# Patient Record
Sex: FEMALE | Race: WHITE | NOT HISPANIC OR LATINO | Employment: OTHER | URBAN - METROPOLITAN AREA
[De-identification: names, ages, dates, MRNs, and addresses within clinical notes are randomized per-mention and may not be internally consistent; named-entity substitution may affect disease eponyms.]

---

## 2017-01-04 ENCOUNTER — ALLSCRIPTS OFFICE VISIT (OUTPATIENT)
Dept: OTHER | Facility: OTHER | Age: 72
End: 2017-01-04

## 2017-01-04 DIAGNOSIS — Z13.820 ENCOUNTER FOR SCREENING FOR OSTEOPOROSIS: ICD-10-CM

## 2017-01-04 DIAGNOSIS — Z12.31 ENCOUNTER FOR SCREENING MAMMOGRAM FOR MALIGNANT NEOPLASM OF BREAST: ICD-10-CM

## 2017-01-04 DIAGNOSIS — E04.2 NONTOXIC MULTINODULAR GOITER: ICD-10-CM

## 2017-01-05 ENCOUNTER — GENERIC CONVERSION - ENCOUNTER (OUTPATIENT)
Dept: OTHER | Facility: OTHER | Age: 72
End: 2017-01-05

## 2017-01-05 ENCOUNTER — LAB CONVERSION - ENCOUNTER (OUTPATIENT)
Dept: OTHER | Facility: OTHER | Age: 72
End: 2017-01-05

## 2017-01-05 LAB — HBA1C MFR BLD HPLC: 11.7 % OF TOTAL HGB

## 2017-01-06 LAB — CULTURE RESULT (HISTORICAL): NORMAL

## 2017-01-11 ENCOUNTER — GENERIC CONVERSION - ENCOUNTER (OUTPATIENT)
Dept: OTHER | Facility: OTHER | Age: 72
End: 2017-01-11

## 2017-01-16 ENCOUNTER — HOSPITAL ENCOUNTER (OUTPATIENT)
Dept: RADIOLOGY | Age: 72
Discharge: HOME | End: 2017-01-16
Payer: MEDICARE

## 2017-01-16 DIAGNOSIS — E04.2 NONTOXIC MULTINODULAR GOITER: ICD-10-CM

## 2017-01-16 DIAGNOSIS — Z12.31 ENCOUNTER FOR SCREENING MAMMOGRAM FOR MALIGNANT NEOPLASM OF BREAST: ICD-10-CM

## 2017-01-16 PROCEDURE — G0202 SCR MAMMO BI INCL CAD: HCPCS

## 2017-01-16 PROCEDURE — 76536 US EXAM OF HEAD AND NECK: CPT

## 2017-02-09 ENCOUNTER — GENERIC CONVERSION - ENCOUNTER (OUTPATIENT)
Dept: OTHER | Facility: OTHER | Age: 72
End: 2017-02-09

## 2017-04-24 ENCOUNTER — ALLSCRIPTS OFFICE VISIT (OUTPATIENT)
Dept: OTHER | Facility: OTHER | Age: 72
End: 2017-04-24

## 2017-04-24 DIAGNOSIS — R09.89 OTHER SPECIFIED SYMPTOMS AND SIGNS INVOLVING THE CIRCULATORY AND RESPIRATORY SYSTEMS: ICD-10-CM

## 2017-04-24 LAB — GLUCOSE SERPL-MCNC: 272 MG/DL

## 2017-04-26 ENCOUNTER — TRANSCRIBE ORDERS (OUTPATIENT)
Dept: LAB | Facility: CLINIC | Age: 72
End: 2017-04-26

## 2017-04-26 ENCOUNTER — APPOINTMENT (OUTPATIENT)
Dept: LAB | Facility: CLINIC | Age: 72
End: 2017-04-26
Payer: MEDICARE

## 2017-04-26 DIAGNOSIS — E55.9 UNSPECIFIED VITAMIN D DEFICIENCY: ICD-10-CM

## 2017-04-26 DIAGNOSIS — E11.65 UNCONTROLLED TYPE 2 DIABETES MELLITUS WITH COMPLICATION, UNSPECIFIED LONG TERM INSULIN USE STATUS: ICD-10-CM

## 2017-04-26 DIAGNOSIS — E78.2 MIXED HYPERLIPIDEMIA: ICD-10-CM

## 2017-04-26 DIAGNOSIS — E04.2 NONTOXIC MULTINODULAR GOITER: ICD-10-CM

## 2017-04-26 DIAGNOSIS — I10 UNSPECIFIED ESSENTIAL HYPERTENSION: ICD-10-CM

## 2017-04-26 DIAGNOSIS — I10 UNSPECIFIED ESSENTIAL HYPERTENSION: Primary | ICD-10-CM

## 2017-04-26 DIAGNOSIS — E11.8 UNCONTROLLED TYPE 2 DIABETES MELLITUS WITH COMPLICATION, UNSPECIFIED LONG TERM INSULIN USE STATUS: ICD-10-CM

## 2017-04-26 LAB
25(OH)D3 SERPL-MCNC: 15.5 NG/ML (ref 30–100)
ALBUMIN SERPL BCP-MCNC: 3.6 G/DL (ref 3.5–5)
ALP SERPL-CCNC: 119 U/L (ref 46–116)
ALT SERPL W P-5'-P-CCNC: 26 U/L (ref 12–78)
ANION GAP SERPL CALCULATED.3IONS-SCNC: 9 MMOL/L (ref 4–13)
AST SERPL W P-5'-P-CCNC: 16 U/L (ref 5–45)
BILIRUB DIRECT SERPL-MCNC: 0.1 MG/DL (ref 0–0.2)
BILIRUB SERPL-MCNC: 0.4 MG/DL (ref 0.2–1)
BUN SERPL-MCNC: 13 MG/DL (ref 5–25)
CALCIUM SERPL-MCNC: 9.5 MG/DL (ref 8.3–10.1)
CHLORIDE SERPL-SCNC: 97 MMOL/L (ref 100–108)
CHOLEST SERPL-MCNC: 234 MG/DL (ref 50–200)
CO2 SERPL-SCNC: 31 MMOL/L (ref 21–32)
CREAT SERPL-MCNC: 0.71 MG/DL (ref 0.6–1.3)
CREAT UR-MCNC: 89.1 MG/DL
EST. AVERAGE GLUCOSE BLD GHB EST-MCNC: 255 MG/DL
GFR SERPL CREATININE-BSD FRML MDRD: >60 ML/MIN/1.73SQ M
GLUCOSE SERPL-MCNC: 284 MG/DL (ref 65–140)
HBA1C MFR BLD: 10.5 % (ref 4.2–6.3)
HDLC SERPL-MCNC: 51 MG/DL (ref 40–60)
LDLC SERPL CALC-MCNC: 150 MG/DL (ref 0–100)
MICROALBUMIN UR-MCNC: 9.8 MG/L (ref 0–20)
MICROALBUMIN/CREAT 24H UR: 11 MG/G CREATININE (ref 0–30)
PHOSPHATE SERPL-MCNC: 3.3 MG/DL (ref 2.3–4.1)
POTASSIUM SERPL-SCNC: 4.5 MMOL/L (ref 3.5–5.3)
PROT SERPL-MCNC: 7.6 G/DL (ref 6.4–8.2)
PTH-INTACT SERPL-MCNC: 77.6 PG/ML (ref 14–72)
SODIUM SERPL-SCNC: 137 MMOL/L (ref 136–145)
T4 FREE SERPL-MCNC: 1.06 NG/DL (ref 0.76–1.46)
TRIGL SERPL-MCNC: 163 MG/DL
TSH SERPL DL<=0.05 MIU/L-ACNC: 1.89 UIU/ML (ref 0.36–3.74)

## 2017-04-26 PROCEDURE — 80076 HEPATIC FUNCTION PANEL: CPT

## 2017-04-26 PROCEDURE — 82306 VITAMIN D 25 HYDROXY: CPT

## 2017-04-26 PROCEDURE — 84100 ASSAY OF PHOSPHORUS: CPT

## 2017-04-26 PROCEDURE — 80048 BASIC METABOLIC PNL TOTAL CA: CPT

## 2017-04-26 PROCEDURE — 83036 HEMOGLOBIN GLYCOSYLATED A1C: CPT

## 2017-04-26 PROCEDURE — 84443 ASSAY THYROID STIM HORMONE: CPT

## 2017-04-26 PROCEDURE — 83970 ASSAY OF PARATHORMONE: CPT

## 2017-04-26 PROCEDURE — 36415 COLL VENOUS BLD VENIPUNCTURE: CPT

## 2017-04-26 PROCEDURE — 84439 ASSAY OF FREE THYROXINE: CPT

## 2017-04-26 PROCEDURE — 82043 UR ALBUMIN QUANTITATIVE: CPT | Performed by: INTERNAL MEDICINE

## 2017-04-26 PROCEDURE — 82570 ASSAY OF URINE CREATININE: CPT | Performed by: INTERNAL MEDICINE

## 2017-04-26 PROCEDURE — 84681 ASSAY OF C-PEPTIDE: CPT

## 2017-04-26 PROCEDURE — 80061 LIPID PANEL: CPT

## 2017-04-27 LAB — C PEPTIDE SERPL-MCNC: 3.5 NG/ML (ref 1.1–4.4)

## 2017-04-28 ENCOUNTER — GENERIC CONVERSION - ENCOUNTER (OUTPATIENT)
Dept: OTHER | Facility: OTHER | Age: 72
End: 2017-04-28

## 2017-09-11 ENCOUNTER — ALLSCRIPTS OFFICE VISIT (OUTPATIENT)
Dept: OTHER | Facility: OTHER | Age: 72
End: 2017-09-11

## 2017-09-14 ENCOUNTER — LAB CONVERSION - ENCOUNTER (OUTPATIENT)
Dept: OTHER | Facility: OTHER | Age: 72
End: 2017-09-14

## 2017-09-14 LAB
BUN SERPL-MCNC: 16 MG/DL (ref 7–25)
BUN/CREA RATIO (HISTORICAL): ABNORMAL (CALC) (ref 6–22)
CALCIUM SERPL-MCNC: 9.4 MG/DL (ref 8.6–10.4)
CHLORIDE SERPL-SCNC: 99 MMOL/L (ref 98–110)
CO2 SERPL-SCNC: 25 MMOL/L (ref 20–31)
CREAT SERPL-MCNC: 0.68 MG/DL (ref 0.6–0.93)
EGFR AFRICAN AMERICAN (HISTORICAL): 102 ML/MIN/1.73M2
EGFR-AMERICAN CALC (HISTORICAL): 88 ML/MIN/1.73M2
GLUCOSE (HISTORICAL): 344 MG/DL (ref 65–99)
HBA1C MFR BLD HPLC: 10.8 % OF TOTAL HGB
POTASSIUM SERPL-SCNC: 4.5 MMOL/L (ref 3.5–5.3)
SODIUM SERPL-SCNC: 135 MMOL/L (ref 135–146)

## 2017-09-20 ENCOUNTER — GENERIC CONVERSION - ENCOUNTER (OUTPATIENT)
Dept: OTHER | Facility: OTHER | Age: 72
End: 2017-09-20

## 2018-01-12 NOTE — RESULT NOTES
Message   Madi Graff called her and told her that her throat culture was negative  She may discontinue antibiotics  Verified Results  (1) THROAT CULTURE (CULTURE, UPPER RESPIRATORY) [id number] 12:00AM Booker Esteves     Test Name Result Flag Reference   CULTURE, THROAT      CULTURE, THROAT         MICRO NUMBER:      [id number]    TEST STATUS:       FINAL    SPECIMEN SOURCE:   NOT GIVEN    SPECIMEN QUALITY:  ADEQUATE    RESULT:            No oropharyngeal pathogens recovered

## 2018-01-13 VITALS
DIASTOLIC BLOOD PRESSURE: 90 MMHG | SYSTOLIC BLOOD PRESSURE: 160 MMHG | WEIGHT: 260.38 LBS | HEART RATE: 84 BPM | HEIGHT: 68 IN | BODY MASS INDEX: 39.46 KG/M2

## 2018-01-13 VITALS
TEMPERATURE: 99.7 F | SYSTOLIC BLOOD PRESSURE: 140 MMHG | BODY MASS INDEX: 39.25 KG/M2 | DIASTOLIC BLOOD PRESSURE: 70 MMHG | HEIGHT: 68 IN | WEIGHT: 259 LBS

## 2018-01-13 VITALS
WEIGHT: 255 LBS | BODY MASS INDEX: 38.65 KG/M2 | SYSTOLIC BLOOD PRESSURE: 144 MMHG | DIASTOLIC BLOOD PRESSURE: 82 MMHG | HEIGHT: 68 IN

## 2018-01-13 NOTE — RESULT NOTES
Verified Results  (1) BASIC METABOLIC PROFILE [id number] 12:00AM Manpreet Ho     Test Name Result Flag Reference   GLUCOSE 344 mg/dL H 65-99   Fasting reference interval     For someone without known diabetes, a glucose  value >125 mg/dL indicates that they may have  diabetes and this should be confirmed with a  follow-up test    UREA NITROGEN (BUN) 16 mg/dL  7-25   CREATININE 0 68 mg/dL  0 60-0 93   For patients >52years of age, the reference limit  for Creatinine is approximately 13% higher for people  identified as -American  eGFR NON-AFR  AMERICAN 88 mL/min/1 73m2  > OR = 60   eGFR AFRICAN AMERICAN 102 mL/min/1 73m2  > OR = 60   BUN/CREATININE RATIO   4-97   NOT APPLICABLE (calc)   SODIUM 135 mmol/L  135-146   POTASSIUM 4 5 mmol/L  3 5-5 3   CHLORIDE 99 mmol/L     CARBON DIOXIDE 25 mmol/L  20-31   CALCIUM 9 4 mg/dL  8 6-10 4     (Q) HEMOGLOBIN A1c [id number] 12:00AM Booker Esteves     Test Name Result Flag Reference   HEMOGLOBIN A1c 10 8 % of total Hgb H <5 7   For someone without known diabetes, a hemoglobin A1c  value of 6 5% or greater indicates that they may have   diabetes and this should be confirmed with a follow-up   test      For someone with known diabetes, a value <7% indicates   that their diabetes is well controlled and a value   greater than or equal to 7% indicates suboptimal   control  A1c targets should be individualized based on   duration of diabetes, age, comorbid conditions, and   other considerations  Currently, no consensus exists regarding use of  hemoglobin A1c for diagnosis of diabetes for children         Plan  Diabetes type 2, uncontrolled    · MetFORMIN HCl ER (OSM) 1000 MG Oral Tablet Extended Release 24 Hour;  take one tablet by mouth twice daily

## 2018-01-13 NOTE — RESULT NOTES
Discussion/Summary   diabetes is out of control  can start metformin as she wishes but would need more meds as a1c is 10 5%  send us Logs  high BG would be deterimental to her body organs  vitamin D is low , let us know if she is willing to take 50K weekly x 8 weeks  Verified Results  (1) RENAL FUNCTION PANEL 26Apr2017 06:28AM Shalonda Bettencourt     Test Name Result Flag Reference   PHOSPHORUS 3 3 mg/dL  2 3-4 1     (1) LIPID PANEL, FASTING 26Apr2017 06:28AM Shalonda Bettencourt     Test Name Result Flag Reference   CHOLESTEROL 234 mg/dL H    HDL,DIRECT 51 mg/dL  40-60   Specimen collection should occur prior to Metamizole administration due to the potential for falsely depressed results  LDL CHOLESTEROL CALCULATED 150 mg/dL H 0-100   This is a fasting blood test  Water,black tea or black  coffee only after 9:00pm the night before test  Drink 2 glasses of water the morning of test         Triglyceride:         Normal              <150 mg/dl       Borderline High    150-199 mg/dl       High               200-499 mg/dl       Very High          >499 mg/dl  Cholesterol:         Desirable        <200 mg/dl      Borderline High  200-239 mg/dl      High             >239 mg/dl  HDL Cholesterol:        High    >59 mg/dL      Low     <41 mg/dL  LDL CALCULATED:    This screening LDL is a calculated result  It does not have the accuracy of the Direct Measured LDL in the monitoring of patients with hyperlipidemia and/or statin therapy  Direct Measure LDL ([id number]) must be ordered separately in these patients  TRIGLYCERIDES 163 mg/dL H <=150   Specimen collection should occur prior to N-Acetylcysteine or Metamizole administration due to the potential for falsely depressed results  (1) HEPATIC FUNCTION PANEL 26Apr2017 06:28AM Sahlonda Bettencourt     Test Name Result Flag Reference   ALBUMIN 3 6 g/dL  3 5-5 0   This bloodwork is non-fasting  Please drink two glasses of water morning of  bloodwork     ALK PHOSPHATAS 119 U/L H    ALT (SGPT) 26 U/L  12-78   AST(SGOT) 16 U/L  5-45   BILI, DIRECT 0 10 mg/dL  0 00-0 20   BILI, TOTAL 0 40 mg/dL  0 20-1 00   TOTAL PROTEIN 7 6 g/dL  6 4-8 2     (1) RENAL FUNCTION PANEL 26Apr2017 06:28 Lisa Ku     Test Name Result Flag Reference   ANION GAP (CALC) 9 mmol/L  4-13   BLOOD UREA NITROGEN 13 mg/dL  5-25   CALCIUM 9 5 mg/dL  8 3-10 1   CHLORIDE 97 mmol/L L 100-108   CARBON DIOXIDE 31 mmol/L  21-32   CREATININE 0 71 mg/dL  0 60-1 30   Standardized to IDMS reference method   GLUCOSE,RANDM 284 mg/dL H    If the patient is fasting, the ADA then defines impaired fasting glucose as > 100 mg/dL and diabetes as > or equal to 123 mg/dL  POTASSIUM 4 5 mmol/L  3 5-5 3   eGFR Non-African American      >60 0 ml/min/1 [address] [facility] Disease Education Program recommendations are as follows:  GFR calculation is accurate only with a steady state creatinine  Chronic Kidney disease less than 60 ml/min/1 73 sq  meters  Kidney failure less than 15 ml/min/1 73 sq  meters  SODIUM 137 mmol/L  136-145     (1) TSH 26Apr2017 06:28 Lisa Bondgess     Test Name Result Flag Reference   TSH 1 890 uIU/mL  0 358-3 740   This bloodwork is non-fasting  Please drink two glasses of water morning of  bloodwork  Patients undergoing fluorescein dye angiography may retain small amounts of fluorescein in the body for 48-72 hours post procedure  Samples containing fluorescein can produce falsely depressed TSH values  If the patient had this procedure,a specimen should be resubmitted post fluorescein clearance            The recommended reference ranges for TSH during pregnancy are as follows:  First trimester 0 1 to 2 5 uIU/mL  Second trimester  0 2 to 3 0 uIU/mL  Third trimester 0 3 to 3 0 uIU/m     (1) T4, FREE 26Apr2017 06:28 dooub     Test Name Result Flag Reference   T4,FREE 1 06 ng/dL  0 76-1 46     (1) PTH N-TERMINAL (INTACT) 26Apr2017 06:28 dooub     Test Name Result Flag Reference   PARATHYROID HORMONE INTACT 77 6 pg/mL H 14 0-72 0     (1) VITAMIN D 25-HYDROXY 26Apr2017 06:28AM Jillian Diss     Test Name Result Flag Reference   VIT D 25-HYDROX 15 5 ng/mL L 30 0-100 0   This assay is a certified procedure of the CDC Vitamin D Standardization Certification Program (VDSCP)     Deficiency <20ng/ml   Insufficiency 20-30ng/ml   Sufficient  ng/ml     *Patients undergoing fluorescein dye angiography may retain small amounts of fluorescein in the body for 48-72 hours post procedure  Samples containing fluorescein can produce falsely elevated Vitamin D values  If the patient had this procedure, a specimen should be resubmitted post fluorescein clearance  (1) HEMOGLOBIN A1C 26Apr2017 06:28AM Jillian Diss     Test Name Result Flag Reference   HEMOGLOBIN A1C 10 5 % H 4 2-6 3   EST  AVG  GLUCOSE 255 mg/dl       (1) MICROALBUMIN CREATININE RATIO, RANDOM URINE 26Apr2017 06:28AM Jillian Diss     Test Name Result Flag Reference   MICROALBUMIN/ CREAT R 11 mg/g creatinine  0-30   MICROALBUMIN,URINE 9 8 mg/L  0 0-20 0   CREATININE URINE 89 1 mg/dL       (1) C-PEPTIDE 26Apr2017 06:28AM Jillian Diss     Test Name Result Flag Reference   C PEPTIDE 3 5 ng/mL  1 1 - 4 4   C-Peptide reference interval is for fasting patients    Performed at:  [address] [place] [address]  [id number]  : Roman eDng MD, Phone:  [phone]

## 2018-01-14 NOTE — RESULT NOTES
Message   Please tell her that her A1c was 11 7  Let us know if she is unable to obtain appointment with endocrinology  Verified Results  (Q) HEMOGLOBIN A1c [id number] 12:00AM Thurlow Fall     Test Name Result Flag Reference   HEMOGLOBIN A1c 11 7 % of total Hgb H <5 7   According to ADA guidelines, hemoglobin A1c <7 0%  represents optimal control in non-pregnant diabetic  patients  Different metrics may apply to specific  patient populations  Standards of Medical Care in    Diabetes Care  2013;36:s11-s66     For the purpose of screening for the presence of  diabetes  <5 7%       Consistent with the absence of diabetes  5 7-6 4%    Consistent with increased risk for diabetes              (prediabetes)  >or=6 5%    Consistent with diabetes     This assay result is consistent with diabetes  mellitus  Currently, no consensus exists for use of hemoglobin  A1c for diagnosis of diabetes for children

## 2018-01-15 NOTE — RESULT NOTES
Message   Please tell her that her thyroid reveals no change in nodules from her previous ultrasound according to radiologist  None appear suspicious but she should discuss this with endocrinology when she sees them later this month in regards to this as well as her diabetes  Verified Results  * MAMMO SCREENING BILATERAL W CAD [id number] 09:45AM Cyndie Gandhi Order Number: [id number]    - Patient Instructions: To schedule this appointment, please contact Central Scheduling at [phone]  Do not wear any perfume, powder, lotion or deodorant on breast or underarm area  Please bring your doctors order, referral (if needed) and insurance information with you on the day of the test  Failure to bring this information may result in this test being rescheduled  Arrive 15 minutes prior to your appointment time to register  On the day of your test, please bring any prior mammogram or breast studies with you that were not performed at a [facility]  Failure to bring prior exams may result in your test needing to be rescheduled  Test Name Result Flag Reference   MAMMO SCREENING BILATERAL W CAD (Report)     Patient History:   Patient is postmenopausal and has history of endometrial cancer    at age 62  Family history of breast cancer in maternal half sister  Took hormonal contraceptives for 27 years  Patient has never smoked  Patient's BMI is 38 0      Reason for exam: screening (asymptomatic)  Mammo Screening Bilateral W CAD: January 16, 2017 - Check In #:    [de-identified]   Bilateral MLO and CC view(s) were taken  Technologist: Rupert Duverney, R T (EVAN)(M)   Prior study comparison: December 17, 2014, digital bilateral    screening mammogram performed at [facility]  June 26, 2013, digital bilateral screening mammogram performed at   [facility]  January 23, 2012, digital    bilateral screening mammogram performed at [facility]  December 2, 2010, digital bilateral screening mammogram    performed at [facility]  January 21, 2009,    bilateral SLNBIC DIGTL SCRN MAMMO performed at [facility]  There are scattered fibroglandular densities  No dominant soft tissue mass, architectural distortion or    suspicious calcifications are noted in either breast  The skin    and nipple contours are within normal limits  No evidence of malignancy  No significant changes when compared with prior studies  ASSESSMENT: BiRad:1 - Negative     Recommendation:   Routine screening mammogram of both breasts in 1 year  A    reminder letter will be scheduled  Analyzed by CAD     8-10% of cancers will be missed on mammography  Management of a    palpable abnormality must be based on clinical grounds  Patients   will be notified of their results via letter from our facility  Accredited by [employer] of Radiology and FDA  Transcription Location: EVAN Mcneil 98: [id number]     Risk Value(s):   Tyrer-Cuzick 10 Year: 4 237%, Tyrer-Cuzick Lifetime: 6 682%,    Myriad Table: 1 5%, TONY 5 Year: 1 4%, NCI Lifetime: 4 1%   Signed by:   Marilin Valencia MD   1/16/17     US THYROID [id number] 09:43AM Shalom Gave Order Number: [id number]    - Patient Instructions: To schedule this appointment, please contact Central Scheduling at [phone]  Test Name Result Flag Reference   US THYROID (Report)     THYROID ULTRASOUND     INDICATION: Nontoxic multinodular goiter  COMPARISON: Report from outside institution dated 12/22/2014     TECHNIQUE:  Ultrasound of the thyroid was performed with a high frequency linear transducer in transverse and sagittal planes including volumetric imaging sweeps as well as traditional still imaging technique  FINDINGS:   Diffusely heterogeneous echotexture     Right gland: 4 7 x 3 0 x 1 8 cm      Mid region nearly isoechoic nodule measuring 1 8 x 1 5 x 1 4 cm previously described as 2 1 x 1 9 x 1 6 cm  Lower pole nodule measures 1 6 x 1 1 x 1 3 cm earlier described as 1 7 x 1 3 x 1 5 cm  Left gland: 3 7 x 2 0 x 1 6 cm  No dominant nodules  Isthmus: The isthmus is 0 5 cm in AP dimension  Right isthmus hypoechoic nodule measures 0 8 x 0 5 x 0 7 cm  This was earlier described as 0 7 x 0 5 x 0 4 cm  IMPRESSION:      Right thyroid nodules and right isthmus nodule, without apparent significant change from earlier report descriptions from 2014               Workstation performed: [id number]     Signed by:   Gregorio Fitzgerald MD   1/16/17

## 2018-01-22 ENCOUNTER — HOSPITAL ENCOUNTER (OUTPATIENT)
Dept: RADIOLOGY | Age: 73
Discharge: HOME | End: 2018-01-22
Payer: MEDICARE

## 2018-01-22 DIAGNOSIS — Z12.31 ENCOUNTER FOR SCREENING MAMMOGRAM FOR MALIGNANT NEOPLASM OF BREAST: ICD-10-CM

## 2018-01-22 PROCEDURE — 77067 SCR MAMMO BI INCL CAD: CPT

## 2018-01-24 NOTE — CONSULTS
Assessment    1  Diabetes type 2, uncontrolled (250 02) (E11 65)   2  Multiple thyroid nodules (241 1) (E04 2)   3  Hyperlipidemia, mixed (272 2) (E78 2)   4  Benign essential hypertension (401 1) (I10)   5  Vitamin D deficiency (268 9) (E55 9)   6  Decreased dorsalis pedis pulse (785 9) (R09 89)      # Obese uncontrolled Type 2 Diabetes Mellitus: uncontrolled, a1c,  11 7%  gfr, ?   -No blood sugar logs to review, has self beliefs to control diabetes, get CGM yenny if agrees  -Please start self-monitoring of blood glucose and undergo comprehensive diabetes education  -We had a lengthy discussion about the effect of hyperglycemia on multiple organ damage, largely irreversible  -If renal function is normal, she would only like to try metformin 500 mg twice a day only  -we discussed possibility of other medications which she does not seem to be ready for them yet    2  MNG: asymptomatic  -obtain old US and FNA reports from 2014 [facility]  -per patient all of them were bx negative  -New US in Jan 2017 showed stable nodules  -check TSH and Ft4 now    3  HTN and HLP:  -should be on aceI and Statins if she agrees  -check lipids and monitor BP at home  -discussed CVD prevention    4  R/o PAD: decreased peripheral pulses  -obtain ASHLEY and vascular dopler     Plan  Benign essential hypertension, Diabetes type 2, uncontrolled, Hyperlipidemia, mixed,  Multiple thyroid nodules, Vitamin D deficiency    · (1) C-PEPTIDE; Status:Active; Requested for:24Apr2017;    Perform:[facility]; Due:24Apr2018; Ordered; For:Benign essential hypertension, Diabetes type 2, uncontrolled, Hyperlipidemia, mixed, Multiple thyroid nodules, Vitamin D deficiency; Ordered By:Cong Retana;   · (1) HEMOGLOBIN A1C; Status:Active; Requested for:24Apr2017;    Perform:[facility]; Due:[id number]; Ordered; For:Benign essential hypertension, Diabetes type 2, uncontrolled, Hyperlipidemia, mixed, Multiple thyroid nodules, Vitamin D deficiency;  Ordered By:Cong Retana;   · (1) HEPATIC FUNCTION PANEL; Status:Active; Requested for:24Apr2017;    Perform:[facility]; Due:24Apr2018; Ordered; For:Benign essential hypertension, Diabetes type 2, uncontrolled, Hyperlipidemia, mixed, Multiple thyroid nodules, Vitamin D deficiency; Ordered By:Cong Retana;   · (1) LIPID PANEL, FASTING; Status:Active; Requested for:24Apr2017;    Perform:[facility]; Due:24Apr2018; Ordered; For:Benign essential hypertension, Diabetes type 2, uncontrolled, Hyperlipidemia, mixed, Multiple thyroid nodules, Vitamin D deficiency; Ordered By:Cong Retana;   · (1) MICROALBUMIN CREATININE RATIO, RANDOM URINE; Status:Active; Requested  for:24Apr2017;    Perform:[facility]; Due:24Apr2018; Ordered; For:Benign essential hypertension, Diabetes type 2, uncontrolled, Hyperlipidemia, mixed, Multiple thyroid nodules, Vitamin D deficiency; Ordered By:Cong Retana;   · (1) PTH N-TERMINAL (INTACT); Status:Active; Requested for:24Apr2017;    Perform:[facility]; Due:24Apr2018; Ordered; For:Benign essential hypertension, Diabetes type 2, uncontrolled, Hyperlipidemia, mixed, Multiple thyroid nodules, Vitamin D deficiency; Ordered By:Cong Retana;   · (1) RENAL FUNCTION PANEL; Status:Active; Requested for:24Apr2017;    Perform:[facility]; Due:24Apr2018; Ordered; For:Benign essential hypertension, Diabetes type 2, uncontrolled, Hyperlipidemia, mixed, Multiple thyroid nodules, Vitamin D deficiency; Ordered By:Cong Retana;   · (1) T4, FREE; Status:Active; Requested for:24Apr2017;    Perform:[facility]; Due:24Apr2018; Ordered; For:Benign essential hypertension, Diabetes type 2, uncontrolled, Hyperlipidemia, mixed, Multiple thyroid nodules, Vitamin D deficiency; Ordered By:Cong Retana;   · (1) TSH; Status:Active; Requested for:24Apr2017;    Perform:[facility]; Due:24Apr2018; Ordered; For:Benign essential hypertension, Diabetes type 2, uncontrolled, Hyperlipidemia, mixed, Multiple thyroid nodules, Vitamin D deficiency;  Ordered By:Cong Retana;   · (1) VITAMIN D 25-HYDROXY; Status:Active; Requested for:24Apr2017;    Perform:[facility]; Due:24Apr2018; Ordered; For:Benign essential hypertension, Diabetes type 2, uncontrolled, Hyperlipidemia, mixed, Multiple thyroid nodules, Vitamin D deficiency; Ordered By:Cong Retana;   · Continuous Glucose Monitoring CGM; Status:Hold For - Scheduling; Requested  for:24Apr2017;    Perform:Not Applicable; Sophia Lyman; XQK:[id number]; Ordered; For:Benign essential hypertension, Diabetes type 2, uncontrolled, Hyperlipidemia, mixed, Multiple thyroid nodules, Vitamin D deficiency; Ordered By:Cong Retana;   · Follow-up visit in 6 months Evaluation and Treatment  Follow-up  Status: Hold For -  Scheduling  Requested for: 24Apr2017   Ordered; For: Benign essential hypertension, Diabetes type 2, uncontrolled, Hyperlipidemia, mixed, Multiple thyroid nodules, Vitamin D deficiency; Ordered By: Magnolia Mcginnis Performed:  Due: [id number]   · Follow-Up With Advanced Practitioner Evaluation and Treatment  Follow-up  Status: Hold  For - Scheduling  Requested for: 24Apr2017   Ordered; For: Benign essential hypertension, Diabetes type 2, uncontrolled, Hyperlipidemia, mixed, Multiple thyroid nodules, Vitamin D deficiency; Ordered By: Magnolia Mcginnis Performed:  Due: [id number]   · *1 - [facility] DIABETES SELF MANAGEMENT TRAINING OUTPATIENT Co-Management  *   Status: Hold For - Scheduling  Requested for: 24Apr2017   Ordered; For: Benign essential hypertension, Diabetes type 2, uncontrolled, Hyperlipidemia, mixed, Multiple thyroid nodules, Vitamin D deficiency; Ordered By: Magnolia Mcginnis Performed:  Due: 42GWA2669  Counting : Yes  requires instruction : Yes  Needs requiring Individual DSMT? : No  Self-Management Education/Trainng : Living Well with Diabetes Education      Program  Care Summary provided  : Yes   · *1 - [facility] NUTRITION THERAPY FOR DIABETES Co-Management  *  Status:  Need Information - Financial Authorization  Requested for: 24Apr2017   Ordered;  For: Benign essential hypertension, Diabetes type 2, uncontrolled, Hyperlipidemia, mixed, Multiple thyroid nodules, Vitamin D deficiency; Ordered By: John Ramos Performed:  Due: 29TSG3350  Care Summary provided  : Yes   · 2 - José Manuel Mendez  (Podiatry) Co-Management  *  Status: Hold For - Scheduling   Requested for: 01OFK2843   Ordered; For: Benign essential hypertension, Diabetes type 2, uncontrolled, Hyperlipidemia, mixed, Multiple thyroid nodules, Vitamin D deficiency; Ordered By: John Ramos Performed:  Due: 00RNN9433  Care Summary provided  : Yes   · 2 - Judith Cordon  (Podiatry) Co-Management  *  Status: Hold For - Scheduling   Requested for: 08XUF2407   Ordered; For: Benign essential hypertension, Diabetes type 2, uncontrolled, Hyperlipidemia, mixed, Multiple thyroid nodules, Vitamin D deficiency; Ordered By: John Ramos Performed:  Due: 40CMO6418  Care Summary provided  : Yes  Decreased dorsalis pedis pulse    · VAS ASHLEY & WAVEFORM ANALYSIS, MULTIPLE LEVELS; Status:Hold For - Scheduling;  Requested for:24Apr2017;    Perform:[facility]; Due:24Apr2018; Ordered; For:Decreased dorsalis pedis pulse; Ordered By:Cong Retana;  Diabetes type 2, uncontrolled    · Blood Glucose- POC; Status:Resulted - Requires Verification,Retrospective  Authorization;   Done: 08OFQ6572 09:17AM   Performed: In Office; Due:24Apr2018; Last Updated Adria Jean; 4/24/2017 9:18:21 AM;Ordered; For:Diabetes type 2, uncontrolled; Ordered By:Cong Retana; Fasting (Y/N) : No - N    Discussion/Summary  Discussion Summary:   1  Please check blood sugars 2-4 times a day and send us logs  2  please carry glucose tablets with you all the time  3  Please call us probably the blood sugar is less than 70 or more than 300 more than twice  4  Please followed regularly with diabetes educator, podiatrist and ophthalmologist  5   Please perform lab work as advised and make sure we have discussed it in the office visit or on the phone within 7-14 days  6  If you are on insulin therapy, please make sure you rotate insulin injection sites to avoid any hard areas  Counseling Documentation With Imm: The patient was counseled regarding diagnostic results, instructions for management, risk factor reductions, impressions, risks and benefits of treatment options, importance of compliance with treatment  Medication SE Review and Pt Understands Tx: Possible side effects of new medications were reviewed with the patient/guardian today  The treatment plan was reviewed with the patient/guardian  The patient/guardian understands and agrees with the treatment plan      Chief Complaint  Chief Complaint Free Text Note Form: Consult      History of Present Illness  HPI: The patient is a 78-year-old female with a past medical history of type 2 diabetes mellitus for at least 2 years and maybe longer, uncontrolled due to noncompliance and self beliefs, was seen by an endocrinologist in the past and used metformin for some time, reported self-improvement of blood sugars by losing 30 pounds of weight, last A1c was 11 7% Jan 2017 and apparently in 2015 also, comes for initial visit  She does not like diabetes medications and would like to avoid them  She recently had back pain and is not able to exercise  She thinks that the exercise and her weight loss would improve blood sugars  Denies any known diabetes complications and has seen a ophthalmologist  No previous heart attack or stroke  She denies any neuropathy but has numbness off and on with cold feet  She barely checks her blood sugar once or twice a week  No hypoglycemia is reported  Had previous diabetes education and seeing a nutritionist in [facility]  It appears from the chart that probably she has hypertension but is not on any medications  She thinks that today she is nervous and having hypertension d/t the same   She is also reporting a borderline hyper cholesterolemia but we do not have any previous lab work to review  She is not on any cholesterol medication and would like to avoid if possible  She is not on a vitamin D replacement  She also had thyroid nodules were diagnosed and biopsied about 2 years ago at [facility]  She reported benign biopsy  We do not have her old reports review  No family history of thyroid cancer or Head and neck radiation but was exposed to [place] accident in 13  Diabetes: The patient is being seen for an initial evaluation of Diabetes Mellitus 2  The HbA1c was   See Medication List for current medication(s)  By report, there is poor compliance with treatment, poor tolerance of treatment and poor symptom control  Current pertinent lifestyle factors include obesity, disordered eating and inactivity  Disease Course and Complications:  there have been no previous episodes of diabetic ketoacidosis  Goiter: The patient is being seen for an initial evaluation of a goiter  Past evaluation has included thyroid ultrasound  The patient is currently asymptomatic  The patient is not currently being treated for this problem  Hyperlipidemia (Follow-Up): The patient states her hyperlipidemia has been poorly controlled since the last visit  Comorbid Illnesses: diabetes mellitus and hypertension  She has no significant interval events  Symptoms: The patient is currently asymptomatic  Medications: The patient is not currently on any medications for her hyperlipidemia  The patient is not doing well with her hyperlipidemia goals  Hypertension (Follow-Up): The patient presents for follow-up of essential hypertension  The patient states she has been doing poorly with her blood pressure control since the last visit  She has no significant interval events  Symptoms: The patient is currently asymptomatic        Review of Systems  Endo Adult ROS Female New Patient:   Constitutional/General: no change in ring size, no change in shoe size, no chills, no dizziness, no fainting, no fatigue, no fever, no forgetfulness, no headache, no loss of sleep, no recent weight loss, no nervousness, no numbness, no temperature intolerance, no excessive sweating and no weight gain  Muscle/Joint/Bone: no arm pain, no back pain, no hip pain, no leg pain, no foot pain, no neck pain, no hand pain, no shoulder pain, no arm weakness, no back weakness, no hip weakness, no leg weakness, no foot weakness, no neck weakness, no hand weakness, no shoulder weakness, no arm numbness, no back numbness, no hip numbness, no leg numbness, no foot numbness, no neck numbness, no hand numbness and no shoulder numbness  Gastrointestinal: no constipation, no diarrhea, no excessive hunger, no excessive thirst, no nausea, no poor appetite, no rectal bleeding, no stomach pain, no vomiting and no vomiting blood  Cardiovascular: no chest pain, no hypertension, no irregular heart beat, no hypotension, no poor circulation, no rapid heart beat and no ankle swelling  Eye/Ear/Nose/Throat: no bleeding gums, no blurred vision, no difficulty swallowing, no double vision, no gritty eyes, no hoarseness, no hearing loss, no persistent cough, no sinus problems, not seeing flashes and not seeing halos  Skin: no easy bruising, no hives, no itching, no change in a mole, no rashes, no scar and no slow healing sores  Genitourinary frequent urination and night time urination, but no blood in urine and no painful urination  Genitourinary - Reproductive 3 Children, but normal period, no bleeding between periods, no breast lump, no hot flashes, no nipple discharge, no vaginal discharge and not pregnant  date of LMP is not applicable  LMP intervals not applicable  the interval length of the last menstruation is not applicable   ROS Reviewed:   ROS reviewed  Active Problems    1  Acute pharyngitis, unspecified etiology (462) (J02 9)   2  Carpal tunnel syndrome (354 0) (G56 00)   3   Dermatitis (692 9) (L30 9)   4  Diabetes type 2, uncontrolled (250 02) (E11 65)   5  Multiple thyroid nodules (241 1) (E04 2)   6  Screening for osteoporosis (V82 81) (Z13 820)   7  Synovial Cyst (727 40)    Past Medical History    1  History of acute sinusitis (V12 69) (Z87 09)  Active Problems And Past Medical History Reviewed: The active problems and past medical history were reviewed and updated today  Surgical History    1  History of Hysterectomy   2  History of Tonsillectomy   3  History of Tubal Ligation  Surgical History Reviewed: The surgical history was reviewed and updated today  Family History  Brother    1  Family history of Chronic Obstructive Pulmonary Disease   2  Family history of Coronary Artery Disease (V17 49)  Family History Reviewed: The family history was reviewed and updated today  Social History    · Never A Smoker  Social History Reviewed: The social history was reviewed and updated today  Current Meds   1  Amoxicillin 875 MG Oral Tablet; TAKE 1 TABLET EVERY 12 HOURS DAILY; Therapy: 01MAI5948 to (Evaluate:14Jan2017)  Requested for: 79CPT4889; Last   Rx:04Jan2017 Ordered   2  Multivitamin TABS; TAKE 1 TABLET DAILY; Therapy: (Recorded:24Apr2017) to Recorded   3  Promethazine-DM 6 25-15 MG/5ML Oral Syrup; TAKE 5 - 10 ML BY MOUTH EVERY 6   HOURS AS NEEDED; Therapy: 98WQL0017 to (Evaluate:07Jan2017)  Requested for: 85NHR9988; Last   Rx:04Jan2017 Ordered  Medication List Reviewed: The medication list was reviewed and updated today  Allergies    1  No Known Drug Allergies    Vitals  Vital Signs    Recorded: 24Apr2017 09:14AM   Heart Rate 84   Systolic 901   Diastolic 90   Height 5 ft 8 in   Weight 260 lb 6 00 oz   BMI Calculated 39 59   BSA Calculated 2 29     Physical Exam    Constitutional   General appearance: No acute distress, well appearing and well nourished  Obese but not cushingoid  Eyes   Conjunctiva and lids: No swelling, erythema, or discharge      Pupils: Equal, round and reactive to light  The sclera are anicteric  Extraocular movements are intact  Ears, Nose, Mouth, and Throat   External inspection of ears, nose and lips: Normal     Oropharynx: Normal with no erythema, edema, exudate or lesions  Exam of Head: The head is atraumatic and normocephalic  Neck: Abnormal   Bilateral thyroid nodules  Pulmonary   Auscultation of lungs: Clear to auscultation bilaterally with normal chest expansion  Cardiovascular   Auscultation of heart: Normal rate and rhythm with no murmurs, gallops or rubs  Examination of extremities for edema and/or varicosities: Abnormal     Examination of extremities for edema and/or varicosities: Abnormal     Abdomen   Abdomen: Abdomen is soft, non-tender with normal bowel sounds  Lymphatic   Palpation of lymph nodes: No supraclavicular or suboccipital lymphadenopathy  Musculoskeletal   Inspection/palpation of joints, bones, and muscles: Muscle bulk and tone is normal     Skin   Skin and subcutaneous tissue: Abnormal     Neurologic   Reflexes: 2+ and symmetric  Motor Strength: Strength is 5/5 bilaterally  Psychiatric   Orientation to person, place and time: Normal     Mood and affect: Affect and attention span are normal       Socks and shoes removed, Right Foot Findings: swollen, but no warmth  The right toes were normal  Diminished tactile sensation with monofilament testing throughout the right foot  Socks and shoes removed, Left Foot Findings: swollen, but no warmth  The left toes were normal  Diminished tactile sensation with monofilament testing throughout the left foot  Pulses:   1+ in the posterior tibialis on the right   1+ in the dorsalis pedis on the right  Pulses:   1+ in the posterior tibialis on the left   1+ in the dorsalis pedis on the left         Results/Data  Blood Glucose- POC 24Apr2017 09:17AM Fab Rehman     Test Name Result Flag Reference   Glucose Finger Stick [address] Record Review: I have reviewed the office records as summarized above in the HPI  I have requested any additional records  US THYROID [id number] 09:43AM Sera Canales Order Number: [id number]    - Patient Instructions: To schedule this appointment, please contact Central Scheduling at [phone]  Test Name Result Flag Reference   US THYROID (Report)     THYROID ULTRASOUND     INDICATION: Nontoxic multinodular goiter  COMPARISON: Report from outside institution dated 12/22/2014     TECHNIQUE:  Ultrasound of the thyroid was performed with a high frequency linear transducer in transverse and sagittal planes including volumetric imaging sweeps as well as traditional still imaging technique  FINDINGS:   Diffusely heterogeneous echotexture     Right gland: 4 7 x 3 0 x 1 8 cm  Mid region nearly isoechoic nodule measuring 1 8 x 1 5 x 1 4 cm previously described as 2 1 x 1 9 x 1 6 cm  Lower pole nodule measures 1 6 x 1 1 x 1 3 cm earlier described as 1 7 x 1 3 x 1 5 cm  Left gland: 3 7 x 2 0 x 1 6 cm  No dominant nodules  Isthmus: The isthmus is 0 5 cm in AP dimension  Right isthmus hypoechoic nodule measures 0 8 x 0 5 x 0 7 cm  This was earlier described as 0 7 x 0 5 x 0 4 cm  IMPRESSION:      Right thyroid nodules and right isthmus nodule, without apparent significant change from earlier report descriptions from 2014  Workstation performed: [id number]     Signed by:   Wilson Flores MD   1/16/17     (Q) HEMOGLOBIN A1c [id number] 12:00AM Nancy Gonzalezoba     Test Name Result Flag Reference   HEMOGLOBIN A1c 11 7 % of total Hgb H <5 7   According to ADA guidelines, hemoglobin A1c <7 0%  represents optimal control in non-pregnant diabetic  patients  Different metrics may apply to specific  patient populations  Standards of Medical Care in    Diabetes Care   2013;36:s11-s66     For the purpose of screening for the presence of  diabetes  <5 7% Consistent with the absence of diabetes  5 7-6 4%    Consistent with increased risk for diabetes              (prediabetes)  >or=6 5%    Consistent with diabetes     This assay result is consistent with diabetes  mellitus  Currently, no consensus exists for use of hemoglobin  A1c for diagnosis of diabetes for children       Future Appointments    Date/Time Provider Specialty Site   10/24/2017 07:45 AM Csaey Nguyễn, [address] [facility]     Signatures   Electronically signed by : Romana Prose, M D ; Apr 24 2017  4:25PM EST                       (Author)

## 2018-06-22 LAB
LEFT EYE DIABETIC RETINOPATHY: NORMAL
RIGHT EYE DIABETIC RETINOPATHY: NORMAL

## 2018-10-15 ENCOUNTER — IMMUNIZATION (OUTPATIENT)
Dept: FAMILY MEDICINE CLINIC | Facility: CLINIC | Age: 73
End: 2018-10-15
Payer: MEDICARE

## 2018-10-15 DIAGNOSIS — Z23 ENCOUNTER FOR IMMUNIZATION: ICD-10-CM

## 2018-10-15 PROCEDURE — 90662 IIV NO PRSV INCREASED AG IM: CPT

## 2018-10-15 PROCEDURE — G0008 ADMIN INFLUENZA VIRUS VAC: HCPCS

## 2018-10-15 RX ORDER — METFORMIN HYDROCHLORIDE EXTENDED-RELEASE TABLETS 1000 MG/1
1 TABLET, FILM COATED, EXTENDED RELEASE ORAL 2 TIMES DAILY
COMMUNITY
Start: 2017-05-02 | End: 2019-03-09

## 2018-10-15 RX ORDER — BLOOD-GLUCOSE METER
KIT MISCELLANEOUS
COMMUNITY
Start: 2017-05-11 | End: 2019-03-09

## 2018-10-15 RX ORDER — ELECTROLYTES/DEXTROSE
1 SOLUTION, ORAL ORAL DAILY
COMMUNITY
End: 2019-03-09

## 2018-10-15 RX ORDER — ERGOCALCIFEROL 1.25 MG/1
1 CAPSULE ORAL WEEKLY
COMMUNITY
Start: 2017-05-02 | End: 2019-03-09

## 2019-03-09 ENCOUNTER — OFFICE VISIT (OUTPATIENT)
Dept: FAMILY MEDICINE CLINIC | Facility: CLINIC | Age: 74
End: 2019-03-09
Payer: MEDICARE

## 2019-03-09 VITALS
HEIGHT: 68 IN | BODY MASS INDEX: 37.28 KG/M2 | DIASTOLIC BLOOD PRESSURE: 90 MMHG | WEIGHT: 246 LBS | TEMPERATURE: 98.5 F | SYSTOLIC BLOOD PRESSURE: 158 MMHG

## 2019-03-09 DIAGNOSIS — J11.1 INFLUENZA: Primary | ICD-10-CM

## 2019-03-09 DIAGNOSIS — IMO0002 UNCONTROLLED TYPE 2 DIABETES MELLITUS WITH COMPLICATION: ICD-10-CM

## 2019-03-09 DIAGNOSIS — E11.65 UNCONTROLLED TYPE 2 DIABETES MELLITUS WITH HYPERGLYCEMIA (HCC): ICD-10-CM

## 2019-03-09 PROBLEM — I10 BENIGN ESSENTIAL HYPERTENSION: Status: ACTIVE | Noted: 2017-04-24

## 2019-03-09 PROBLEM — R09.89 DECREASED DORSALIS PEDIS PULSE: Status: ACTIVE | Noted: 2017-04-24

## 2019-03-09 PROBLEM — E66.9 OBESITY (BMI 30-39.9): Status: ACTIVE | Noted: 2017-09-11

## 2019-03-09 PROBLEM — E78.2 HYPERLIPIDEMIA, MIXED: Status: ACTIVE | Noted: 2017-04-24

## 2019-03-09 PROBLEM — E04.2 MULTIPLE THYROID NODULES: Status: ACTIVE | Noted: 2017-01-04

## 2019-03-09 PROBLEM — E55.9 VITAMIN D DEFICIENCY: Status: ACTIVE | Noted: 2017-04-24

## 2019-03-09 PROCEDURE — 99214 OFFICE O/P EST MOD 30 MIN: CPT | Performed by: FAMILY MEDICINE

## 2019-03-09 RX ORDER — OSELTAMIVIR PHOSPHATE 75 MG/1
75 CAPSULE ORAL EVERY 12 HOURS SCHEDULED
Qty: 10 CAPSULE | Refills: 0 | Status: SHIPPED | OUTPATIENT
Start: 2019-03-09 | End: 2019-03-14

## 2019-03-09 RX ORDER — DEXTROMETHORPHAN HYDROBROMIDE AND PROMETHAZINE HYDROCHLORIDE 15; 6.25 MG/5ML; MG/5ML
5 SYRUP ORAL 4 TIMES DAILY PRN
Qty: 118 ML | Refills: 0 | Status: SHIPPED | OUTPATIENT
Start: 2019-03-09 | End: 2019-05-24 | Stop reason: ALTCHOICE

## 2019-03-09 NOTE — ASSESSMENT & PLAN NOTE
Lab Results   Component Value Date    HGBA1C 10 8 (H) 09/11/2017    We briefly discussed her diabetes today  She has not had any follow-up through Endocrinology or other medical provider for this for quite some time  We reviewed that she is going to be at increased risk cardiovascular complication among other if she continues to not treat this issue  She states that she is aware of this  She is going to [place] for 6 weeks in the near future  She states that after this occurs she would like to come in for wellness visit to address her issues  We agreed with her that she needs to begin addressing this as soon as possible  No results for input(s): POCGLU in the last 72 hours      Blood Sugar Average: Last 72 hrs:

## 2019-03-09 NOTE — ASSESSMENT & PLAN NOTE
Patient presents with an acute viral respiratory illness  She is in no distress  I believe this most likely represents a case of influenza or RSV  We are going to have her push fluids and rest   She will start Tamiflu as well as promethazine  We did obtain a nasopharyngeal specimen for flu and RSV PCR  She will use antipyretics  She will call Monday for results of her PCR  She will call sooner or seek more urgent medical attention should her condition deteriorate  She agrees

## 2019-03-09 NOTE — PROGRESS NOTES
Assessment/Plan:  Influenza  Patient presents with an acute viral respiratory illness  She is in no distress  I believe this most likely represents a case of influenza or RSV  We are going to have her push fluids and rest   She will start Tamiflu as well as promethazine  We did obtain a nasopharyngeal specimen for flu and RSV PCR  She will use antipyretics  She will call Monday for results of her PCR  She will call sooner or seek more urgent medical attention should her condition deteriorate  She agrees  Diabetes type 2, uncontrolled ([place] [address] )  Lab Results   Component Value Date    HGBA1C 10 8 (H) 09/11/2017    We briefly discussed her diabetes today  She has not had any follow-up through Endocrinology or other medical provider for this for quite some time  We reviewed that she is going to be at increased risk cardiovascular complication among other if she continues to not treat this issue  She states that she is aware of this  She is going to [place] for 6 weeks in the near future  She states that after this occurs she would like to come in for wellness visit to address her issues  We agreed with her that she needs to begin addressing this as soon as possible  No results for input(s): POCGLU in the last 72 hours  Blood Sugar Average: Last 72 hrs:           Diagnoses and all orders for this visit:    Influenza  -     promethazine-dextromethorphan (PHENERGAN-DM) 6 25-15 mg/5 mL oral syrup; Take 5 mL by mouth 4 (four) times a day as needed for cough  -     oseltamivir (TAMIFLU) 75 mg capsule;  Take 1 capsule (75 mg total) by mouth every 12 (twelve) hours for 5 days  -     Influenza A/B and RSV by PCR    Uncontrolled type 2 diabetes mellitus with complication ([facility] )    Uncontrolled type 2 diabetes mellitus with hyperglycemia ([place] [address] )          Subjective:   Chief Complaint   Patient presents with    Cough     Pain in lungs    Headache    Sore Throat         Patient ID: Deena Shaan is a 69 y o  female  HPI  The patient is a 67-year-old female who presents today stating that she began with a sore throat 2 days ago  She has had a headache and some soreness in her chest but no diffuse myalgias  She did have an influenza vaccine  She has had a fevers feeling but no working thermometer  She has had some nausea but no vomiting or diarrhea  She flew back from [place] 1 week ago  She has no odynophagia or dysphagia  She has no otalgia but feels a fluttering in her ear  She has had copious clear nasal discharge with postnasal drip  No rash no chest pain or shortness of breath  She does have a history of uncontrolled diabetes  She has not been addressing this recently  She does check her fasting blood sugars about once a week and it is in the 250 range or more  She has had polyuria polydipsia  The following portions of the patient's history were reviewed and updated as appropriate: allergies, current medications, past medical history, past social history and problem list     Review of Systems   Constitution: Positive for chills  Negative for malaise/fatigue  HENT: Positive for congestion and sore throat  Negative for ear pain  Cardiovascular: Negative for chest pain, irregular heartbeat, leg swelling, orthopnea and paroxysmal nocturnal dyspnea  Respiratory: Positive for cough and sputum production  Negative for shortness of breath and wheezing  Endocrine: Positive for polydipsia, polyphagia and polyuria  Negative for cold intolerance and heat intolerance  Hematologic/Lymphatic: Negative for adenopathy and bleeding problem  Does not bruise/bleed easily  Skin: Negative for rash  Musculoskeletal: Positive for myalgias  Gastrointestinal: Positive for nausea  Negative for diarrhea and vomiting  Genitourinary: Positive for frequency  Negative for dysuria and urgency  Neurological: Positive for headaches           Objective:    Physical Exam   Constitutional: She is oriented to person, place, and time  She appears well-developed and well-nourished  No distress  Obese in no distress   HENT:   Right Ear: External ear normal    Left Ear: External ear normal    Mouth/Throat: Oropharynx is clear and moist  No oropharyngeal exudate  Minimal oropharyngeal erythema without ulcer exudate or other lesion  No obvious postnasal drip  Tympanic membranes appear normal bilaterally without evidence of otitis media  No paranasal sinus tenderness  Eyes: Right eye exhibits no discharge  Left eye exhibits no discharge  Neck: Neck supple  Cardiovascular: Normal rate, regular rhythm and normal heart sounds  Exam reveals no gallop  No murmur heard  Pulmonary/Chest: Effort normal and breath sounds normal  No respiratory distress  She has no wheezes  She has no rales  No rub   Musculoskeletal: She exhibits no edema  Lymphadenopathy:     She has no cervical adenopathy  Neurological: She is alert and oriented to person, place, and time  Skin: No rash noted  Psychiatric: She has a normal mood and affect  Thought content normal    Nursing note and vitals reviewed

## 2019-03-11 LAB
FLUAV RNA SPEC QL NAA+PROBE: NOT DETECTED
FLUBV RNA SPEC QL NAA+PROBE: NOT DETECTED
RSV RNA SPEC QL NAA+PROBE: NOT DETECTED

## 2019-05-24 ENCOUNTER — OFFICE VISIT (OUTPATIENT)
Dept: FAMILY MEDICINE CLINIC | Facility: CLINIC | Age: 74
End: 2019-05-24
Payer: MEDICARE

## 2019-05-24 VITALS
DIASTOLIC BLOOD PRESSURE: 82 MMHG | TEMPERATURE: 98.5 F | SYSTOLIC BLOOD PRESSURE: 138 MMHG | HEART RATE: 77 BPM | OXYGEN SATURATION: 95 % | WEIGHT: 246 LBS | BODY MASS INDEX: 37.28 KG/M2 | HEIGHT: 68 IN

## 2019-05-24 DIAGNOSIS — R53.83 FATIGUE, UNSPECIFIED TYPE: ICD-10-CM

## 2019-05-24 DIAGNOSIS — E11.65 UNCONTROLLED TYPE 2 DIABETES MELLITUS WITH HYPERGLYCEMIA (HCC): Primary | ICD-10-CM

## 2019-05-24 DIAGNOSIS — E66.9 OBESITY (BMI 30-39.9): ICD-10-CM

## 2019-05-24 DIAGNOSIS — E55.9 VITAMIN D DEFICIENCY: ICD-10-CM

## 2019-05-24 DIAGNOSIS — E78.2 HYPERLIPIDEMIA, MIXED: ICD-10-CM

## 2019-05-24 DIAGNOSIS — Z12.11 COLON CANCER SCREENING: ICD-10-CM

## 2019-05-24 DIAGNOSIS — Z11.59 NEED FOR HEPATITIS C SCREENING TEST: ICD-10-CM

## 2019-05-24 PROBLEM — J11.1 INFLUENZA: Status: RESOLVED | Noted: 2019-03-09 | Resolved: 2019-05-24

## 2019-05-24 LAB — SL AMB POCT HEMOGLOBIN AIC: 12.5 (ref ?–6.5)

## 2019-05-24 PROCEDURE — 99214 OFFICE O/P EST MOD 30 MIN: CPT | Performed by: FAMILY MEDICINE

## 2019-05-24 PROCEDURE — 83036 HEMOGLOBIN GLYCOSYLATED A1C: CPT | Performed by: FAMILY MEDICINE

## 2019-05-24 PROCEDURE — G0438 PPPS, INITIAL VISIT: HCPCS | Performed by: FAMILY MEDICINE

## 2019-05-28 LAB
25(OH)D3 SERPL-MCNC: 16 NG/ML (ref 30–100)
ALBUMIN SERPL-MCNC: 3.9 G/DL (ref 3.6–5.1)
ALBUMIN/CREAT UR: 13 MCG/MG CREAT
ALBUMIN/GLOB SERPL: 1.3 (CALC) (ref 1–2.5)
ALP SERPL-CCNC: 100 U/L (ref 33–130)
ALT SERPL-CCNC: 18 U/L (ref 6–29)
AST SERPL-CCNC: 15 U/L (ref 10–35)
B BURGDOR AB SER IA-ACNC: <0.9 INDEX
BILIRUB SERPL-MCNC: 0.5 MG/DL (ref 0.2–1.2)
BUN SERPL-MCNC: 13 MG/DL (ref 7–25)
BUN/CREAT SERPL: ABNORMAL (CALC) (ref 6–22)
CALCIUM SERPL-MCNC: 9.4 MG/DL (ref 8.6–10.4)
CHLORIDE SERPL-SCNC: 102 MMOL/L (ref 98–110)
CHOLEST SERPL-MCNC: 231 MG/DL
CHOLEST/HDLC SERPL: 4.5 (CALC)
CO2 SERPL-SCNC: 24 MMOL/L (ref 20–32)
CREAT SERPL-MCNC: 0.66 MG/DL (ref 0.6–0.93)
CREAT UR-MCNC: 91 MG/DL (ref 20–275)
ERYTHROCYTE [DISTWIDTH] IN BLOOD BY AUTOMATED COUNT: 13 % (ref 11–15)
GLOBULIN SER CALC-MCNC: 3.1 G/DL (CALC) (ref 1.9–3.7)
GLUCOSE SERPL-MCNC: 279 MG/DL (ref 65–99)
HCT VFR BLD AUTO: 43.2 % (ref 35–45)
HCV AB S/CO SERPL IA: 0.01
HCV AB SERPL QL IA: NORMAL
HDLC SERPL-MCNC: 51 MG/DL
HGB BLD-MCNC: 14.5 G/DL (ref 11.7–15.5)
LDLC SERPL CALC-MCNC: 159 MG/DL (CALC)
MCH RBC QN AUTO: 27.7 PG (ref 27–33)
MCHC RBC AUTO-ENTMCNC: 33.6 G/DL (ref 32–36)
MCV RBC AUTO: 82.6 FL (ref 80–100)
MICROALBUMIN UR-MCNC: 1.2 MG/DL
NONHDLC SERPL-MCNC: 180 MG/DL (CALC)
PLATELET # BLD AUTO: 285 THOUSAND/UL (ref 140–400)
PMV BLD REES-ECKER: 10.3 FL (ref 7.5–12.5)
POTASSIUM SERPL-SCNC: 4.3 MMOL/L (ref 3.5–5.3)
PROT SERPL-MCNC: 7 G/DL (ref 6.1–8.1)
RBC # BLD AUTO: 5.23 MILLION/UL (ref 3.8–5.1)
SL AMB EGFR AFRICAN AMERICAN: 102 ML/MIN/1.73M2
SL AMB EGFR NON AFRICAN AMERICAN: 88 ML/MIN/1.73M2
SODIUM SERPL-SCNC: 138 MMOL/L (ref 135–146)
TRIGL SERPL-MCNC: 101 MG/DL
TSH SERPL-ACNC: 1.14 MIU/L (ref 0.4–4.5)
WBC # BLD AUTO: 6.6 THOUSAND/UL (ref 3.8–10.8)

## 2019-09-10 ENCOUNTER — CONSULT (OUTPATIENT)
Dept: GASTROENTEROLOGY | Facility: AMBULARY SURGERY CENTER | Age: 74
End: 2019-09-10
Payer: MEDICARE

## 2019-09-10 VITALS
BODY MASS INDEX: 37.71 KG/M2 | SYSTOLIC BLOOD PRESSURE: 140 MMHG | HEIGHT: 68 IN | RESPIRATION RATE: 16 BRPM | WEIGHT: 248.8 LBS | TEMPERATURE: 98.8 F | DIASTOLIC BLOOD PRESSURE: 80 MMHG | HEART RATE: 80 BPM

## 2019-09-10 DIAGNOSIS — Z12.11 COLON CANCER SCREENING: Primary | ICD-10-CM

## 2019-09-10 DIAGNOSIS — K62.5 RECTAL BLEEDING: ICD-10-CM

## 2019-09-10 PROCEDURE — 99204 OFFICE O/P NEW MOD 45 MIN: CPT | Performed by: INTERNAL MEDICINE

## 2019-09-10 NOTE — ASSESSMENT & PLAN NOTE
Appear to be from hemorrhoids    Rule out colorectal lesions     -avoid straining during defecation    -may use preparation H as needed

## 2019-09-10 NOTE — PROGRESS NOTES
Consultation - [facility]  Nathanael Gooden 1945 female         Chief Complaint:  Colonoscopy    HPI:  67-year-old female with history of diabetes mellitus was referred for colonoscopy  She had colonoscopy about 12 years ago  Patient has regular bowel movements and reports having occasional fresh bleeding from the rectum from hemorrhoids  Appetite is good and denies any recent weight loss  Denies any abdominal pain, nausea, or vomiting  Has no heartburn or acid reflux  Denies any difficulty swallowing  REVIEW OF SYSTEMS: Review of Systems   Constitutional: Negative for activity change, appetite change, chills, diaphoresis, fatigue, fever and unexpected weight change  HENT: Negative for ear discharge, ear pain, facial swelling, hearing loss, nosebleeds, sore throat, tinnitus and voice change  Eyes: Negative for pain, discharge, redness, itching and visual disturbance  Respiratory: Negative for apnea, cough, chest tightness, shortness of breath and wheezing  Cardiovascular: Negative for chest pain and palpitations  Gastrointestinal:        As noted in HPI   Endocrine: Negative for cold intolerance, heat intolerance and polyuria  Genitourinary: Negative for difficulty urinating, dysuria, flank pain, hematuria and urgency  Musculoskeletal: Negative for arthralgias, back pain, gait problem, joint swelling and myalgias  Skin: Negative for rash and wound  Neurological: Negative for dizziness, tremors, seizures, speech difficulty, light-headedness, numbness and headaches  Hematological: Negative for adenopathy  Does not bruise/bleed easily  Psychiatric/Behavioral: Negative for agitation, behavioral problems and confusion  The patient is not nervous/anxious           Past Medical History:   Diagnosis Date    Endometrial carcinoma [facility])       Past Surgical History:   Procedure Laterality Date    HYSTERECTOMY  2006    TONSILLECTOMY  1962    TUBAL LIGATION 1992     Social History     Socioeconomic History    Marital status: /Civil Union     Spouse name: Not on file    Number of children: Not on file    Years of education: Not on file    Highest education level: Not on file   Occupational History    Occupation: Retired    Social Needs    Financial resource strain: Not on file    Food insecurity:     Worry: Not on file     Inability: Not on file   [employer] needs:     Medical: Not on file     Non-medical: Not on file   Tobacco Use    Smoking status: Never Smoker    Smokeless tobacco: Never Used   Substance and Sexual Activity    Alcohol use: Never     Frequency: Never    Drug use: Never    Sexual activity: Not on file   Lifestyle    Physical activity:     Days per week: Not on file     Minutes per session: Not on file    Stress: Not on file   Relationships    Social connections:     Talks on phone: Not on file     Gets together: Not on file     Attends Evangelical service: Not on file     Active member of club or organization: Not on file     Attends meetings of clubs or organizations: Not on file     Relationship status: Not on file    Intimate partner violence:     Fear of current or ex partner: Not on file     Emotionally abused: Not on file     Physically abused: Not on file     Forced sexual activity: Not on file   Other Topics Concern    Not on file   Social History Narrative    Denied: History of alcohol use - As per [employer]    Daily caffeine consumption, 2-3 servings a day    Denied: History of drug use - As per [employer]    Retired from employment: [employer]       Family History   Problem Relation Age of Onset    Stroke Mother     Diabetes Mother     Cancer Mother     Lung cancer Father     Arthritis Sister     COPD Brother     Coronary artery disease Brother     Heart failure Brother     Leukemia Son     HIV Brother     Cancer Family      Patient has no known allergies    Current Outpatient Medications   Medication Sig Dispense Refill    metFORMIN (GLUCOPHAGE) 500 mg tablet Take 1 tablet (500 mg total) by mouth 2 (two) times a day with meals for 30 days 60 tablet 3    Na Sulfate-K Sulfate-Mg Sulf (SUPREP BOWEL PREP KIT) 17 5-3 13-1 6 GM/177ML SOLN Take 2 Bottles by mouth see administration instructions Please follow the instructions from the office 2 Bottle 0     No current facility-administered medications for this visit  Blood pressure 140/80, pulse 80, temperature 98 8 °F (37 1 °C), temperature source Tympanic, resp  rate 16, height 5' 8" (1 727 m), weight 113 kg (248 lb 12 8 oz)  PHYSICAL EXAM: Physical Exam   Constitutional: She is oriented to person, place, and time  She appears well-developed and well-nourished  HENT:   Head: Normocephalic and atraumatic  Nose: Nose normal    Mouth/Throat: Oropharynx is clear and moist    Eyes: Conjunctivae are normal  Right eye exhibits no discharge  Left eye exhibits no discharge  No scleral icterus  Neck: Neck supple  No JVD present  No tracheal deviation present  No thyromegaly present  Cardiovascular: Normal rate, regular rhythm and normal heart sounds  Exam reveals no gallop and no friction rub  No murmur heard  Pulmonary/Chest: Effort normal and breath sounds normal  No respiratory distress  She has no wheezes  She has no rales  She exhibits no tenderness  Abdominal: Soft  Bowel sounds are normal  She exhibits no distension and no mass  There is no tenderness  There is no rebound and no guarding  No hernia  Musculoskeletal: She exhibits no edema, tenderness or deformity  Lymphadenopathy:     She has no cervical adenopathy  Neurological: She is alert and oriented to person, place, and time  Skin: Skin is warm and dry  No rash noted  No erythema  Psychiatric: She has a normal mood and affect   Her behavior is normal  Thought content normal         Lab Results   Component Value Date    WBC 6 6 05/24/2019    HGB 14 5 05/24/2019    HCT 43 2 05/24/2019    MCV 82 6 05/24/2019     05/24/2019     Lab Results   Component Value Date    GLUCOSE 246 (H) 02/25/2015    CALCIUM 9 4 05/24/2019     09/11/2017    K 4 3 05/24/2019    CO2 24 05/24/2019     05/24/2019    BUN 13 05/24/2019    CREATININE 0 66 05/24/2019     Lab Results   Component Value Date    ALT 18 05/24/2019    AST 15 05/24/2019    ALKPHOS 100 05/24/2019    BILITOT 0 4 02/25/2015     No results found for: INR, PROTIME    No results found  ASSESSMENT & PLAN:    Colon cancer screening  Screening for colon cancer - patient is at average risk for colon cancer screening  Rule out colorectal lesions including polyps or malignancy         -Schedule for colonoscopy  -High-fiber diet     -Patient was given instructions about the colonoscopy prep     -Patient was explained about  the risks and benefits of the procedure  Risks including but not limited to bleeding, infection, perforation were explained in detail  Also explained about less than 100% sensitivity with the exam and other alternatives  Rectal bleeding  Appear to be from hemorrhoids    Rule out colorectal lesions     -avoid straining during defecation    -may use preparation H as needed

## 2019-09-22 DIAGNOSIS — E11.65 UNCONTROLLED TYPE 2 DIABETES MELLITUS WITH HYPERGLYCEMIA (HCC): ICD-10-CM

## 2019-11-14 ENCOUNTER — OFFICE VISIT (OUTPATIENT)
Dept: FAMILY MEDICINE CLINIC | Facility: CLINIC | Age: 74
End: 2019-11-14
Payer: MEDICARE

## 2019-11-14 VITALS
OXYGEN SATURATION: 97 % | WEIGHT: 242 LBS | DIASTOLIC BLOOD PRESSURE: 80 MMHG | TEMPERATURE: 98.1 F | BODY MASS INDEX: 36.68 KG/M2 | HEART RATE: 75 BPM | SYSTOLIC BLOOD PRESSURE: 140 MMHG | HEIGHT: 68 IN

## 2019-11-14 DIAGNOSIS — E04.2 MULTIPLE THYROID NODULES: ICD-10-CM

## 2019-11-14 DIAGNOSIS — E11.65 UNCONTROLLED TYPE 2 DIABETES MELLITUS WITH HYPERGLYCEMIA (HCC): Primary | ICD-10-CM

## 2019-11-14 DIAGNOSIS — Z23 ENCOUNTER FOR IMMUNIZATION: ICD-10-CM

## 2019-11-14 DIAGNOSIS — R53.83 FATIGUE, UNSPECIFIED TYPE: ICD-10-CM

## 2019-11-14 DIAGNOSIS — E78.2 HYPERLIPIDEMIA, MIXED: ICD-10-CM

## 2019-11-14 DIAGNOSIS — I10 BENIGN ESSENTIAL HYPERTENSION: ICD-10-CM

## 2019-11-14 DIAGNOSIS — E55.9 VITAMIN D DEFICIENCY: ICD-10-CM

## 2019-11-14 DIAGNOSIS — E66.9 OBESITY (BMI 30-39.9): ICD-10-CM

## 2019-11-14 LAB — SL AMB POCT HEMOGLOBIN AIC: 10.2 (ref ?–6.5)

## 2019-11-14 PROCEDURE — G0009 ADMIN PNEUMOCOCCAL VACCINE: HCPCS | Performed by: FAMILY MEDICINE

## 2019-11-14 PROCEDURE — 90662 IIV NO PRSV INCREASED AG IM: CPT | Performed by: FAMILY MEDICINE

## 2019-11-14 PROCEDURE — 83036 HEMOGLOBIN GLYCOSYLATED A1C: CPT | Performed by: FAMILY MEDICINE

## 2019-11-14 PROCEDURE — 90670 PCV13 VACCINE IM: CPT | Performed by: FAMILY MEDICINE

## 2019-11-14 PROCEDURE — 99214 OFFICE O/P EST MOD 30 MIN: CPT | Performed by: FAMILY MEDICINE

## 2019-11-14 PROCEDURE — G0008 ADMIN INFLUENZA VIRUS VAC: HCPCS | Performed by: FAMILY MEDICINE

## 2019-11-14 PROCEDURE — 36415 COLL VENOUS BLD VENIPUNCTURE: CPT | Performed by: FAMILY MEDICINE

## 2019-11-14 RX ORDER — LISINOPRIL 10 MG/1
10 TABLET ORAL DAILY
Qty: 30 TABLET | Refills: 5 | Status: SHIPPED | OUTPATIENT
Start: 2019-11-14 | End: 2020-02-11 | Stop reason: ALTCHOICE

## 2019-11-14 NOTE — ASSESSMENT & PLAN NOTE
She is due for surveillance thyroid ultrasound    She is going to have this performed will follow up when results are available

## 2019-11-14 NOTE — PROGRESS NOTES
BMI Counseling: Body mass index is 36 8 kg/m²  The BMI is above normal  Nutrition recommendations include decreasing portion sizes, encouraging healthy choices of fruits and vegetables, consuming healthier snacks, limiting drinks that contain sugar and moderation in carbohydrate intake  Exercise recommendations include moderate physical activity 150 minutes/week and exercising 3-5 times per week  No pharmacotherapy was ordered  Assessment/Plan:  Diabetes type 2, uncontrolled ([address] )  The patient's A1c improved from 12 5-10 2  We congratulated her for this significant improvement  We did note that she still has a long way to get to goal   Also her inability to maintain regimen due to her 's recent illness probably impacted her A1c negatively  She is motivated to begin exercising and improving her diet  We are also going to increase her metformin to 1000 b i d  She is asked to return in 4-6 months for follow-up A1c  She is in agreement with this plan  Lab Results   Component Value Date    HGBA1C 10 2 (A) 11/14/2019       Multiple thyroid nodules  She is due for surveillance thyroid ultrasound  She is going to have this performed will follow up when results are available    Benign essential hypertension  Blood pressure control is suboptimal today  We are going to add lisinopril 10  She agrees  Come for blood pressure recheck in the next week to 2  Obesity (BMI 30-39  9)  Continued efforts at diet weight loss  Diagnoses and all orders for this visit:    Uncontrolled type 2 diabetes mellitus with hyperglycemia (HCC)  -     POCT hemoglobin A1c  -     metFORMIN (GLUCOPHAGE) 500 mg tablet; Take 2 tablets (1,000 mg total) by mouth 2 (two) times a day with meals  -     lisinopril (ZESTRIL) 10 mg tablet;  Take 1 tablet (10 mg total) by mouth daily    Encounter for immunization  -     FLUZONE HIGH-DOSE: influenza vaccine, high-dose, preservative-free 0 5 mL    Benign essential hypertension  - lisinopril (ZESTRIL) 10 mg tablet; Take 1 tablet (10 mg total) by mouth daily    Multiple thyroid nodules  -     US thyroid; Future    Obesity (BMI 30-39  9)        She will receive a Prevnar today  Subjective:   Chief Complaint   Patient presents with    Diabetes     here for a 3 mo checkup and fbw  pt given a flu shot  Exercise not at goal due to spouses recent medical issues  Has multiple aerobic machines  Diet was worse  Improving now with spouses improving health  Patient ID: Norine Homans is a 69 y o  female  HPI  The patient is a 23-year-old diabetic female who presents today for routine follow-up  She has been compliant with her metformin 500 b i d  She has had no side effects from the medication  She notes that her diet and exercise regimen is not at goal to to spouse is recent medical issues  Recently his health has been improving and she is now motivated to begin exercising and watching her diet  She has no cardiovascular pulmonary complaint  No GI or  complaint  No polyuria polydipsia polyphagia  She did not have her colonoscopy due to scheduling issues  She is also due for eye exam which she has scheduled  She does have concerns over history of thyroid nodules  She notes that she was in the proximity of [place] and was eating food for 2 weeks after the event happened as the [address] [place] [address] Avenue did not notify people as to the event  The following portions of the patient's history were reviewed and updated as appropriate: allergies, current medications, past medical history, past social history, past surgical history and problem list     Review of Systems   Constitution: Negative  Cardiovascular: Negative  Respiratory: Negative  Endocrine: Negative  Gastrointestinal: Negative  Genitourinary: Negative  Neurological: Negative  Psychiatric/Behavioral: Negative            Objective:    Physical Exam   Constitutional: She is oriented to person, place, and time  She appears well-developed and well-nourished  No distress  Obese in no distress   Neck: Neck supple  No JVD present  No thyromegaly present  Thyroid mildly enlarged  No dominant nodule noted  Cardiovascular: Normal rate, regular rhythm and normal heart sounds  Pulmonary/Chest: Effort normal and breath sounds normal  No respiratory distress  She has no wheezes  She has no rales  Musculoskeletal: She exhibits no edema  Lymphadenopathy:     She has no cervical adenopathy  Neurological: She is alert and oriented to person, place, and time  Psychiatric: She has a normal mood and affect  Thought content normal    Nursing note and vitals reviewed

## 2019-11-14 NOTE — ASSESSMENT & PLAN NOTE
Blood pressure control is suboptimal today  We are going to add lisinopril 10  She agrees  Come for blood pressure recheck in the next week to 2

## 2019-11-15 LAB
25(OH)D3 SERPL-MCNC: 16 NG/ML (ref 30–100)
ALBUMIN SERPL-MCNC: 4.1 G/DL (ref 3.6–5.1)
ALBUMIN/GLOB SERPL: 1.5 (CALC) (ref 1–2.5)
ALP SERPL-CCNC: 90 U/L (ref 33–130)
ALT SERPL-CCNC: 14 U/L (ref 6–29)
AST SERPL-CCNC: 11 U/L (ref 10–35)
BILIRUB SERPL-MCNC: 0.5 MG/DL (ref 0.2–1.2)
BUN SERPL-MCNC: 16 MG/DL (ref 7–25)
BUN/CREAT SERPL: ABNORMAL (CALC) (ref 6–22)
CALCIUM SERPL-MCNC: 9.7 MG/DL (ref 8.6–10.4)
CHLORIDE SERPL-SCNC: 100 MMOL/L (ref 98–110)
CHOLEST SERPL-MCNC: 229 MG/DL
CHOLEST/HDLC SERPL: 5 (CALC)
CO2 SERPL-SCNC: 31 MMOL/L (ref 20–32)
CREAT SERPL-MCNC: 0.7 MG/DL (ref 0.6–0.93)
ERYTHROCYTE [DISTWIDTH] IN BLOOD BY AUTOMATED COUNT: 13.1 % (ref 11–15)
GLOBULIN SER CALC-MCNC: 2.8 G/DL (CALC) (ref 1.9–3.7)
GLUCOSE SERPL-MCNC: 225 MG/DL (ref 65–99)
HCT VFR BLD AUTO: 45.8 % (ref 35–45)
HDLC SERPL-MCNC: 46 MG/DL
HGB BLD-MCNC: 14.7 G/DL (ref 11.7–15.5)
LDLC SERPL CALC-MCNC: 156 MG/DL (CALC)
MCH RBC QN AUTO: 27.5 PG (ref 27–33)
MCHC RBC AUTO-ENTMCNC: 32.1 G/DL (ref 32–36)
MCV RBC AUTO: 85.6 FL (ref 80–100)
NONHDLC SERPL-MCNC: 183 MG/DL (CALC)
PLATELET # BLD AUTO: 270 THOUSAND/UL (ref 140–400)
PMV BLD REES-ECKER: 10.2 FL (ref 7.5–12.5)
POTASSIUM SERPL-SCNC: 4.5 MMOL/L (ref 3.5–5.3)
PROT SERPL-MCNC: 6.9 G/DL (ref 6.1–8.1)
RBC # BLD AUTO: 5.35 MILLION/UL (ref 3.8–5.1)
SL AMB EGFR AFRICAN AMERICAN: 100 ML/MIN/1.73M2
SL AMB EGFR NON AFRICAN AMERICAN: 86 ML/MIN/1.73M2
SODIUM SERPL-SCNC: 139 MMOL/L (ref 135–146)
TRIGL SERPL-MCNC: 138 MG/DL
WBC # BLD AUTO: 6.7 THOUSAND/UL (ref 3.8–10.8)

## 2019-11-19 LAB
LEFT EYE DIABETIC RETINOPATHY: NORMAL
RIGHT EYE DIABETIC RETINOPATHY: NORMAL

## 2019-11-20 ENCOUNTER — HOSPITAL ENCOUNTER (OUTPATIENT)
Dept: ULTRASOUND IMAGING | Facility: HOSPITAL | Age: 74
Discharge: HOME | End: 2019-11-20
Payer: MEDICARE

## 2019-11-20 DIAGNOSIS — E04.2 MULTIPLE THYROID NODULES: ICD-10-CM

## 2019-11-20 PROCEDURE — 76536 US EXAM OF HEAD AND NECK: CPT

## 2019-11-21 ENCOUNTER — CLINICAL SUPPORT (OUTPATIENT)
Dept: FAMILY MEDICINE CLINIC | Facility: CLINIC | Age: 74
End: 2019-11-21

## 2019-11-21 VITALS — SYSTOLIC BLOOD PRESSURE: 144 MMHG | DIASTOLIC BLOOD PRESSURE: 82 MMHG

## 2019-11-21 DIAGNOSIS — E11.9 TYPE 2 DIABETES MELLITUS WITHOUT COMPLICATION, WITHOUT LONG-TERM CURRENT USE OF INSULIN (HCC): Primary | ICD-10-CM

## 2019-11-23 DIAGNOSIS — E04.2 MULTIPLE THYROID NODULES: Primary | ICD-10-CM

## 2019-12-12 ENCOUNTER — HOSPITAL ENCOUNTER (OUTPATIENT)
Dept: RADIOLOGY | Facility: HOSPITAL | Age: 74
Discharge: HOME | End: 2019-12-12
Payer: MEDICARE

## 2019-12-12 DIAGNOSIS — E04.2 MULTIPLE THYROID NODULES: ICD-10-CM

## 2019-12-12 DIAGNOSIS — E04.2 MULTIPLE THYROID NODULES: Primary | ICD-10-CM

## 2019-12-12 PROCEDURE — 10005 FNA BX W/US GDN 1ST LES: CPT

## 2019-12-12 PROCEDURE — 10006 FNA BX W/US GDN EA ADDL: CPT

## 2019-12-12 PROCEDURE — 88173 CYTOPATH EVAL FNA REPORT: CPT | Performed by: PATHOLOGY

## 2019-12-12 RX ORDER — LIDOCAINE HYDROCHLORIDE 10 MG/ML
5 INJECTION, SOLUTION EPIDURAL; INFILTRATION; INTRACAUDAL; PERINEURAL ONCE
Status: COMPLETED | OUTPATIENT
Start: 2019-12-12 | End: 2019-12-12

## 2019-12-12 RX ADMIN — LIDOCAINE HYDROCHLORIDE 3 ML: 10 INJECTION, SOLUTION EPIDURAL; INFILTRATION; INTRACAUDAL; PERINEURAL at 10:05

## 2019-12-12 NOTE — PROGRESS NOTES
Received a call from Radiology  Review of recent ultrasound reveals a 2nd nodule which qualifies for biopsy based on characteristic  We are going to place an order that all suspicious nodules be biopsied

## 2019-12-20 ENCOUNTER — TELEPHONE (OUTPATIENT)
Dept: GASTROENTEROLOGY | Facility: AMBULARY SURGERY CENTER | Age: 74
End: 2019-12-20

## 2019-12-20 NOTE — TELEPHONE ENCOUNTER
Returned pt's call re: rescheduling colonoscopy from 10/31 an gi lab to 2/11/2020 at [facility] w/ dr Jang Danger

## 2020-01-27 ENCOUNTER — ANESTHESIA EVENT (OUTPATIENT)
Dept: GASTROENTEROLOGY | Facility: AMBULARY SURGERY CENTER | Age: 75
End: 2020-01-27

## 2020-02-11 ENCOUNTER — ANESTHESIA (OUTPATIENT)
Dept: GASTROENTEROLOGY | Facility: AMBULARY SURGERY CENTER | Age: 75
End: 2020-02-11

## 2020-02-11 ENCOUNTER — HOSPITAL ENCOUNTER (OUTPATIENT)
Dept: GASTROENTEROLOGY | Facility: AMBULARY SURGERY CENTER | Age: 75
Setting detail: OUTPATIENT SURGERY
Discharge: HOME | End: 2020-02-11
Attending: INTERNAL MEDICINE | Admitting: INTERNAL MEDICINE
Payer: MEDICARE

## 2020-02-11 VITALS
OXYGEN SATURATION: 94 % | SYSTOLIC BLOOD PRESSURE: 125 MMHG | HEART RATE: 84 BPM | HEIGHT: 68 IN | RESPIRATION RATE: 16 BRPM | DIASTOLIC BLOOD PRESSURE: 75 MMHG | TEMPERATURE: 97.4 F | WEIGHT: 243 LBS | BODY MASS INDEX: 36.83 KG/M2

## 2020-02-11 DIAGNOSIS — Z12.11 COLON CANCER SCREENING: ICD-10-CM

## 2020-02-11 LAB — GLUCOSE SERPL-MCNC: 292 MG/DL (ref 65–140)

## 2020-02-11 PROCEDURE — 1123F ACP DISCUSS/DSCN MKR DOCD: CPT | Performed by: PATHOLOGY

## 2020-02-11 PROCEDURE — 88305 TISSUE EXAM BY PATHOLOGIST: CPT | Performed by: PATHOLOGY

## 2020-02-11 PROCEDURE — 82948 REAGENT STRIP/BLOOD GLUCOSE: CPT

## 2020-02-11 PROCEDURE — 45380 COLONOSCOPY AND BIOPSY: CPT | Performed by: INTERNAL MEDICINE

## 2020-02-11 PROCEDURE — 45385 COLONOSCOPY W/LESION REMOVAL: CPT | Performed by: INTERNAL MEDICINE

## 2020-02-11 RX ORDER — PROPOFOL 10 MG/ML
INJECTION, EMULSION INTRAVENOUS AS NEEDED
Status: DISCONTINUED | OUTPATIENT
Start: 2020-02-11 | End: 2020-02-11 | Stop reason: SURG

## 2020-02-11 RX ORDER — SODIUM CHLORIDE, SODIUM LACTATE, POTASSIUM CHLORIDE, CALCIUM CHLORIDE 600; 310; 30; 20 MG/100ML; MG/100ML; MG/100ML; MG/100ML
125 INJECTION, SOLUTION INTRAVENOUS CONTINUOUS
Status: DISCONTINUED | OUTPATIENT
Start: 2020-02-11 | End: 2020-02-15 | Stop reason: HOSPADM

## 2020-02-11 RX ORDER — LIDOCAINE HYDROCHLORIDE 10 MG/ML
0.5 INJECTION, SOLUTION EPIDURAL; INFILTRATION; INTRACAUDAL; PERINEURAL ONCE AS NEEDED
Status: DISCONTINUED | OUTPATIENT
Start: 2020-02-11 | End: 2020-02-15 | Stop reason: HOSPADM

## 2020-02-11 RX ADMIN — PROPOFOL 100 MG: 10 INJECTION, EMULSION INTRAVENOUS at 09:16

## 2020-02-11 RX ADMIN — PROPOFOL 50 MG: 10 INJECTION, EMULSION INTRAVENOUS at 09:20

## 2020-02-11 RX ADMIN — SODIUM CHLORIDE, SODIUM LACTATE, POTASSIUM CHLORIDE, AND CALCIUM CHLORIDE 125 ML/HR: .6; .31; .03; .02 INJECTION, SOLUTION INTRAVENOUS at 08:46

## 2020-02-11 RX ADMIN — PROPOFOL 50 MG: 10 INJECTION, EMULSION INTRAVENOUS at 09:24

## 2020-02-11 RX ADMIN — PROPOFOL 50 MG: 10 INJECTION, EMULSION INTRAVENOUS at 09:27

## 2020-02-11 NOTE — ANESTHESIA PREPROCEDURE EVALUATION
Review of Systems/Medical History  Patient summary reviewed    No history of anesthetic complications     Cardiovascular  Exercise tolerance (METS): >4,  Hyperlipidemia, Hypertension , No angina ,    Pulmonary  No shortness of breath, No recent URI ,        GI/Hepatic    No GERD ,        Negative  ROS        Endo/Other  Diabetes type 2 , History of thyroid disease: thyroid nodules  ,      GYN       Hematology   Musculoskeletal  Negative musculoskeletal ROS        Neurology  Negative neurology ROS No seizures ,  No CVA ,    Psychology           Physical Exam    Airway    Mallampati score: II  TM Distance: >3 FB  Neck ROM: full     Dental   No notable dental hx     Cardiovascular      Pulmonary      Other Findings        Anesthesia Plan  ASA Score- 2     Anesthesia Type- IV sedation with anesthesia with ASA Monitors  Additional Monitors:   Airway Plan:         Plan Factors-    Induction- intravenous  Postoperative Plan-     Informed Consent- Anesthetic plan and risks discussed with patient  I personally reviewed this patient with the CRNA  Discussed and agreed on the Anesthesia Plan with the CRNA  Lloyd Guardado

## 2020-02-11 NOTE — H&P
History and Physical -  [facility]  Fiorella Wilkersondden 69 y o  female MRN: [id number]        HPI:  29-year-old female with history of hypertension, diabetes mellitus was referred for colonoscopy regular bowel movements and denies any blood or mucus in the stool  Good appetite, no recent weight loss  Historical Information   Past Medical History:   Diagnosis Date    [facility] mellitus ([facility] )     Endometrial carcinoma [facility] )     Hypertension      Past Surgical History:   Procedure Laterality Date    HYSTERECTOMY  2006    TONSILLECTOMY  1962    TUBAL LIGATION  1992    US GUIDED THYROID BIOPSY  12/12/2019     Social History   Social History     Substance and Sexual Activity   Alcohol Use Never    Frequency: Never     Social History     Substance and Sexual Activity   Drug Use Never     Social History     Tobacco Use   Smoking Status Never Smoker   Smokeless Tobacco Never Used     Family History   Problem Relation Age of Onset    Stroke Mother     Diabetes Mother     Cancer Mother     Lung cancer Father     Arthritis Sister     COPD Brother     Coronary artery disease Brother     Heart failure Brother     Leukemia Son     HIV Brother     Cancer Family        Meds/Allergies       (Not in a hospital admission)    No Known Allergies    Objective     Blood pressure 157/74, pulse 90, temperature 97 8 °F (36 6 °C), temperature source Temporal, resp  rate 18, height 5' 8" (1 727 m), weight 110 kg (243 lb), SpO2 94 %      PHYSICAL EXAM:    Gen: NAD  CV: S1 & S2 normal, RRR  CHEST: Clear to auscultate  ABD: soft, NT/ND, good bowel sounds  EXT: no edema    ASSESSMENT:     Screening for colon cancer    PLAN:    Colonoscopy

## 2020-02-17 DIAGNOSIS — E11.65 UNCONTROLLED TYPE 2 DIABETES MELLITUS WITH HYPERGLYCEMIA (HCC): ICD-10-CM

## 2020-02-28 ENCOUNTER — TELEPHONE (OUTPATIENT)
Dept: GASTROENTEROLOGY | Facility: AMBULARY SURGERY CENTER | Age: 75
End: 2020-02-28

## 2020-02-28 NOTE — TELEPHONE ENCOUNTER
----- Message from Kenia Jerez MD sent at 2/18/2020  6:43 PM EST -----    Colon polyps removed came back as precancerous tubular adenoma  Next colonoscopy in 3 years  Patient to call our office for any GI symptoms

## 2020-03-11 ENCOUNTER — TELEPHONE (OUTPATIENT)
Dept: GASTROENTEROLOGY | Facility: AMBULARY SURGERY CENTER | Age: 75
End: 2020-03-11

## 2020-03-11 NOTE — TELEPHONE ENCOUNTER
----- Message from Kindra Echols MD sent at 2/18/2020  6:43 PM EST -----    Colon polyps removed came back as precancerous tubular adenoma  Next colonoscopy in 3 years  Patient to call our office for any GI symptoms

## 2020-08-20 ENCOUNTER — TELEPHONE (OUTPATIENT)
Dept: GASTROENTEROLOGY | Facility: AMBULARY SURGERY CENTER | Age: 75
End: 2020-08-20

## 2020-08-20 NOTE — TELEPHONE ENCOUNTER
Let's continue to monitor, if it becomes consistent then definitely send for stool ent and C diff PCR  Thank you!

## 2020-08-20 NOTE — TELEPHONE ENCOUNTER
Triage Telephone Intake  Dr Donavan Aguilar  Chief complaint:diarrhea x6/7 today & occasional episodes x7 days   Incontinent of bowel x2 in last 24H  Reports skinny stools when formed  Patient observed black stools in the past month- None today      Blood/mucous in stool:denies  Pain:denies                            [address]    Is patient increasing po fluids:yes  Increased yogurt in diet     Colonoscopy 2/11/20     Patient has follow up appointment scheduled: no    Current medications for chief complaint: none  Patient stated she has Type II DM (has not administered Metformin in 7d)  No new diabetic medication reported  Recommendations given:  1  Stay well hydrated  2  Message forwarded to provider - if stool specimens should be ordered at this time  Nurse to call back patient in labs ordered today     3  Follow up appt

## 2020-08-21 NOTE — TELEPHONE ENCOUNTER
Spoke to patient re: update in symptoms:     Patient states she is much better today  No episodes of diarrhea since 8/20/20 afternoon  Patient aware if loose stools or incontinence of bowels returns to please call office & ask for Wally  I will enter C-Diff PCR stool study per: PA provider recommendation

## 2020-09-20 DIAGNOSIS — I10 BENIGN ESSENTIAL HYPERTENSION: ICD-10-CM

## 2020-09-20 DIAGNOSIS — E11.65 UNCONTROLLED TYPE 2 DIABETES MELLITUS WITH HYPERGLYCEMIA (HCC): ICD-10-CM

## 2020-09-21 RX ORDER — LISINOPRIL 10 MG/1
TABLET ORAL
Qty: 30 TABLET | Refills: 5 | OUTPATIENT
Start: 2020-09-21

## 2020-10-07 ENCOUNTER — IMMUNIZATIONS (OUTPATIENT)
Dept: FAMILY MEDICINE CLINIC | Facility: CLINIC | Age: 75
End: 2020-10-07
Payer: MEDICARE

## 2020-10-07 DIAGNOSIS — Z23 ENCOUNTER FOR IMMUNIZATION: ICD-10-CM

## 2020-10-07 PROCEDURE — 90662 IIV NO PRSV INCREASED AG IM: CPT

## 2020-10-07 PROCEDURE — G0008 ADMIN INFLUENZA VIRUS VAC: HCPCS

## 2020-10-19 ENCOUNTER — OFFICE VISIT (OUTPATIENT)
Dept: OBGYN CLINIC | Facility: CLINIC | Age: 75
End: 2020-10-19
Payer: MEDICARE

## 2020-10-19 VITALS
WEIGHT: 243.4 LBS | HEIGHT: 68 IN | BODY MASS INDEX: 36.89 KG/M2 | TEMPERATURE: 97.9 F | SYSTOLIC BLOOD PRESSURE: 142 MMHG | DIASTOLIC BLOOD PRESSURE: 84 MMHG

## 2020-10-19 DIAGNOSIS — R10.2 VAGINAL PAIN: ICD-10-CM

## 2020-10-19 DIAGNOSIS — N90.89 VULVAR IRRITATION: Primary | ICD-10-CM

## 2020-10-19 DIAGNOSIS — R30.0 DYSURIA: ICD-10-CM

## 2020-10-19 LAB
SL AMB  POCT GLUCOSE, UA: NORMAL
SL AMB LEUKOCYTE ESTERASE,UA: NORMAL
SL AMB POCT BILIRUBIN,UA: NORMAL
SL AMB POCT BLOOD,UA: NORMAL
SL AMB POCT CLARITY,UA: NORMAL
SL AMB POCT COLOR,UA: YELLOW
SL AMB POCT KETONES,UA: NORMAL
SL AMB POCT NITRITE,UA: NORMAL
SL AMB POCT PH,UA: 6.5
SL AMB POCT SPECIFIC GRAVITY,UA: 1.01
SL AMB POCT URINE PROTEIN: NORMAL
SL AMB POCT UROBILINOGEN: NORMAL

## 2020-10-19 PROCEDURE — 87086 URINE CULTURE/COLONY COUNT: CPT | Performed by: OBSTETRICS & GYNECOLOGY

## 2020-10-19 PROCEDURE — 99203 OFFICE O/P NEW LOW 30 MIN: CPT | Performed by: OBSTETRICS & GYNECOLOGY

## 2020-10-19 PROCEDURE — 81003 URINALYSIS AUTO W/O SCOPE: CPT | Performed by: OBSTETRICS & GYNECOLOGY

## 2020-10-19 RX ORDER — NYSTATIN AND TRIAMCINOLONE ACETONIDE 100000; 1 [USP'U]/G; MG/G
OINTMENT TOPICAL 2 TIMES DAILY
Qty: 60 G | Refills: 0 | Status: SHIPPED | OUTPATIENT
Start: 2020-10-19 | End: 2020-12-31 | Stop reason: ALTCHOICE

## 2020-10-21 LAB — BACTERIA UR CULT: NORMAL

## 2020-12-31 ENCOUNTER — OFFICE VISIT (OUTPATIENT)
Dept: FAMILY MEDICINE CLINIC | Facility: CLINIC | Age: 75
End: 2020-12-31
Payer: MEDICARE

## 2020-12-31 VITALS
SYSTOLIC BLOOD PRESSURE: 138 MMHG | DIASTOLIC BLOOD PRESSURE: 84 MMHG | WEIGHT: 242 LBS | TEMPERATURE: 97.9 F | BODY MASS INDEX: 36.68 KG/M2 | HEIGHT: 68 IN

## 2020-12-31 DIAGNOSIS — E11.65 UNCONTROLLED TYPE 2 DIABETES MELLITUS WITH HYPERGLYCEMIA (HCC): Primary | ICD-10-CM

## 2020-12-31 DIAGNOSIS — Z12.31 ENCOUNTER FOR SCREENING MAMMOGRAM FOR MALIGNANT NEOPLASM OF BREAST: ICD-10-CM

## 2020-12-31 DIAGNOSIS — I10 BENIGN ESSENTIAL HYPERTENSION: ICD-10-CM

## 2020-12-31 DIAGNOSIS — Z23 ENCOUNTER FOR IMMUNIZATION: ICD-10-CM

## 2020-12-31 DIAGNOSIS — E78.2 HYPERLIPIDEMIA, MIXED: ICD-10-CM

## 2020-12-31 DIAGNOSIS — R53.83 FATIGUE, UNSPECIFIED TYPE: ICD-10-CM

## 2020-12-31 DIAGNOSIS — E66.9 OBESITY (BMI 30-39.9): ICD-10-CM

## 2020-12-31 LAB — SL AMB POCT HEMOGLOBIN AIC: 12.2 (ref ?–6.5)

## 2020-12-31 PROCEDURE — 83036 HEMOGLOBIN GLYCOSYLATED A1C: CPT | Performed by: FAMILY MEDICINE

## 2020-12-31 PROCEDURE — 99214 OFFICE O/P EST MOD 30 MIN: CPT | Performed by: FAMILY MEDICINE

## 2020-12-31 PROCEDURE — 90732 PPSV23 VACC 2 YRS+ SUBQ/IM: CPT

## 2020-12-31 PROCEDURE — G0009 ADMIN PNEUMOCOCCAL VACCINE: HCPCS

## 2020-12-31 RX ORDER — METFORMIN HYDROCHLORIDE 500 MG/1
500 TABLET, EXTENDED RELEASE ORAL 2 TIMES DAILY WITH MEALS
Qty: 60 TABLET | Refills: 5 | Status: SHIPPED | OUTPATIENT
Start: 2020-12-31 | End: 2021-02-05 | Stop reason: DRUGHIGH

## 2021-01-02 LAB
ALBUMIN SERPL-MCNC: 4.1 G/DL (ref 3.6–5.1)
ALBUMIN/CREAT UR: 11 MCG/MG CREAT
ALBUMIN/GLOB SERPL: 1.4 (CALC) (ref 1–2.5)
ALP SERPL-CCNC: 91 U/L (ref 37–153)
ALT SERPL-CCNC: 17 U/L (ref 6–29)
AST SERPL-CCNC: 11 U/L (ref 10–35)
BILIRUB SERPL-MCNC: 0.6 MG/DL (ref 0.2–1.2)
BUN SERPL-MCNC: 13 MG/DL (ref 7–25)
BUN/CREAT SERPL: ABNORMAL (CALC) (ref 6–22)
CALCIUM SERPL-MCNC: 9.4 MG/DL (ref 8.6–10.4)
CHLORIDE SERPL-SCNC: 100 MMOL/L (ref 98–110)
CHOLEST SERPL-MCNC: 257 MG/DL
CHOLEST/HDLC SERPL: 5.5 (CALC)
CO2 SERPL-SCNC: 27 MMOL/L (ref 20–32)
CREAT SERPL-MCNC: 0.68 MG/DL (ref 0.6–0.93)
CREAT UR-MCNC: 46 MG/DL (ref 20–275)
ERYTHROCYTE [DISTWIDTH] IN BLOOD BY AUTOMATED COUNT: 13 % (ref 11–15)
GLOBULIN SER CALC-MCNC: 3 G/DL (CALC) (ref 1.9–3.7)
GLUCOSE SERPL-MCNC: 305 MG/DL (ref 65–99)
HCT VFR BLD AUTO: 48.2 % (ref 35–45)
HDLC SERPL-MCNC: 47 MG/DL
HGB BLD-MCNC: 15.8 G/DL (ref 11.7–15.5)
LDLC SERPL CALC-MCNC: 172 MG/DL (CALC)
MCH RBC QN AUTO: 27.5 PG (ref 27–33)
MCHC RBC AUTO-ENTMCNC: 32.8 G/DL (ref 32–36)
MCV RBC AUTO: 83.8 FL (ref 80–100)
MICROALBUMIN UR-MCNC: 0.5 MG/DL
NONHDLC SERPL-MCNC: 210 MG/DL (CALC)
PLATELET # BLD AUTO: 301 THOUSAND/UL (ref 140–400)
PMV BLD REES-ECKER: 10.4 FL (ref 7.5–12.5)
POTASSIUM SERPL-SCNC: 4.3 MMOL/L (ref 3.5–5.3)
PROT SERPL-MCNC: 7.1 G/DL (ref 6.1–8.1)
RBC # BLD AUTO: 5.75 MILLION/UL (ref 3.8–5.1)
SL AMB EGFR AFRICAN AMERICAN: 100 ML/MIN/1.73M2
SL AMB EGFR NON AFRICAN AMERICAN: 86 ML/MIN/1.73M2
SODIUM SERPL-SCNC: 138 MMOL/L (ref 135–146)
TRIGL SERPL-MCNC: 212 MG/DL
WBC # BLD AUTO: 6.7 THOUSAND/UL (ref 3.8–10.8)

## 2021-01-22 ENCOUNTER — HOSPITAL ENCOUNTER (OUTPATIENT)
Dept: RADIOLOGY | Facility: HOSPITAL | Age: 76
Discharge: HOME | End: 2021-01-22
Payer: MEDICARE

## 2021-01-22 VITALS — BODY MASS INDEX: 36.37 KG/M2 | WEIGHT: 240 LBS | HEIGHT: 68 IN

## 2021-01-22 DIAGNOSIS — Z12.31 ENCOUNTER FOR SCREENING MAMMOGRAM FOR MALIGNANT NEOPLASM OF BREAST: ICD-10-CM

## 2021-01-22 PROCEDURE — 77063 BREAST TOMOSYNTHESIS BI: CPT

## 2021-01-22 PROCEDURE — 77067 SCR MAMMO BI INCL CAD: CPT

## 2021-01-28 ENCOUNTER — OFFICE VISIT (OUTPATIENT)
Dept: FAMILY MEDICINE CLINIC | Facility: CLINIC | Age: 76
End: 2021-01-28
Payer: MEDICARE

## 2021-01-28 VITALS
HEART RATE: 89 BPM | DIASTOLIC BLOOD PRESSURE: 82 MMHG | HEIGHT: 68 IN | SYSTOLIC BLOOD PRESSURE: 150 MMHG | WEIGHT: 244 LBS | TEMPERATURE: 98.2 F | OXYGEN SATURATION: 97 % | BODY MASS INDEX: 36.98 KG/M2

## 2021-01-28 DIAGNOSIS — E11.65 UNCONTROLLED TYPE 2 DIABETES MELLITUS WITH HYPERGLYCEMIA (HCC): ICD-10-CM

## 2021-01-28 DIAGNOSIS — M51.17 SCIATIC RADICULITIS: Primary | ICD-10-CM

## 2021-01-28 PROCEDURE — 99214 OFFICE O/P EST MOD 30 MIN: CPT | Performed by: FAMILY MEDICINE

## 2021-01-28 RX ORDER — GABAPENTIN 300 MG/1
300 CAPSULE ORAL 3 TIMES DAILY
Qty: 60 CAPSULE | Refills: 1 | Status: SHIPPED | OUTPATIENT
Start: 2021-01-28 | End: 2021-03-05 | Stop reason: ALTCHOICE

## 2021-01-28 NOTE — ASSESSMENT & PLAN NOTE
Lab Results   Component Value Date    HGBA1C 12 2 (A) 12/31/2020   She has tolerated the metformin well  She is going to increase her dose to 1000 b i d  At meals  She also needs new test strips though she is not sure of her meter  She is going to call back with the brand we will refill for her  She will follow-up as scheduled in April or sooner as needed  She agrees

## 2021-01-28 NOTE — ASSESSMENT & PLAN NOTE
The patient has had 4-5 days a symptoms consistent with sciatic radiculitis  She has no objective weakness  She has no incontinence of bowel or bladder  She has no fevers chills or constitutional symptoms  She is an uncontrolled diabetic and we are going to avoid steroids  Will have her use ice and encouraged her to walk more  We are going to give her some gabapentin to try  We did give her sedation warnings  She is asked call in a few days with report of her condition  She is asked call sooner seek more urgent medical attention if she develops any of the worrisome symptoms we discussed above  She is in agreement with this plan

## 2021-02-05 DIAGNOSIS — E11.65 UNCONTROLLED TYPE 2 DIABETES MELLITUS WITH HYPERGLYCEMIA (HCC): Primary | ICD-10-CM

## 2021-02-15 DIAGNOSIS — M51.17 SCIATIC RADICULITIS: Primary | ICD-10-CM

## 2021-02-15 RX ORDER — NAPROXEN 500 MG/1
500 TABLET ORAL 2 TIMES DAILY WITH MEALS
Qty: 30 TABLET | Refills: 0 | Status: SHIPPED | OUTPATIENT
Start: 2021-02-15 | End: 2021-03-01

## 2021-02-27 DIAGNOSIS — M51.17 SCIATIC RADICULITIS: ICD-10-CM

## 2021-03-01 DIAGNOSIS — Z23 ENCOUNTER FOR IMMUNIZATION: ICD-10-CM

## 2021-03-01 RX ORDER — NAPROXEN 500 MG/1
TABLET ORAL
Qty: 30 TABLET | Refills: 0 | Status: SHIPPED | OUTPATIENT
Start: 2021-03-01 | End: 2021-03-15

## 2021-03-05 ENCOUNTER — OFFICE VISIT (OUTPATIENT)
Dept: FAMILY MEDICINE CLINIC | Facility: CLINIC | Age: 76
End: 2021-03-05
Payer: MEDICARE

## 2021-03-05 ENCOUNTER — IMMUNIZATIONS (OUTPATIENT)
Dept: FAMILY MEDICINE CLINIC | Facility: HOSPITAL | Age: 76
End: 2021-03-05

## 2021-03-05 VITALS
TEMPERATURE: 99.5 F | BODY MASS INDEX: 36.68 KG/M2 | DIASTOLIC BLOOD PRESSURE: 80 MMHG | SYSTOLIC BLOOD PRESSURE: 128 MMHG | WEIGHT: 242 LBS | OXYGEN SATURATION: 97 % | HEIGHT: 68 IN | HEART RATE: 88 BPM

## 2021-03-05 DIAGNOSIS — E11.65 UNCONTROLLED TYPE 2 DIABETES MELLITUS WITH HYPERGLYCEMIA (HCC): ICD-10-CM

## 2021-03-05 DIAGNOSIS — S39.012D CHRONIC SACROILIAC STRAIN, SUBSEQUENT ENCOUNTER: Primary | ICD-10-CM

## 2021-03-05 DIAGNOSIS — E11.42 DIABETIC POLYNEUROPATHY ASSOCIATED WITH TYPE 2 DIABETES MELLITUS (HCC): ICD-10-CM

## 2021-03-05 DIAGNOSIS — Z23 ENCOUNTER FOR IMMUNIZATION: Primary | ICD-10-CM

## 2021-03-05 PROBLEM — S39.012A: Status: ACTIVE | Noted: 2021-03-05

## 2021-03-05 PROCEDURE — 99214 OFFICE O/P EST MOD 30 MIN: CPT | Performed by: FAMILY MEDICINE

## 2021-03-05 PROCEDURE — 0011A SARS-COV-2 / COVID-19 MRNA VACCINE (MODERNA) 100 MCG: CPT

## 2021-03-05 PROCEDURE — 91301 SARS-COV-2 / COVID-19 MRNA VACCINE (MODERNA) 100 MCG: CPT

## 2021-03-05 RX ORDER — METHOCARBAMOL 750 MG/1
750 TABLET, FILM COATED ORAL EVERY 6 HOURS PRN
Qty: 40 TABLET | Refills: 0 | Status: SHIPPED | OUTPATIENT
Start: 2021-03-05 | End: 2021-03-31 | Stop reason: SDUPTHER

## 2021-03-05 NOTE — ASSESSMENT & PLAN NOTE
Lab Results   Component Value Date    HGBA1C 12 2 (A) 12/31/2020     She has been tolerating her metformin at a 1000 mg b i d  She has not been checking her blood sugars as recommended  We asked her to begin checking her fasting blood sugar and give us a report next week  She agrees

## 2021-03-05 NOTE — ASSESSMENT & PLAN NOTE
Lab Results   Component Value Date    HGBA1C 12 2 (A) 12/31/2020    Unfortunately she developed urticaria with gabapentin when used for other pain condition  Presently she is going to begin using Robaxin for her sacroiliac strain  In the future we may have to come up with another strategy such as possibly a try cyclic such as amitriptyline

## 2021-03-05 NOTE — PROGRESS NOTES
Assessment/Plan:  Chronic sacroiliac strain   The patient has right sided sacroiliac pain  This may be a strain or sprain related to recent snow removal activities  Due to her age and persistence of her pain we are going to send her for x-rays of her lumbar spine  She has no constitutional symptoms such as anorexia weight loss night sweats fever X cetera but I do believe it is prudent to get these films  She is in agreement with this plan  In the meantime we are going to give her some Robaxin to try  We did wonder as to potential sedation, ataxia X cetderrick  Will follow up with her when results of the films are available and she is asked call sooner as needed  She agrees  Diabetes type 2, uncontrolled ([facility] )    Lab Results   Component Value Date    HGBA1C 12 2 (A) 12/31/2020     She has been tolerating her metformin at a 1000 mg b i d  She has not been checking her blood sugars as recommended  We asked her to begin checking her fasting blood sugar and give us a report next week  She agrees  Diabetic polyneuropathy associated with type 2 diabetes mellitus ([facility] )    Lab Results   Component Value Date    HGBA1C 12 2 (A) 12/31/2020    Unfortunately she developed urticaria with gabapentin when used for other pain condition  Presently she is going to begin using Robaxin for her sacroiliac strain  In the future we may have to come up with another strategy such as possibly a try cyclic such as amitriptyline  Diagnoses and all orders for this visit:    Chronic sacroiliac strain, subsequent encounter  -     XR spine lumbar minimum 4 views non injury; Future  -     methocarbamol (ROBAXIN) 750 mg tablet; Take 1 tablet (750 mg total) by mouth every 6 (six) hours as needed for muscle spasms  -     Ambulatory referral to Physical Therapy;  Future    Uncontrolled type 2 diabetes mellitus with hyperglycemia (HCC)    Diabetic polyneuropathy associated with type 2 diabetes mellitus (HCC)          Subjective: Chief Complaint   Patient presents with    Sciatica     pain down into b/l legs, knee pain, numbness and tingling at times  Patient ID: Aris Mazariegos is a 77 y o  female  I have pain all the time  Naproxen ineffective  Urticaria from gabapentin  I feel really unwell  I get weepy and feel like my life is over  HPI    The patient is a 44-year-old female who presents today for persistent upper gluteal pain  She states that she also has bilateral knee pain now  She also has some burning dysesthesias of the soles of her feet  She does have uncontrolled diabetes  She tried some gabapentin for her back pain but this caused urticaria  She tried Naprosyn but this did not seem to help  She states that aspirin or Aleve seems to give her some relief  She is now concerned that she has something more serious going on  She has no incontinence of bowel or bladder and no weakness of her lower extremities  She states that every morning she wakes up and feels "really unwell"  She has had no weight loss, night sweats fevers chills or other constitutional symptoms  She did receive her COVID-19 vaccination today  The following portions of the patient's history were reviewed and updated as appropriate: allergies, current medications, past family history, past medical history, past social history, past surgical history and problem list     Review of Systems   Constitution: Negative for decreased appetite, malaise/fatigue, night sweats and weight loss  Cardiovascular: Negative  Negative for irregular heartbeat and leg swelling  Respiratory: Negative for cough, shortness of breath and wheezing  Musculoskeletal: Positive for back pain and joint pain  R gluteal pain which is dull and always there   Gastrointestinal: Negative for bowel incontinence  Genitourinary: Positive for nocturia  Negative for bladder incontinence  Nocturia x 2   Neurological: Positive for sensory change  Negative for focal weakness and weakness  Psychiatric/Behavioral: Positive for depression  The patient has insomnia and is nervous/anxious  Objective:    Physical Exam   Constitutional: She is oriented to person, place, and time  She appears well-developed  Obese in no distress   Neck: No JVD present  No thyromegaly present  Musculoskeletal:         General: Tenderness present  No deformity or edema  Comments: She has tenderness of her right SI joint  This is worse with twisting her trunk  She has no tenderness over her greater trochanter  She has negative straight leg raise test   She has negative logroll  She has no step-off deformity or tenderness of her lumbar spine  Lymphadenopathy:     She has no cervical adenopathy  Neurological: She is alert and oriented to person, place, and time  Skin: No rash noted  No erythema  Psychiatric: Her behavior is normal  Judgment and thought content normal    Mildly anxious in appearance   Nursing note and vitals reviewed

## 2021-03-05 NOTE — ASSESSMENT & PLAN NOTE
The patient has right sided sacroiliac pain  This may be a strain or sprain related to recent snow removal activities  Due to her age and persistence of her pain we are going to send her for x-rays of her lumbar spine  She has no constitutional symptoms such as anorexia weight loss night sweats fever X mitra but I do believe it is prudent to get these films  She is in agreement with this plan  In the meantime we are going to give her some Robaxin to try  We did wonder as to potential sedation, ataxia X mitra  Will follow up with her when results of the films are available and she is asked call sooner as needed  She agrees

## 2021-03-08 ENCOUNTER — HOSPITAL ENCOUNTER (OUTPATIENT)
Dept: RADIOLOGY | Facility: HOSPITAL | Age: 76
Discharge: HOME | End: 2021-03-08
Payer: MEDICARE

## 2021-03-08 ENCOUNTER — TRANSCRIBE ORDERS (OUTPATIENT)
Dept: ADMINISTRATIVE | Facility: HOSPITAL | Age: 76
End: 2021-03-08

## 2021-03-08 DIAGNOSIS — S39.012D CHRONIC SACROILIAC STRAIN, SUBSEQUENT ENCOUNTER: ICD-10-CM

## 2021-03-08 PROCEDURE — 72110 X-RAY EXAM L-2 SPINE 4/>VWS: CPT

## 2021-03-10 ENCOUNTER — EVALUATION (OUTPATIENT)
Dept: PHYSICAL THERAPY | Facility: REHABILITATION | Age: 76
End: 2021-03-10
Payer: MEDICARE

## 2021-03-10 DIAGNOSIS — S39.012D CHRONIC SACROILIAC STRAIN, SUBSEQUENT ENCOUNTER: Primary | ICD-10-CM

## 2021-03-10 PROCEDURE — 97110 THERAPEUTIC EXERCISES: CPT

## 2021-03-10 PROCEDURE — 97161 PT EVAL LOW COMPLEX 20 MIN: CPT

## 2021-03-10 NOTE — PROGRESS NOTES
PT Evaluation     Today's date: 3/10/2021  Patient name: Itzel Corona  : 1945  MRN: [id number]  Referring provider: Lucrecia Carson MD  Dx:   Encounter Diagnosis     ICD-10-CM    1  Chronic sacroiliac strain, subsequent encounter  S39 012D Ambulatory referral to Physical Therapy       Start Time: 1130  Stop Time: 1230  Total time in clinic (min): 60 minutes    Assessment  Assessment details: Dave Toney is a 78 yo female presenting to PT with R sided back pain  Patient did not present with any red flags  No bowel bladder dysfunction or saddle anesthesia  Lower quarter screen unremarkable  Patient has a hx of neuropathy and n/t in feet  Restricted HIP PROM in all planes  Discussed POC and given HEP  Impairments: abnormal or restricted ROM, activity intolerance, impaired balance, impaired physical strength, lacks appropriate home exercise program, pain with function and poor body mechanics    Symptom irritability: highUnderstanding of Dx/Px/POC: good   Prognosis: good    Plan  Plan details: Plan to see patient 2x/week for the next 8 weeks  Patient would benefit from: skilled physical therapy  Planned modality interventions: low level laser therapy, thermotherapy: hydrocollator packs and TENS  Planned therapy interventions: joint mobilization, manual therapy, massage, ADL retraining, activity modification, abdominal trunk stabilization, neuromuscular re-education, patient education, behavior modification, strengthening, stretching, therapeutic activities, therapeutic exercise, functional ROM exercises, flexibility, graded activity, graded exercise, graded motor and home exercise program  Frequency: 2x week  Duration in weeks: 8  Treatment plan discussed with: patient        Subjective Evaluation    History of Present Illness  Mechanism of injury: Started Mid January may have been related to shoveling snow   Patient states that the pain is on the R side and radiates into pelvis and down hip at times  Pain primarily located in the R SIJ and glut region  Pain has improved with use of robaxin  Pain  Current pain ratin  At worst pain ratin  Quality: dull ache  Alleviating factors: robaxin    Aggravating factors: sitting, standing and walking (lying)    Patient Goals  Patient goals for therapy: decreased pain, increased motion, increased strength, independence with ADLs/IADLs and return to sport/leisure activities  Patient goal: Painfree, less use of medication        Objective     Active Range of Motion     Lumbar   Flexion:  Restriction level: minimal  Extension:  Restriction level: moderate  Left lateral flexion:  Restriction level: minimal  Right lateral flexion:  Restriction level: minimal  Left rotation:  Restriction level: minimal  Right rotation:  Restriction level: minimal    General Comments:      Lumbar Comments  Lower Quarter Screen  Myotomes:  4/5 throughout no weakness    Sensation:  Hx of diabetic neuropathy with occasional    Reflexes:  Intact    Special Tests:  DEREK (+ on right, tight b/l), FADIR (tight b/l), SLR (tight b/l), Compression (painful on R)    Strength:  Hib Abduction 4-/5 b/l    Repeated Motion  Extension: No change  Flexion: No change    Tender to Palpation:  Glue med, piriformis    Unable to assess PA pressures      Flowsheet Rows      Most Recent Value   PT/OT G-Codes   Current Score  53   Projected Score  64             Precautions: Hx of neuropathy      Manuals 3/10            Laser nv            STM nv                                      Neuro Re-Ed             Bridges 2x10            Clamshells 2x15 OTB            Birddogs nv            Multifidus Press nv                                                   Ther Ex             Seated Hamstring Stretch 30"x3            Seated Piriformis Stretch 30"x3            PBall rollout                                                                              Ther Activity                                       Gait Training Modalities

## 2021-03-15 ENCOUNTER — OFFICE VISIT (OUTPATIENT)
Dept: PHYSICAL THERAPY | Facility: REHABILITATION | Age: 76
End: 2021-03-15
Payer: MEDICARE

## 2021-03-15 DIAGNOSIS — M51.17 SCIATIC RADICULITIS: ICD-10-CM

## 2021-03-15 DIAGNOSIS — S39.012D CHRONIC SACROILIAC STRAIN, SUBSEQUENT ENCOUNTER: Primary | ICD-10-CM

## 2021-03-15 PROCEDURE — 97112 NEUROMUSCULAR REEDUCATION: CPT

## 2021-03-15 PROCEDURE — 97110 THERAPEUTIC EXERCISES: CPT

## 2021-03-15 RX ORDER — NAPROXEN 500 MG/1
TABLET ORAL
Qty: 30 TABLET | Refills: 0 | Status: SHIPPED | OUTPATIENT
Start: 2021-03-15 | End: 2021-07-19 | Stop reason: ALTCHOICE

## 2021-03-15 NOTE — PROGRESS NOTES
Daily Note     Today's date: 3/15/2021  Patient name: Narendra Busby  : 1945  MRN: [id number]  Referring provider: Alma Vaughan MD  Dx:   Encounter Diagnosis     ICD-10-CM    1  Chronic sacroiliac strain, subsequent encounter  S39 012D        Start Time: 1630  Stop Time: 2332  Total time in clinic (min): 44 minutes    Subjective: Patient states she only to take pain medication once since last visit  Has been compliant with HEP and feels HEP is helping  Objective: See treatment diary below      Assessment: Patient had no increase in symptoms with added exercises today  Plan to add new exercises to HEP next visit if patient has no increased symptoms following  Tolerated treatment well  Patient demonstrated fatigue post treatment and would benefit from continued PT      Plan: Continue per plan of care        Precautions: Hx of neuropathy      Manuals 3/10 3/15           Laser  nv            STM  nv                                      Neuro Re-Ed             Bridges 2x10 2x10           Clamshells 2x15 OTB 2x15 OTB           Birddogs nv 10x each LLE           Multifidus Press nv [place] 5# 15x each                                                  Ther Ex             Seated Hamstring Stretch 30"x3 30"x3           Seated Piriformis Stretch 30"x3 30"x3           PBall rollout  10x 1" hold teal ball           Standing SLR 2 Way ABD/EXT  10x each b/l           Bike  5'                                                  Ther Activity                                       Gait Training                                       Modalities

## 2021-03-19 ENCOUNTER — OFFICE VISIT (OUTPATIENT)
Dept: PHYSICAL THERAPY | Facility: REHABILITATION | Age: 76
End: 2021-03-19
Payer: MEDICARE

## 2021-03-19 DIAGNOSIS — S39.012D CHRONIC SACROILIAC STRAIN, SUBSEQUENT ENCOUNTER: Primary | ICD-10-CM

## 2021-03-19 PROCEDURE — 97110 THERAPEUTIC EXERCISES: CPT

## 2021-03-19 PROCEDURE — 97112 NEUROMUSCULAR REEDUCATION: CPT

## 2021-03-19 NOTE — PROGRESS NOTES
2017          Yong Mendozaujillo  [address]  [place] [address]    Dear Baby Girl:    [facility] wants to ensure your discharge home is safe and you or your loved ones have had all your questions answered regarding your care after you leave the hospital.    You may receive a telephone call within two days of your discharge.  This call is to make certain you understand your discharge instructions as well as ensure we provided you with the best care possible during your stay with us.     The call will only last approximately 3-5 minutes and will be done by a nurse.    Once again, we want to ensure your discharge home is safe and that you have a clear understanding of any next steps in your care.  If you have any questions or concerns, please do not hesitate to contact us, we are here for you.  Thank you for choosing [facility] for your healthcare needs.    Sincerely,    Dominick Collins    [facility]         Daily Note     Today's date: 3/19/2021  Patient name: Alia Vann  : 1945  MRN: [id number]  Referring provider: Sheila Brown MD  Dx:   Encounter Diagnosis     ICD-10-CM    1  Chronic sacroiliac strain, subsequent encounter  S39 012D                   Subjective: Pt reports she was very fatigued and general body ache's following LV  Had to take it easy for a couple of days, but everything has resolved; performed some of HEP yesterday  Has only taking pain medication once this week  Notes she is feeling "much better" compared to before starting therapy  Objective: See treatment diary below  Encouraged pt to become more constant with HEP, performing frequency as prescribed, should she feel better following today's visit compare to last       Assessment: Tolerated treatment well  Patient would benefit from continued PT  No progressions made today d/t subjective comments made at start of session  HEP also not updated at this time, but may tolerate new exercises for home [place]  Challenged with current program, but was able to perform all exercise with no increase in pain  Plan: Continue per plan of care  Monitor response to today's treatment  Update HEP NV if no adverse effects to today's visit          Precautions: Hx of neuropathy      Manuals 3/10 3/15 3/19          Laser  nv            STM  nv                                      Neuro Re-Ed             Bridges 2x10 2x10 2x10          Clamshells 2x15 OTB 2x15 OTB 2x15 OTB          Birddogs nv 10x each LLE 10x each LLE          Multifidus Press nv [place] 5# 15x each [place] 5# 15x each                                                 Ther Ex             Seated Hamstring Stretch 30"x3 30"x3 30"x3          Seated Piriformis Stretch 30"x3 30"x3 30"x3          PBall rollout  10x 1" hold teal ball 10x 1" hold teal ball          Standing SLR 2 Way ABD/EXT  10x each b/l 10x each b/l          Bike  5' 5' Ther Activity                                       Gait Training                                       Modalities

## 2021-03-22 ENCOUNTER — OFFICE VISIT (OUTPATIENT)
Dept: PHYSICAL THERAPY | Facility: REHABILITATION | Age: 76
End: 2021-03-22
Payer: MEDICARE

## 2021-03-22 DIAGNOSIS — S39.012D CHRONIC SACROILIAC STRAIN, SUBSEQUENT ENCOUNTER: Primary | ICD-10-CM

## 2021-03-22 PROCEDURE — 97110 THERAPEUTIC EXERCISES: CPT

## 2021-03-22 PROCEDURE — 97112 NEUROMUSCULAR REEDUCATION: CPT

## 2021-03-22 NOTE — PROGRESS NOTES
Daily Note     Today's date: 3/22/2021  Patient name: Korin Chang  : 1945  MRN: [id number]  Referring provider: Leilani Mendez MD  Dx:   Encounter Diagnosis     ICD-10-CM    1  Chronic sacroiliac strain, subsequent encounter  S39 012D        Start Time: 1100  Stop Time: 1145  Total time in clinic (min): 45 minutes    Subjective: Patient states back pain is more achy in nature and much less sharp  Objective: See treatment diary below      Assessment: No increased pain/discomfort in back following todays exercises  Plan to add multifidus press to HEP if patient responds favorably next visit  Tolerated treatment well  Patient demonstrated fatigue post treatment and would benefit from continued PT      Plan: Continue per plan of care        Precautions: Hx of neuropathy      Manuals 3/10 3/15 3/19 3/22         Laser  nv            STM  nv                                      Neuro Re-Ed             Bridges 2x10 2x10 2x10 3x10         Clamshells 2x15 OTB 2x15 OTB 2x15 OTB gtb 2x10         Birddogs nv 10x each LLE 10x each LLE 10x each LLE         Multifidus Press nv [place] 5# 15x each Caryl 5# 15x each [place] 5# 15x each         Caryl Walkout    nv                                   Ther Ex             Seated Hamstring Stretch 30"x3 30"x3 30"x3 30"x3         Seated Piriformis Stretch 30"x3 30"x3 30"x3 30"x3         PBall rollout  10x 1" hold teal ball 10x 1" hold teal ball 3x 10" hold 3 ways           Standing SLR 2 Way ABD/EXT  10x each b/l 10x each b/l 10x each bl         Bike  5' 5' 7'         Nautilus Leg Press    nv                                   Ther Activity                                       Gait Training                                       Modalities

## 2021-03-26 ENCOUNTER — OFFICE VISIT (OUTPATIENT)
Dept: PHYSICAL THERAPY | Facility: REHABILITATION | Age: 76
End: 2021-03-26
Payer: MEDICARE

## 2021-03-26 DIAGNOSIS — S39.012D CHRONIC SACROILIAC STRAIN, SUBSEQUENT ENCOUNTER: Primary | ICD-10-CM

## 2021-03-26 PROCEDURE — 97110 THERAPEUTIC EXERCISES: CPT

## 2021-03-26 PROCEDURE — 97112 NEUROMUSCULAR REEDUCATION: CPT

## 2021-03-26 NOTE — PROGRESS NOTES
Daily Note     Today's date: 3/26/2021  Patient name: Bert Kee  : 1945  MRN: [id number]  Referring provider: Pura Reid MD  Dx:   Encounter Diagnosis     ICD-10-CM    1  Chronic sacroiliac strain, subsequent encounter  S39 012D        Start Time: 0900  Stop Time: 8968  Total time in clinic (min): 47 minutes    Subjective: Patient states she was sore 2 days after last visit  States soreness started in back and wrapped around into pelvis  Objective: See treatment diary below      Assessment: No complaints of pain today  Held on progressing exercises and adding new exercises secondary to increased soreness following last visit  Tolerated treatment well  Patient demonstrated fatigue post treatment and would benefit from continued PT      Plan: Continue per plan of care        Precautions: Hx of neuropathy      Manuals 3/10 3/15 3/19 3/22 3/26        Laser  nv            STM  nv                                      Neuro Re-Ed             Bridges 2x10 2x10 2x10 3x10 3x10        Clamshells 2x15 OTB 2x15 OTB 2x15 OTB gtb 2x10 Gtb 2x10        Birddogs nv 10x each LLE 10x each LLE 10x each LLE 15x LE        Multifidus Press nv [place] 5# 15x each [place] 5# 15x each [place] 5# 15x each Caryl 5# 20x each        [place] Walkout    nv nv        Hip Add Iso     5"x10                     Ther Ex             Seated Hamstring Stretch 30"x3 30"x3 30"x3 30"x3 30"x3        Seated Piriformis Stretch 30"x3 30"x3 30"x3 30"x3 30"x3        PBall rollout  10x 1" hold teal ball 10x 1" hold teal ball 3x 10" hold 3 ways   3x 10" hold 3 ways        Standing SLR 2 Way ABD/EXT  10x each b/l 10x each b/l 10x each bl 10x each  20x this visit       Bike  5' 5' 7' 7'        Nautilus Leg Press    nv nv                                  Ther Activity                                       Gait Training                                       Modalities

## 2021-03-29 ENCOUNTER — OFFICE VISIT (OUTPATIENT)
Dept: PHYSICAL THERAPY | Facility: REHABILITATION | Age: 76
End: 2021-03-29
Payer: MEDICARE

## 2021-03-29 DIAGNOSIS — S39.012D CHRONIC SACROILIAC STRAIN, SUBSEQUENT ENCOUNTER: Primary | ICD-10-CM

## 2021-03-29 PROCEDURE — 97110 THERAPEUTIC EXERCISES: CPT

## 2021-03-29 PROCEDURE — 97112 NEUROMUSCULAR REEDUCATION: CPT

## 2021-03-29 NOTE — PROGRESS NOTES
Daily Note     Today's date: 3/29/2021  Patient name: Mavis Garcia  : 1945  MRN: [id number]  Referring provider: Clive Trinidad MD  Dx:   Encounter Diagnosis     ICD-10-CM    1  Chronic sacroiliac strain, subsequent encounter  S39 012D        Start Time: 1100  Stop Time: 1150  Total time in clinic (min): 50 minutes    Subjective: Patient reported soreness in hips for 2 days following PT again  Objective: See treatment diary below      Assessment: Patient continues to report soreness two days following PT, will hold on progressions until soreness is less than 1 day in duration  Instructed patient to add lumbar roll to low back during sitting activities to help with increased soreness and tightness following  Tolerated treatment well  Patient demonstrated fatigue post treatment and would benefit from continued PT      Plan: Continue per plan of care        Precautions: Hx of neuropathy      Manuals 3/10 3/15 3/19 3/22 3/26 3/29       Laser  nv            STM  nv            Piriformis Stretch      nv       Sidelying Hip Flexion stretch      nv       Neuro Re-Ed             Bridges 2x10 2x10 2x10 3x10 3x10 3x10       Clamshells 2x15 OTB 2x15 OTB 2x15 OTB gtb 2x10 Gtb 2x10 Gtb 2x10       Birddogs nv 10x each LLE 10x each LLE 10x each LLE 15x LE 15x LE       Multifidus Press nv [place] 5# 15x each [place] 5# 15x each Caryl 5# 15x each [place] 5# 20x each Caryl 5# 20x each       Caryl Walkout    nv nv nv       Hip Add Iso w/ Ball     5"x10 5"x10                    Ther Ex             Seated Hamstring Stretch 30"x3 30"x3 30"x3 30"x3 30"x3 30"x3       Seated Piriformis Stretch 30"x3 30"x3 30"x3 30"x3 30"x3 30"x3       PBall rollout  10x 1" hold teal ball 10x 1" hold teal ball 3x 10" hold 3 ways   3x 10" hold 3 ways 5x 5" hold 3 ways       Standing SLR 2 Way ABD/EXT  10x each b/l 10x each b/l 10x each bl 10x each  20x this visit       Bike  5' 5' 7' 7' 5'       Nautilus Leg Press    nv nv Not ready [place]             Step Ups             Ther Activity                                       Gait Training                                       Modalities

## 2021-03-31 ENCOUNTER — OFFICE VISIT (OUTPATIENT)
Dept: PHYSICAL THERAPY | Facility: REHABILITATION | Age: 76
End: 2021-03-31
Payer: MEDICARE

## 2021-03-31 DIAGNOSIS — S39.012D CHRONIC SACROILIAC STRAIN, SUBSEQUENT ENCOUNTER: Primary | ICD-10-CM

## 2021-03-31 DIAGNOSIS — S39.012D CHRONIC SACROILIAC STRAIN, SUBSEQUENT ENCOUNTER: ICD-10-CM

## 2021-03-31 PROCEDURE — 97112 NEUROMUSCULAR REEDUCATION: CPT

## 2021-03-31 PROCEDURE — 97110 THERAPEUTIC EXERCISES: CPT

## 2021-03-31 RX ORDER — METHOCARBAMOL 750 MG/1
750 TABLET, FILM COATED ORAL EVERY 6 HOURS PRN
Qty: 40 TABLET | Refills: 0 | Status: SHIPPED | OUTPATIENT
Start: 2021-03-31 | End: 2021-07-19 | Stop reason: ALTCHOICE

## 2021-03-31 NOTE — PROGRESS NOTES
Daily Note     Today's date: 3/31/2021  Patient name: Jacqueline Huang  : 1945  MRN: [id number]  Referring provider: Lulú Bradshaw MD  Dx: No diagnosis found  Start Time: 1100  Stop Time: 1150  Total time in clinic (min): 50 minutes    Subjective: reports improvements in soreness in back today  Objective: See treatment diary below      Assessment: Patient responded well to lateral stepping today  Progress slowly  Tolerated treatment well  Patient demonstrated fatigue post treatment and would benefit from continued PT      Plan: Continue per plan of care        Precautions: Hx of neuropathy      Manuals 3/10 3/15 3/19 3/22 3/26 3/29 3/31      Laser  nv            STM  nv            Piriformis Stretch      nv       Sidelying Hip Flexion stretch      nv       Neuro Re-Ed             Bridges 2x10 2x10 2x10 3x10 3x10 3x10 3x10 w/ ball       Clamshells 2x15 OTB 2x15 OTB 2x15 OTB gtb 2x10 Gtb 2x10 Gtb 2x10 YHB 2x10      Birddogs nv 10x each LLE 10x each LLE 10x each LLE 15x LE 15x LE 15x LE      Multifidus Press nv [place] 5# 15x each [place] 5# 15x each [place] 5# 15x each [place] 5# 20x each Caryl 5# 20x each Caryl 5# 20x each      Caryl Lateral Stepping    nv nv nv 3# 3x      Hip Add Iso w/ Ball     5"x10 5"x10 5"x10      Tandem Stance       10"x10      Single Leg Balance       10"x10 4" step for support                   Ther Ex             Seated Hamstring Stretch 30"x3 30"x3 30"x3 30"x3 30"x3 30"x3 30"x3      Seated Piriformis Stretch 30"x3 30"x3 30"x3 30"x3 30"x3 30"x3 30"x3      PBall rollout  10x 1" hold teal ball 10x 1" hold teal ball 3x 10" hold 3 ways   3x 10" hold 3 ways 5x 5" hold 3 ways 5x 5" hold 3 ways      Standing SLR 2 Way ABD/EXT  10x each b/l 10x each b/l 10x each bl 10x each  20x 20x      Bike  5' 5' 7' 7' 5' 7'      Nautilus Leg Press    nv nv Not ready       DL Calf raises       20x                   Ther Activity                                       Gait Training Modalities

## 2021-04-05 ENCOUNTER — OFFICE VISIT (OUTPATIENT)
Dept: PHYSICAL THERAPY | Facility: REHABILITATION | Age: 76
End: 2021-04-05
Payer: MEDICARE

## 2021-04-05 ENCOUNTER — IMMUNIZATIONS (OUTPATIENT)
Dept: FAMILY MEDICINE CLINIC | Facility: HOSPITAL | Age: 76
End: 2021-04-05

## 2021-04-05 DIAGNOSIS — Z23 ENCOUNTER FOR IMMUNIZATION: Primary | ICD-10-CM

## 2021-04-05 DIAGNOSIS — S39.012D CHRONIC SACROILIAC STRAIN, SUBSEQUENT ENCOUNTER: Primary | ICD-10-CM

## 2021-04-05 PROCEDURE — 0012A SARS-COV-2 / COVID-19 MRNA VACCINE (MODERNA) 100 MCG: CPT

## 2021-04-05 PROCEDURE — 97112 NEUROMUSCULAR REEDUCATION: CPT

## 2021-04-05 PROCEDURE — 91301 SARS-COV-2 / COVID-19 MRNA VACCINE (MODERNA) 100 MCG: CPT

## 2021-04-05 PROCEDURE — 97110 THERAPEUTIC EXERCISES: CPT

## 2021-04-05 NOTE — PROGRESS NOTES
Daily Note     Today's date: 2021  Patient name: Robert Dickinson  : 1945  MRN: [id number]  Referring provider: Benjamin Hector MD  Dx:   Encounter Diagnosis     ICD-10-CM    1  Chronic sacroiliac strain, subsequent encounter  S39 012D        Start Time: 930  Stop Time: 1017  Total time in clinic (min): 47 minutes    Subjective: Patient states back feeling good today, states she was able to do yardwork over the weekend with no issues  Objective: See treatment diary below      Assessment: Tolerated treatment well  Patient demonstrated fatigue post treatment and would benefit from continued PT      Plan: Continue per plan of care        Precautions: Hx of neuropathy      Manuals 3/10 3/15 3/19 3/22 3/26 3/29 3/31 4/5     Laser  nv            STM  nv            Piriformis Stretch      nv       Sidelying Hip Flexion stretch      nv       Neuro Re-Ed             Bridges 2x10 2x10 2x10 3x10 3x10 3x10 3x10 w/ ball  3x10 w/ ball     Clamshells 2x15 OTB 2x15 OTB 2x15 OTB gtb 2x10 Gtb 2x10 Gtb 2x10 YHB 2x10 YHB 2x10     Birddogs nv 10x each LLE 10x each LLE 10x each LLE 15x LE 15x LE 15x LE 15x LE     Multifidus Press nv Caryl 5# 15x each Caryl 5# 15x each [place] 5# 15x each [place] 5# 20x each [place] 5# 20x each [place] 5# 20x each RTB 20x each     Caryl Lateral Stepping    nv nv nv 3# 3x 3# 10x     Hip Add Iso w/ Ball     5"x10 5"x10 5"x10 5"x15      Tandem Stance       10"x10 10"x10     Single Leg Balance       10"x10 4" step for support 10"x10 4" step for support                  Ther Ex             Seated Hamstring Stretch 30"x3 30"x3 30"x3 30"x3 30"x3 30"x3 30"x3 np     Seated Piriformis Stretch 30"x3 30"x3 30"x3 30"x3 30"x3 30"x3 30"x3 np     PBall rollout  10x 1" hold teal ball 10x 1" hold teal ball 3x 10" hold 3 ways   3x 10" hold 3 ways 5x 5" hold 3 ways 5x 5" hold 3 ways 10x 3" hold 3 way     Standing SLR 2 Way ABD/EXT  10x each b/l 10x each b/l 10x each bl 10x each  20x 20x 20x     Bike 5' 5' 7' 7' 5' 7' 7'     Nautilus Leg Press    nv nv Not ready       DL Calf raises       20x 20x                  Ther Activity                                       Gait Training                                       Modalities

## 2021-04-07 ENCOUNTER — APPOINTMENT (OUTPATIENT)
Dept: PHYSICAL THERAPY | Facility: REHABILITATION | Age: 76
End: 2021-04-07
Payer: MEDICARE

## 2021-04-08 ENCOUNTER — ANNUAL EXAM (OUTPATIENT)
Dept: OBGYN CLINIC | Facility: CLINIC | Age: 76
End: 2021-04-08
Payer: MEDICARE

## 2021-04-08 VITALS
HEIGHT: 68 IN | DIASTOLIC BLOOD PRESSURE: 64 MMHG | WEIGHT: 239.6 LBS | SYSTOLIC BLOOD PRESSURE: 128 MMHG | BODY MASS INDEX: 36.31 KG/M2

## 2021-04-08 DIAGNOSIS — Z01.419 WELL WOMAN EXAM WITH ROUTINE GYNECOLOGICAL EXAM: Primary | ICD-10-CM

## 2021-04-08 PROCEDURE — G0101 CA SCREEN;PELVIC/BREAST EXAM: HCPCS | Performed by: OBSTETRICS & GYNECOLOGY

## 2021-04-09 ENCOUNTER — OFFICE VISIT (OUTPATIENT)
Dept: PHYSICAL THERAPY | Facility: REHABILITATION | Age: 76
End: 2021-04-09
Payer: MEDICARE

## 2021-04-09 DIAGNOSIS — S39.012D CHRONIC SACROILIAC STRAIN, SUBSEQUENT ENCOUNTER: Primary | ICD-10-CM

## 2021-04-09 PROCEDURE — 97112 NEUROMUSCULAR REEDUCATION: CPT

## 2021-04-09 PROCEDURE — 97110 THERAPEUTIC EXERCISES: CPT

## 2021-04-09 NOTE — PROGRESS NOTES
Daily Note     Today's date: 2021  Patient name: Hailee Vincent  : 1945  MRN: [id number]  Referring provider: Yulisa Moulton MD  Dx:   Encounter Diagnosis     ICD-10-CM    1  Chronic sacroiliac strain, subsequent encounter  S39 012D        Start Time: 1200  Stop Time: 1240  Total time in clinic (min): 40 minutes    Subjective: Patient reports significant fatigue and soreness following 2nd covid vaccination  States she was able to do 2 hours of yardwork yesterday without any increases in back pain  Objective: See treatment diary below      Assessment: Differed progressing patient today secondary to increased fatigue levels  Challenged today with balance activities likely related to vaccination as well  Plan to progress next visit  Tolerated treatment well  Patient demonstrated fatigue post treatment and would benefit from continued PT      Plan: Continue per plan of care        Precautions: Hx of neuropathy      Manuals 3/10 3/15 3/19 3/22 3/26 3/29 3/31 4/5 4/9    Laser  nv            STM  nv            Piriformis Stretch      nv       Sidelying Hip Flexion stretch      nv       Neuro Re-Ed             Bridges 2x10 2x10 2x10 3x10 3x10 3x10 3x10 w/ ball  3x10 w/ ball 3x10 w/ ball    Clamshells 2x15 OTB 2x15 OTB 2x15 OTB gtb 2x10 Gtb 2x10 Gtb 2x10 YHB 2x10 YHB 2x10 YHB 2x10    Birddogs nv 10x each LLE 10x each LLE 10x each LLE 15x LE 15x LE 15x LE 15x LE np today    Multifidus Press nv Caryl 5# 15x each Caryl 5# 15x each [place] 5# 15x each Caryl 5# 20x each [place] 5# 20x each Caryl 5# 20x each RTB 20x each [place] 7# 20x each    Caryl Lateral Stepping    nv nv nv 3# 3x 3# 10x 3# 10x    Hip Add Iso w/ Ball     5"x10 5"x10 5"x10 5"x15  5"x15    Tandem Stance       10"x10 10"x10 10"x10    Single Leg Balance       10"x10 4" step for support 10"x10 4" step for support 10"x10 4" step for support                 Ther Ex             Seated Hamstring Stretch 30"x3 30"x3 30"x3 30"x3 30"x3 30"x3 30"x3 np     Seated Piriformis Stretch 30"x3 30"x3 30"x3 30"x3 30"x3 30"x3 30"x3 np     PBall rollout  10x 1" hold teal ball 10x 1" hold teal ball 3x 10" hold 3 ways   3x 10" hold 3 ways 5x 5" hold 3 ways 5x 5" hold 3 ways 10x 3" hold 3 way np resume    Standing SLR 2 Way ABD/EXT  10x each b/l 10x each b/l 10x each bl 10x each  20x 20x 20x np resume    Bike  5' 5' 7' 7' 5' 7' 7' 5'    Nautilus Leg Press    nv nv Not ready       DL Calf raises       20x 20x 30x                 Ther Activity                                       Gait Training                                       Modalities

## 2021-04-12 ENCOUNTER — OFFICE VISIT (OUTPATIENT)
Dept: PHYSICAL THERAPY | Facility: REHABILITATION | Age: 76
End: 2021-04-12
Payer: MEDICARE

## 2021-04-12 DIAGNOSIS — S39.012D CHRONIC SACROILIAC STRAIN, SUBSEQUENT ENCOUNTER: Primary | ICD-10-CM

## 2021-04-12 PROCEDURE — 97110 THERAPEUTIC EXERCISES: CPT

## 2021-04-12 PROCEDURE — 97112 NEUROMUSCULAR REEDUCATION: CPT

## 2021-04-12 NOTE — PROGRESS NOTES
PT Re-Evaluation     Today's date: 2021  Patient name: Ruma Barron  : 1945  MRN: [id number]  Referring provider: Ainsley Radford MD  Dx:   Encounter Diagnosis     ICD-10-CM    1  Chronic sacroiliac strain, subsequent encounter  S39 012D        Start Time: 1022  Stop Time: 1105  Total time in clinic (min): 43 minutes    Assessment  Assessment details: Silva Kwon is a 76 yo female presenting to PT with R sided back pain  Patient has been actively participating in PT for around 1 month now  Patient states significant improvements in back pain and function  Patient has met some of her goals  Patient wishes to increase her functional ability with ADLs/IADLs and recreational activities at home  Patient would continue to benefit from skilled physical therapy at this time to maximize function and prevent any increases in back pain  Impairments: abnormal or restricted ROM, activity intolerance, impaired balance and poor body mechanics    Symptom irritability: moderateUnderstanding of Dx/Px/POC: good   Prognosis: good    Plan  Plan details: Plan to see patient 2x/week for the next 8 weeks  Patient would benefit from: skilled physical therapy  Planned modality interventions: low level laser therapy, thermotherapy: hydrocollator packs and TENS  Planned therapy interventions: joint mobilization, manual therapy, massage, ADL retraining, activity modification, abdominal trunk stabilization, neuromuscular re-education, patient education, behavior modification, strengthening, stretching, therapeutic activities, therapeutic exercise, functional ROM exercises, flexibility, graded activity, graded exercise, graded motor and home exercise program  Frequency: 2x week  Duration in weeks: 8  Treatment plan discussed with: patient        Subjective Evaluation    History of Present Illness  Mechanism of injury: Started Mid January may have been related to shoveling snow   Patient states that the pain is on the R side and radiates into pelvis and down hip at times  Pain primarily located in the R SIJ and glut region  Pain has improved with use of robaxin  RE:  80% improvement since start of PT  The last 20% being increased walking distances and speed, and the ability to perform yard and housework  Frequency of pain is decreased, only occurring once or twice a week  Patient states the pain is seldom now  Patient reports not needing pain medication at this time  No back pain in the last week  Pain  Current pain ratin  At worst pain ratin  Quality: dull ache  Alleviating factors: robaxin  Exacerbated by: no clear aggravating factors,   Progression: improved    Patient Goals  Patient goals for therapy: decreased pain, increased motion, increased strength, independence with ADLs/IADLs and return to sport/leisure activities  Patient goal: Painfree, less use of medication        Objective     Active Range of Motion     Lumbar   Flexion:  Restriction level: minimal  Extension:  Restriction level: minimal  Left lateral flexion:  Restriction level: minimal  Right lateral flexion:  Restriction level: minimal  Left rotation:  Restriction level: minimal  Right rotation:  Restriction level: minimal    General Comments:      Lumbar Comments  Special Tests:  DEREK (-, minimal tightness bilateral), FADIR (moderate tightness), SLR (80 degrees bilateral), Compression (painful on R)    Strength:  Hib Abduction 4-/5 b/l tfl compensation with testing    Lumbar AROM  No reproduction of pain in any direction      Repeated Motion  Extension: No change  Flexion: No change    Tender to Palpation:  Glue med, piriformis             Precautions: Hx of neuropathy    Manuals 3/10 3/15 3/19 3/22 3/26 3/29 3/31 4/5 4/9 4/12   Laser  nv            STM  nv            Piriformis Stretch      nv       Sidelying Hip Flexion stretch      nv                    Neuro Re-Ed             Bridges 2x10 2x10 2x10 3x10 3x10 3x10 3x10 w/ ball  3x10 w/ ball 3x10 w/ ball 3x10 w/ ball   Clamshells 2x15 OTB 2x15 OTB 2x15 OTB gtb 2x10 Gtb 2x10 Gtb 2x10 YHB 2x10 YHB 2x10 YHB 2x10 YHB 2x10   Birddogs nv 10x each LLE 10x each LLE 10x each LLE 15x LE 15x LE 15x LE 15x LE np today    Multifidus Press nv [place] 5# 15x each [place] 5# 15x each Caryl 5# 15x each [place] 5# 20x each [place] 5# 20x each [place] 5# 20x each RTB 20x each [place] 7# 20x each [place] 10# 20x each   [place] Lateral Stepping    nv nv nv 3# 3x 3# 10x 3# 10x 5# 10x   Hip Add Iso w/ Ball     5"x10 5"x10 5"x10 5"x15  5"x15 np   Tandem Stance       10"x10 10"x10 10"x10 10"x5   Single Leg Balance       10"x10 4" step for support 10"x10 4" step for support 10"x10 4" step for support 10"x5   Theraband Rows/Ext          PTB 2x10                Ther Ex             Seated Hamstring Stretch 30"x3 30"x3 30"x3 30"x3 30"x3 30"x3 30"x3 np     Seated Piriformis Stretch 30"x3 30"x3 30"x3 30"x3 30"x3 30"x3 30"x3 np     PBall rollout  10x 1" hold teal ball 10x 1" hold teal ball 3x 10" hold 3 ways   3x 10" hold 3 ways 5x 5" hold 3 ways 5x 5" hold 3 ways 10x 3" hold 3 way np resume    Standing SLR 2 Way ABD/EXT  10x each b/l 10x each b/l 10x each bl 10x each  20x 20x 20x np resume np resume   Bike  5' 5' 7' 7' 5' 7' 7' 5' 7'   Nautilus Leg Press    nv nv Not ready       DL Calf raises       20x 20x 30x 30x   Re-Evaluation          20'   Ther Activity                                       Gait Training                                       Modalities

## 2021-04-16 ENCOUNTER — OFFICE VISIT (OUTPATIENT)
Dept: PHYSICAL THERAPY | Facility: REHABILITATION | Age: 76
End: 2021-04-16
Payer: MEDICARE

## 2021-04-16 DIAGNOSIS — S39.012D CHRONIC SACROILIAC STRAIN, SUBSEQUENT ENCOUNTER: Primary | ICD-10-CM

## 2021-04-16 PROCEDURE — 97112 NEUROMUSCULAR REEDUCATION: CPT

## 2021-04-16 PROCEDURE — 97110 THERAPEUTIC EXERCISES: CPT

## 2021-04-16 NOTE — LETTER
2021    Walt Hutchison MD  [address] [place] [id number]    Patient: Yesenia Galo   YOB: 1945   Date of Visit: 2021     Encounter Diagnosis     ICD-10-CM    1  Chronic sacroiliac strain, subsequent encounter  S39 012D        Dear Dr Constantino Fernandez:    Thank you for your recent referral of Yesenia Galo  Please review the attached evaluation summary from Cherrie's recent visit  Please verify that you agree with the plan of care by signing the attached order  If you have any questions or concerns, please do not hesitate to call  I sincerely appreciate the opportunity to share in the care of one of your patients and hope to have another opportunity to work with you in the near future  Sincerely,    Deirdre Zelaya, PT      Referring Provider:      I certify that I have read the below Plan of Care and certify the need for these services furnished under this plan of treatment while under my care  Walt Hutchiosn MD  [address] [place] [id number]  [facility] [facility] [place]          Daily Note     Today's date: 2021  Patient name: Yesenia Galo  : 1945  MRN: [id number]  Referring provider: Moshe Hollingsworth MD  Dx:   Encounter Diagnosis     ICD-10-CM    1  Chronic sacroiliac strain, subsequent encounter  S39 012D        Start Time: 1100  Stop Time: 1145  Total time in clinic (min): 45 minutes    Subjective: Patient states back is feeling very well  Objective: See treatment diary below      Assessment: Balance improved today with SLB and tandem stance  Patient states her new goal is to return to PLOF and physical activity levels  Tolerated treatment well  Patient demonstrated fatigue post treatment and would benefit from continued PT      Plan: Continue per plan of care        Precautions: Hx of neuropathy    Manuals 4/16    3/26 3/29 3/31 4/5 4/9 4/12   Laser              STM              Piriformis Stretch      nv Sidelying Hip Flexion stretch      nv                    Neuro Re-Ed             Bridges 3x10 w/ ball    3x10 3x10 3x10 w/ ball  3x10 w/ ball 3x10 w/ ball 3x10 w/ ball   Clamshells YBH 2x10    Gtb 2x10 Gtb 2x10 YHB 2x10 YHB 2x10 YHB 2x10 YHB 2x10   Birddogs 15x    15x LE 15x LE 15x LE 15x LE np today    Multifidus Press Caryl 10# 30x each    Caryl 5# 20x each [place] 5# 20x each [place] 5# 20x each RTB 20x each Caryl 7# 20x each Caryl 10# 20x each   Caryl Lateral Stepping 5# 10x    nv nv 3# 3x 3# 10x 3# 10x 5# 10x   Hip Add Iso w/ Ball np    5"x10 5"x10 5"x10 5"x15  5"x15 np   Tandem Stance 10"x10      10"x10 10"x10 10"x10 10"x5   Single Leg Balance 10"x10      10"x10 4" step for support 10"x10 4" step for support 10"x10 4" step for support 10"x5   Theraband Rows/Ext PTB 3x10         PTB 2x10                Ther Ex             Seated Hamstring Stretch     30"x3 30"x3 30"x3 np     Seated Piriformis Stretch     30"x3 30"x3 30"x3 np     PBall rollout     3x 10" hold 3 ways 5x 5" hold 3 ways 5x 5" hold 3 ways 10x 3" hold 3 way np resume    Standing SLR 2 Way ABD/EXT 20x    10x each  20x 20x 20x np resume np resume   Bike 7'    7' 5' 7' 7' 5' 7'   DL Calf raises 30x      20x 20x 30x 30x   Re-Evaluation          20'   Ther Activity                                       Gait Training                                       Modalities

## 2021-04-16 NOTE — PROGRESS NOTES
Daily Note     Today's date: 2021  Patient name: Joi Sosa  : 1945  MRN: [id number]  Referring provider: Minesh Hernandez MD  Dx:   Encounter Diagnosis     ICD-10-CM    1  Chronic sacroiliac strain, subsequent encounter  S39 012D        Start Time: 1100  Stop Time: 1145  Total time in clinic (min): 45 minutes    Subjective: Patient states back is feeling very well  Objective: See treatment diary below      Assessment: Balance improved today with SLB and tandem stance  Patient states her new goal is to return to PLOF and physical activity levels  Tolerated treatment well  Patient demonstrated fatigue post treatment and would benefit from continued PT      Plan: Continue per plan of care        Precautions: Hx of neuropathy    Manuals 4/16    3/26 3/29 3/31 4/5 4/9 4/12   Laser              STM              Piriformis Stretch      nv       Sidelying Hip Flexion stretch      nv                    Neuro Re-Ed             Bridges 3x10 w/ ball    3x10 3x10 3x10 w/ ball  3x10 w/ ball 3x10 w/ ball 3x10 w/ ball   Clamshells YBH 2x10    Gtb 2x10 Gtb 2x10 YHB 2x10 YHB 2x10 YHB 2x10 YHB 2x10   Birddogs 15x    15x LE 15x LE 15x LE 15x LE np today    Multifidus Press Caryl 10# 30x each    [place] 5# 20x each Caryl 5# 20x each [place] 5# 20x each RTB 20x each Caryl 7# 20x each [place] 10# 20x each   [place] Lateral Stepping 5# 10x    nv nv 3# 3x 3# 10x 3# 10x 5# 10x   Hip Add Iso w/ Ball np    5"x10 5"x10 5"x10 5"x15  5"x15 np   Tandem Stance 10"x10      10"x10 10"x10 10"x10 10"x5   Single Leg Balance 10"x10      10"x10 4" step for support 10"x10 4" step for support 10"x10 4" step for support 10"x5   Theraband Rows/Ext PTB 3x10         PTB 2x10                Ther Ex             Seated Hamstring Stretch     30"x3 30"x3 30"x3 np     Seated Piriformis Stretch     30"x3 30"x3 30"x3 np     PBall rollout     3x 10" hold 3 ways 5x 5" hold 3 ways 5x 5" hold 3 ways 10x 3" hold 3 way np resume    Standing SLR 2 Way ABD/EXT 20x    10x each  20x 20x 20x np resume np resume   Bike 7'    7' 5' 7' 7' 5' 7'   DL Calf raises 30x      20x 20x 30x 30x   Re-Evaluation          20'   Ther Activity                                       Gait Training                                       Modalities

## 2021-04-16 NOTE — LETTER
2021    Denys Soctt MD  [address] [place] [id number]    Patient: Rk Amaya   YOB: 1945   Date of Visit: 2021   No diagnosis found  Dear Dr Kevan Pegueroers:    Thank you for your recent referral of Rk Amaya  Please review the attached evaluation summary from Cherrie's recent visit  Please verify that you agree with the plan of care by signing the attached order  If you have any questions or concerns, please do not hesitate to call  I sincerely appreciate the opportunity to share in the care of one of your patients and hope to have another opportunity to work with you in the near future  Sincerely,    Niels Mazariegos, PT      Referring Provider:      I certify that I have read the below Plan of Care and certify the need for these services furnished under this plan of treatment while under my care  Denys Scott MD  [address] [place] [id number]  [facility] [facility] [place]          Daily Note     Today's date: 2021  Patient name: Rk Amaya  : 1945  MRN: [id number]  Referring provider: Jaun Hall MD  Dx:   Encounter Diagnosis     ICD-10-CM    1  Chronic sacroiliac strain, subsequent encounter  S39 012D        Start Time: 1100  Stop Time: 1145  Total time in clinic (min): 45 minutes    Subjective: Patient states back is feeling very well  Objective: See treatment diary below      Assessment: Balance improved today with SLB and tandem stance  Patient states her new goal is to return to PLOF and physical activity levels  Tolerated treatment well  Patient demonstrated fatigue post treatment and would benefit from continued PT      Plan: Continue per plan of care        Precautions: Hx of neuropathy    Manuals 4/16    3/26 3/29 3/31 4/5 4/9 4/12   Laser              STM              Piriformis Stretch      nv       Sidelying Hip Flexion stretch      nv                    Neuro Re-Ed [place] 3x10 w/ ball    3x10 3x10 3x10 w/ ball  3x10 w/ ball 3x10 w/ ball 3x10 w/ ball   Clamshells YBH 2x10    Gtb 2x10 Gtb 2x10 YHB 2x10 YHB 2x10 YHB 2x10 YHB 2x10   Birddogs 15x    15x LE 15x LE 15x LE 15x LE np today    Multifidus Press Caryl 10# 30x each    Caryl 5# 20x each [place] 5# 20x each Caryl 5# 20x each RTB 20x each [place] 7# 20x each [place] 10# 20x each   [place] Lateral Stepping 5# 10x    nv nv 3# 3x 3# 10x 3# 10x 5# 10x   Hip Add Iso w/ Ball np    5"x10 5"x10 5"x10 5"x15  5"x15 np   Tandem Stance 10"x10      10"x10 10"x10 10"x10 10"x5   Single Leg Balance 10"x10      10"x10 4" step for support 10"x10 4" step for support 10"x10 4" step for support 10"x5   Theraband Rows/Ext PTB 3x10         PTB 2x10                Ther Ex             Seated Hamstring Stretch     30"x3 30"x3 30"x3 np     Seated Piriformis Stretch     30"x3 30"x3 30"x3 np     PBall rollout     3x 10" hold 3 ways 5x 5" hold 3 ways 5x 5" hold 3 ways 10x 3" hold 3 way np resume    Standing SLR 2 Way ABD/EXT 20x    10x each  20x 20x 20x np resume np resume   Bike 7'    7' 5' 7' 7' 5' 7'   DL Calf raises 30x      20x 20x 30x 30x   Re-Evaluation          20'   Ther Activity                                       Gait Training                                       Modalities

## 2021-04-19 ENCOUNTER — TRANSCRIBE ORDERS (OUTPATIENT)
Dept: PHYSICAL THERAPY | Facility: REHABILITATION | Age: 76
End: 2021-04-19

## 2021-04-19 ENCOUNTER — OFFICE VISIT (OUTPATIENT)
Dept: PHYSICAL THERAPY | Facility: REHABILITATION | Age: 76
End: 2021-04-19
Payer: MEDICARE

## 2021-04-19 DIAGNOSIS — S39.012D CHRONIC SACROILIAC STRAIN, SUBSEQUENT ENCOUNTER: Primary | ICD-10-CM

## 2021-04-19 PROCEDURE — 97110 THERAPEUTIC EXERCISES: CPT

## 2021-04-19 PROCEDURE — 97112 NEUROMUSCULAR REEDUCATION: CPT

## 2021-04-19 NOTE — PROGRESS NOTES
Daily Note     Today's date: 2021  Patient name: Luli Leigh  : 1945  MRN: [id number]  Referring provider: Mariel Moya MD  Dx:   Encounter Diagnosis     ICD-10-CM    1  Chronic sacroiliac strain, subsequent encounter  S39 012D        Start Time: 1100  Stop Time: 1145  Total time in clinic (min): 45 minutes    Subjective: Reports her back did well again this weekend with weeding, states her knees were sore  Objective: See treatment diary below      Assessment: Patient balance improving slowly with activities  Tolerated treatment well  Patient demonstrated fatigue post treatment and would benefit from continued PT      Plan: Continue per plan of care        Precautions: Hx of neuropathy    Manuals 4/16 4/19   3/26 3/29 3/31 4/5 4/9 4/12   Laser              STM              Piriformis Stretch      nv       Sidelying Hip Flexion stretch      nv                    Neuro Re-Ed             Bridges 3x10 w/ ball 3x10 w/ ball   3x10 3x10 3x10 w/ ball  3x10 w/ ball 3x10 w/ ball 3x10 w/ ball   Clamshells YBH 2x10 rbh 2x10   Gtb 2x10 Gtb 2x10 YHB 2x10 YHB 2x10 YHB 2x10 YHB 2x10   Birddogs 15x np resume   15x LE 15x LE 15x LE 15x LE np today    Multifidus Press Caryl 10# 30x each [place] 10# 30x each   Caryl 5# 20x each [place] 5# 20x each [place] 5# 20x each RTB 20x each [place] 7# 20x each Caryl 10# 20x each   Caryl Lateral Stepping 5# 10x #5 10x   nv nv 3# 3x 3# 10x 3# 10x 5# 10x   Hip Add Iso w/ Ball np    5"x10 5"x10 5"x10 5"x15  5"x15 np   Tandem Stance 10"x10 10"x10     10"x10 10"x10 10"x10 10"x5   Single Leg Balance 10"x10 10"x10     10"x10 4" step for support 10"x10 4" step for support 10"x10 4" step for support 10"x5   Theraband Rows/Ext PTB 3x10 OTB 2x10        PTB 2x10   Unilateral Farmer Carries  8#kg 4 laps each           Ther Ex             Seated Hamstring Stretch     30"x3 30"x3 30"x3 np     Seated Piriformis Stretch     30"x3 30"x3 30"x3 np     PBall rollout     3x 10" hold 3 ways 5x 5" hold 3 ways 5x 5" hold 3 ways 10x 3" hold 3 way np resume    Standing SLR 2 Way ABD/EXT 20x 30x   10x each  20x 20x 20x np resume np resume   Bike 7' 7'   7' 5' 7' 7' 5' 7'   DL Calf raises 30x 3x10x1" hold     20x 20x 30x 30x   Re-Evaluation          20'   Ther Activity                                       Gait Training                                       Modalities

## 2021-04-23 ENCOUNTER — OFFICE VISIT (OUTPATIENT)
Dept: PHYSICAL THERAPY | Facility: REHABILITATION | Age: 76
End: 2021-04-23
Payer: MEDICARE

## 2021-04-23 DIAGNOSIS — S39.012D CHRONIC SACROILIAC STRAIN, SUBSEQUENT ENCOUNTER: Primary | ICD-10-CM

## 2021-04-23 PROCEDURE — 97110 THERAPEUTIC EXERCISES: CPT

## 2021-04-23 PROCEDURE — 97112 NEUROMUSCULAR REEDUCATION: CPT

## 2021-04-23 NOTE — PROGRESS NOTES
Daily Note     Today's date: 2021  Patient name: Carol Merritt  : 1945  MRN: [id number]  Referring provider: Dang Anderson MD  Dx:   Encounter Diagnosis     ICD-10-CM    1  Chronic sacroiliac strain, subsequent encounter  S39 012D        Start Time: 9395  Stop Time: 1100  Total time in clinic (min): 45 minutes    Subjective: Reports some soreness down posterior R leg  Repots increased amount of yard and house work over the last few days  States leg feels alright this morning  Objective: See treatment diary below      Assessment: Pain free with activities today  Tolerated progressions well  Tolerated treatment well  Patient demonstrated fatigue post treatment and would benefit from continued PT      Plan: Continue per plan of care        Precautions: Hx of neuropathy    Manuals 4/16 4/19 4/23  3/26 3/29 3/31 4/5 4/9 4/12   Laser              STM              Piriformis Stretch      nv       Sidelying Hip Flexion stretch      nv                    Neuro Re-Ed             Bridges 3x10 w/ ball 3x10 w/ ball 3x10 w/ ball  3x10 3x10 3x10 w/ ball  3x10 w/ ball 3x10 w/ ball 3x10 w/ ball   Clamshells YBH 2x10 rbh 2x10 rbh 2x10  Gtb 2x10 Gtb 2x10 YHB 2x10 YHB 2x10 YHB 2x10 YHB 2x10   Birddogs 15x np resume 15x  15x LE 15x LE 15x LE 15x LE np today    Multifidus Press Caryl 10# 30x each [place] 10# 30x each [place] 15# 20x each  [place] 5# 20x each Caryl 5# 20x each [place] 5# 20x each RTB 20x each [place] 7# 20x each [place] 10# 20x each   [place] Lateral Stepping 5# 10x #5 10x [place] #7 10x  nv nv 3# 3x 3# 10x 3# 10x 5# 10x   Hip Add Iso w/ Ball np    5"x10 5"x10 5"x10 5"x15  5"x15 np   Tandem Stance 10"x10 10"x10 30"x3    10"x10 10"x10 10"x10 10"x5   Single Leg Balance 10"x10 10"x10 30"x3    10"x10 4" step for support 10"x10 4" step for support 10"x10 4" step for support 10"x5   Theraband Rows/Ext PTB 3x10 OTB 2x10 OTB 2x10       PTB 2x10   Unilateral Farmer Carries  8#kg 4 laps each 8#kg 4 laps each Ther Ex             Seated Hamstring Stretch     30"x3 30"x3 30"x3 np     Seated Piriformis Stretch     30"x3 30"x3 30"x3 np     PBall rollout     3x 10" hold 3 ways 5x 5" hold 3 ways 5x 5" hold 3 ways 10x 3" hold 3 way np resume    Standing SLR 2 Way ABD/EXT 20x 30x 30x  10x each  20x 20x 20x np resume np resume   Bike 7' 7' 7'  7' 5' 7' 7' 5' 7'   DL Calf raises 30x 3x10x1" hold 3x10x1" hold    20x 20x 30x 30x   Re-Evaluation          20'   Ther Activity                                       Gait Training                                       Modalities

## 2021-04-26 ENCOUNTER — OFFICE VISIT (OUTPATIENT)
Dept: PHYSICAL THERAPY | Facility: REHABILITATION | Age: 76
End: 2021-04-26
Payer: MEDICARE

## 2021-04-26 DIAGNOSIS — S39.012D CHRONIC SACROILIAC STRAIN, SUBSEQUENT ENCOUNTER: Primary | ICD-10-CM

## 2021-04-26 PROCEDURE — 97110 THERAPEUTIC EXERCISES: CPT

## 2021-04-26 NOTE — PROGRESS NOTES
Daily Note     Today's date: 2021  Patient name: Robetro Ramirez  : 1945  MRN: [id number]  Referring provider: Kermit Ellis MD  Dx:   Encounter Diagnosis     ICD-10-CM    1  Chronic sacroiliac strain, subsequent encounter  S39 012D        Start Time:   Stop Time: 168  Total time in clinic (min): 30 minutes    Subjective: Patient had no new compliants of back in the low back  Objective: See treatment diary below      Assessment: Tolerated treatment well  Patient demonstrated fatigue post treatment and would benefit from continued PT      Plan: Continue per plan of care        Precautions: Hx of neuropathy    Manuals 4/16 4/19 4/23 4/26 3/26 3/29 3/31 4/5 4/9 4/12   Laser              STM              Piriformis Stretch      nv       Sidelying Hip Flexion stretch      nv                    Neuro Re-Ed             Bridges 3x10 w/ ball 3x10 w/ ball 3x10 w/ ball 3x10 w/ ball 3x10 3x10 3x10 w/ ball  3x10 w/ ball 3x10 w/ ball 3x10 w/ ball   Clamshells YBH 2x10 rbh 2x10 rbh 2x10 rbh 2x10 Gtb 2x10 Gtb 2x10 YHB 2x10 YHB 2x10 YHB 2x10 YHB 2x10   Birddogs 15x np resume 15x np resume 15x LE 15x LE 15x LE 15x LE np today    Multifidus Press Caryl 10# 30x each Caryl 10# 30x each [place] 15# 20x each Caryl 15# 20x each Caryl 5# 20x each Caryl 5# 20x each [place] 5# 20x each RTB 20x each [place] 7# 20x each [place] 10# 20x each   [place] Lateral Stepping 5# 10x #5 10x C[place] #7 10x [place] #7 10x nv nv 3# 3x 3# 10x 3# 10x 5# 10x   Hip Add Iso w/ Ball np    5"x10 5"x10 5"x10 5"x15  5"x15 np   Tandem Stance 10"x10 10"x10 30"x3 30"x3   10"x10 10"x10 10"x10 10"x5   Single Leg Balance 10"x10 10"x10 30"x3 30"x3   10"x10 4" step for support 10"x10 4" step for support 10"x10 4" step for support 10"x5   Theraband Rows/Ext PTB 3x10 OTB 2x10 OTB 2x10 np resume      PTB 2x10   Unilateral Farmer Carries  8#kg 4 laps each 8#kg 4 laps each 10#kg 4 laps          Ther Ex             Seated Hamstring Stretch     30"x3 30"x3 30"x3 np     Seated Piriformis Stretch     30"x3 30"x3 30"x3 np     PBall rollout     3x 10" hold 3 ways 5x 5" hold 3 ways 5x 5" hold 3 ways 10x 3" hold 3 way np resume    Standing SLR 2 Way ABD/EXT 20x 30x 30x 20x 1# 10x each  20x 20x 20x np resume np resume   Bike 7' 7' 7' 7' 7' 5' 7' 7' 5' 7'   DL Calf raises 30x 3x10x1" hold 3x10x1" hold 3x10x1"hold   20x 20x 30x 30x   Re-Evaluation          20'   Ther Activity                                       Gait Training                                       Modalities

## 2021-04-29 ENCOUNTER — OFFICE VISIT (OUTPATIENT)
Dept: PHYSICAL THERAPY | Facility: REHABILITATION | Age: 76
End: 2021-04-29
Payer: MEDICARE

## 2021-04-29 DIAGNOSIS — S39.012D CHRONIC SACROILIAC STRAIN, SUBSEQUENT ENCOUNTER: Primary | ICD-10-CM

## 2021-04-29 PROCEDURE — 97112 NEUROMUSCULAR REEDUCATION: CPT

## 2021-04-29 PROCEDURE — 97110 THERAPEUTIC EXERCISES: CPT

## 2021-04-29 NOTE — PROGRESS NOTES
Daily Note     Today's date: 2021  Patient name: Garth Roque  : 1945  MRN: [id number]  Referring provider: Karuna León MD  Dx:   Encounter Diagnosis     ICD-10-CM    1  Chronic sacroiliac strain, subsequent encounter  S39 012D        Start Time: 1400          Subjective: Patient reports knee are sore today, states she was doing a lot of housework yesterday  Objective: See treatment diary below      Assessment: Held a few exercises today secondary to patient having increased soreness in knees  Will resume when patient can tolerate  Tolerated treatment well  Patient demonstrated fatigue post treatment      Plan: Continue per plan of care        Precautions: Hx of neuropathy    Manuals    Laser              STM              Piriformis Stretch             Sidelying Hip Flexion stretch                          Neuro Re-Ed             Bridges 3x10 w/ ball 3x10 w/ ball 3x10 w/ ball 3x10 w/ ball 3x10 w/ ball    3x10 w/ ball 3x10 w/ ball   Clamshells YBH 2x10 rbh 2x10 rbh 2x10 rbh 2x10 30x rhb    YHB 2x10 YHB 2x10   Birddogs 15x np resume 15x np resume hold    np today    Multifidus Press [place] 10# 30x each [place] 10# 30x each Caryl 15# 20x each Caryl 15# 20x each hold    [place] 7# 20x each [place] 10# 20x each   [place] Lateral Stepping 5# 10x #5 10x [place] #7 10x [place] #7 10x hold    3# 10x 5# 10x   Hip Add Iso w/ Ball np        5"x15 np   Tandem Stance 10"x10 10"x10 30"x3 30"x3 30"x3    10"x10 10"x5   Single Leg Balance 10"x10 10"x10 30"x3 30"x3 20x alt on foam    10"x10 4" step for support 10"x5   Theraband Rows/Ext PTB 3x10 OTB 2x10 OTB 2x10 np resume np resume     PTB 2x10   Unilateral Farmer Carries  8#kg 4 laps each 8#kg 4 laps each 10#kg 4 laps  8# kg 4 laps        Ther Ex             Seated Hamstring Stretch             Seated Piriformis Stretch             PBall rollout         np resume    Standing SLR 2 Way ABD/EXT 20x 30x 30x 20x 1# 20x 1# np resume np resume   Bike 7' 7' 7' 7' 7'    5' 7'   DL Calf raises 30x 3x10x1" hold 3x10x1" hold 3x10x1"hold 3x10x1"hold    30x 30x   Re-Evaluation          20'   Ther Activity                                       Gait Training                                       Modalities

## 2021-05-03 ENCOUNTER — OFFICE VISIT (OUTPATIENT)
Dept: PHYSICAL THERAPY | Facility: REHABILITATION | Age: 76
End: 2021-05-03
Payer: MEDICARE

## 2021-05-03 DIAGNOSIS — S39.012D CHRONIC SACROILIAC STRAIN, SUBSEQUENT ENCOUNTER: Primary | ICD-10-CM

## 2021-05-03 PROCEDURE — 97112 NEUROMUSCULAR REEDUCATION: CPT

## 2021-05-03 PROCEDURE — 97110 THERAPEUTIC EXERCISES: CPT

## 2021-05-03 NOTE — PROGRESS NOTES
Daily Note     Today's date: 5/3/2021  Patient name: Darshan Lamb  : 1945  MRN: [id number]  Referring provider: Jagdish Maya MD  Dx:   Encounter Diagnosis     ICD-10-CM    1  Chronic sacroiliac strain, subsequent encounter  S39 012D        Start Time:   Stop Time: 1125  Total time in clinic (min): 40 minutes    Subjective: Patient reports her R knee was still a bit sore from housework in the attic  States back continues to feel good  Objective: See treatment diary below      Assessment: Patient tolerated progressions well today  Tolerated treatment well  Patient demonstrated fatigue post treatment and would benefit from continued PT      Plan: Continue per plan of care        Precautions: Hx of neuropathy    Manuals 4/16 4/19 4/23 4/26 4/29 5/3   4/9 4/12   Laser              STM              Piriformis Stretch             Sidelying Hip Flexion stretch                          Neuro Re-Ed             Bridges 3x10 w/ ball 3x10 w/ ball 3x10 w/ ball 3x10 w/ ball 3x10 w/ ball 3x10 w/ ball   3x10 w/ ball 3x10 w/ ball   Clamshells YBH 2x10 rbh 2x10 rbh 2x10 rbh 2x10 30x rhb 30x rhb   YHB 2x10 YHB 2x10   Birddogs 15x np resume 15x np resume hold hold   np today    Multifidus Press [place] 10# 30x each [place] 10# 30x each [place] 15# 20x each Caryl 15# 20x each hold 10# 20x   Caryl 7# 20x each [place] 10# 20x each   Caryl Lateral Stepping 5# 10x #5 10x Caryl #7 10x [place] #7 10x hold hold   3# 10x 5# 10x   Hip Add Iso w/ Ball np        5"x15 np   Tandem Walking 10"x10 10"x10 30"x3 30"x3 30"x3 4 laps at counter   10"x10 10"x5   Single Leg Balance 10"x10 10"x10 30"x3 30"x3 20x alt on foam 20x alt on foam   10"x10 4" step for support 10"x5   Theraband Rows/Ext PTB 3x10 OTB 2x10 OTB 2x10 np resume np resume otb 30x    PTB 2x10   Unilateral Farmer Carries  8#kg 4 laps each 8#kg 4 laps each 10#kg 4 laps  8# kg 4 laps 10#kg 3 laps        Ther Ex             Seated Hamstring Stretch             Seated Piriformis Stretch             PBall rollout         np resume    Standing SLR 2 Way ABD/EXT 20x 30x 30x 20x 1# 20x 1# 20x 1#   np resume np resume   Bike 7' 7' 7' 7' 7' 7'   5' 7'   DL Calf raises 30x 3x10x1" hold 3x10x1" hold 3x10x1"hold 3x10x1"hold 3x10x1"hold   30x 30x   Re-Evaluation          20'   Ther Activity                                       Gait Training                                       Modalities

## 2021-05-04 DIAGNOSIS — E11.65 UNCONTROLLED TYPE 2 DIABETES MELLITUS WITH HYPERGLYCEMIA (HCC): ICD-10-CM

## 2021-05-07 ENCOUNTER — OFFICE VISIT (OUTPATIENT)
Dept: PHYSICAL THERAPY | Facility: REHABILITATION | Age: 76
End: 2021-05-07
Payer: MEDICARE

## 2021-05-07 DIAGNOSIS — S39.012D CHRONIC SACROILIAC STRAIN, SUBSEQUENT ENCOUNTER: Primary | ICD-10-CM

## 2021-05-07 PROCEDURE — 97112 NEUROMUSCULAR REEDUCATION: CPT

## 2021-05-07 PROCEDURE — 97110 THERAPEUTIC EXERCISES: CPT

## 2021-05-07 NOTE — PROGRESS NOTES
Daily Note     Today's date: 2021  Patient name: Tg Yuan  : 1945  MRN: [id number]  Referring provider: Jesusita Lee MD  Dx:   Encounter Diagnosis     ICD-10-CM    1  Chronic sacroiliac strain, subsequent encounter  S39 012D        Start Time:   Stop Time: 1200  Total time in clinic (min): 45 minutes   Patient 1 on 1 with PT [id number]    Subjective: Patient states back continues to be painfree with activities, states knees are bothering again  Objective: See treatment diary below      Assessment: Tolerated treatment well  Patient demonstrated fatigue post treatment and would benefit from continued PT      Plan: Continue per plan of care        Precautions: Hx of neuropathy    Manuals 4/16 4/19 4/23 4/26 4/29 5/3 5/7      Laser              STM              Piriformis Stretch             Sidelying Hip Flexion stretch                          Neuro Re-Ed             Bridges 3x10 w/ ball 3x10 w/ ball 3x10 w/ ball 3x10 w/ ball 3x10 w/ ball 3x10 w/ ball 40x w/ ball      Clamshells YBH 2x10 rbh 2x10 rbh 2x10 rbh 2x10 30x rhb 30x rhb 2x20 rhb      Birddogs 15x np resume 15x np resume hold hold hold      Multifidus Press Caryl 10# 30x each Caryl 10# 30x each [place] 15# 20x each [place] 15# 20x each hold 10# 20x 15# 20x      Caryl Lateral Stepping 5# 10x #5 10x Caryl #7 10x [place] #7 10x hold hold hold      Hip Add Iso w/ Ball np            Tandem Walking 10"x10 10"x10 30"x3 30"x3 30"x3 4 laps at counter 5 laps at counter      Single Leg Balance 10"x10 10"x10 30"x3 30"x3 20x alt on foam 20x alt on foam 20x alt on foam      Theraband Rows/Ext PTB 3x10 OTB 2x10 OTB 2x10 np resume np resume otb 30x otb 30x      Unilateral Farmer Carries  8#kb 4 laps each 8# kb 4 laps each 10# kb 4 laps  8# kb 4 laps 10# kb 3 laps  10# kb 3 laps      Ther Ex             Seated Hamstring Stretch             Seated Piriformis Stretch             PBall rollout             Standing SLR 2 Way ABD/EXT 20x 30x 30x 20x 1# 20x 1# 20x 1# 10x 2#      Bike 7' 7' 7' 7' 7' 7' 7'      DL Calf raises 30x 3x10x1" hold 3x10x1" hold 3x10x1"hold 3x10x1"hold 3x10x1"hold 2x10 SL      Re-Evaluation             Ther Activity                                       Gait Training                                       Modalities

## 2021-05-10 ENCOUNTER — APPOINTMENT (OUTPATIENT)
Dept: PHYSICAL THERAPY | Facility: REHABILITATION | Age: 76
End: 2021-05-10
Payer: MEDICARE

## 2021-05-13 DIAGNOSIS — E11.65 UNCONTROLLED TYPE 2 DIABETES MELLITUS WITH HYPERGLYCEMIA (HCC): Primary | ICD-10-CM

## 2021-05-14 ENCOUNTER — APPOINTMENT (OUTPATIENT)
Dept: PHYSICAL THERAPY | Facility: REHABILITATION | Age: 76
End: 2021-05-14
Payer: MEDICARE

## 2021-05-17 ENCOUNTER — OFFICE VISIT (OUTPATIENT)
Dept: PHYSICAL THERAPY | Facility: REHABILITATION | Age: 76
End: 2021-05-17
Payer: MEDICARE

## 2021-05-17 DIAGNOSIS — S39.012D CHRONIC SACROILIAC STRAIN, SUBSEQUENT ENCOUNTER: Primary | ICD-10-CM

## 2021-05-17 PROCEDURE — 97110 THERAPEUTIC EXERCISES: CPT

## 2021-05-17 PROCEDURE — 97112 NEUROMUSCULAR REEDUCATION: CPT

## 2021-05-17 NOTE — PROGRESS NOTES
Daily Note     Today's date: 2021  Patient name: Monica Sim  : 1945  MRN: [id number]  Referring provider: Kyleigh Torres MD  Dx:   Encounter Diagnosis     ICD-10-CM    1  Chronic sacroiliac strain, subsequent encounter  S39 012D        Start Time: 1140  Stop Time: 1220  Total time in clinic (min): 40 minutes   Patient 1 on 1 with PT from  [id number]  Subjective: Patient reports good after her break last week  Objective: See treatment diary below      Assessment: Patient continues to pain free even over here 1 week break from PT  Tolerated treatment well  Patient would benefit from continued PT      Plan: Continue per plan of care        Precautions: Hx of neuropathy    Manuals 4/16 4/19 4/23 4/26 4/29 5/3 5/7 05/17     Laser              STM              Piriformis Stretch             Sidelying Hip Flexion stretch                          Neuro Re-Ed             Bridges 3x10 w/ ball 3x10 w/ ball 3x10 w/ ball 3x10 w/ ball 3x10 w/ ball 3x10 w/ ball 40x w/ ball 40x w/ ball     Clamshells YBH 2x10 rbh 2x10 rbh 2x10 rbh 2x10 30x rhb 30x rhb 2x20 rhb 2x10 bhb     Birddogs 15x np resume 15x np resume hold hold hold      Multifidus Press Caryl 10# 30x each Caryl 10# 30x each [place] 15# 20x each Caryl 15# 20x each hold 10# 20x 15# 20x 15# 20x     Caryl Lateral Stepping 5# 10x #5 10x Caryl #7 10x Caryl #7 10x hold hold hold [place] 7# 5x     Hip Add Iso w/ Ball np            Tandem Walking 10"x10 10"x10 30"x3 30"x3 30"x3 4 laps at counter 5 laps at counter 5 laps at counter     Single Leg Balance 10"x10 10"x10 30"x3 30"x3 20x alt on foam 20x alt on foam 20x alt on foam 30x alt foam       Theraband Rows/Ext PTB 3x10 OTB 2x10 OTB 2x10 np resume np resume otb 30x otb 30x gtb 20x     Unilateral Farmer Carries  8#kb 4 laps each 8# kb 4 laps each 10# kb 4 laps  8# kb 4 laps 10# kb 3 laps  10# kb 3 laps 10# kb 4 laps     Ther Ex             Seated Hamstring Stretch             Seated Piriformis Stretch             PBall rollout             Standing SLR 2 Way ABD/EXT 20x 30x 30x 20x 1# 20x 1# 20x 1# 10x 2# x20 2#     Bike 7' 7' 7' 7' 7' 7' 7'      Treadmill        5' 1 0 mph     DL Calf raises 30x 3x10x1" hold 3x10x1" hold 3x10x1"hold 3x10x1"hold 3x10x1"hold 2x10 SL 2x10 SL     Re-Evaluation             Ther Activity                                       Gait Training                                       Modalities

## 2021-05-21 ENCOUNTER — OFFICE VISIT (OUTPATIENT)
Dept: PHYSICAL THERAPY | Facility: REHABILITATION | Age: 76
End: 2021-05-21
Payer: MEDICARE

## 2021-05-21 DIAGNOSIS — S39.012D CHRONIC SACROILIAC STRAIN, SUBSEQUENT ENCOUNTER: Primary | ICD-10-CM

## 2021-05-21 PROCEDURE — 97112 NEUROMUSCULAR REEDUCATION: CPT

## 2021-05-21 PROCEDURE — 97110 THERAPEUTIC EXERCISES: CPT

## 2021-05-21 NOTE — PROGRESS NOTES
Daily Note     Today's date: 2021  Patient name: Tg Yuan  : 1945  MRN: [id number]  Referring provider: Jesusita Lee MD  Dx:   Encounter Diagnosis     ICD-10-CM    1  Chronic sacroiliac strain, subsequent encounter  S39 012D        Start Time: 1100  Stop Time: 1145  Total time in clinic (min): 45 minutes    Subjective: No new complaints of LBP or knee pain  Objective: See treatment diary below      Assessment: Pain in low back continues to be absent  Plan: Continue per plan of care        Precautions: Hx of neuropathy    Manuals 4/16 4/19 4/23 4/26 4/29 5/3 5/7 05/17 05/21    Laser              STM              Piriformis Stretch             Sidelying Hip Flexion stretch                          Neuro Re-Ed             Bridges 3x10 w/ ball 3x10 w/ ball 3x10 w/ ball 3x10 w/ ball 3x10 w/ ball 3x10 w/ ball 40x w/ ball 40x w/ ball 40x w/ ball    Clamshells YBH 2x10 rbh 2x10 rbh 2x10 rbh 2x10 30x rhb 30x rhb 2x20 rhb 2x10 bhb 2x10 bhb    Birddogs 15x np resume 15x np resume hold hold hold      Multifidus Press Caryl 10# 30x each Caryl 10# 30x each Caryl 15# 20x each Caryl 15# 20x each hold 10# 20x 15# 20x 15# 20x 20# 20x    Caryl Lateral Stepping 5# 10x #5 10x [place] #7 10x Caryl #7 10x hold hold hold [place] 7# 5x Caryl 7# 5x    Hip Add Iso w/ Ball np            Tandem Walking 10"x10 10"x10 30"x3 30"x3 30"x3 4 laps at counter 5 laps at counter 5 laps at counter 6 laps at counter    Single Leg Balance 10"x10 10"x10 30"x3 30"x3 20x alt on foam 20x alt on foam 20x alt on foam 30x alt foam   30x alt foam    Theraband Rows/Ext PTB 3x10 OTB 2x10 OTB 2x10 np resume np resume otb 30x otb 30x gtb 20x gtb 20x    Unilateral Farmer Carries  8#kb 4 laps each 8# kb 4 laps each 10# kb 4 laps  8# kb 4 laps 10# kb 3 laps  10# kb 3 laps 10# kb 4 laps 15# kb 3 laps    Ther Ex             Seated Hamstring Stretch             Seated Piriformis Stretch             PBall rollout Standing SLR 2 Way ABD/EXT 20x 30x 30x 20x 1# 20x 1# 20x 1# 10x 2# x20 2# x20 2#    Bike 7' 7' 7' 7' 7' 7' 7'  7'    Treadmill        5' 1 0 mph 5' 1 2 mph    DL Calf raises 30x 3x10x1" hold 3x10x1" hold 3x10x1"hold 3x10x1"hold 3x10x1"hold 2x10 SL 2x10 SL 2x10 SL    Re-Evaluation             Ther Activity                                       Gait Training                                       Modalities

## 2021-05-24 ENCOUNTER — OFFICE VISIT (OUTPATIENT)
Dept: PHYSICAL THERAPY | Facility: REHABILITATION | Age: 76
End: 2021-05-24
Payer: MEDICARE

## 2021-05-24 DIAGNOSIS — S39.012D CHRONIC SACROILIAC STRAIN, SUBSEQUENT ENCOUNTER: Primary | ICD-10-CM

## 2021-05-24 PROCEDURE — 97112 NEUROMUSCULAR REEDUCATION: CPT

## 2021-05-24 PROCEDURE — 97110 THERAPEUTIC EXERCISES: CPT

## 2021-05-24 NOTE — PROGRESS NOTES
Daily Note     Today's date: 2021  Patient name: Travis Medina  : 1945  MRN: [id number]  Referring provider: Jody Luna MD  Dx:   Encounter Diagnosis     ICD-10-CM    1  Chronic sacroiliac strain, subsequent encounter  S39 012D        Start Time: 1468  Stop Time: 1215  Total time in clinic (min): 30 minutes    Subjective: Patient reports continued pain free in back  Objective: See treatment diary below  Pain Levels  Pain at rest: 0/10  Pain at worst: 0/10    Long Term Goals:  Patient has met the majority of long terms goals which include: ability to perform yardwork pain free, pain free walking, and improved balance  Assessment: Patient needed to leave early today, deferred a few activities  Plan for discharge next visit       Plan: Discharge next visit     Precautions: Hx of neuropathy    Manuals 4/16 4/19 4/23 4/26 4/29 5/3 5/7 05/17 05/21 05/24   Laser              STM              Piriformis Stretch             Sidelying Hip Flexion stretch                          Neuro Re-Ed             Bridges 3x10 w/ ball 3x10 w/ ball 3x10 w/ ball 3x10 w/ ball 3x10 w/ ball 3x10 w/ ball 40x w/ ball 40x w/ ball 40x w/ ball 40x w/ ball    Clamshells YBH 2x10 rbh 2x10 rbh 2x10 rbh 2x10 30x rhb 30x rhb 2x20 rhb 2x10 bhb 2x10 bhb 2x10 bhb   Birddogs 15x np resume 15x np resume hold hold hold      Multifidus Press [place] 10# 30x each [place] 10# 30x each Caryl 15# 20x each Caryl 15# 20x each hold 10# 20x 15# 20x 15# 20x 20# 20x 20# 20x   [place] Lateral Stepping 5# 10x #5 10x [place] #7 10x Caryl #7 10x hold hold hold [place] 7# 5x [place] 7# 5x    Hip Add Iso w/ Ball np            Tandem Walking 10"x10 10"x10 30"x3 30"x3 30"x3 4 laps at counter 5 laps at counter 5 laps at counter 6 laps at counter 7 laps at counter   Single Leg Balance 10"x10 10"x10 30"x3 30"x3 20x alt on foam 20x alt on foam 20x alt on foam 30x alt foam   30x alt foam 30x alt foam   Theraband Rows/Ext PTB 3x10 OTB 2x10 OTB 2x10 np resume np resume otb 30x otb 30x gtb 20x gtb 20x    Unilateral Dianne Montane  8#kb 4 laps each 8# kb 4 laps each 10# kb 4 laps  8# kb 4 laps 10# kb 3 laps  10# kb 3 laps 10# kb 4 laps 15# kb 3 laps    Ther Ex             Seated Hamstring Stretch             Seated Piriformis Stretch             PBall rollout             Standing SLR 2 Way ABD/EXT 20x 30x 30x 20x 1# 20x 1# 20x 1# 10x 2# x20 2# x20 2# x30 2#   Bike 7' 7' 7' 7' 7' 7' 7'  7'    Treadmill        5' 1 0 mph 5' 1 2 mph 7' 1 2 mph   DL Calf raises 30x 3x10x1" hold 3x10x1" hold 3x10x1"hold 3x10x1"hold 3x10x1"hold 2x10 SL 2x10 SL 2x10 SL 2x10 SL   Re-Evaluation             Ther Activity                                       Gait Training                                       Modalities

## 2021-05-27 ENCOUNTER — OFFICE VISIT (OUTPATIENT)
Dept: PHYSICAL THERAPY | Facility: REHABILITATION | Age: 76
End: 2021-05-27
Payer: MEDICARE

## 2021-05-27 DIAGNOSIS — S39.012D CHRONIC SACROILIAC STRAIN, SUBSEQUENT ENCOUNTER: Primary | ICD-10-CM

## 2021-05-27 PROCEDURE — 97110 THERAPEUTIC EXERCISES: CPT

## 2021-05-27 NOTE — PROGRESS NOTES
PT Discharge    Today's date: 2021  Patient name: Travis Medina  : 1945  MRN: [id number]  Referring provider: Jody Luna MD  Dx:   Encounter Diagnosis     ICD-10-CM    1  Chronic sacroiliac strain, subsequent encounter  S39 012D        Start Time:   Stop Time: 121  Total time in clinic (min): 30 minutes    Assessment  Assessment details: Anum Gonzalez is a 78 yo female presenting to PT with R sided back pain  Patient has been participating in PT for 2 months with great improvements in back pain and function  Patient reports 100% improvement at this time  Patient has returned to all light and heavy activities at home without back pain  Patient is pain free with all ADLs/IADLs  Patient has met all her goals set at IE  Patient will be discharged at this time  Subjective Evaluation    History of Present Illness  Mechanism of injury: Started Mid January may have been related to shoveling snow  Patient states that the pain is on the R side and radiates into pelvis and down hip at times  Pain primarily located in the R SIJ and glut region  Pain has improved with use of robaxin  RE:  80% improvement since start of PT  The last 20% being increased walking distances and speed, and the ability to perform yard and housework  Frequency of pain is decreased, only occurring once or twice a week  Patient states the pain is seldom now  Patient reports not needing pain medication at this time  No back pain in the last week  RE:   100% improvement since start of PT  Patient reports being pain free with all activities at home including light and heavy activities  No pain with ADLs/IADls     Pain  Current pain ratin  At worst pain ratin    Patient Goals  Patient goals for therapy: decreased pain, increased motion, increased strength, independence with ADLs/IADLs and return to sport/leisure activities          Objective     Active Range of Motion     Lumbar   Flexion:  Restriction level: minimal  Extension:  Restriction level: minimal  Left lateral flexion:  Restriction level: minimal  Right lateral flexion:  Restriction level: minimal  Left rotation:  Restriction level: minimal  Right rotation:  Restriction level: minimal    General Comments:      Lumbar Comments  Special Tests:  DEREK (-, minimal tightness bilateral), FADIR (- moderate tightness), SLR (-, 80 degrees bilateral), Compression (-)    Strength:  Hib Abduction 4/5    Lumbar AROM  No reproduction of pain in any direction      Repeated Motion  Extension: No change  Flexion: No change    Tender to Palpation:  None             Precautions: Hx of neuropathy        Manuals 05/27 4/19 4/23 4/26 4/29 5/3 5/7 05/17 05/21 05/24   Laser              STM              Piriformis Stretch             Sidelying Hip Flexion stretch                          Neuro Re-Ed             Bridges  3x10 w/ ball 3x10 w/ ball 3x10 w/ ball 3x10 w/ ball 3x10 w/ ball 40x w/ ball 40x w/ ball 40x w/ ball 40x w/ ball    Clamshells  rbh 2x10 rbh 2x10 rbh 2x10 30x rhb 30x rhb 2x20 rhb 2x10 bhb 2x10 bhb 2x10 bhb   Birddogs  np resume 15x np resume hold hold hold      Multifidus Press  [place] 10# 30x each [place] 15# 20x each Caryl 15# 20x each hold 10# 20x 15# 20x 15# 20x 20# 20x 20# 20x   [place] Lateral Stepping  #5 10x [place] #7 10x [place] #7 10x hold hold hold [place] 7# 5x [place] 7# 5x    Hip Add Iso w/ Ball             Tandem Walking  10"x10 30"x3 30"x3 30"x3 4 laps at counter 5 laps at counter 5 laps at counter 6 laps at counter 7 laps at counter   Single Leg Balance  10"x10 30"x3 30"x3 20x alt on foam 20x alt on foam 20x alt on foam 30x alt foam   30x alt foam 30x alt foam   Theraband Rows/Ext  OTB 2x10 OTB 2x10 np resume np resume otb 30x otb 30x gtb 20x gtb 20x    Unilateral Farmer Carries  8#kb 4 laps each 8# kb 4 laps each 10# kb 4 laps  8# kb 4 laps 10# kb 3 laps  10# kb 3 laps 10# kb 4 laps 15# kb 3 laps    Ther Ex             Seated Hamstring Stretch Seated Piriformis Stretch             PBall rollout             Standing SLR 2 Way ABD/EXT  30x 30x 20x 1# 20x 1# 20x 1# 10x 2# x20 2# x20 2# x30 2#   Bike  7' 7' 7' 7' 7' 7'  7'    Treadmill        5' 1 0 mph 5' 1 2 mph 7' 1 2 mph   DL Calf raises  3x10x1" hold 3x10x1" hold 3x10x1"hold 3x10x1"hold 3x10x1"hold 2x10 SL 2x10 SL 2x10 SL 2x10 SL   Re-Evaluation and Update HEP 30'            Ther Activity                                       Gait Training                                       Modalities

## 2021-06-18 DIAGNOSIS — E11.65 UNCONTROLLED TYPE 2 DIABETES MELLITUS WITH HYPERGLYCEMIA (HCC): ICD-10-CM

## 2021-06-28 ENCOUNTER — OFFICE VISIT (OUTPATIENT)
Dept: FAMILY MEDICINE CLINIC | Facility: CLINIC | Age: 76
End: 2021-06-28
Payer: MEDICARE

## 2021-06-28 VITALS
WEIGHT: 235 LBS | HEIGHT: 68 IN | BODY MASS INDEX: 35.61 KG/M2 | SYSTOLIC BLOOD PRESSURE: 138 MMHG | DIASTOLIC BLOOD PRESSURE: 82 MMHG

## 2021-06-28 DIAGNOSIS — S80.869D: Primary | ICD-10-CM

## 2021-06-28 DIAGNOSIS — E11.65 UNCONTROLLED TYPE 2 DIABETES MELLITUS WITH HYPERGLYCEMIA (HCC): ICD-10-CM
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
## 2021-06-28 PROCEDURE — 99214 OFFICE O/P EST MOD 30 MIN: CPT | Performed by: FAMILY MEDICINE

## 2021-06-28 RX ORDER — BLOOD-GLUCOSE METER
EACH MISCELLANEOUS
COMMUNITY
Start: 2021-05-17

## 2021-06-28 RX ORDER — BETAMETHASONE DIPROPIONATE 0.5 MG/G
CREAM TOPICAL 2 TIMES DAILY
Qty: 30 G | Refills: 0 | Status: SHIPPED | OUTPATIENT
Start: 2021-06-28 | End: 2022-01-13 | Stop reason: ALTCHOICE

## 2021-06-28 RX ORDER — BLOOD SUGAR DIAGNOSTIC
STRIP MISCELLANEOUS
COMMUNITY
Start: 2021-05-17 | End: 2021-08-16

## 2021-06-28 NOTE — ASSESSMENT & PLAN NOTE
Lab Results   Component Value Date    HGBA1C 12 2 (A) 12/31/2020   She is to be more compliant with her treatment of her type 2 diabetes

## 2021-06-28 NOTE — ASSESSMENT & PLAN NOTE
Patient has a small bilateral eruption of her posterior calves  I believe this is due to contact dermatitis verses more likely chigger or other insect bite  It does not appear to represent venomous sting  She is going to use cool compresses  We gave her some Diprolene cream to use  Will avoid systemic corticosteroids due to the relative minimal nature of the eruption as well as her diabetes  She is asked call over the next [phone] hours with report per sooner as needed  She agrees with this plan

## 2021-06-28 NOTE — PROGRESS NOTES
Assessment/Plan:  Insect bite of lower leg with local reaction, subsequent encounter  Patient has a small bilateral eruption of her posterior calves  I believe this is due to contact dermatitis verses more likely chigger or other insect bite  It does not appear to represent venomous sting  She is going to use cool compresses  We gave her some Diprolene cream to use  Will avoid systemic corticosteroids due to the relative minimal nature of the eruption as well as her diabetes  She is asked call over the next 4872 hours with report per sooner as needed  She agrees with this plan  Benign essential hypertension  Blood pressure remains acceptable off therapy  Diabetes type 2, uncontrolled ([facility] [address] )    Lab Results   Component Value Date    HGBA1C 12 2 (A) 12/31/2020   She is to be more compliant with her treatment of her type 2 diabetes  Diagnoses and all orders for this visit:    Insect bite of lower leg with local reaction, subsequent encounter  -     betamethasone dipropionate (DIPROSONE) 0 05 % cream; Apply topically 2 (two) times a day    Uncontrolled type 2 diabetes mellitus with hyperglycemia (HCC)    Other orders  -     Blood Glucose Monitoring Suppl (ONE TOUCH ULTRA 2) w/Device KIT; TEST BLOOD SUGAR ONCE DAILY OR AS NEEDED (E11 65)  -     OneTouch Ultra test strip; TEST BLOOD SUGAR DAILY, OR AS NEEDED (E11 65)          Subjective:   Chief Complaint   Patient presents with    Insect Bite     both legs  Noticed yesterday  Patient ID: Luli Leigh is a 77 y o  female  I noticed my legs were swollen after pushing mower, then into pool  I feel unwell  HPI  The patient is a 69-year-old female with history of type 2 diabetes, essential hypertension, hyperlipidemia as well as obesity who presents today with concerns over possible bee stings of her posterior calves bilaterally  She states that she was mowing her lawn    When she got done she noted some discomfort upper posterior calves and jumped in the pool  This did not seem to relieve it  She took some Benadryl which again was not overly successful in giving her relief  She felt they were swollen and overall feeling unwell  She denies any urticaria  No chest pain shortness of breath or dizziness  No feeling of shortness of breath, dysphonia dysphagia X cetera  The following portions of the patient's history were reviewed and updated as appropriate: allergies, current medications, past family history, past medical history, past social history, past surgical history and problem list     Review of Systems   Constitutional: Negative  Cardiovascular: Negative  Respiratory: Negative  Endocrine: Negative  Hematologic/Lymphatic: Negative  Skin: Positive for itching and rash  Gastrointestinal: Negative  Objective:    Physical Exam  Vitals and nursing note reviewed  Constitutional:       Appearance: She is obese  She is not ill-appearing  Skin:     Findings: Erythema and rash present  Comments: Mid to upper posterior calves bilaterally with a few macular papular lesions, left greater than right  They appear to be aware crop pants and socks would not meet  Neurological:      Mental Status: She is alert and oriented to person, place, and time  Psychiatric:         Mood and Affect: Mood normal          Thought Content:  Thought content normal          Judgment: Judgment normal

## 2021-07-01 ENCOUNTER — HOSPITAL ENCOUNTER (EMERGENCY)
Facility: HOSPITAL | Age: 76
Discharge: HOME | End: 2021-07-01
Attending: EMERGENCY MEDICINE | Admitting: EMERGENCY MEDICINE
Payer: MEDICARE

## 2021-07-01 VITALS
SYSTOLIC BLOOD PRESSURE: 177 MMHG | TEMPERATURE: 98.6 F | HEART RATE: 97 BPM | BODY MASS INDEX: 36.26 KG/M2 | WEIGHT: 235 LBS | RESPIRATION RATE: 18 BRPM | OXYGEN SATURATION: 96 % | DIASTOLIC BLOOD PRESSURE: 83 MMHG

## 2021-07-01 DIAGNOSIS — M79.605 LEFT LEG PAIN: Primary | ICD-10-CM

## 2021-07-01 PROCEDURE — 99284 EMERGENCY DEPT VISIT MOD MDM: CPT | Performed by: EMERGENCY MEDICINE

## 2021-07-01 PROCEDURE — 99283 EMERGENCY DEPT VISIT LOW MDM: CPT

## 2021-07-01 PROCEDURE — 96372 THER/PROPH/DIAG INJ SC/IM: CPT

## 2021-07-01 RX ADMIN — ENOXAPARIN SODIUM 105 MG: 120 INJECTION SUBCUTANEOUS at 20:37

## 2021-07-02 ENCOUNTER — HOSPITAL ENCOUNTER (OUTPATIENT)
Dept: RADIOLOGY | Facility: HOSPITAL | Age: 76
Discharge: HOME | End: 2021-07-02
Attending: EMERGENCY MEDICINE
Payer: MEDICARE

## 2021-07-02 DIAGNOSIS — M79.605 LEFT LEG PAIN: ICD-10-CM

## 2021-07-02 PROCEDURE — 93971 EXTREMITY STUDY: CPT

## 2021-07-02 NOTE — ED NOTES
Pt ambulatory to ED with steady gait does not appear in distress      Will Mccann, RN  07/01/21 2015

## 2021-07-02 NOTE — ED PROVIDER NOTES
History  Chief Complaint   Patient presents with    Leg Pain     States frontal aspect of left lower leg is painful intermittently for a week and the veins on the front of her leg are swollen  Denies calf pain  Pain front of leg worse today     Patient presents for evaluation of left anterior lower leg pain  States pain is been intermittent for about 1 week of burning sharp like pain in different spots of the anterior lower leg  She also noticed that the veins in her legs or dilated today when she was outside  Denies any calf pain  She does have multiple bug bites on the posterior left calf  She is our primary care doctor for that and was given medication the scene be improving  Sent in to rule out blood clot  Denies any chest pain shortness of breath  History provided by:  Patient   used: No    Leg Pain  Associated symptoms: no back pain and no fever        Prior to Admission Medications   Prescriptions Last Dose Informant Patient Reported? Taking?    Blood Glucose Monitoring Suppl (ONE TOUCH ULTRA 2) w/Device KIT   Yes No   Sig: TEST BLOOD SUGAR ONCE DAILY OR AS NEEDED (E11 65)   OneTouch Ultra test strip   Yes No   Sig: TEST BLOOD SUGAR DAILY, OR AS NEEDED (E11 65)   betamethasone dipropionate (DIPROSONE) 0 05 % cream   No No   Sig: Apply topically 2 (two) times a day   metFORMIN (GLUCOPHAGE) 1000 MG tablet   No No   Sig: TAKE 1 TABLET BY MOUTH TWICE A DAY WITH MEALS   methocarbamol (ROBAXIN) 750 mg tablet   No No   Sig: Take 1 tablet (750 mg total) by mouth every 6 (six) hours as needed for muscle spasms   naproxen (NAPROSYN) 500 mg tablet   No No   Sig: TAKE 1 TABLET BY MOUTH TWICE A DAY WITH MEALS      Facility-Administered Medications: None       Past Medical History:   Diagnosis Date    Diabetes mellitus ([facility] [address] )     Endometrial carcinoma (HCC)     Hypertension     Varicella        Past Surgical History:   Procedure Laterality Date    HYSTERECTOMY  2006    TONSILLECTOMY [address] [facility] [address] THYROID BIOPSY  12/12/2019       Family History   Problem Relation Age of Onset   [facility] Stroke Mother     Diabetes Mother     Cancer Mother     Lung cancer Father     Breast cancer Sister 66    COPD Brother     Coronary artery disease Brother     Heart failure Brother     Leukemia Son     HIV Brother     Cancer Family      I have reviewed and agree with the history as documented  E-Cigarette/Vaping    E-Cigarette Use Never User      E-Cigarette/Vaping Substances    Nicotine No     THC No     CBD No     Flavoring No     Other No     Unknown No      Social History     Tobacco Use    Smoking status: Never Smoker    Smokeless tobacco: Never Used   Vaping Use    Vaping Use: Never used   Substance Use Topics    Alcohol use: Never    Drug use: Never       Review of Systems   Constitutional: Negative for chills and fever  HENT: Negative for ear pain and sore throat  Eyes: Negative for pain and visual disturbance  Respiratory: Negative for cough and shortness of breath  Cardiovascular: Negative for chest pain and palpitations  Gastrointestinal: Negative for abdominal pain and vomiting  Genitourinary: Negative for dysuria and hematuria  Musculoskeletal: Negative for arthralgias and back pain  Skin: Negative for color change and rash  Neurological: Negative for seizures and syncope  All other systems reviewed and are negative  Physical Exam  Physical Exam  Vitals and nursing note reviewed  Constitutional:       General: She is not in acute distress  Appearance: Normal appearance  HENT:      Head: Atraumatic  Right Ear: External ear normal       Left Ear: External ear normal       Nose: Nose normal       Mouth/Throat:      Mouth: Mucous membranes are moist       Pharynx: Oropharynx is clear  Eyes:      General: No scleral icterus       Conjunctiva/sclera: Conjunctivae normal    Cardiovascular:      Rate and Rhythm: Normal rate and regular rhythm  Pulses: Normal pulses  Pulmonary:      Effort: Pulmonary effort is normal  No respiratory distress  Breath sounds: Normal breath sounds  Musculoskeletal:         General: No deformity  Normal range of motion  Right lower leg: No edema  Left lower leg: No edema  Legs:    Skin:     Capillary Refill: Capillary refill takes less than 2 seconds  Findings: No rash  Neurological:      General: No focal deficit present  Mental Status: She is alert and oriented to person, place, and time  Vital Signs  ED Triage Vitals [07/01/21 1918]   Temperature Pulse Respirations Blood Pressure SpO2   98 6 °F (37 °C) 97 18 (!) 177/83 96 %      Temp Source Heart Rate Source Patient Position - Orthostatic VS BP Location FiO2 (%)   Tympanic Monitor Sitting Left arm --      Pain Score       4           Vitals:    07/01/21 1918   BP: (!) 177/83   Pulse: 97   Patient Position - Orthostatic VS: Sitting         Visual Acuity      ED Medications  Medications   enoxaparin (LOVENOX) subcutaneous injection 105 mg (105 mg Subcutaneous Given 7/1/21 2037)       Diagnostic Studies  Results Reviewed     None                 No orders to display              Procedures  Procedures         ED Course                                           MDM  Number of Diagnoses or Management Options  Left leg pain  Diagnosis management comments: Pulse ox 96% on room air indicating adequate oxygenation  Vascular study to rule out blood clot not available overnight  This was discussed with patient  Will give a shot of Lovenox and have patient follow-up outpatient ultrasound tomorrow         Amount and/or Complexity of Data Reviewed  Decide to obtain previous medical records or to obtain history from someone other than the patient: yes  Review and summarize past medical records: yes    Patient Progress  Patient progress: stable      Disposition  Final diagnoses:   Left leg pain     Time reflects when diagnosis was documented in both MDM as applicable and the Disposition within this note     Time User Action Codes Description Comment    7/1/2021  8:22 PM Eloise Tomlinson Add [O81 926] Left leg pain       ED Disposition     ED Disposition Condition Date/Time Comment    Discharge Stable Thu Jul 1, 2021  8:22 PM Christy Britton discharge to home/self care  Follow-up Information     Follow up With Specialties Details Why Contact Info    Attila Pascual MD Family Medicine In 3 days  [address] [address]  [phone]            Discharge Medication List as of 7/1/2021  8:23 PM      CONTINUE these medications which have NOT CHANGED    Details   betamethasone dipropionate (DIPROSONE) 0 05 % cream Apply topically 2 (two) times a day, Starting Mon 6/28/2021, Normal      Blood Glucose Monitoring Suppl (ONE TOUCH ULTRA 2) w/Device KIT TEST BLOOD SUGAR ONCE DAILY OR AS NEEDED (E11 65), Historical Med      metFORMIN (GLUCOPHAGE) 1000 MG tablet TAKE 1 TABLET BY MOUTH TWICE A DAY WITH MEALS, Normal      methocarbamol (ROBAXIN) 750 mg tablet Take 1 tablet (750 mg total) by mouth every 6 (six) hours as needed for muscle spasms, Starting Wed 3/31/2021, Normal      naproxen (NAPROSYN) 500 mg tablet TAKE 1 TABLET BY MOUTH TWICE A DAY WITH MEALS, Normal      OneTouch Ultra test strip TEST BLOOD SUGAR DAILY, OR AS NEEDED (E11 65), Historical Med           Outpatient Discharge Orders   VAS lower limb venous duplex study, unilateral/limited   Standing Status: Future Number of Occurrences: 1 Standing Exp   Date: 07/01/25       PDMP Review     None          ED Provider  Electronically Signed by           Jihan Jon DO  07/02/21 7192

## 2021-07-05 PROCEDURE — 93971 EXTREMITY STUDY: CPT | Performed by: SURGERY

## 2021-07-22 ENCOUNTER — OFFICE VISIT (OUTPATIENT)
Dept: FAMILY MEDICINE CLINIC | Facility: CLINIC | Age: 76
End: 2021-07-22
Payer: MEDICARE

## 2021-07-22 VITALS
WEIGHT: 232 LBS | DIASTOLIC BLOOD PRESSURE: 70 MMHG | HEIGHT: 68 IN | BODY MASS INDEX: 35.16 KG/M2 | HEART RATE: 98 BPM | OXYGEN SATURATION: 96 % | TEMPERATURE: 98.6 F | SYSTOLIC BLOOD PRESSURE: 122 MMHG

## 2021-07-22 DIAGNOSIS — E78.2 HYPERLIPIDEMIA, MIXED: ICD-10-CM

## 2021-07-22 DIAGNOSIS — R53.83 FATIGUE, UNSPECIFIED TYPE: Primary | ICD-10-CM

## 2021-07-22 DIAGNOSIS — E11.65 UNCONTROLLED TYPE 2 DIABETES MELLITUS WITH HYPERGLYCEMIA (HCC): ICD-10-CM

## 2021-07-22 LAB — SL AMB POCT HEMOGLOBIN AIC: 10 (ref ?–6.5)

## 2021-07-22 PROCEDURE — 83036 HEMOGLOBIN GLYCOSYLATED A1C: CPT | Performed by: FAMILY MEDICINE

## 2021-07-22 PROCEDURE — 99214 OFFICE O/P EST MOD 30 MIN: CPT | Performed by: FAMILY MEDICINE

## 2021-07-22 NOTE — ASSESSMENT & PLAN NOTE
The patient is a 50-year-old female who initially scheduled this visit as a wellness visit  Unfortunately today she is not feeling well  She has had an unexplained headache as well as feeling of malaise  She has also had some intermittent constipation over the past month  Her A1c today, though 10 is improved from her past visit  I do not believe it is likely that the diabetes is the explanation for her symptoms presently  She does spend a lot of time outside  She has a history of Lyme disease  This certainly could be a new infection  We are going to ask her to push fluids and rest   She will try some Colace for her constipation  In the meantime we are going to get a CBC, CMP, Lyme and TSH  We will follow up with her when results are available  In the meantime she will call sooner should she see any significant deterioration in her condition or seek more urgent attention as needed  She agrees with this plan

## 2021-07-22 NOTE — PROGRESS NOTES
Assessment/Plan:  Fatigue  The patient is a 79-year-old female who initially scheduled this visit as a wellness visit  Unfortunately today she is not feeling well  She has had an unexplained headache as well as feeling of malaise  She has also had some intermittent constipation over the past month  Her A1c today, though 10 is improved from her past visit  I do not believe it is likely that the diabetes is the explanation for her symptoms presently  She does spend a lot of time outside  She has a history of Lyme disease  This certainly could be a new infection  We are going to ask her to push fluids and rest   She will try some Colace for her constipation  In the meantime we are going to get a CBC, CMP, Lyme and TSH  We will follow up with her when results are available  In the meantime she will call sooner should she see any significant deterioration in her condition or seek more urgent attention as needed  She agrees with this plan  Her urinalysis today was normal      Diagnoses and all orders for this visit:    Fatigue, unspecified type  -     CBC  -     Lyme Total Antibody Profile with reflex to WB  -     TSH, 3rd generation    [facility]  -     Comprehensive metabolic panel  -     POCT hemoglobin A1c    Hyperlipidemia, mixed  -     Lipid panel          Subjective:   Chief Complaint   Patient presents with    States she has a headache, feels unsteady on feet  No BM fo        Patient ID: Christie Newell is a 77 y o  female  Awoke with a HA this am which is unusual  No BM x 3 days  Third episode over past month  No heme  Stools seem smaller diameter like finger diameter  No fever  Had colonoscopy 2020  Annette Lightning Diminutive polyps, no divertics, 3 year recall  No OTC agents  No fever, baseline joint aches  No rash  Had deer tick nymph bites a month ago  No  sx other than frequency which is baseline  No CVP c/o other than a baseline cough  HPI  Patient is a 81-year-old female who presents today initially for annual wellness visit  During her rooming process she stated that she was not feeling well today and the visit was changed to an acute visit for illness  She states that over the past month or so she has had 3 episodes where she has felt constipated for 2 or 3 days  She is currently in the midst of 1 of the cycles  She has had no blood in her stools  She did have colonoscopy in 2020 which we reviewed showing several diminutive polyps but no diverticulosis colitis X cetera  She was given a recall for 3 years  She has tried no over-the-counter agents for constipation  She states that today she awoke with a headache which is relatively severe  This is unusual for her  She has been aware of no fever  Recently he she has had some rash but it did not appear to be an ECM  She did go to the emergency room earlier in the month for same as well as a bit here last month  She does admit that she has had some deer tick nymph bites about a month ago  She has been aware of no fever no increased joint aches and no ECM  She does have history of Lyme x1 in the past   She has no  symptoms  Her urine dip today is negative  She has no cardiovascular pulmonary complaint other than a cough which is nonproductive of purulent sputum or heme  The following portions of the patient's history were reviewed and updated as appropriate: allergies, current medications, past family history, past medical history, past social history, past surgical history and problem list     Review of Systems   Constitutional: Positive for malaise/fatigue  Negative for decreased appetite and fever  Cardiovascular: Negative for chest pain, irregular heartbeat and leg swelling  Respiratory: Positive for cough  Endocrine: Positive for polyuria  Negative for polydipsia and polyphagia  Gastrointestinal: Positive for change in bowel habit and constipation   Negative for hematochezia  Genitourinary: Positive for frequency  Neurological: Positive for headaches  Objective:    Physical Exam  Constitutional:       Appearance: She is obese  Comments: Somewhat fatigued appearing but in no acute distress   Eyes:      General: No scleral icterus  Conjunctiva/sclera: Conjunctivae normal    Cardiovascular:      Rate and Rhythm: Normal rate and regular rhythm  Pulmonary:      Effort: Pulmonary effort is normal       Breath sounds: Normal breath sounds  No wheezing or rhonchi  Abdominal:      General: Bowel sounds are normal       Palpations: Abdomen is soft  Tenderness: There is no abdominal tenderness  There is no right CVA tenderness or left CVA tenderness  Musculoskeletal:      Cervical back: Neck supple  Right lower leg: No edema  Left lower leg: No edema  Lymphadenopathy:      Cervical: No cervical adenopathy  Neurological:      Mental Status: She is alert and oriented to person, place, and time  Psychiatric:         Mood and Affect: Mood normal          Thought Content:  Thought content normal          Judgment: Judgment normal

## 2021-07-23 LAB
ALBUMIN SERPL-MCNC: 4 G/DL (ref 3.6–5.1)
ALBUMIN/GLOB SERPL: 1.4 (CALC) (ref 1–2.5)
ALP SERPL-CCNC: 80 U/L (ref 37–153)
ALT SERPL-CCNC: 11 U/L (ref 6–29)
AST SERPL-CCNC: 10 U/L (ref 10–35)
B BURGDOR AB SER IA-ACNC: <0.9 INDEX
BILIRUB SERPL-MCNC: 0.5 MG/DL (ref 0.2–1.2)
BUN SERPL-MCNC: 12 MG/DL (ref 7–25)
BUN/CREAT SERPL: ABNORMAL (CALC) (ref 6–22)
CALCIUM SERPL-MCNC: 9.7 MG/DL (ref 8.6–10.4)
CHLORIDE SERPL-SCNC: 100 MMOL/L (ref 98–110)
CHOLEST SERPL-MCNC: 238 MG/DL
CHOLEST/HDLC SERPL: 5.3 (CALC)
CO2 SERPL-SCNC: 24 MMOL/L (ref 20–32)
CREAT SERPL-MCNC: 0.65 MG/DL (ref 0.6–0.93)
ERYTHROCYTE [DISTWIDTH] IN BLOOD BY AUTOMATED COUNT: 13.1 % (ref 11–15)
GLOBULIN SER CALC-MCNC: 2.9 G/DL (CALC) (ref 1.9–3.7)
GLUCOSE SERPL-MCNC: 238 MG/DL (ref 65–99)
HCT VFR BLD AUTO: 46.3 % (ref 35–45)
HDLC SERPL-MCNC: 45 MG/DL
HGB BLD-MCNC: 15.1 G/DL (ref 11.7–15.5)
LDLC SERPL CALC-MCNC: 164 MG/DL (CALC)
MCH RBC QN AUTO: 27.7 PG (ref 27–33)
MCHC RBC AUTO-ENTMCNC: 32.6 G/DL (ref 32–36)
MCV RBC AUTO: 84.8 FL (ref 80–100)
NONHDLC SERPL-MCNC: 193 MG/DL (CALC)
PLATELET # BLD AUTO: 303 THOUSAND/UL (ref 140–400)
PMV BLD REES-ECKER: 9.8 FL (ref 7.5–12.5)
POTASSIUM SERPL-SCNC: 4.3 MMOL/L (ref 3.5–5.3)
PROT SERPL-MCNC: 6.9 G/DL (ref 6.1–8.1)
RBC # BLD AUTO: 5.46 MILLION/UL (ref 3.8–5.1)
SL AMB EGFR AFRICAN AMERICAN: 101 ML/MIN/1.73M2
SL AMB EGFR NON AFRICAN AMERICAN: 87 ML/MIN/1.73M2
SODIUM SERPL-SCNC: 138 MMOL/L (ref 135–146)
TRIGL SERPL-MCNC: 143 MG/DL
TSH SERPL-ACNC: 1.59 MIU/L (ref 0.4–4.5)
WBC # BLD AUTO: 7.2 THOUSAND/UL (ref 3.8–10.8)

## 2021-08-15 DIAGNOSIS — E11.65 UNCONTROLLED TYPE 2 DIABETES MELLITUS WITH HYPERGLYCEMIA (HCC): Primary | ICD-10-CM

## 2021-08-16 RX ORDER — BLOOD SUGAR DIAGNOSTIC
STRIP MISCELLANEOUS
Qty: 100 STRIP | Refills: 1 | Status: SHIPPED | OUTPATIENT
Start: 2021-08-16 | End: 2022-07-20 | Stop reason: SDUPTHER

## 2021-10-10 DIAGNOSIS — E11.65 UNCONTROLLED TYPE 2 DIABETES MELLITUS WITH HYPERGLYCEMIA (HCC): ICD-10-CM

## 2021-10-11 PROBLEM — E66.01 SEVERE OBESITY (BMI 35.0-39.9) WITH COMORBIDITY (HCC): Status: ACTIVE | Noted: 2017-09-11

## 2021-10-15 ENCOUNTER — OFFICE VISIT (OUTPATIENT)
Dept: FAMILY MEDICINE CLINIC | Facility: CLINIC | Age: 76
End: 2021-10-15
Payer: MEDICARE

## 2021-10-15 VITALS
HEIGHT: 68 IN | TEMPERATURE: 98.3 F | WEIGHT: 235 LBS | SYSTOLIC BLOOD PRESSURE: 130 MMHG | OXYGEN SATURATION: 96 % | BODY MASS INDEX: 35.61 KG/M2 | DIASTOLIC BLOOD PRESSURE: 80 MMHG | HEART RATE: 86 BPM

## 2021-10-15 DIAGNOSIS — Z23 ENCOUNTER FOR IMMUNIZATION: ICD-10-CM

## 2021-10-15 DIAGNOSIS — I10 BENIGN ESSENTIAL HYPERTENSION: ICD-10-CM

## 2021-10-15 DIAGNOSIS — E11.65 UNCONTROLLED TYPE 2 DIABETES MELLITUS WITH HYPERGLYCEMIA (HCC): ICD-10-CM

## 2021-10-15 DIAGNOSIS — E11.42 DIABETIC POLYNEUROPATHY ASSOCIATED WITH TYPE 2 DIABETES MELLITUS (HCC): ICD-10-CM

## 2021-10-15 DIAGNOSIS — E66.01 SEVERE OBESITY (BMI 35.0-39.9) WITH COMORBIDITY (HCC): ICD-10-CM

## 2021-10-15 DIAGNOSIS — E04.2 MULTIPLE THYROID NODULES: ICD-10-CM

## 2021-10-15 DIAGNOSIS — E78.2 HYPERLIPIDEMIA, MIXED: ICD-10-CM

## 2021-10-15 DIAGNOSIS — Z00.00 MEDICARE ANNUAL WELLNESS VISIT, SUBSEQUENT: Primary | ICD-10-CM

## 2021-10-15 LAB
CREAT UR-MCNC: 40.4 MG/DL
LEFT EYE DIABETIC RETINOPATHY: NORMAL
MICROALBUMIN UR-MCNC: 6.8 MG/L (ref 0–20)
MICROALBUMIN/CREAT 24H UR: 17 MG/G CREATININE (ref 0–30)
RIGHT EYE DIABETIC RETINOPATHY: NORMAL

## 2021-10-15 PROCEDURE — 82043 UR ALBUMIN QUANTITATIVE: CPT | Performed by: FAMILY MEDICINE

## 2021-10-15 PROCEDURE — 1123F ACP DISCUSS/DSCN MKR DOCD: CPT

## 2021-10-15 PROCEDURE — 99214 OFFICE O/P EST MOD 30 MIN: CPT | Performed by: FAMILY MEDICINE

## 2021-10-15 PROCEDURE — G0439 PPPS, SUBSEQ VISIT: HCPCS | Performed by: FAMILY MEDICINE

## 2021-10-15 PROCEDURE — 82570 ASSAY OF URINE CREATININE: CPT | Performed by: FAMILY MEDICINE

## 2021-10-15 PROCEDURE — 90662 IIV NO PRSV INCREASED AG IM: CPT

## 2021-10-15 PROCEDURE — G0008 ADMIN INFLUENZA VIRUS VAC: HCPCS

## 2021-10-15 RX ORDER — METFORMIN HYDROCHLORIDE 500 MG/1
1000 TABLET, EXTENDED RELEASE ORAL 2 TIMES DAILY WITH MEALS
Qty: 120 TABLET | Refills: 5 | Status: SHIPPED | OUTPATIENT
Start: 2021-10-15 | End: 2022-05-27 | Stop reason: SDUPTHER

## 2021-10-18 ENCOUNTER — HOSPITAL ENCOUNTER (OUTPATIENT)
Dept: ULTRASOUND IMAGING | Facility: HOSPITAL | Age: 76
Discharge: HOME | End: 2021-10-18
Payer: MEDICARE

## 2021-10-18 DIAGNOSIS — E04.2 MULTIPLE THYROID NODULES: ICD-10-CM

## 2021-10-18 PROCEDURE — 76536 US EXAM OF HEAD AND NECK: CPT

## 2021-10-20 ENCOUNTER — VBI (OUTPATIENT)
Dept: ADMINISTRATIVE | Facility: OTHER | Age: 76
End: 2021-10-20

## 2021-11-05 ENCOUNTER — IMMUNIZATIONS (OUTPATIENT)
Dept: FAMILY MEDICINE CLINIC | Facility: HOSPITAL | Age: 76
End: 2021-11-05

## 2021-11-05 DIAGNOSIS — Z23 ENCOUNTER FOR IMMUNIZATION: Primary | ICD-10-CM

## 2021-11-05 PROCEDURE — 91306 COVID-19 MODERNA VACC 0.25 ML BOOSTER: CPT

## 2021-11-05 PROCEDURE — 0013A COVID-19 MODERNA VACC 0.25 ML BOOSTER: CPT

## 2022-01-13 ENCOUNTER — OFFICE VISIT (OUTPATIENT)
Dept: OBGYN CLINIC | Facility: CLINIC | Age: 77
End: 2022-01-13
Payer: MEDICARE

## 2022-01-13 VITALS
DIASTOLIC BLOOD PRESSURE: 86 MMHG | WEIGHT: 236.8 LBS | SYSTOLIC BLOOD PRESSURE: 156 MMHG | BODY MASS INDEX: 38.06 KG/M2 | HEIGHT: 66 IN

## 2022-01-13 DIAGNOSIS — N39.41 URGE INCONTINENCE: ICD-10-CM

## 2022-01-13 DIAGNOSIS — N76.0 ACUTE VAGINITIS: ICD-10-CM

## 2022-01-13 DIAGNOSIS — N81.10 BADEN-WALKER GRADE 1 CYSTOCELE: Primary | ICD-10-CM

## 2022-01-13 PROBLEM — S80.869D: Status: RESOLVED | Noted: 2021-06-28 | Resolved: 2022-01-13
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
## 2022-01-13 PROBLEM — Z00.00 MEDICARE ANNUAL WELLNESS VISIT, SUBSEQUENT: Status: RESOLVED | Noted: 2021-10-15 | Resolved: 2022-01-13

## 2022-01-13 LAB
SL AMB  POCT GLUCOSE, UA: ABNORMAL
SL AMB LEUKOCYTE ESTERASE,UA: ABNORMAL
SL AMB POCT BILIRUBIN,UA: ABNORMAL
SL AMB POCT BLOOD,UA: ABNORMAL
SL AMB POCT CLARITY,UA: CLEAR
SL AMB POCT COLOR,UA: ABNORMAL
SL AMB POCT KETONES,UA: ABNORMAL
SL AMB POCT NITRITE,UA: ABNORMAL
SL AMB POCT PH,UA: 6
SL AMB POCT SPECIFIC GRAVITY,UA: 1.02
SL AMB POCT URINE PROTEIN: ABNORMAL
SL AMB POCT UROBILINOGEN: ABNORMAL

## 2022-01-13 PROCEDURE — 81003 URINALYSIS AUTO W/O SCOPE: CPT | Performed by: PHYSICIAN ASSISTANT

## 2022-01-13 PROCEDURE — 87086 URINE CULTURE/COLONY COUNT: CPT | Performed by: PHYSICIAN ASSISTANT

## 2022-01-13 PROCEDURE — 99213 OFFICE O/P EST LOW 20 MIN: CPT | Performed by: PHYSICIAN ASSISTANT

## 2022-01-13 RX ORDER — FLUCONAZOLE 150 MG/1
150 TABLET ORAL
Qty: 2 TABLET | Refills: 0 | Status: SHIPPED | OUTPATIENT
Start: 2022-01-13 | End: 2022-01-17

## 2022-01-13 NOTE — PROGRESS NOTES
Assessment/Plan:  - Reviewed yeast infection, start diflucan  - UA and urine culture ordered  - Mild cystocele, referral placed to urology  - Patient to call for concerns  - RTO PRN       Diagnoses and all orders for this visit:    [place] grade 1 cystocele  -     Ambulatory Referral to Urology; Future    Urge incontinence  -     Ambulatory Referral to Urology; Future    Acute vaginitis  -     fluconazole (DIFLUCAN) 150 mg tablet; Take 1 tablet (150 mg total) by mouth every third day for 2 doses          Subjective:      Patient ID: Eddi Ortiz is a 77 y o  female  Zach Khanna is a 72YO [id number] WF presenting to the office with complaints of achy vaginal pain x 3 weeks  She reports this discomfort as a pressure sensation  Patient states she mostly notices it when changing her pants or when lying down at night  It is intermittent in nature  Patient states she also reports a dryness and feeling the need to urinate more freuntly but does not end up urinating  She denies dysuria or external pain  She has had this a few times in the past and states it had previously resolved on its own  Patient reports she has had frequent yeast infections in the past due to her diabetes  She states this feels different this time  She denies difficulty urinating, but does report she will leak urine if she cannot get to the bathroom in time  Patient has a PMHX significant for endometrial carcinoma and hysterectomy with BSO in 2006  She also has a history of 3 vaginal deliveries  The following portions of the patient's history were reviewed and updated as appropriate: allergies, current medications, past family history, past medical history, past social history, past surgical history and problem list     Review of Systems   Constitutional: Negative for chills, fever and unexpected weight change  Respiratory: Negative for shortness of breath  Cardiovascular: Negative for chest pain     Gastrointestinal: Negative for abdominal pain, diarrhea, nausea and vomiting  Genitourinary: Positive for vaginal pain (achy)  Skin: Negative for rash  Objective:      /86 (BP Location: Right arm, Patient Position: Sitting, Cuff Size: Standard)   Ht 5' 6" (1 676 m)   Wt 107 kg (236 lb 12 8 oz)   BMI 38 22 kg/m²          Physical Exam  Vitals reviewed  Constitutional:       Appearance: Normal appearance  She is obese  HENT:      Head: Normocephalic and atraumatic  Pulmonary:      Effort: Pulmonary effort is normal    Genitourinary:     Pubic Area: No rash  Labia:         Right: No rash or lesion  Left: No rash or lesion  Comments: Mild thick white external discharge surrounding bilateral labia minora  Small cystocele protruding from anterior vaginal wall  Palpable on exam    Uterus and cervix surgically absent  Skin:     General: Skin is warm and dry  Neurological:      General: No focal deficit present  Mental Status: She is alert     Psychiatric:         Mood and Affect: Mood normal          Behavior: Behavior normal

## 2022-01-14 LAB — BACTERIA UR CULT: NORMAL

## 2022-03-03 ENCOUNTER — OFFICE VISIT (OUTPATIENT)
Dept: FAMILY MEDICINE CLINIC | Facility: CLINIC | Age: 77
End: 2022-03-03
Payer: MEDICARE

## 2022-03-03 VITALS
WEIGHT: 237 LBS | DIASTOLIC BLOOD PRESSURE: 86 MMHG | HEIGHT: 66 IN | BODY MASS INDEX: 38.09 KG/M2 | SYSTOLIC BLOOD PRESSURE: 136 MMHG

## 2022-03-03 DIAGNOSIS — T14.8XXA OPEN WOUND OF SKIN: ICD-10-CM

## 2022-03-03 DIAGNOSIS — Z12.31 ENCOUNTER FOR SCREENING MAMMOGRAM FOR MALIGNANT NEOPLASM OF BREAST: ICD-10-CM

## 2022-03-03 DIAGNOSIS — E11.42 DIABETIC POLYNEUROPATHY ASSOCIATED WITH TYPE 2 DIABETES MELLITUS (HCC): ICD-10-CM

## 2022-03-03 DIAGNOSIS — E66.01 SEVERE OBESITY (BMI 35.0-39.9) WITH COMORBIDITY (HCC): ICD-10-CM

## 2022-03-03 DIAGNOSIS — S80.811A ABRASION, RIGHT LOWER LEG, INITIAL ENCOUNTER: Primary | ICD-10-CM

## 2022-03-03 DIAGNOSIS — E04.2 MULTIPLE THYROID NODULES: ICD-10-CM

## 2022-03-03 PROBLEM — K62.5 RECTAL BLEEDING: Status: RESOLVED | Noted: 2019-09-10 | Resolved: 2022-03-03

## 2022-03-03 PROBLEM — M51.17 SCIATIC RADICULITIS: Status: RESOLVED | Noted: 2021-01-28 | Resolved: 2022-03-03

## 2022-03-03 PROCEDURE — 90715 TDAP VACCINE 7 YRS/> IM: CPT

## 2022-03-03 PROCEDURE — 90471 IMMUNIZATION ADMIN: CPT

## 2022-03-03 PROCEDURE — 99214 OFFICE O/P EST MOD 30 MIN: CPT | Performed by: FAMILY MEDICINE

## 2022-03-03 RX ORDER — DOXYCYCLINE HYCLATE 100 MG/1
100 CAPSULE ORAL EVERY 12 HOURS SCHEDULED
Qty: 20 CAPSULE | Refills: 0 | Status: SHIPPED | OUTPATIENT
Start: 2022-03-03 | End: 2022-03-13

## 2022-03-03 NOTE — ASSESSMENT & PLAN NOTE
The patient presents today with a right lower leg wound  It does not appear secondarily infected but she is going to need to be vigilant for evidence of same due to her history of type 2 diabetes which has been uncontrolled  She agrees with this plan    Lab Results   Component Value Date    HGBA1C 10 0 (A) 07/22/2021

## 2022-03-03 NOTE — ASSESSMENT & PLAN NOTE
The patient is a 78-year-old diabetic female who presents today with concerns over an abrasion of her right anterior lower extremity  The wound was cleaned and dressed with Neosporin and Telfa  There did not appear to be evidence of secondary infection presently  An Adacel was administered  We did give her a prescription for doxycycline should she develop any erythema, induration, lymphangitis streak or purulent drainage X cetera  In the meantime she is asked to wash with soap and water twice a day, applied Neosporin with a Telfa dressing  She has an appointment in the next several days for a diabetic med check  We will re-evaluated at that time  She is asked call sooner as needed  She agrees with this plan

## 2022-03-03 NOTE — PROGRESS NOTES
Assessment/Plan:  Diabetic polyneuropathy associated with type 2 diabetes mellitus ([place] [address] )    Lab Results   Component Value Date    HGBA1C 10 0 (A) 07/22/2021       Abrasion, right lower leg, initial encounter   The patient is a 70-year-old diabetic female who presents today with concerns over an abrasion of her right anterior lower extremity  The wound was cleaned and dressed with Neosporin and Telfa  There did not appear to be evidence of secondary infection presently  An Adacel was administered  We did give her a prescription for doxycycline should she develop any erythema, induration, lymphangitis streak or purulent drainage X cetera  In the meantime she is asked to wash with soap and water twice a day, applied Neosporin with a Telfa dressing  She has an appointment in the next several days for a diabetic med check  We will re-evaluated at that time  She is asked call sooner as needed  She agrees with this plan  Diabetes type 2, uncontrolled ([place] [address] )  The patient presents today with a right lower leg wound  It does not appear secondarily infected but she is going to need to be vigilant for evidence of same due to her history of type 2 diabetes which has been uncontrolled  She agrees with this plan  Lab Results   Component Value Date    HGBA1C 10 0 (A) 07/22/2021       Multiple thyroid nodules  Ultrasound in October of 2021 revealed no change in the character of her nodules  Severe obesity (BMI 35 0-39  9) with comorbidity ([place] [address] )  Continued efforts at improved diet and exercise for weight loss  Diagnoses and all orders for this visit:    Abrasion, right lower leg, initial encounter   -     TDAP VACCINE GREATER THAN OR EQUAL TO 6YO IM    Diabetic polyneuropathy associated with type 2 diabetes mellitus (HCC)    Severe obesity (BMI 35 0-39  9) with comorbidity ([facility] )    Encounter for screening mammogram for malignant neoplasm of breast  -     Mammo screening bilateral w 3d & cad; Future    Open wound of skin  -     doxycycline hyclate (VIBRAMYCIN) 100 mg capsule; Take 1 capsule (100 mg total) by mouth every 12 (twelve) hours for 10 days  -     TDAP VACCINE GREATER THAN OR EQUAL TO 8YO IM    Multiple thyroid nodules          Subjective:   Chief Complaint   Patient presents with    Abrasion right cox  Patient ID: Reta Lombard is a 77 y o  female  HPI  The patient is a 78-year-old female with a history of hypertension, type 2 diabetes as well as obesity who presents today with concerns over an abrasion of her right shin  This happened about 3 days ago  She was backing down her cellar steps slipped and scraped her right shin against the front of 1 of the steps  She notes that it has been giving her sensation of pulling since last evening  She has seen no drainage  No lymphangitis streaking  No fevers chills or constitutional symptoms  She has not had metformin for the past couple of weeks  She noted that she developed some abdominal pain and diarrhea with it  The following portions of the patient's history were reviewed and updated as appropriate: allergies, current medications, past family history, past medical history, past social history, past surgical history and problem list     ROS    Per the HPI  Objective:    Physical Exam  Constitutional:       Appearance: She is obese  She is not ill-appearing  Musculoskeletal:      Right lower leg: No edema  Left lower leg: No edema  Skin:     Comments: Patient has a circular abrasion over mid right anterior lower extremity/shin  It is approximately 2 5 cm in diameter  There is no surrounding induration  There is no lymphangitis streaking  There is no purulent discharge  There is some organization/crust superficially  This was debrided with saline solution and sterile 4 x 4  Healthy granulation tissue is apparent  Neosporin and a Telfa dressing was applied     Neurological:      Mental Status: She is alert and oriented to person, place, and time  Psychiatric:         Mood and Affect: Mood normal          Thought Content:  Thought content normal          Judgment: Judgment normal

## 2022-03-15 ENCOUNTER — OFFICE VISIT (OUTPATIENT)
Dept: UROLOGY | Facility: CLINIC | Age: 77
End: 2022-03-15
Payer: MEDICARE

## 2022-03-15 DIAGNOSIS — N39.41 URGE INCONTINENCE: ICD-10-CM

## 2022-03-15 DIAGNOSIS — N81.10 BADEN-WALKER GRADE 1 CYSTOCELE: ICD-10-CM

## 2022-03-15 LAB
POST-VOID RESIDUAL VOLUME, ML POC: 6 ML
SL AMB  POCT GLUCOSE, UA: NORMAL
SL AMB LEUKOCYTE ESTERASE,UA: NORMAL
SL AMB POCT BILIRUBIN,UA: NORMAL
SL AMB POCT BLOOD,UA: NORMAL
SL AMB POCT CLARITY,UA: CLEAR
SL AMB POCT COLOR,UA: YELLOW
SL AMB POCT KETONES,UA: NORMAL
SL AMB POCT NITRITE,UA: NORMAL
SL AMB POCT PH,UA: 7
SL AMB POCT SPECIFIC GRAVITY,UA: 1.01
SL AMB POCT URINE PROTEIN: NORMAL
SL AMB POCT UROBILINOGEN: NORMAL

## 2022-03-15 PROCEDURE — 99203 OFFICE O/P NEW LOW 30 MIN: CPT | Performed by: PHYSICIAN ASSISTANT

## 2022-03-15 PROCEDURE — 51798 US URINE CAPACITY MEASURE: CPT | Performed by: PHYSICIAN ASSISTANT

## 2022-03-15 PROCEDURE — 81002 URINALYSIS NONAUTO W/O SCOPE: CPT | Performed by: PHYSICIAN ASSISTANT

## 2022-03-16 ENCOUNTER — OFFICE VISIT (OUTPATIENT)
Dept: FAMILY MEDICINE CLINIC | Facility: CLINIC | Age: 77
End: 2022-03-16
Payer: MEDICARE

## 2022-03-16 VITALS
HEIGHT: 66 IN | DIASTOLIC BLOOD PRESSURE: 84 MMHG | SYSTOLIC BLOOD PRESSURE: 134 MMHG | WEIGHT: 235 LBS | BODY MASS INDEX: 37.77 KG/M2

## 2022-03-16 DIAGNOSIS — E11.65 UNCONTROLLED TYPE 2 DIABETES MELLITUS WITH HYPERGLYCEMIA (HCC): Primary | ICD-10-CM

## 2022-03-16 DIAGNOSIS — E11.42 DIABETIC POLYNEUROPATHY ASSOCIATED WITH TYPE 2 DIABETES MELLITUS (HCC): ICD-10-CM

## 2022-03-16 DIAGNOSIS — S80.811A ABRASION, RIGHT LOWER LEG, INITIAL ENCOUNTER: ICD-10-CM

## 2022-03-16 LAB — SL AMB POCT HEMOGLOBIN AIC: 11.3 (ref ?–6.5)

## 2022-03-16 PROCEDURE — 99214 OFFICE O/P EST MOD 30 MIN: CPT | Performed by: FAMILY MEDICINE

## 2022-03-16 PROCEDURE — 83036 HEMOGLOBIN GLYCOSYLATED A1C: CPT | Performed by: FAMILY MEDICINE

## 2022-03-16 NOTE — PATIENT INSTRUCTIONS
10% - bad control"> 10% - bad control,Hemoglobin A1c (HbA1c) greater than 10% indicating poor diabetic control,Haemoglobin A1c greater than 10% indicating poor diabetic control">   Diabetes Mellitus Type 2 in Adults, Ambulatory Care   GENERAL INFORMATION:   Diabetes mellitus type 2  is a disease that affects how your body uses glucose (sugar)  Insulin helps move sugar out of the blood so it can be used for energy  Normally, when the blood sugar level increases, the pancreas makes more insulin  Type 2 diabetes develops because either the body cannot make enough insulin, or it cannot use the insulin correctly  After many years, your pancreas may stop making insulin  Common symptoms include the following:   · More hunger or thirst than usual     · Frequent urination     · Weight loss without trying     · Blurred vision  Seek immediate care for the following symptoms:   · Severe abdominal pain, or pain that spreads to your back  You may also be vomiting  · Trouble staying awake or focusing    · Shaking or sweating    · Blurred or double vision    · Breath has a fruity, sweet smell    · Breathing is deep and labored, or rapid and shallow    · Heartbeat is fast and weak  Treatment for diabetes mellitus type 2  includes keeping your blood sugar at a normal level  You must eat the right foods, and exercise regularly  You may also need medicine if you cannot control your blood sugar level with nutrition and exercise  Manage diabetes mellitus type 2:   · Check your blood sugar level  You will be taught how to check a small drop of blood in a glucose monitor  Ask your healthcare provider when and how often to check during the day  Ask your healthcare provider what your blood sugar levels should be when you check them  · Keep track of carbohydrates (sugar and starchy foods)  Your blood sugar level can get too high if you eat too many carbohydrates   Your dietitian will help you plan meals and snacks that have the right amount of carbohydrates  · Eat low-fat foods  Some examples are skinless chicken and low-fat milk  · Eat less sodium (salt)  Some examples of high-sodium foods to limit are soy sauce, potato chips, and soup  Do not add salt to food you cook  Limit your use of table salt  · Eat high-fiber foods  Foods that are a good source of fiber include vegetables, whole grain bread, and beans  · Limit alcohol  Alcohol affects your blood sugar level and can make it harder to manage your diabetes  Women should limit alcohol to 1 drink a day  Men should limit alcohol to 2 drinks a day  A drink of alcohol is 12 ounces of beer, 5 ounces of wine, or 1½ ounces of liquor  · Get regular exercise  Exercise can help keep your blood sugar level steady, decrease your risk of heart disease, and help you lose weight  Exercise for at least 30 minutes, 5 days a week  Include muscle strengthening activities 2 days each week  Work with your healthcare provider to create an exercise plan  · Check your feet each day  for injuries or open sores  Ask your healthcare provider for activities you can do if you have an open sore  · Quit smoking  If you smoke, it is never too late to quit  Smoking can worsen the problems that may occur with diabetes  Ask your healthcare provider for information about how to stop smoking if you are having trouble quitting  · Ask about your weight:  Ask healthcare providers if you need to lose weight, and how much to lose  Ask them to help you with a weight loss program  Even a 10 to 15 pound weight loss can help you manage your blood sugar level  · Carry medical alert identification  Wear medical alert jewelry or carry a card that says you have diabetes  Ask your healthcare provider where to get these items  · Ask about vaccines  Diabetes puts you at risk of serious illness if you get the flu, pneumonia, or hepatitis   Ask your healthcare provider if you should get a flu, pneumonia, or hepatitis B vaccine, and when to get the vaccine  Follow up with your healthcare provider as directed:  Write down your questions so you remember to ask them during your visits  CARE AGREEMENT:   You have the right to help plan your care  Learn about your health condition and how it may be treated  Discuss treatment options with your caregivers to decide what care you want to receive  You always have the right to refuse treatment  The above information is an  only  It is not intended as medical advice for individual conditions or treatments  Talk to your doctor, nurse or pharmacist before following any medical regimen to see if it is safe and effective for you  © 2014 [address] is for End User's use only and may not be sold, redistributed or otherwise used for commercial purposes  All illustrations and images included in CareNotes® are the copyrighted property of A D A M [employer]  or Micah Alonzo

## 2022-03-16 NOTE — ASSESSMENT & PLAN NOTE
Appears to be healing albeit slowly today though  There is no drainage  No induration  There is an eschar

## 2022-03-16 NOTE — ASSESSMENT & PLAN NOTE
Her hemoglobin A1c control is worse today  We again had a long discussion of the potential for complications of her diabetes  She was concerned that her bowel changes were related to metformin  Discontinuance did not improve her symptoms  She agrees to resume metformin and we will repeat her hemoglobin A1c in 3 months  We did review that she had a colonoscopy about 2 years ago and is not due for repeat presently  She agrees with this plan    Lab Results   Component Value Date    HGBA1C 11 3 (A) 03/16/2022

## 2022-03-16 NOTE — PROGRESS NOTES
Assessment/Plan:  Abrasion, right lower leg, initial encounter   Appears to be healing albeit slowly today though  There is no drainage  No induration  There is an eschar  Diabetic polyneuropathy associated with type 2 diabetes mellitus (HCC)  Her hemoglobin A1c control is worse today  We again had a long discussion of the potential for complications of her diabetes  She was concerned that her bowel changes were related to metformin  Discontinuance did not improve her symptoms  She agrees to resume metformin and we will repeat her hemoglobin A1c in 3 months  We did review that she had a colonoscopy about 2 years ago and is not due for repeat presently  She agrees with this plan  Lab Results   Component Value Date    HGBA1C 11 3 (A) 03/16/2022          Diagnoses and all orders for this visit:    Uncontrolled type 2 diabetes mellitus with hyperglycemia ([facility] )  -     CBC  -     Comprehensive metabolic panel  -     Lipid panel  -     POCT hemoglobin A1c  -     Hemoglobin A1C; Future    Abrasion, right lower leg, initial encounter     Diabetic polyneuropathy associated with type 2 diabetes mellitus ([facility] )          Subjective:   Chief Complaint   Patient presents with    Follow-up     Med Check        Patient ID: Dima Stockton is a 69 y o  female  I normally feel constipated but every 10 days I have diarrhea, no heme, no pain  No bloating  I restarted exercise yesterday  Walked 2 miles  Yesterday  Polyuria and dipsia  HPI  The patient is a 52-year-old female who presents today for routine follow-up of diabetes  She notes that she has taken no metformin for at least a month now  She had some changes in her bowels including diarrhea at times which she attributed to the metformin  She states that since discontinuing the metformin she has seen no improvement in her bowels  Typically she feels constipated for several days and she has diarrhea    There is no blood in her stools and no abdominal pain   There is no purulence of her stools  She has no bloating  She had been noncompliant with diet exercise  Just recently she started walking again, 2 mild yesterday with her sister  She does admit to polyuria and polydipsia but no polyphagia  She denies any dysesthesias of the soles of her feet or toes  She does have some discomfort of her left lateral lower extremity  No calf tenderness swelling chest pain shortness of breath X cetera  BMI Counseling: Body mass index is 37 93 kg/m²  The BMI is above normal  Nutrition recommendations include decreasing portion sizes, encouraging healthy choices of fruits and vegetables, consuming healthier snacks, limiting drinks that contain sugar and moderation in carbohydrate intake  Exercise recommendations include moderate physical activity 150 minutes/week and exercising 3-5 times per week  No pharmacotherapy was ordered  Rationale for BMI follow-up plan is due to patient being overweight or obese  Adhere to ADA diet        The following portions of the patient's history were reviewed and updated as appropriate: allergies, current medications, past family history, past medical history, past social history, past surgical history and problem list     ROS    Per the HPI  Objective:    Physical Exam  Vitals and nursing note reviewed  Constitutional:       Appearance: She is obese  She is not ill-appearing  Musculoskeletal:         General: No tenderness  Right lower leg: No edema  Left lower leg: No edema  Comments: No calf tenderness, Homans sign or cord  No sensory deficit noted  Neurological:      Mental Status: She is alert and oriented to person, place, and time  Psychiatric:         Mood and Affect: Mood normal          Thought Content:  Thought content normal          Judgment: Judgment normal

## 2022-03-17 LAB
ALBUMIN SERPL-MCNC: 4.1 G/DL (ref 3.6–5.1)
ALBUMIN/GLOB SERPL: 1.5 (CALC) (ref 1–2.5)
ALP SERPL-CCNC: 84 U/L (ref 37–153)
ALT SERPL-CCNC: 13 U/L (ref 6–29)
AST SERPL-CCNC: 12 U/L (ref 10–35)
BILIRUB SERPL-MCNC: 0.5 MG/DL (ref 0.2–1.2)
BUN SERPL-MCNC: 12 MG/DL (ref 7–25)
BUN/CREAT SERPL: ABNORMAL (CALC) (ref 6–22)
CALCIUM SERPL-MCNC: 9.5 MG/DL (ref 8.6–10.4)
CHLORIDE SERPL-SCNC: 101 MMOL/L (ref 98–110)
CHOLEST SERPL-MCNC: 240 MG/DL
CHOLEST/HDLC SERPL: 4.6 (CALC)
CO2 SERPL-SCNC: 27 MMOL/L (ref 20–32)
CREAT SERPL-MCNC: 0.67 MG/DL (ref 0.6–0.93)
ERYTHROCYTE [DISTWIDTH] IN BLOOD BY AUTOMATED COUNT: 13.1 % (ref 11–15)
GLOBULIN SER CALC-MCNC: 2.8 G/DL (CALC) (ref 1.9–3.7)
GLUCOSE SERPL-MCNC: 287 MG/DL (ref 65–99)
HCT VFR BLD AUTO: 45 % (ref 35–45)
HDLC SERPL-MCNC: 52 MG/DL
HGB BLD-MCNC: 15.2 G/DL (ref 11.7–15.5)
LDLC SERPL CALC-MCNC: 161 MG/DL (CALC)
MCH RBC QN AUTO: 28.5 PG (ref 27–33)
MCHC RBC AUTO-ENTMCNC: 33.8 G/DL (ref 32–36)
MCV RBC AUTO: 84.4 FL (ref 80–100)
NONHDLC SERPL-MCNC: 188 MG/DL (CALC)
PLATELET # BLD AUTO: 276 THOUSAND/UL (ref 140–400)
PMV BLD REES-ECKER: 10.7 FL (ref 7.5–12.5)
POTASSIUM SERPL-SCNC: 4.5 MMOL/L (ref 3.5–5.3)
PROT SERPL-MCNC: 6.9 G/DL (ref 6.1–8.1)
RBC # BLD AUTO: 5.33 MILLION/UL (ref 3.8–5.1)
SL AMB EGFR AFRICAN AMERICAN: 99 ML/MIN/1.73M2
SL AMB EGFR NON AFRICAN AMERICAN: 85 ML/MIN/1.73M2
SODIUM SERPL-SCNC: 137 MMOL/L (ref 135–146)
TRIGL SERPL-MCNC: 143 MG/DL
WBC # BLD AUTO: 6.1 THOUSAND/UL (ref 3.8–10.8)

## 2022-04-15 ENCOUNTER — IMMUNIZATIONS (OUTPATIENT)
Dept: FAMILY MEDICINE CLINIC | Facility: HOSPITAL | Age: 77
End: 2022-04-15

## 2022-04-15 DIAGNOSIS — Z23 ENCOUNTER FOR IMMUNIZATION: Primary | ICD-10-CM

## 2022-04-15 PROCEDURE — 0064A COVID-19 MODERNA VACC 0.25 ML BOOSTER: CPT

## 2022-04-15 PROCEDURE — 91306 COVID-19 MODERNA VACC 0.25 ML BOOSTER: CPT

## 2022-04-18 ENCOUNTER — HOSPITAL ENCOUNTER (OUTPATIENT)
Dept: RADIOLOGY | Facility: HOSPITAL | Age: 77
Discharge: HOME | End: 2022-04-18
Payer: MEDICARE

## 2022-04-18 VITALS — BODY MASS INDEX: 36.96 KG/M2 | WEIGHT: 230 LBS | HEIGHT: 66 IN

## 2022-04-18 DIAGNOSIS — Z12.31 ENCOUNTER FOR SCREENING MAMMOGRAM FOR MALIGNANT NEOPLASM OF BREAST: ICD-10-CM

## 2022-04-18 PROCEDURE — 77063 BREAST TOMOSYNTHESIS BI: CPT

## 2022-04-18 PROCEDURE — 77067 SCR MAMMO BI INCL CAD: CPT

## 2022-05-24 ENCOUNTER — HOSPITAL ENCOUNTER (OUTPATIENT)
Dept: RADIOLOGY | Facility: HOSPITAL | Age: 77
Discharge: HOME | End: 2022-05-24
Payer: MEDICARE

## 2022-05-24 ENCOUNTER — APPOINTMENT (EMERGENCY)
Dept: RADIOLOGY | Facility: HOSPITAL | Age: 77
End: 2022-05-24
Payer: MEDICARE

## 2022-05-24 ENCOUNTER — APPOINTMENT (OUTPATIENT)
Dept: RADIOLOGY | Facility: HOSPITAL | Age: 77
End: 2022-05-24
Payer: MEDICARE

## 2022-05-24 ENCOUNTER — HOSPITAL ENCOUNTER (OUTPATIENT)
Facility: HOSPITAL | Age: 77
Setting detail: OBSERVATION
Discharge: HOME | End: 2022-05-25
Attending: EMERGENCY MEDICINE | Admitting: INTERNAL MEDICINE
Payer: MEDICARE

## 2022-05-24 DIAGNOSIS — E11.65 UNCONTROLLED TYPE 2 DIABETES MELLITUS WITH HYPERGLYCEMIA (HCC): ICD-10-CM

## 2022-05-24 DIAGNOSIS — E11.9 DIABETES (HCC): ICD-10-CM

## 2022-05-24 DIAGNOSIS — R11.2 NAUSEA AND VOMITING: Primary | ICD-10-CM

## 2022-05-24 DIAGNOSIS — R27.0 ATAXIA: ICD-10-CM

## 2022-05-24 DIAGNOSIS — R91.1 PULMONARY NODULE, LEFT: ICD-10-CM

## 2022-05-24 DIAGNOSIS — R07.9 CHEST PAIN: ICD-10-CM

## 2022-05-24 DIAGNOSIS — R73.9 HYPERGLYCEMIA: ICD-10-CM

## 2022-05-24 DIAGNOSIS — E78.2 HYPERLIPIDEMIA, MIXED: ICD-10-CM

## 2022-05-24 DIAGNOSIS — R42 DIZZINESS: ICD-10-CM

## 2022-05-24 LAB
2HR DELTA HS TROPONIN: 4 NG/L
4HR DELTA HS TROPONIN: 3 NG/L
ALBUMIN SERPL BCP-MCNC: 3.5 G/DL (ref 3.5–5)
ALP SERPL-CCNC: 105 U/L (ref 46–116)
ALT SERPL W P-5'-P-CCNC: 26 U/L (ref 12–78)
ANION GAP SERPL CALCULATED.3IONS-SCNC: 7 MMOL/L (ref 4–13)
AST SERPL W P-5'-P-CCNC: 14 U/L (ref 5–45)
BACTERIA UR QL AUTO: NORMAL /HPF
BASOPHILS # BLD AUTO: 0.03 THOUSANDS/ΜL (ref 0–0.1)
BASOPHILS NFR BLD AUTO: 0 % (ref 0–1)
BILIRUB SERPL-MCNC: 0.53 MG/DL (ref 0.2–1)
BILIRUB UR QL STRIP: NEGATIVE
BUN SERPL-MCNC: 13 MG/DL (ref 5–25)
CALCIUM SERPL-MCNC: 9.2 MG/DL (ref 8.3–10.1)
CARDIAC TROPONIN I PNL SERPL HS: 5 NG/L
CARDIAC TROPONIN I PNL SERPL HS: 8 NG/L
CARDIAC TROPONIN I PNL SERPL HS: 9 NG/L
CHLORIDE SERPL-SCNC: 99 MMOL/L (ref 100–108)
CLARITY UR: CLEAR
CO2 SERPL-SCNC: 28 MMOL/L (ref 21–32)
COLOR UR: ABNORMAL
CREAT SERPL-MCNC: 0.76 MG/DL (ref 0.6–1.3)
EOSINOPHIL # BLD AUTO: 0.11 THOUSAND/ΜL (ref 0–0.61)
EOSINOPHIL NFR BLD AUTO: 2 % (ref 0–6)
ERYTHROCYTE [DISTWIDTH] IN BLOOD BY AUTOMATED COUNT: 12.1 % (ref 11.6–15.1)
FLUAV RNA RESP QL NAA+PROBE: NEGATIVE
FLUBV RNA RESP QL NAA+PROBE: NEGATIVE
GFR SERPL CREATININE-BSD FRML MDRD: 76 ML/MIN/1.73SQ M
GLUCOSE SERPL-MCNC: 258 MG/DL (ref 65–140)
GLUCOSE SERPL-MCNC: 270 MG/DL (ref 65–140)
GLUCOSE SERPL-MCNC: 277 MG/DL (ref 65–140)
GLUCOSE SERPL-MCNC: 367 MG/DL (ref 65–140)
GLUCOSE UR STRIP-MCNC: ABNORMAL MG/DL
HCT VFR BLD AUTO: 46.8 % (ref 34.8–46.1)
HGB BLD-MCNC: 15.6 G/DL (ref 11.5–15.4)
HGB UR QL STRIP.AUTO: NEGATIVE
IMM GRANULOCYTES # BLD AUTO: 0.03 THOUSAND/UL (ref 0–0.2)
IMM GRANULOCYTES NFR BLD AUTO: 0 % (ref 0–2)
KETONES UR STRIP-MCNC: ABNORMAL MG/DL
LEUKOCYTE ESTERASE UR QL STRIP: ABNORMAL
LIPASE SERPL-CCNC: 76 U/L (ref 73–393)
LYMPHOCYTES # BLD AUTO: 1.18 THOUSANDS/ΜL (ref 0.6–4.47)
LYMPHOCYTES NFR BLD AUTO: 17 % (ref 14–44)
MAGNESIUM SERPL-MCNC: 1.9 MG/DL (ref 1.6–2.6)
MCH RBC QN AUTO: 27.6 PG (ref 26.8–34.3)
MCHC RBC AUTO-ENTMCNC: 33.3 G/DL (ref 31.4–37.4)
MCV RBC AUTO: 83 FL (ref 82–98)
MONOCYTES # BLD AUTO: 0.36 THOUSAND/ΜL (ref 0.17–1.22)
MONOCYTES NFR BLD AUTO: 5 % (ref 4–12)
NEUTROPHILS # BLD AUTO: 5.31 THOUSANDS/ΜL (ref 1.85–7.62)
NEUTS SEG NFR BLD AUTO: 76 % (ref 43–75)
NITRITE UR QL STRIP: NEGATIVE
NON-SQ EPI CELLS URNS QL MICRO: NORMAL /HPF
NRBC BLD AUTO-RTO: 0 /100 WBCS
PH UR STRIP.AUTO: 6.5 [PH]
PLATELET # BLD AUTO: 282 THOUSANDS/UL (ref 149–390)
PMV BLD AUTO: 10.2 FL (ref 8.9–12.7)
POTASSIUM SERPL-SCNC: 3.9 MMOL/L (ref 3.5–5.3)
PROT SERPL-MCNC: 7.4 G/DL (ref 6.4–8.2)
PROT UR STRIP-MCNC: NEGATIVE MG/DL
RBC # BLD AUTO: 5.65 MILLION/UL (ref 3.81–5.12)
RBC #/AREA URNS AUTO: NORMAL /HPF
RSV RNA RESP QL NAA+PROBE: NEGATIVE
SARS-COV-2 RNA RESP QL NAA+PROBE: NEGATIVE
SODIUM SERPL-SCNC: 134 MMOL/L (ref 136–145)
SP GR UR STRIP.AUTO: 1.01 (ref 1–1.03)
UROBILINOGEN UR QL STRIP.AUTO: 0.2 E.U./DL
WBC # BLD AUTO: 7.02 THOUSAND/UL (ref 4.31–10.16)
WBC #/AREA URNS AUTO: NORMAL /HPF

## 2022-05-24 PROCEDURE — 82948 REAGENT STRIP/BLOOD GLUCOSE: CPT

## 2022-05-24 PROCEDURE — 70551 MRI BRAIN STEM W/O DYE: CPT

## 2022-05-24 PROCEDURE — G1004 CDSM NDSC: HCPCS

## 2022-05-24 PROCEDURE — 71045 X-RAY EXAM CHEST 1 VIEW: CPT

## 2022-05-24 PROCEDURE — 85025 COMPLETE CBC W/AUTO DIFF WBC: CPT | Performed by: EMERGENCY MEDICINE

## 2022-05-24 PROCEDURE — 99285 EMERGENCY DEPT VISIT HI MDM: CPT | Performed by: EMERGENCY MEDICINE

## 2022-05-24 PROCEDURE — 36415 COLL VENOUS BLD VENIPUNCTURE: CPT | Performed by: EMERGENCY MEDICINE

## 2022-05-24 PROCEDURE — 81001 URINALYSIS AUTO W/SCOPE: CPT | Performed by: EMERGENCY MEDICINE

## 2022-05-24 PROCEDURE — 80053 COMPREHEN METABOLIC PANEL: CPT | Performed by: EMERGENCY MEDICINE

## 2022-05-24 PROCEDURE — 70498 CT ANGIOGRAPHY NECK: CPT

## 2022-05-24 PROCEDURE — 0241U HB NFCT DS VIR RESP RNA 4 TRGT: CPT | Performed by: EMERGENCY MEDICINE

## 2022-05-24 PROCEDURE — 83735 ASSAY OF MAGNESIUM: CPT | Performed by: EMERGENCY MEDICINE

## 2022-05-24 PROCEDURE — 84484 ASSAY OF TROPONIN QUANT: CPT | Performed by: EMERGENCY MEDICINE

## 2022-05-24 PROCEDURE — 70496 CT ANGIOGRAPHY HEAD: CPT

## 2022-05-24 PROCEDURE — 99285 EMERGENCY DEPT VISIT HI MDM: CPT

## 2022-05-24 PROCEDURE — 99220 PR INITIAL OBSERVATION CARE/DAY 70 MINUTES: CPT | Performed by: FAMILY MEDICINE

## 2022-05-24 PROCEDURE — 83690 ASSAY OF LIPASE: CPT | Performed by: EMERGENCY MEDICINE

## 2022-05-24 PROCEDURE — 96360 HYDRATION IV INFUSION INIT: CPT

## 2022-05-24 PROCEDURE — 93005 ELECTROCARDIOGRAM TRACING: CPT

## 2022-05-24 RX ORDER — ENOXAPARIN SODIUM 100 MG/ML
40 INJECTION SUBCUTANEOUS DAILY
Status: DISCONTINUED | OUTPATIENT
Start: 2022-05-24 | End: 2022-05-25 | Stop reason: HOSPADM

## 2022-05-24 RX ORDER — INSULIN LISPRO 100 [IU]/ML
1-6 INJECTION, SOLUTION INTRAVENOUS; SUBCUTANEOUS
Status: DISCONTINUED | OUTPATIENT
Start: 2022-05-24 | End: 2022-05-25 | Stop reason: HOSPADM

## 2022-05-24 RX ORDER — ACETAMINOPHEN 325 MG/1
650 TABLET ORAL ONCE
Status: COMPLETED | OUTPATIENT
Start: 2022-05-24 | End: 2022-05-24

## 2022-05-24 RX ORDER — ONDANSETRON 2 MG/ML
INJECTION INTRAMUSCULAR; INTRAVENOUS
Status: DISPENSED
Start: 2022-05-24 | End: 2022-05-24

## 2022-05-24 RX ORDER — SODIUM CHLORIDE, SODIUM LACTATE, POTASSIUM CHLORIDE, CALCIUM CHLORIDE 600; 310; 30; 20 MG/100ML; MG/100ML; MG/100ML; MG/100ML
75 INJECTION, SOLUTION INTRAVENOUS CONTINUOUS
Status: DISCONTINUED | OUTPATIENT
Start: 2022-05-24 | End: 2022-05-25 | Stop reason: HOSPADM

## 2022-05-24 RX ORDER — MECLIZINE HYDROCHLORIDE 25 MG/1
50 TABLET ORAL ONCE
Status: COMPLETED | OUTPATIENT
Start: 2022-05-24 | End: 2022-05-24

## 2022-05-24 RX ORDER — INSULIN LISPRO 100 [IU]/ML
1-6 INJECTION, SOLUTION INTRAVENOUS; SUBCUTANEOUS
Status: DISCONTINUED | OUTPATIENT
Start: 2022-05-25 | End: 2022-05-25 | Stop reason: HOSPADM

## 2022-05-24 RX ORDER — MECLIZINE HYDROCHLORIDE 25 MG/1
25 TABLET ORAL EVERY 8 HOURS
Status: DISCONTINUED | OUTPATIENT
Start: 2022-05-24 | End: 2022-05-25 | Stop reason: HOSPADM

## 2022-05-24 RX ORDER — ATORVASTATIN CALCIUM 40 MG/1
40 TABLET, FILM COATED ORAL EVERY EVENING
Status: DISCONTINUED | OUTPATIENT
Start: 2022-05-24 | End: 2022-05-25 | Stop reason: HOSPADM

## 2022-05-24 RX ORDER — ASPIRIN 81 MG/1
81 TABLET, CHEWABLE ORAL DAILY
Status: DISCONTINUED | OUTPATIENT
Start: 2022-05-24 | End: 2022-05-25 | Stop reason: HOSPADM

## 2022-05-24 RX ADMIN — ASPIRIN 81 MG CHEWABLE TABLET 81 MG: 81 TABLET CHEWABLE at 16:45

## 2022-05-24 RX ADMIN — MECLIZINE HYDROCHLORIDE 25 MG: 25 TABLET ORAL at 22:17

## 2022-05-24 RX ADMIN — ACETAMINOPHEN 650 MG: 325 TABLET ORAL at 11:31

## 2022-05-24 RX ADMIN — ATORVASTATIN CALCIUM 40 MG: 40 TABLET, FILM COATED ORAL at 16:44

## 2022-05-24 RX ADMIN — SODIUM CHLORIDE, SODIUM LACTATE, POTASSIUM CHLORIDE, AND CALCIUM CHLORIDE 75 ML/HR: .6; .31; .03; .02 INJECTION, SOLUTION INTRAVENOUS at 16:46

## 2022-05-24 RX ADMIN — IOHEXOL 65 ML: 350 INJECTION, SOLUTION INTRAVENOUS at 11:45

## 2022-05-24 RX ADMIN — SODIUM CHLORIDE 500 ML: 0.9 INJECTION, SOLUTION INTRAVENOUS at 11:32

## 2022-05-24 RX ADMIN — ENOXAPARIN SODIUM 40 MG: 40 INJECTION SUBCUTANEOUS at 16:45

## 2022-05-24 RX ADMIN — MECLIZINE HYDROCHLORIDE 50 MG: 25 TABLET ORAL at 14:45

## 2022-05-24 NOTE — ASSESSMENT & PLAN NOTE
Lab Results   Component Value Date    HGBA1C 11 3 (A) 03/16/2022       Recent Labs     05/24/22  1207   POCGLU 270*       Blood Sugar Average: Last 72 hrs:  (P) 270   Hold metformin  Accuchecks AC/HS with insulin sliding scale

## 2022-05-24 NOTE — ED NOTES
Pt ambulated around unit and to bathroom per provider Theodore request  Pt HR 99 SpO2 95%  Pt denies nausea or dizziness but states that she feels unsteady  Will update provider and continue to monitor        Bisi Peng RN  05/24/22 6247

## 2022-05-24 NOTE — H&P
Sarah 46  H&P- Tesfaye Guillermo 1945, 69 y o  female MRN: [id number]  Unit/Bed#: ED 09 Encounter: [id number]  Primary Care Provider: Hemant Floyd MD   Date and time admitted to hospital: 5/24/2022 10:45 AM    * Dizziness  Assessment & Plan  Patient presented to the ED with complaints of dizziness  She states for the past month she has noticed that if she moves abruptly she feels dizzy like the room is spinning  For the past several days she has had URI symptoms with nasal congestion  This morning when she woke up she felt very dizzy and had to hold on to things to walk around  She had nausea with no vomiting  In the ER, CT of the head and CTA head and neck had no acute findings  EKG revealed NSR with first degree AV block  Labs are unremarkable  She feels better after fluids and meclizine, dizziness and nausea has resolved  She still feels slightly "unsteady" when ambulating  · Admit to telemetry  · Start aspirin and statin  · Neuro checks q 4 hours  · Lipid panel, Hga1c in AM  · MRI in AM  · Meclizine     Severe obesity (BMI 35 0-39  9) with comorbidity ([address] )  Assessment & Plan  Dietary and lifestyle modifications     Type 2 diabetes mellitus, without long-term current use of insulin [facility])  Assessment & Plan  Lab Results   Component Value Date    HGBA1C 11 3 (A) 03/16/2022       Recent Labs     05/24/22  1207   POCGLU 270*       Blood Sugar Average: Last 72 hrs:  (P) 270   Hold metformin  Accuchecks AC/HS with insulin sliding scale    VTE Prophylaxis: Heparin  / sequential compression device   Code Status: No Order    Anticipated Length of Stay:  Patient will be admitted on an Observation basis with an anticipated length of stay of less than 2 midnights  Justification for Hospital Stay: dizziness    Total Time for Visit, including Counseling / Coordination of Care: 20 minutes    Greater than 50% of this total time spent on direct patient counseling and coordination of care  Chief Complaint:   Nausea (Nausea and vomiting at home - (pt triaged at 9443, see down time paper work))      History of Present Illness:    Tona Guillermo is a 69 y o  female with a PMH of diabetes, endometrial cancer who presents with dizziness  She states for the past month she has noticed that if she moves abruptly she feels dizzy like the room is spinning  For the past several days she has had URI symptoms with nasal congestion  This morning when she woke up she felt very dizzy and had to hold on to things to walk around  She had nausea with no vomiting  In the ER, CT of the head and CTA head and neck had no acute findings  EKG revealed NSR with first degree AV block  Labs are unremarkable  She feels better after fluids and meclizine, dizziness and nausea has resolved  She still feels slightly "unsteady" when ambulating  Review of Systems:    Review of Systems   Constitutional: Negative for chills and fever  HENT: Negative for ear pain and sore throat  Eyes: Negative for pain and visual disturbance  Respiratory: Negative for cough and shortness of breath  Cardiovascular: Negative for chest pain and palpitations  Gastrointestinal: Positive for nausea  Negative for abdominal pain and vomiting  Genitourinary: Negative for dysuria and hematuria  Musculoskeletal: Negative for arthralgias and back pain  Skin: Negative for color change and rash  Neurological: Positive for dizziness  Negative for seizures, syncope and headaches  All other systems reviewed and are negative        Past Medical and Surgical History:     Past Medical History:   Diagnosis Date    Diabetes mellitus [facility] )     Endometrial carcinoma ([facility] )     Hypertension     Rectal bleeding 9/10/2019    Sciatic radiculitis 1/28/2021    Varicella        Past Surgical History:   Procedure Laterality Date    HYSTERECTOMY  2006    TONSILLECTOMY  1962    TUBAL LIGATION  1992    US GUIDED THYROID BIOPSY  12/12/2019       Meds/Allergies:    Prior to Admission medications    Medication Sig Start Date End Date Taking? Authorizing Provider   Blood Glucose Monitoring Suppl (ONE TOUCH ULTRA 2) w/Device KIT TEST BLOOD SUGAR ONCE DAILY OR AS NEEDED (E11 65) 5/17/21   Historical Provider, MD   metFORMIN (GLUCOPHAGE-XR) 500 mg 24 hr tablet Take 2 tablets (1,000 mg total) by mouth 2 (two) times a day with meals 10/15/21 11/14/21  Deitra Mortimer, MD   OneTouch Ultra test strip TEST BLOOD SUGAR DAILY, OR AS NEEDED (E11 65) 8/16/21   Deitra Mortimer, MD       Allergies: Allergies   Allergen Reactions    Gabapentin Hives       Social History:     Marital Status: /Civil Union   Substance Use History:   Social History     Substance and Sexual Activity   Alcohol Use Never     Social History     Tobacco Use   Smoking Status Never Smoker   Smokeless Tobacco Never Used     Social History     Substance and Sexual Activity   Drug Use Never       Family History:    Family History   Problem Relation Age of Onset   Clary Drop Stroke Mother     Diabetes Mother     Cancer Mother     Lung cancer Father     Breast cancer Sister 66    COPD Brother     Coronary artery disease Brother     Heart failure Brother     Leukemia Son     HIV Brother     Cancer Family        Physical Exam:     Vitals:   Blood Pressure: 138/67 (Simultaneous filing  User may not have seen previous data ) (05/24/22 1515)  Pulse: 72 (Simultaneous filing  User may not have seen previous data ) (05/24/22 1515)  Temperature: (!) 96 8 °F (36 °C) (05/24/22 1515)  Temp Source: Tympanic (05/24/22 1515)  Respirations: 18 (05/24/22 1336)  SpO2: 93 % (Simultaneous filing  User may not have seen previous data ) (05/24/22 1515)    Physical Exam  Vitals and nursing note reviewed  Constitutional:       Appearance: Normal appearance  HENT:      Head: Normocephalic  Nose: Nose normal    Eyes:      Extraocular Movements: Extraocular movements intact     Cardiovascular: Rate and Rhythm: Normal rate and regular rhythm  Pulses: Normal pulses  Pulmonary:      Effort: Pulmonary effort is normal       Breath sounds: Normal breath sounds  Abdominal:      General: Abdomen is flat  Bowel sounds are normal  There is no distension  Palpations: Abdomen is soft  Tenderness: There is no abdominal tenderness  Musculoskeletal:         General: Normal range of motion  Skin:     General: Skin is warm and dry  Neurological:      General: No focal deficit present  Mental Status: She is alert and oriented to person, place, and time  Psychiatric:         Mood and Affect: Mood normal          Behavior: Behavior normal            Additional Data:     Lab Results: I have personally reviewed pertinent reports  Results from last 7 days   Lab Units 05/24/22  0901   WBC Thousand/uL 7 02   HEMOGLOBIN g/dL 15 6*   HEMATOCRIT % 46 8*   PLATELETS Thousands/uL 282   NEUTROS PCT % 76*     Results from last 7 days   Lab Units 05/24/22  0901   SODIUM mmol/L 134*   POTASSIUM mmol/L 3 9   CHLORIDE mmol/L 99*   CO2 mmol/L 28   BUN mg/dL 13   CREATININE mg/dL 0 76   CALCIUM mg/dL 9 2   TOTAL BILIRUBIN mg/dL 0 53   ALK PHOS U/L 105   ALT U/L 26   AST U/L 14             Results from last 7 days   Lab Units 05/24/22  1207   POC GLUCOSE mg/dl 270*           Imaging: I have personally reviewed pertinent reports  CTA head and neck with and without contrast   Final Result by Mikey Liu DO (05/24 9357)      CT brain: No acute intracranial abnormality  CT angiography: Minor noncalcified atherosclerotic disease involving the left common carotid artery  Normal bifurcation and cervical internal carotid arteries  Normal intracranial vasculature  6 mm pulmonary nodule within the anterior medial left upper lobe  No prior studies for comparison   Based on current Fleischner Society 2017 Guidelines on incidental pulmonary nodule, followup non-contrast CT is recommended at 6-12 months from the initial    examination and, if stable at that time, an additional followup is recommended for 18-24 months from the initial examination  This examination was marked "immediate notification" in Epic in order to begin the standard process by which the radiology reading room liaison alerts the referring practitioner  Workstation performed: [id number]             CTA head and neck with and without contrast   Final Result      CT brain: No acute intracranial abnormality  CT angiography: Minor noncalcified atherosclerotic disease involving the left common carotid artery  Normal bifurcation and cervical internal carotid arteries  Normal intracranial vasculature  6 mm pulmonary nodule within the anterior medial left upper lobe  No prior studies for comparison  Based on current Fleischner Society 2017 Guidelines on incidental pulmonary nodule, followup non-contrast CT is recommended at 6-12 months from the initial    examination and, if stable at that time, an additional followup is recommended for 18-24 months from the initial examination  This examination was marked "immediate notification" in Epic in order to begin the standard process by which the radiology reading room liaison alerts the referring practitioner  Workstation performed: [id number]             EKG, Pathology, and Other Studies Reviewed on Admission:   · EKG: NSR with first degree AV block    [employer] / Epic Records Reviewed: Yes     ** Please Note: This note has been constructed using a voice recognition system   **

## 2022-05-24 NOTE — ED NOTES
Ambulated to BR, patient c/o of feeling "unsteady", no dizziness present at this time per pt  Patient report headache 7/10   Urine collected and sent to lab       Shahab Adrian RN  05/24/22 1562

## 2022-05-24 NOTE — ED PROVIDER NOTES
History  Chief Complaint   Patient presents with    Nausea     Nausea and vomiting at home - (pt triaged at [id number], see down time paper work)     67-year-old female with past history of obesity, diabetes, hypertension, endometrial carcinoma, presents to the ED for evaluation of acute onset nausea, vomiting, dizziness since she woke up at 6:00 a m  This morning  Patient states that she went to bed her normal self at 8:00 p m  Last night  Stroke alert was not activated as patient is not a tPA candidate  Patient denies any fever wash chills  Patient denies any sick contacts  Patient is vaccinated against COVID-19  History provided by:  Patient  Nausea  The primary symptoms include nausea and vomiting  Primary symptoms do not include fever, abdominal pain, diarrhea, dysuria, arthralgias or rash  The illness does not include chills, constipation or back pain  Prior to Admission Medications   Prescriptions Last Dose Informant Patient Reported? Taking?    Blood Glucose Monitoring Suppl (ONE TOUCH ULTRA 2) w/Device KIT   Yes No   Sig: TEST BLOOD SUGAR ONCE DAILY OR AS NEEDED (E11 65)   OneTouch Ultra test strip   No No   Sig: TEST BLOOD SUGAR DAILY, OR AS NEEDED (E11 65)   metFORMIN (GLUCOPHAGE-XR) 500 mg 24 hr tablet   No No   Sig: Take 2 tablets (1,000 mg total) by mouth 2 (two) times a day with meals      Facility-Administered Medications: None       Past Medical History:   Diagnosis Date    [facility] [address] [facility]     Endometrial carcinoma [facility] [address] [facility]     Hypertension     Rectal bleeding 9/10/2019    Sciatic radiculitis 1/28/2021    Varicella        Past Surgical History:   Procedure Laterality Date    HYSTERECTOMY  2006    TONSILLECTOMY  1962    TUBAL LIGATION  1992    US GUIDED THYROID BIOPSY  12/12/2019       Family History   Problem Relation Age of Onset    Stroke Mother     Diabetes Mother     Cancer Mother     Lung cancer Father     Breast cancer Sister 66    COPD Brother     Coronary artery disease Brother     Heart failure Brother     Leukemia Son     HIV Brother     Cancer Family      I have reviewed and agree with the history as documented  E-Cigarette/Vaping    E-Cigarette Use Never User      E-Cigarette/Vaping Substances    Nicotine No     THC No     CBD No     Flavoring No     Other No     Unknown No      Social History     Tobacco Use    Smoking status: Never Smoker    Smokeless tobacco: Never Used   Vaping Use    Vaping Use: Never used   Substance Use Topics    Alcohol use: Never    Drug use: Never       Review of Systems   Constitutional: Negative for activity change, appetite change, chills and fever  HENT: Negative for congestion and ear pain  Eyes: Negative for pain and discharge  Respiratory: Negative for cough, chest tightness, shortness of breath, wheezing and stridor  Cardiovascular: Negative for chest pain and palpitations  Gastrointestinal: Positive for nausea and vomiting  Negative for abdominal distention, abdominal pain, constipation and diarrhea  Endocrine: Negative for cold intolerance  Genitourinary: Negative for dysuria, frequency and urgency  Musculoskeletal: Negative for arthralgias and back pain  Skin: Negative for color change and rash  Allergic/Immunologic: Negative for environmental allergies and food allergies  Neurological: Negative for dizziness, weakness, numbness and headaches  Hematological: Negative for adenopathy  Psychiatric/Behavioral: Negative for agitation, behavioral problems and confusion  The patient is not nervous/anxious  All other systems reviewed and are negative  Physical Exam  Physical Exam  Vitals and nursing note reviewed  Constitutional:       Appearance: She is well-developed  HENT:      Head: Normocephalic and atraumatic  Eyes:      Conjunctiva/sclera: Conjunctivae normal    Cardiovascular:      Rate and Rhythm: Normal rate and regular rhythm        Heart sounds: Normal heart sounds  Pulmonary:      Effort: Pulmonary effort is normal       Breath sounds: Normal breath sounds  Abdominal:      General: Bowel sounds are normal  There is no distension  Palpations: Abdomen is soft  Tenderness: There is abdominal tenderness  Comments: Abdomen is soft, nondistended, with bowel sounds present all 4 quadrants  Generalized, mild discomfort noted to palpation of abdomen  Musculoskeletal:         General: Normal range of motion  Cervical back: Normal range of motion and neck supple  Skin:     General: Skin is warm and dry  Neurological:      General: No focal deficit present  Mental Status: She is alert and oriented to person, place, and time  Comments: Dizziness noted but otherwise no focal neuro deficits  Psychiatric:         Behavior: Behavior normal          Thought Content:  Thought content normal          Judgment: Judgment normal          Vital Signs  ED Triage Vitals   Temperature Pulse Respirations Blood Pressure SpO2   05/24/22 1336 05/24/22 1049 05/24/22 1049 05/24/22 1049 05/24/22 1049   (!) 97 1 °F (36 2 °C) 80 18 137/71 94 %      Temp Source Heart Rate Source Patient Position - Orthostatic VS BP Location FiO2 (%)   05/24/22 1336 05/24/22 1049 05/24/22 1049 05/24/22 1049 --   Tympanic Monitor Lying Right arm       Pain Score       05/24/22 1131       6           Vitals:    05/24/22 1515 05/24/22 1545 05/24/22 1548 05/24/22 1549   BP: 138/67 136/74 154/79 149/80   Pulse: 72 76 78 92   Patient Position - Orthostatic VS:             Visual Acuity  Visual Acuity    Flowsheet Row Most Recent Value   L Pupil Size (mm) 3   R Pupil Size (mm) 3          ED Medications  Medications   atorvastatin (LIPITOR) tablet 40 mg (has no administration in time range)   aspirin chewable tablet 81 mg (has no administration in time range)   enoxaparin (LOVENOX) subcutaneous injection 40 mg (has no administration in time range)   insulin lispro (HumaLOG) 100 units/mL subcutaneous injection 1-6 Units (has no administration in time range)   insulin lispro (HumaLOG) 100 units/mL subcutaneous injection 1-6 Units (has no administration in time range)   insulin lispro (HumaLOG) 100 units/mL subcutaneous injection 1-6 Units (has no administration in time range)   lactated ringers infusion (has no administration in time range)   meclizine (ANTIVERT) tablet 25 mg (has no administration in time range)   acetaminophen (TYLENOL) tablet 650 mg (650 mg Oral Given 5/24/22 1131)   sodium chloride 0 9 % bolus 500 mL (0 mL Intravenous Stopped 5/24/22 1205)   iohexol (OMNIPAQUE) 350 MG/ML injection (MULTI-DOSE) 65 mL (65 mL Intravenous Given 5/24/22 1145)   meclizine (ANTIVERT) tablet 50 mg (50 mg Oral Given 5/24/22 1445)       Diagnostic Studies  Results Reviewed     Procedure Component Value Units Date/Time    HS Troponin I 4hr [[id number]]  (Normal) Collected: 05/24/22 1426    Lab Status: Final result Specimen: Blood from Arm, Left Updated: 05/24/22 1458     hs TnI 4hr 8 ng/L      Delta 4hr hsTnI 3 ng/L     Fingerstick Glucose (POCT) [[id number]]  (Abnormal) Collected: 05/24/22 1207    Lab Status: Final result Updated: 05/24/22 1211     POC Glucose 270 mg/dl     HS Troponin I 2hr [[id number]]  (Normal) Collected: 05/24/22 1122    Lab Status: Final result Specimen: Blood from Arm, Left Updated: 05/24/22 1154     hs TnI 2hr 9 ng/L      Delta 2hr hsTnI 4 ng/L     Urine Microscopic [[id number]]  (Normal) Collected: 05/24/22 1112    Lab Status: Final result Specimen: Urine, Clean Catch Updated: 05/24/22 1132     RBC, UA 0-1 /hpf      WBC, UA 2-4 /hpf      Epithelial Cells Occasional /hpf      Bacteria, UA Occasional /hpf     UA w Reflex to Microscopic w Reflex to Culture [[id number]]  (Abnormal) Collected: 05/24/22 1112    Lab Status: Final result Specimen: Urine, Clean Catch Updated: 05/24/22 1120     Color, UA Light Yellow     Clarity, UA Clear     Specific Gravity, UA 1 015     pH, UA 6 5 Leukocytes, UA Elevated glucose may cause decreased leukocyte values   See urine microscopic for [facility] result/     Nitrite, UA Negative     Protein, UA Negative mg/dl      Glucose, UA >=1000 (1%) mg/dl      Ketones, UA Trace mg/dl      Urobilinogen, UA 0 2 E U /dl      Bilirubin, UA Negative     Blood, UA Negative    HS Troponin 0hr (reflex protocol) [[id number]]  (Normal) Collected: 05/24/22 0901    Lab Status: Final result Specimen: Blood Updated: 05/24/22 1111     hs TnI 0hr 5 ng/L     Magnesium [[id number]]  (Normal) Collected: 05/24/22 0901    Lab Status: Final result Specimen: Blood Updated: 05/24/22 1111     Magnesium 1 9 mg/dL     Lipase [[id number]]  (Normal) Collected: 05/24/22 0901    Lab Status: Final result Specimen: Blood Updated: 05/24/22 1111     Lipase 76 u/L     Comprehensive metabolic panel [[id number]]  (Abnormal) Collected: 05/24/22 0901    Lab Status: Final result Specimen: Blood Updated: 05/24/22 1111     Sodium 134 mmol/L      Potassium 3 9 mmol/L      Chloride 99 mmol/L      CO2 28 mmol/L      ANION GAP 7 mmol/L      BUN 13 mg/dL      Creatinine 0 76 mg/dL      Glucose 367 mg/dL      Calcium 9 2 mg/dL      AST 14 U/L      ALT 26 U/L      Alkaline Phosphatase 105 U/L      Total Protein 7 4 g/dL      Albumin 3 5 g/dL      Total Bilirubin 0 53 mg/dL      eGFR 76 ml/min/1 73sq m     Narrative:      Jl guidelines for Chronic Kidney Disease (CKD):     Stage 1 with normal or high GFR (GFR > 90 mL/min/1 73 square meters)    Stage 2 Mild CKD (GFR = 60-89 mL/min/1 73 square meters)    Stage 3A Moderate CKD (GFR = 45-59 mL/min/1 73 square meters)    Stage 3B Moderate CKD (GFR = 30-44 mL/min/1 73 square meters)    Stage 4 Severe CKD (GFR = 15-29 mL/min/1 73 square meters)    Stage 5 End Stage CKD (GFR <15 mL/min/1 73 square meters)  Note: GFR calculation is accurate only with a steady state creatinine    CBC and differential [[id number]]  (Abnormal) Collected: 05/24/22 0901    Lab Status: Final result Specimen: Blood Updated: 05/24/22 1102     WBC 7 02 Thousand/uL      RBC 5 65 Million/uL      Hemoglobin 15 6 g/dL      Hematocrit 46 8 %      MCV 83 fL      MCH 27 6 pg      MCHC 33 3 g/dL      RDW 12 1 %      MPV 10 2 fL      Platelets 663 Thousands/uL      nRBC 0 /100 WBCs      Neutrophils Relative 76 %      Immat GRANS % 0 %      Lymphocytes Relative 17 %      Monocytes Relative 5 %      Eosinophils Relative 2 %      Basophils Relative 0 %      Neutrophils Absolute 5 31 Thousands/µL      Immature Grans Absolute 0 03 Thousand/uL      Lymphocytes Absolute 1 18 Thousands/µL      Monocytes Absolute 0 36 Thousand/µL      Eosinophils Absolute 0 11 Thousand/µL      Basophils Absolute 0 03 Thousands/µL     COVID/FLU/RSV [[id number]]  (Normal) Collected: 05/24/22 0838    Lab Status: Final result Specimen: Nares from Nose Updated: 05/24/22 1101     SARS-CoV-2 Negative     INFLUENZA A PCR Negative     INFLUENZA B PCR Negative     RSV PCR Negative    Narrative:      FOR PEDIATRIC PATIENTS - copy/paste COVID Guidelines URL to browser: [email or web address]  [employer]    SARS-CoV-2 assay is a Nucleic Acid Amplification assay intended for the  qualitative detection of nucleic acid from SARS-CoV-2 in nasopharyngeal  swabs  Results are for the presumptive identification of SARS-CoV-2 RNA  Positive results are indicative of infection with SARS-CoV-2, the virus  causing COVID-19, but do not rule out bacterial infection or co-infection  with other viruses  Laboratories within the [place] and its  territories are required to report all positive results to the appropriate  public health authorities  Negative results do not preclude SARS-CoV-2  infection and should not be used as the sole basis for treatment or other  patient management decisions   Negative results must be combined with  clinical observations, patient history, and epidemiological information  This test has not been FDA cleared or approved  This test has been authorized by FDA under an Emergency Use Authorization  (EUA)  This test is only authorized for the duration of time the  declaration that circumstances exist justifying the authorization of the  emergency use of an in vitro diagnostic tests for detection of SARS-CoV-2  virus and/or diagnosis of COVID-19 infection under section 564(b)(1) of  the Act, 21 U  S C  955ANO-8(J)(6), unless the authorization is terminated  or revoked sooner  The test has been validated but independent review by FDA  and CLIA is pending  Test performed using [employer] GeneXpert: This RT-PCR assay targets N2,  a region unique to SARS-CoV-2  A conserved region in the E-gene was chosen  for pan-Sarbecovirus detection which includes SARS-CoV-2  CTA head and neck with and without contrast   Final Result by Mikey Leon DO (05/24 3451)      CT brain: No acute intracranial abnormality  CT angiography: Minor noncalcified atherosclerotic disease involving the left common carotid artery  Normal bifurcation and cervical internal carotid arteries  Normal intracranial vasculature  6 mm pulmonary nodule within the anterior medial left upper lobe  No prior studies for comparison  Based on current Fleischner Society 2017 Guidelines on incidental pulmonary nodule, followup non-contrast CT is recommended at 6-12 months from the initial    examination and, if stable at that time, an additional followup is recommended for 18-24 months from the initial examination  This examination was marked "immediate notification" in Epic in order to begin the standard process by which the radiology reading room liaison alerts the referring practitioner                         Workstation performed: [id number]         MRI Inpatient Order    (Results Pending)              Procedures  ECG 12 Lead Documentation Only    Date/Time: 5/24/2022 9:08 AM  Performed by: Reyes Quill, DO  Authorized by: Reyes Quill, DO     Indications / Diagnosis:  Dizziness  ECG reviewed by me, the ED Provider: yes    Patient location:  ED  Previous ECG:     Previous ECG:  Unavailable    Comparison to cardiac monitor: Yes    Interpretation:     Interpretation: abnormal    Comments:      Sinus rhythm, rate 84, first-degree AV block, normal axis, normal intervals, no acute ST/T-wave abnormalities noted, sinus rhythm with first-degree AV block, otherwise unremarkable EKG, no previous EKG available for comparison  ED Course  ED Course as of 05/24/22 1618   Tue May 24, 2022   1120 Patient felt wobbly and lightheaded and wobbly while ambulating to the bathroom  Will obtain CT head  1430 Tried to ambulate patient after patient was given 1 L of IV fluid bolus  Patient continues to have feeling of off balance when she is walking  At this time patient will be admitted for further management  SBIRT 22yo+    Flowsheet Row Most Recent Value   SBIRT (23 yo +)    In order to provide better care to our patients, we are screening all of our patients for alcohol and drug use  Would it be okay to ask you these screening questions? Yes Filed at: 05/24/2022 1427   Initial Alcohol Screen: US AUDIT-C     1  How often do you have a drink containing alcohol? 0 Filed at: 05/24/2022 1427   2  How many drinks containing alcohol do you have on a typical day you are drinking? 0 Filed at: 05/24/2022 1427   3a  Male UNDER 65: How often do you have five or more drinks on one occasion? 0 Filed at: 05/24/2022 1427   3b  FEMALE Any Age, or MALE 65+: How often do you have 4 or more drinks on one occassion? 0 Filed at: 05/24/2022 1427   Audit-C Score 0 Filed at: 05/24/2022 1427   LACHO: How many times in the past year have you    Used an illegal drug or used a prescription medication for non-medical reasons?  Never Filed at: 05/24/2022 1427 MDM  Number of Diagnoses or Management Options  Ataxia: new and requires workup  Diabetes [facility]): new and requires workup  Dizziness: new and requires workup  Hyperglycemia: new and requires workup  Nausea and vomiting: new and requires workup  Pulmonary nodule, left: new and requires workup  Diagnosis management comments: Obtain blood work, EKG, viral swab, CT head/neck  Give IV fluids, antiemetics and monitor patient for any worsening symptoms       Amount and/or Complexity of Data Reviewed  Clinical lab tests: ordered and reviewed  Tests in the radiology section of CPT®: ordered and reviewed  Tests in the medicine section of CPT®: reviewed and ordered  Review and summarize past medical records: yes  Independent visualization of images, tracings, or specimens: yes    Risk of Complications, Morbidity, and/or Mortality  General comments: Lab and radiology results were essentially unremarkable  CT head/neck did not show any acute intracranial pathology however there was concern for a pulmonary nodule in the left upper lobe of lung that would require follow-up studies to monitor the size  Patient's nausea and vomiting resolved with IV fluids and antiemetics in the ED  Patient was hyperglycemic   1 L of IV fluid bolus given in the ED with improvement of blood glucose level  Patient continued to have dizziness and felt like she was having unsteady gait when walking  At this time patient will be admitted for further management  Patient is given meclizine to see would help with possible vertigo  At this time patient is agreeable to admission          Disposition  Final diagnoses:   Nausea and vomiting   Dizziness   Ataxia   Hyperglycemia   Diabetes ([address] )   Pulmonary nodule, left     Time reflects when diagnosis was documented in both MDM as applicable and the Disposition within this note     Time User Action Codes Description Comment    5/24/2022  2:32 PM Basil Kelley Add [R11 2] Nausea and vomiting     5/24/2022  2:32 PM Ferdous, Freddie Patten Add [R42] Dizziness     5/24/2022  2:32 PM Ferdous Komaira Add [R27 0] Ataxia     5/24/2022  2:32 PM Ferdous, Freddie Patten Add [R73 9] Hyperglycemia     5/24/2022  2:32 PM Ferdous, Freddie Tulio Add [E11 9] Diabetes ([facility] [address] )     5/24/2022  4:17 PM Jennifer Heady Add [R91 1] Pulmonary nodule, left       ED Disposition     ED Disposition   Admit    Condition   Stable    Date/Time   Tue May 24, 2022  3:04 PM    Comment   Case was discussed with Dr Anyi Moreno and the patient's admission status was agreed to be Admission Status: observation status to the service of Dr Anyi Moreno  Follow-up Information    None         Current Discharge Medication List      CONTINUE these medications which have NOT CHANGED    Details   Blood Glucose Monitoring Suppl (ONE TOUCH ULTRA 2) w/Device KIT TEST BLOOD SUGAR ONCE DAILY OR AS NEEDED (E11 65)      metFORMIN (GLUCOPHAGE-XR) 500 mg 24 hr tablet Take 2 tablets (1,000 mg total) by mouth 2 (two) times a day with meals  Qty: 120 tablet, Refills: 5    Associated Diagnoses: Uncontrolled type 2 diabetes mellitus with hyperglycemia ([facility])      OneTouch Ultra test strip TEST BLOOD SUGAR DAILY, OR AS NEEDED (E11 65)  Qty: 100 strip, Refills: 1    Associated Diagnoses: Uncontrolled type 2 diabetes mellitus with hyperglycemia ([facility] [address] )             No discharge procedures on file      PDMP Review     None          ED Provider  Electronically Signed by           Dante Longoria DO  05/24/22 5316

## 2022-05-24 NOTE — ASSESSMENT & PLAN NOTE
Patient presented to the ED with complaints of dizziness  She states for the past month she has noticed that if she moves abruptly she feels dizzy like the room is spinning  For the past several days she has had URI symptoms with nasal congestion  This morning when she woke up she felt very dizzy and had to hold on to things to walk around  She had nausea with no vomiting  In the ER, CT of the head and CTA head and neck had no acute findings  EKG revealed NSR with first degree AV block  Labs are unremarkable  She feels better after fluids and meclizine, dizziness and nausea has resolved  She still feels slightly "unsteady" when ambulating      · Admit to telemetry  · Start aspirin and statin  · Neuro checks q 4 hours  · Lipid panel, Hga1c in AM  · Orthostatic BP  · MRI in AM  · Meclizine

## 2022-05-25 VITALS
WEIGHT: 229.28 LBS | BODY MASS INDEX: 36.85 KG/M2 | SYSTOLIC BLOOD PRESSURE: 164 MMHG | HEART RATE: 89 BPM | RESPIRATION RATE: 16 BRPM | OXYGEN SATURATION: 94 % | HEIGHT: 66 IN | DIASTOLIC BLOOD PRESSURE: 82 MMHG | TEMPERATURE: 98.2 F

## 2022-05-25 PROBLEM — J30.2 SEASONAL ALLERGIES: Status: ACTIVE | Noted: 2022-05-25

## 2022-05-25 LAB
ALBUMIN SERPL BCP-MCNC: 3 G/DL (ref 3.5–5)
ALP SERPL-CCNC: 88 U/L (ref 46–116)
ALT SERPL W P-5'-P-CCNC: 18 U/L (ref 12–78)
ANION GAP SERPL CALCULATED.3IONS-SCNC: 8 MMOL/L (ref 4–13)
AST SERPL W P-5'-P-CCNC: 11 U/L (ref 5–45)
BILIRUB SERPL-MCNC: 0.42 MG/DL (ref 0.2–1)
BUN SERPL-MCNC: 12 MG/DL (ref 5–25)
CALCIUM ALBUM COR SERPL-MCNC: 9.5 MG/DL (ref 8.3–10.1)
CALCIUM SERPL-MCNC: 8.7 MG/DL (ref 8.3–10.1)
CARDIAC TROPONIN I PNL SERPL HS: 5 NG/L (ref 8–18)
CHLORIDE SERPL-SCNC: 103 MMOL/L (ref 100–108)
CHOLEST SERPL-MCNC: 217 MG/DL
CO2 SERPL-SCNC: 28 MMOL/L (ref 21–32)
CREAT SERPL-MCNC: 0.66 MG/DL (ref 0.6–1.3)
ERYTHROCYTE [DISTWIDTH] IN BLOOD BY AUTOMATED COUNT: 12.4 % (ref 11.6–15.1)
EST. AVERAGE GLUCOSE BLD GHB EST-MCNC: 266 MG/DL
GFR SERPL CREATININE-BSD FRML MDRD: 86 ML/MIN/1.73SQ M
GLUCOSE P FAST SERPL-MCNC: 258 MG/DL (ref 65–99)
GLUCOSE SERPL-MCNC: 255 MG/DL (ref 65–140)
GLUCOSE SERPL-MCNC: 258 MG/DL (ref 65–140)
GLUCOSE SERPL-MCNC: 275 MG/DL (ref 65–140)
GLUCOSE SERPL-MCNC: 278 MG/DL (ref 65–140)
HBA1C MFR BLD: 10.9 %
HCT VFR BLD AUTO: 44 % (ref 34.8–46.1)
HDLC SERPL-MCNC: 41 MG/DL
HGB BLD-MCNC: 14.4 G/DL (ref 11.5–15.4)
LDLC SERPL CALC-MCNC: 140 MG/DL (ref 0–100)
MCH RBC QN AUTO: 27.4 PG (ref 26.8–34.3)
MCHC RBC AUTO-ENTMCNC: 32.7 G/DL (ref 31.4–37.4)
MCV RBC AUTO: 84 FL (ref 82–98)
PLATELET # BLD AUTO: 274 THOUSANDS/UL (ref 149–390)
PMV BLD AUTO: 9.8 FL (ref 8.9–12.7)
POTASSIUM SERPL-SCNC: 4.1 MMOL/L (ref 3.5–5.3)
PROT SERPL-MCNC: 6.8 G/DL (ref 6.4–8.2)
RBC # BLD AUTO: 5.26 MILLION/UL (ref 3.81–5.12)
SODIUM SERPL-SCNC: 139 MMOL/L (ref 136–145)
TRIGL SERPL-MCNC: 182 MG/DL
WBC # BLD AUTO: 6.67 THOUSAND/UL (ref 4.31–10.16)

## 2022-05-25 PROCEDURE — 99217 PR OBSERVATION CARE DISCHARGE MANAGEMENT: CPT | Performed by: NURSE PRACTITIONER

## 2022-05-25 PROCEDURE — 85027 COMPLETE CBC AUTOMATED: CPT | Performed by: NURSE PRACTITIONER

## 2022-05-25 PROCEDURE — 84484 ASSAY OF TROPONIN QUANT: CPT | Performed by: NURSE PRACTITIONER

## 2022-05-25 PROCEDURE — 80061 LIPID PANEL: CPT | Performed by: NURSE PRACTITIONER

## 2022-05-25 PROCEDURE — 97535 SELF CARE MNGMENT TRAINING: CPT

## 2022-05-25 PROCEDURE — 80053 COMPREHEN METABOLIC PANEL: CPT | Performed by: NURSE PRACTITIONER

## 2022-05-25 PROCEDURE — 99205 OFFICE O/P NEW HI 60 MIN: CPT | Performed by: NURSE PRACTITIONER

## 2022-05-25 PROCEDURE — 82948 REAGENT STRIP/BLOOD GLUCOSE: CPT

## 2022-05-25 PROCEDURE — 97167 OT EVAL HIGH COMPLEX 60 MIN: CPT

## 2022-05-25 PROCEDURE — 97163 PT EVAL HIGH COMPLEX 45 MIN: CPT

## 2022-05-25 PROCEDURE — 97530 THERAPEUTIC ACTIVITIES: CPT

## 2022-05-25 PROCEDURE — 83036 HEMOGLOBIN GLYCOSYLATED A1C: CPT | Performed by: NURSE PRACTITIONER

## 2022-05-25 RX ORDER — ASPIRIN 81 MG/1
81 TABLET, CHEWABLE ORAL DAILY
Qty: 30 TABLET | Refills: 0 | Status: SHIPPED | OUTPATIENT
Start: 2022-05-26 | End: 2022-06-25

## 2022-05-25 RX ORDER — FLUTICASONE PROPIONATE 50 MCG
1 SPRAY, SUSPENSION (ML) NASAL DAILY
Qty: 15.8 ML | Refills: 0 | Status: SHIPPED | OUTPATIENT
Start: 2022-05-25 | End: 2022-06-24

## 2022-05-25 RX ORDER — ATORVASTATIN CALCIUM 40 MG/1
40 TABLET, FILM COATED ORAL EVERY EVENING
Qty: 30 TABLET | Refills: 0 | Status: SHIPPED | OUTPATIENT
Start: 2022-05-25 | End: 2022-06-24

## 2022-05-25 RX ORDER — MECLIZINE HYDROCHLORIDE 25 MG/1
25 TABLET ORAL EVERY 8 HOURS
Qty: 30 TABLET | Refills: 0 | Status: SHIPPED | OUTPATIENT
Start: 2022-05-25 | End: 2022-06-06 | Stop reason: SDUPTHER

## 2022-05-25 RX ORDER — FLUTICASONE PROPIONATE 50 MCG
1 SPRAY, SUSPENSION (ML) NASAL DAILY
Status: DISCONTINUED | OUTPATIENT
Start: 2022-05-25 | End: 2022-05-25 | Stop reason: HOSPADM

## 2022-05-25 RX ORDER — ACETAMINOPHEN 325 MG/1
650 TABLET ORAL EVERY 6 HOURS PRN
Status: DISCONTINUED | OUTPATIENT
Start: 2022-05-25 | End: 2022-05-25 | Stop reason: HOSPADM

## 2022-05-25 RX ORDER — LORATADINE 10 MG/1
10 TABLET ORAL DAILY
Qty: 30 TABLET | Refills: 0 | Status: SHIPPED | OUTPATIENT
Start: 2022-05-25 | End: 2022-06-24

## 2022-05-25 RX ADMIN — INSULIN LISPRO 4 UNITS: 100 INJECTION, SOLUTION INTRAVENOUS; SUBCUTANEOUS at 11:38

## 2022-05-25 RX ADMIN — ASPIRIN 81 MG CHEWABLE TABLET 81 MG: 81 TABLET CHEWABLE at 08:32

## 2022-05-25 RX ADMIN — ENOXAPARIN SODIUM 40 MG: 40 INJECTION SUBCUTANEOUS at 08:32

## 2022-05-25 RX ADMIN — ACETAMINOPHEN 650 MG: 325 TABLET ORAL at 06:12

## 2022-05-25 RX ADMIN — SODIUM CHLORIDE, SODIUM LACTATE, POTASSIUM CHLORIDE, AND CALCIUM CHLORIDE 75 ML/HR: .6; .31; .03; .02 INJECTION, SOLUTION INTRAVENOUS at 06:18

## 2022-05-25 RX ADMIN — MECLIZINE HYDROCHLORIDE 25 MG: 25 TABLET ORAL at 06:13

## 2022-05-25 RX ADMIN — INSULIN LISPRO 1 UNITS: 100 INJECTION, SOLUTION INTRAVENOUS; SUBCUTANEOUS at 08:33

## 2022-05-25 NOTE — OCCUPATIONAL THERAPY NOTE
Occupational Therapy Evaluation/Treatment     05/25/22 1100   Note Type   Note type Evaluation   Restrictions/Precautions   Other Precautions Chair Alarm; Fall Risk   Pain Assessment   Pain Assessment Tool 0-10   Pain Score No Pain   Home Living   Type of [address] [place] [address] One level   Bathroom Shower/Tub Tub/shower unit   Bathroom Toilet Standard   Bathroom Equipment Shower chair;Grab bars in shower;Grab bars around toilet   Additional Comments Patient reports using walking sticks when on her property  Prior Function   Level of [place] Independent with ADLs and functional mobility; Needs assistance with IADLs   Lives With Spouse   Receives Help From Friend(s)   ADL Assistance Independent   IADLs Independent   Vocational Retired   Comments Patient admitted with dizziness and nasal congestion   ADL   Eating Assistance 7  Independent   Grooming Assistance 7  Independent   UB Bathing Assistance 4  Minimal Assistance   UB Bathing Deficit [facility] 4  [address] 5  [address] [facility] 5  [address] [place] [address] N 5  Supervision/Setup   Additional Comments Patient don/doffed B socks with set/up supervision at the edge of bed unsupported  Patient independently tied hair up in a pony tail  Bed Mobility   Supine to Sit 4  Minimal assistance   Additional items Assist x 1;Verbal cues   Transfers   Sit to Stand 4  Minimal assistance   Additional items Verbal cues   Stand to Sit 4  Minimal assistance   Additional items Verbal cues   Additional Comments Patient transferred from edge of bed to recliner chair with a few steps, requiring supervision     Balance   Static Sitting Good   Dynamic Sitting Fair   Static Standing Fair -   Dynamic Standing Fair -   Activity Tolerance   Activity Tolerance Patient limited by fatigue;Treatment limited secondary to medical complications (Comment)  (Diziness)   RUE Assessment   RUE Assessment WFL  (Grossly 3+/5 MMT)   LUE Assessment   LUE Assessment WFL  (Grossly 3+/5 MMT)   Hand Function   Gross Motor Coordination Functional   Fine Motor Coordination Functional   Vision-Basic Assessment   Current Vision Wears glasses all the time   Visual History   (Patient is legally blind in the right eye  Patients reports only seeing shadows out of R eye)   Cognition   Overall Cognitive Status WFL   Arousal/Participation Responsive; Cooperative   Orientation Level Oriented X4   Following Commands Follows all commands and directions without difficulty   Assessment   Limitation Decreased ADL status; Decreased endurance;Decreased high-level ADLs; Decreased self-care trans   Prognosis Good   Assessment Patient evaluated by Occupational Therapy  Patient admitted with Dizziness  The patients occupational profile, medical and therapy history includes a extensive additional review of physical, cognitive, or psychosocial history related to current functional performance  Comorbidities affecting functional mobility and ADLS include: diabetes and hypertension  Prior to admission, patient was independent with functional mobility without assistive device, independent with ADLS and requiring assist for IADLS  The evaluation identifies the following performance deficits: weakness, impaired balance, decreased endurance, decreased coordination, increased fall risk, decreased ADLS, decreased IADLS, decreased activity tolerance, decreased safety awareness, decreased strength and visual deficits, that result in activity limitations and/or participation restrictions  This evaluation requires clinical decision making of high complexity, because the patient presents with comorbidites that affect occupational performance and required significant modification of tasks or assistance with consideration of multiple treatment options    The Barthel Index was used as a functional outcome tool presenting with a score of Barthel Index Score: 60, indicating moderate limitations of functional mobility and ADLS  The patient's raw score on the AM-PAC Daily Activity inpatient short form is 20, standardized score is 42 03, greater than 39 4  Patients at this level are likely to benefit from DC to home  Please refer to the recommendation of the Occupational Therapist for safe DC planning  Patient will benefit from skilled Occupational Therapy services to address above deficits and facilitate a safe return to prior level of function  Goals   Patient Goals To go home today   STG Time Frame   (1-7 days)   Short Term Goal  Goals established to promote Patient Goals: To go home today:  Patient will increase standing tolerance to 8 minutes during ADL task to decrease assistance level and decrease fall risk; Patient will increase bed mobility to supervision in preparation for ADLS and transfers; Patient will increase functional mobility to and from bathroom with rolling walker independently to increase performance with ADLS and to use a toilet; Patient will tolerate 8 minutes of UE ROM/strengthening to increase general activity tolerance and performance in ADLS/IADLS; Patient will improve functional activity tolerance to 6 minutes of sustained functional tasks to increase participation in basic self-care and decrease assistance level;  Patient will be able to to verbalize understanding and perform energy conservation/proper body mechanics during ADLS and functional mobility at least 50% of the time with minimal cueing to decrease signs of fatigue and increase stamina to return to prior level of function; Patient will increase dynamic sitting balance to fair+ to improve the ability to sit at edge of bed or on a chair for ADLS;  Patient will increase static/dynamic standing balance to fair to improve postural stability and decrease fall risk during standing ADLS and transfers     LTG Time Frame   (8-14 days)   Long Term Goal Patient will increase standing tolerance to 15 minutes during ADL task to decrease assistance level and decrease fall risk; Patient will increase bed mobility to independent in preparation for ADLS and transfers; Patient will increase functional mobility to and from bathroom with no assistive device independently to increase performance with ADLS and to use a toilet; Patient will tolerate 10 minutes of UE ROM/strengthening to increase general activity tolerance and performance in ADLS/IADLS; Patient will improve functional activity tolerance to 15 minutes of sustained functional tasks to increase participation in basic self-care and decrease assistance level;  Patient will be able to to verbalize understanding and perform energy conservation/proper body mechanics during ADLS and functional mobility at least 90% of the time with no cueing to decrease signs of fatigue and increase stamina to return to prior level of function; Patient will increase dynamic sitting balance to good to improve the ability to sit at edge of bed or on a chair for ADLS;  Patient will increase static/dynamic standing balance to fair+ to improve postural stability and decrease fall risk during standing ADLS and transfers  Pt will score >/= 24/24 on AM-PAC Daily Activity Inpatient scale to promote safe independence with ADLs and functional mobility; Pt will score >/= 90/100 on Barthel Index in order to decrease caregiver assistance needed and increase ability to perform ADLs and functional mobility  Plan   Treatment Interventions ADL retraining;Functional transfer training;UE strengthening/ROM; Endurance training;Patient/family training;Energy conservation; Activityengagement   Goal Expiration Date 06/08/22   OT Frequency 3-5x/wk   Additional Treatment Session   Start Time 1105   End Time 1115   Treatment Assessment S:  "You guys make me happy" Patient denies any pain  O:  Patient ambulated short house hold distace to/from the bathroom with handheld assist; toliet transfer with supervision and no DME;  Patient don/doffed B slip-on shoes independently seated at the edge of the bed unsupported  A;  Patient was cooperative and a pleasure to work with  Patient is limited by c/o dizziness and unsteady at times  Patient requires increased time to complete ADLs/IADLs  Patient would benefit from continued OT services to maximize overall functional ability  At the end of the session, patient was seated in the recliner chair, chair alarm on and all needs met  P: Home with outpatient OT     Recommendation   OT Discharge Recommendation Home with outpatient rehabilitation  [facility])   AM-PAC Daily Activity Inpatient   Lower Body Dressing 3   Bathing 3   Toileting 3   Upper Body Dressing 3   Grooming 4   Eating 4   Daily Activity Raw Score 20   Daily Activity Standardized Score (Calc for Raw Score >=11) 42 03   AM-PAC Applied Cognition Inpatient   Following a Speech/Presentation 4   Understanding Ordinary Conversation 4   Taking Medications 3   Remembering Where Things Are Placed or Put Away 4   Remembering List of 4-5 Errands 3   Taking Care of Complicated Tasks 3   Applied Cognition Raw Score 21   Applied Cognition Standardized Score 44 3   Barthel Index   Feeding 10   Bathing 0   Grooming Score 5   Dressing Score 5   Bladder Score 10   Bowels Score 10   Toilet Use Score 5   Transfers (Bed/Chair) Score 10   Mobility (Level Surface) Score 0   Stairs Score 5   Barthel Index Score 60   Licensure   [place] License Number  [place], OTS

## 2022-05-25 NOTE — PHYSICAL THERAPY NOTE
PHYSICAL THERAPY EVALUATION/TREATMENT     05/25/22 1040   Note Type   Note type Evaluation   Pain Assessment   Pain Assessment Tool 0-10   Pain Score No Pain   Restrictions/Precautions   Other Precautions Fall Risk;Bed Alarm; Chair Alarm;Visual impairment  (Dizzy pt reports she is legally blind right eye)   Home Living   Type of [address] [place] [address] One level  (One step to enter)   Home Equipment   (No DME per pt)   Additional Comments Pt does use 2 walking sticks when she walks along her property  Pt drives prior to admission and ambulates without an assistive device normally   Prior Function   Level of [place] Independent with ADLs and functional mobility   Lives With Spouse   Receives Help From Family   ADL Assistance Independent   IADLs Independent   General   Additional Pertinent History Pt is admitted with nausea, vomiting, and dizziness   Cognition   Overall Cognitive Status WFL   Arousal/Participation Cooperative   Orientation Level Oriented X4   Following Commands Follows all commands and directions without difficulty   Subjective   Subjective Better   RLE Assessment   RLE Assessment WFL  (3+ to 4-/5)   LLE Assessment   LLE Assessment WFL  (3+ to 4-/5)   Vestibular   Spontaneous Nystagmus (-) no evidence of nystagmus at rest in room light   Gaze Holding Nystagmus (-) no evidence of nystagmus   Head Impulse (-) re-fixation saccades with head thrust right;(-) re-fixation saccades with head thrust left   Sharp/Dull   RLE Sharp/Dull Grossly intact   LLE Sharp/Dull Grossly intact   Proprioception   RLE Proprioception Grossly intact   LLE Proprioception Grossly Intact   Bed Mobility   Supine to Sit 4  Minimal assistance   Additional items Assist x 1;Verbal cues; Increased time required; Bedrails   Transfers   Sit to Stand 4  Minimal assistance   Additional items Assist x 1;Verbal cues   Stand to Sit 4  Minimal assistance   Additional items Assist x 1;Verbal cues   Ambulation/Elevation   Gait pattern   (Generalized unsteadiness; handhold assist or pt reaching out for walls and furniture)   Gait Assistance 4  Minimal assist   Additional items Assist x 1;Verbal cues; Tactile cues   Assistive Device None  (Handhold assist)   Distance 25 ft with change in direction   Balance   Static Sitting Good   Static Standing Fair -   Dynamic Standing Fair -   Ambulatory Fair -   Activity Tolerance   Activity Tolerance Patient limited by fatigue;Treatment limited secondary to medical complications (Comment)  (Dizziness)   Assessment   Problem List Decreased strength;Decreased range of motion;Decreased endurance; Impaired balance;Decreased mobility; Decreased safety awareness;Decreased coordination   Assessment Patient seen for Physical Therapy evaluation  Patient admitted with Dizziness  Comorbidities affecting patient's physical performance include:  Dm 2, obesity, hypertension, hyperlipidemia, fatigue, diabetic polyneuropathy  Personal factors affecting patient at time of initial evaluation include: lives in one story house, stairs to enter home, inability to navigate community distances, inability to navigate level surfaces without external assistance and inability to perform dynamic tasks in community  Prior to admission, patient was independent with functional mobility without assistive device, independent with ADLS, independent with IADLS, living with spouse in a one level home with 1 steps to enter, ambulating household distance and ambulating community distances  Please find objective findings from Physical Therapy assessment regarding body systems outlined above with impairments and limitations including weakness, decreased ROM, impaired balance, decreased endurance, impaired coordination, gait deviations, decreased activity tolerance, decreased functional mobility tolerance, decreased safety awareness, fall risk and dizzy    The Barthel Index was used as a functional outcome tool presenting with a score of Barthel Index Score: 55 today indicating marked limitations of functional mobility and ADLS  Patient's clinical presentation is currently unstable/unpredictable as seen in patient's presentation of vital sign response, increased fall risk, new onset of impairment of functional mobility, decreased endurance and new onset of weakness  Pt would benefit from continued Physical Therapy treatment to address deficits as defined above and maximize level of functional mobility  As demonstrated by objective findings, the assigned level of complexity for this evaluation is high  The patient's AM-PAC Basic Mobility Inpatient Short Form Raw Score is 18  A Raw score of greater than 16 suggests the patient may benefit from discharge to home  Please also refer to the recommendation of the Physical Therapist for safe discharge planning  Goals   Patient Goals To go home today   STG Expiration Date 06/01/22   Short Term Goal #1 Bed mobility and transfers-S   Short Term Goal #2 Pt will ambulate with a RW functional household distances-S; up/down 1 step so pt can enter/exit her home with S in order to meet patient's goal of going home   LTG Expiration Date 06/08/22   Long Term Goal #1 Pt will return to I functional mobility without an assistive device as previous, functional household and community distances, levels and stairs   Long Term Goal #2 Balance will be F+/good for safe gait and mobility and to decrease fall risk   Plan   Treatment/Interventions ADL retraining;Functional transfer training;LE strengthening/ROM; Elevations; Therapeutic exercise; Endurance training;Patient/family training;Equipment eval/education; Bed mobility;Gait training; Compensatory technique education   PT Frequency Other (Comment)  (5x/wk)   Recommendation   PT Discharge Recommendation Home with outpatient rehabilitation  (Vestibular rehabilitation at the [facility] Center)   Equipment Recommended [address] Recommended Wheeled walker   AM-PAC Basic Mobility Inpatient   Turning in Bed Without Bedrails 3   Lying on Back to Sitting on Edge of Flat Bed 3   Moving Bed to Chair 3   Standing Up From Chair 3   Walk in Room 3   Climb 3-5 Stairs 3   Basic Mobility Inpatient Raw Score 18   Basic Mobility Standardized Score 41 05   Highest Level Of Mobility   -HL Goal 6: Walk 10 steps or more   -HLM Achieved 7: Walk 25 feet or more   Barthel Index   Feeding 10   Bathing 0   Grooming Score 0   Dressing Score 5   Bladder Score 10   Bowels Score 10   Toilet Use Score 5   Transfers (Bed/Chair) Score 10   Mobility (Level Surface) Score 0   Stairs Score 5   Barthel Index Score 55   Additional Treatment Session   Start Time 1040   End Time 1055   Treatment Assessment Hallpike Cotter testing to both sides  Pt with no nystagmus with Luz Mohan testing to both sides  However, pt with subjective reports of dizziness with Luz Mohan testing to left side  Epley maneuver performed as treatment starting with pt and Hallpike Cotter position and then moving through the complete maneuver  Pt noted with a subjective increase of symptoms when head moved from left rotation in 20° of extension to right rotation indicating the possibility of otoconia and movement  Following Prashanth Pastel testing and Epley maneuver pt transferred and ambulated with a RW for 150 ft with change in direction with cues to decrease overall head movement with ambulation  Based on PT testing there is a significant possibility that pt is positive for BPPV left side  Pt will benefit from continued physical therapy services as an outpatient at the balance Center for balance and vestibular rehabilitation  Pt will also benefit from continued gait training with a RW and for use of the RW at home  Pt is agreeable to all PT recommendations  Pt is safe for discharge home today with continued use of the RW and follow-up with outpatient PT  End of Consult   Patient Position at End of Consult Bedside chair; All needs within reach   [facility] Insurance Number  Loreatha Headings PT [id number]     Portions of the documentation may have been created using voice recognition software  Occasional wrong word or sound alike substitutions may have occurred due to the inherent limitation of the voice recognition software  Read the chart carefully and recognize, using context, where substitutions have occurred

## 2022-05-25 NOTE — CASE MANAGEMENT
Case Management Assessment & Discharge Planning Note    Patient name Tona Guillermo  Location [address] / [place] [address] MRN [id number]  : 1945 Date 2022       Current Admission Date: 2022  Current Admission Diagnosis:Dizziness   Patient Active Problem List    Diagnosis Date Noted    Dizziness 2022    Abrasion, right lower leg, initial encounter  2022    Chronic sacroiliac strain 2021    Diabetic polyneuropathy associated with type 2 diabetes mellitus ([facility] ) 2021    Colon cancer screening 09/10/2019    Fatigue 2019    Severe obesity (BMI 35 0-39  9) with comorbidity ([facility] ) 2017    Benign essential hypertension 2017    Decreased dorsalis pedis pulse 2017    Hyperlipidemia, mixed 2017    Vitamin D deficiency 2017    Type 2 diabetes mellitus, without long-term current use of insulin ([facility] ) 2017    Multiple thyroid nodules 2017    Carpal tunnel syndrome 2014    Synovial cyst 2013      LOS (days): 0  Geometric Mean LOS (GMLOS) (days):   Days to GMLOS:     OBJECTIVE:        Current admission status: Observation  Referral Reason: Stroke    Preferred Pharmacy:   [facility] [place] [address]  Phone: [phone] Fax: [phone]    Primary Care Provider: Da Bradshaw MD    Primary Insurance: MEDICARE  Secondary Insurance: [facility]:  Active Health Care Proxies    There are no active Health Care Proxies on file          Obs Notice Signed: 22 (Notice given by registration per chart)    Readmission Root Cause  30 Day Readmission: No    Patient Information  Admitted from[de-identified] Home  Mental Status: Alert  During Assessment patient was accompanied by: Not accompanied during assessment  Assessment information provided by[de-identified] Patient  Primary Caregiver: Self  Support Systems: Spouse/significant other  South  of Residence: [address] [place] [address] do you live in?: [address] entry access options   Select all that apply : Stairs  Number of steps to enter home : 1  Type of Current Residence: 2 story home  Upon entering residence, is there a bedroom on the main floor (no further steps)?: Yes  Upon entering residence, is there a bathroom on the main floor (no further steps)?: Yes  In the last 12 months, was there a time when you were not able to pay the mortgage or rent on time?: No  In the last 12 months, how many places have you lived?: 1  In the last 12 months, was there a time when you did not have a steady place to sleep or slept in a shelter (including now)?: No  Homeless/housing insecurity resource given?: N/A  Living Arrangements: Lives w/ Spouse/significant other  Is patient a ?: No    Activities of Daily Living Prior to Admission  Functional Status: Independent  Completes ADLs independently?: Yes  Ambulates independently?: Yes  Does patient use assisted devices?: No  Does patient currently own DME?: Yes  What DME does the patient currently own?: Bedside [employer]  Does patient have a history of Outpatient Therapy (PT/OT)?: No  Does the patient have a history of Short-Term Rehab?: No  Does patient have a history of HHC?: No  Does patient currently have [place] [address]?: No      Patient Information Continued  Income Source: Pension/senior care  Does patient have prescription coverage?: Yes  Within the past 12 months, you worried that your food would run out before you got the money to buy more : Never true  Within the past 12 months, the food you bought just didn't last and you didn't have money to get more : Never true  Food insecurity resource given?: N/A  Does patient receive dialysis treatments?: No  Does patient have a history of substance abuse?: No  Does patient have a history of Mental Health Diagnosis?: No     Means of Transportation  Means of Transport to Appts[de-identified] Drives Self  In the past 12 months, has lack of transportation kept you from medical appointments or from getting medications?: No  In the past 12 months, has lack of transportation kept you from meetings, work, or from getting things needed for daily living?: No  Was application for public transport provided?: N/A    DISCHARGE DETAILS:    Discharge planning discussed with[de-identified] Patient  Freedom of Choice: Yes  Comments - Freedom of Choice: FOC reviewed with patient  At this time therapy team recommendations are pending  CM contacted family/caregiver?: Yes     Contacts  Patient Contacts: Gurdeep Galo (spouse)  Relationship to Patient[de-identified] Family  Contact Method: Phone  Phone Number: [phone]  Reason/Outcome: Emergency Contact, Discharge Planning    DME Referral Provided  Referral made for DME?: Yes  DME referral completed for the following items[de-identified] Shower Chair  DME Supplier Name[de-identified] [employer]    Other Referral/Resources/Interventions Provided:  Interventions: DME    Would you like to participate in our [address] service program?  : No - Declined    VENESSA spoke with patient at bedside to introduce role of CM and conduct assessment  Patient lives in a 2-story home with her  and has bedroom and bathroom on the first floor  She is independent with all needs and drives  She occasionally uses a walking stick when outside but does not need it for stability  VENESSA spoke by phone with spouse Anisha Grayson [phone]  He and patient both requested a shower chair and VENESSA explained that an order can be placed but that Medicare will not cover it  VENESSA placed order in [place] and requested cost information  Anisha Grayson also noted that they will plan to install grab bars in their bathroom  Patient has not yet been medically cleared for discharge and treatment team recommendations are pending  VENESSA will continue to follow for discharge planning needs

## 2022-05-25 NOTE — PLAN OF CARE
Plan of care cont         Problem: DISCHARGE PLANNING  Goal: Discharge to home or other facility with appropriate resources  Description: INTERVENTIONS:  - Identify barriers to discharge w/patient and caregiver  - Arrange for needed discharge resources and transportation as appropriate  - Identify discharge learning needs (meds, wound care, etc )  - Arrange for interpretive services to assist at discharge as needed  - Refer to Case Management Department for coordinating discharge planning if the patient needs post-hospital services based on physician/advanced practitioner order or complex needs related to functional status, cognitive ability, or social support system  Outcome: Progressing     Problem: Knowledge Deficit  Goal: Patient/family/caregiver demonstrates understanding of disease process, treatment plan, medications, and discharge instructions  Description: Complete learning assessment and assess knowledge base    Interventions:  - Provide teaching at level of understanding  - Provide teaching via preferred learning methods  Outcome: Progressing     Problem: NEUROSENSORY - ADULT  Goal: Achieves stable or improved neurological status  Description: INTERVENTIONS  - Monitor and report changes in neurological status  - Monitor vital signs such as temperature, blood pressure, glucose, and any other labs ordered   - Initiate measures to prevent increased intracranial pressure  - Monitor for seizure activity and implement precautions if appropriate      Outcome: Progressing  Goal: Remains free of injury related to seizures activity  Description: INTERVENTIONS  - Maintain airway, patient safety  and administer oxygen as ordered  - Monitor patient for seizure activity, document and report duration and description of seizure to physician/advanced practitioner  - If seizure occurs,  ensure patient safety during seizure  - Reorient patient post seizure  - Seizure pads on all 4 side rails  - Instruct patient/family to notify RN of any seizure activity including if an aura is experienced  - Instruct patient/family to call for assistance with activity based on nursing assessment  - Administer anti-seizure medications if ordered    Outcome: Progressing  Goal: Achieves maximal functionality and self care  Description: INTERVENTIONS  - Monitor swallowing and airway patency with patient fatigue and changes in neurological status  - Encourage and assist patient to increase activity and self care     - Encourage visually impaired, hearing impaired and aphasic patients to use assistive/communication devices  Outcome: Progressing

## 2022-05-25 NOTE — ASSESSMENT & PLAN NOTE
Lab Results   Component Value Date    HGBA1C 10 9 (H) 05/25/2022       Recent Labs     05/24/22  2216 05/25/22  0245 05/25/22  0740 05/25/22  1121   POCGLU 277* 255* 278* 275*       Blood Sugar Average: Last 72 hrs:  (P) 216 [id number]     Did discuss with patient usage of metformin, she indicated that she will try to use it once again and will discuss with her primary care physician possibly changing to a different medication  Patient would benefit from therapeutic lifestyle changes including weight loss and exercise, diet modification  Patient indicated that she will attempt this, that this is been a wake-up call    Follow-up with primary care physician 1-2 weeks post discharge

## 2022-05-25 NOTE — CONSULTS
[facility] [address]   Neurology Initial Consult    Donavan Reed is a 69 y o  female  2 [address] 202/[address] obtained from:   Chief Complaint   Patient presents with    Nausea     Nausea and vomiting at home - (pt triaged at 6475, see down time paper work)         Assessment/Plan:    1  BPPV  2  Seasonal Allergies  3  DM  4  HLD    -Monitored on Telemetry  -Neuro assessments   -BASA 81mg daily-can dc upon pt dc  -Lipitor 40mg daily-recommend pt consider continued usage on DC to meet vascular risk goal for LDL <70  -Meclizine 25mg q8hrs scheduled dosing, can give prn dosing for home use  -Recommend a daily sinus/allergy regimen per the medical team  -Recommend pt follow up with PCP, poss endocrine referral for alternate treatment of DM to achieve euglycemia with A1C <7 0  -normotensive BP  -MRI of the Brain completed and negative for acute findings  + fluid in Rt mastoid cells  -Echo with shunt- deferred as pt does not have acute ischemic findings noted this am   -Labs Lipid Profile and A1C  -PT/OT/ST screen  -Outpatient referral to [address] [place] [address] for Vestibular Rehab    Pt is a 73yr old female with PMH of vascular risks HLD, HTN and DM  Pt had wake up episode of vertigo with room swirling around her and onset of nausea and vomiting  Pt unable to stand or move and was brought to the ED   Pt had ataxia in the ED, persisted after receiving Meclizine and IVF  Pt send for CTA to eval posterior circulation, noted no significant LVO or stenosis  Pt does have some mild Lt EC Carotid stenosis <50%  Remaining vessels appear normal caliber  ER gave aspirin, Lipitor 40 and meclizine and admitted for poss  CVA  MRI completed and read independently as well as per radiology, no acute infarct seen  Pt did have fluid in rt Mastoid cells    Pt reported having URI type symptoms for a few days, advised today about 10days of on and off sinus headache, Lt ear discomfort and increased tinnitus  She noted having allergies but no antihistamine due to causing her to be drowsy  Pt has not tied taking at bedtime  Pt also reports having had some abnormal stool patterns she has attributed to her Metformin and reported stopping this medication about 10days ago  Pt did not advise her PCP, although PCP is aware that she has had difficulty tolerating it  Pt A1C 10 9  Pt Chol 217 and , outside of vascular goal ranges of <70  Will discuss recommendations for continued BASA usage, however, recommend pt continue on Lipitor for continue risk management  PT should contact PCP re: alternate DM treatment or poss referral for Endocrine  Pt encouraged to change her lifestyle choices, she is aware that she is eating poorly and will need to work on better control  She should remain normotensive and actively work on her risk management  Can have prn meclizine upon her DC to home      Ana Miranda will need follow up in 8-10 weeks with general attending or advance practitioner  She will not require outpatient neurological testing  HPI:  Ana Miranda is a 77yo female with PMH of HTN, DM, endometrial ca and obesity who reported waking at 6am and felt like her "world was spinning around her"  She noted nausea with dry heaves  Pt reported that it had gotten back to the point where she could not stand or walk  Her  came to help her but she had to come to the ED  Pt presented to the ED with ongoing reports of N/V and feeling off balance and dizzy  /67 and Orthostatic pressures are negative  Pt labs with Na 134, , UA with large glucose and Leukocytes, likely related to Glucose spill  She was ambulated, had difficulty with ataxia  Pt had CTA of head and neck out of concern for poss CVA  Pt showing mild athero of the EC Lt Carotid, <50%     Pt last known normal was 2000, she did awaken a couple of times during the night without dizziness, however, unclear of the timing  Pt was not a candidate for tPA due to reports LKN 8pm, therefore, no stroke alert was called  Pt received IVF, aspirin, lipitor 40mg and total of 75mg Meclizine in the ED  Pt was admitted under the stroke pathway  Pt had MRI done last night, read was pending until this am   No acute infarct seen  Pt does have some fluid noted in the Rt mastoid cells  Spoke with pt at the bedside  She noted that she woke in the am and her world was spinning, she got very nauseated and was having dry heaves  Pt noted that she was unable to stand or move  Noted no incidence of this in the past    Pt noted that she does get dizzy and off balance when she turns her head too quickly and is easily sickened with motion  Pt had previously reported recent URI type symptoms for a few days  She noted that she has allergies and that she has been bothered lately by them  She does have some fullness and discomfort to the Left ear and has had increase in tinnitus for approx the last 10days  She reports having seasonal allergies and she and her  have been outside cutting the grass and tending the yard  Pt does not take any antihistamine during allergy season, noted that the non drowsy medication makes her drowsy  Pt was taking in am, did not trial night time dosing  Pt does not use nasal sprays  Pt is diabetic and reports that she stopped taking her metformin about 10days ago as well  Stated that she had too many intolerable GI symptoms where she would constipate for a few days, then have small stools, then explosive diarrhea  She noted that her PCP did adjust the medication and trialed different formulas  Pt reports that since being in the hospital she has had a sinus headache overnight and she got Tylenol for this which was effective and no longer present  She denied headache during or prior to her event  Pt reports sinus headaches 1-2x a week due to the allergies    On exam, pt has no significant focal deficits, some mild decreased reflex to the Rt patella and some mild ataxia with toe walking  Pt feels wobbly this am but denied any further episode of vertigo  Pt receiving Meclizine 25mg q8hrs during her hospital stay  On EOM exam, notes some mild nystagmus to the Rtward gaze, appeared to have discomfort on exam, pt denies discomfort  Pt does reports light sensitivity on exam   PT/OT arrived and continuing with pt assessment after Neuro exam completed  Will eval for BPPV, nystagmus and treat appropriately  Pt A1C is 10 9 and puts her at vascular risk  Pt has elevated Chol  And LDL is 140, pt is not on statin  Pt advised that she had been told in the past but has not adjusted her diet  Pt has minimal findings of chronic disease however has significant vascular risk factors  Would recommend pt work with PCP or Endocrine for alternate options to Metformin in order to gain better control of her DM  Would recommend lifestyle changes with exercise and diet as well as continued use of statin to help bring her LDL down to the goal range <70  Pt is not on any allergy regimen, advised antihistamine daily at bedtime with use of daily nasal spray to reduce allergy symptoms        Past Medical History:   Diagnosis Date    Diabetes mellitus ([facility] )     Endometrial carcinoma ([facility] )     Hypertension     Rectal bleeding 9/10/2019    Sciatic radiculitis 1/28/2021    Varicella        Past Surgical History:   Procedure Laterality Date    HYSTERECTOMY  2006    TONSILLECTOMY  1962    TUBAL LIGATION  1992    US GUIDED THYROID BIOPSY  12/12/2019       Allergies   Allergen Reactions    Gabapentin Hives         Current Facility-Administered Medications:     acetaminophen (TYLENOL) tablet 650 mg, 650 mg, Oral, Q6H PRN, RAJAN Mojica, 650 mg at 05/25/22 0612    aspirin chewable tablet 81 mg, 81 mg, Oral, Daily, RAJAN Perez, 81 mg at 05/25/22 4364    atorvastatin (LIPITOR) tablet 40 mg, 40 mg, Oral, QPM, RAJAN Giles, 40 mg at 05/24/22 1644    enoxaparin (LOVENOX) subcutaneous injection 40 mg, 40 mg, Subcutaneous, Daily, RAJAN Giles, 40 mg at 05/25/22 4252    insulin lispro (HumaLOG) 100 units/mL subcutaneous injection 1-6 Units, 1-6 Units, Subcutaneous, TID AC, 1 Units at 05/25/22 4388 **AND** Fingerstick Glucose (POCT), , , TID AC, RAJAN Giles    insulin lispro (HumaLOG) 100 units/mL subcutaneous injection 1-6 Units, 1-6 Units, Subcutaneous, HS, Shara Harris, RAJAN    insulin lispro (HumaLOG) 100 units/mL subcutaneous injection 1-6 Units, 1-6 Units, Subcutaneous, 0200, RAJAN Worthington    lactated ringers infusion, 75 mL/hr, Intravenous, Continuous, RAJAN Giles, Last Rate: 75 mL/hr at 05/25/22 0618, 75 mL/hr at 05/25/22 0618    meclizine (ANTIVERT) tablet 25 mg, 25 mg, Oral, Q8H, RAJAN Giles, 25 mg at 05/25/22 6642    Social History     Socioeconomic History    Marital status: /Civil Union     Spouse name: Not on file    Number of children: Not on file    Years of education: Not on file    Highest education level: Not on file   Occupational History    Occupation: Retired    Tobacco Use    Smoking status: Never Smoker    Smokeless tobacco: Never Used   Vaping Use    Vaping Use: Never used   Substance and Sexual Activity    Alcohol use: Never    Drug use: Never    Sexual activity: Not Currently     Partners: Male     Birth control/protection: Post-menopausal   Other Topics Concern    Not on file   Social History Narrative    Denied: History of alcohol use - As per [employer]    Daily caffeine consumption, 2-3 servings a day    Denied: History of drug use - As per [employer]    Retired from employment: [employer]       Social Determinants of Health     Financial Resource Strain: Not on file   Food Insecurity: No Food Insecurity    Worried About Running Out of Food in the Last Year: Never true   [employer] Out of Food in the Last Year: Never true   Transportation Needs: No Transportation Needs    Lack of Transportation (Medical): No    Lack of Transportation (Non-Medical): No   Physical Activity: Not on file   Stress: Not on file   Social Connections: Not on file   Intimate Partner Violence: Not on file   Housing Stability: Low Risk     Unable to Pay for Housing in the Last Year: No    Number of Places Lived in the Last Year: 1    Unstable Housing in the Last Year: No       Family History   Problem Relation Age of Onset    Stroke Mother     Diabetes Mother     Cancer Mother     Lung cancer Father     Breast cancer Sister 66    COPD Brother     Coronary artery disease Brother     Heart failure Brother     Leukemia Son     HIV Brother     Cancer Family          Review of systems:  Please see HPI for positive symptoms  Constitutional: No fever, no chills, no weight change  Ocular: No diplopia, no blurred vision, spots/zigzag lines  +light sensitivities  HEENT:  No sore throat or congestion  +headache overnight, resolved with Tylenol  +Lt ear discomfort x 10d  COR:  No chest pain  No palpitations  Lungs:  no sob  GI:  no  nausea, no vomiting, no diarrhea, no constipation, no anorexia  :  No dysuria, frequency, or urgency  No hematuria  Musculoskeletal:  No joint pain or swelling   Skin:  No rash or itching  Psychiatric:  no anxiety, no depression  Endocrine:  No polyuria or polydipsia  Physical examination:  /82   Pulse 89   Temp 98 2 °F (36 8 °C)   Resp 16   Ht 5' 6" (1 676 m)   Wt 104 kg (229 lb 4 5 oz)   SpO2 94%   BMI 37 01 kg/m²     GENERAL APPEARANCE:  The patient is alert, oriented  HEENT:  Head is normocephalic  Pupils are equal and reactive  NECK:  Supple without lymphadenopathy  HEART:  Regular rate and rhythm  LUNGS:  No audible wheezing or stridor heard  ABDOMEN:  Soft, nontender, nondistended with good bowel sounds heard      EXTREMITIES:  Without cyanosis, clubbing or edema  Mental status: The patient is alert, attentive, and oriented  Speech is clear and fluent, good repetition, comprehension, and naming  Cranial nerves:  CN II: Visual fields are full to confrontation  Fundoscopic exam is normal with sharp discs and no vascular changes  Pupils are 3 mm and briskly reactive to light  CN III, IV, VI: At primary gaze, there is no eye deviation  +mild rtward nystagmus with minimal discomfort  CN V: Facial sensation is intact to pinprick in all 3 divisions bilaterally  Corneal responses are intact  CN VII: Face is symmetric with normal eye closure and smile  CN VIII: Hearing is normal to rubbing fingers  CN IX, X: Palate elevates symmetrically  Phonation is normal   CN XI: Head turning and shoulder shrug are intact  CN XII: Tongue is midline with normal movements and no atrophy  Motor: There is no pronator drift of out-stretched arms  Muscle bulk and tone are normal    Muscle exam  Arm Right Left Leg Right Left   Deltoid 5/5 5/5 Iliopsoas 5/5 5/5   Biceps 5/5 5/5 Quads 5/5 5/5   Triceps 5/5 5/5 Hamstrings 5/5 5/5   Wrist Extension 5/5 5/5 Ankle Dorsi Flexion 5/5 5/5   Wrist Flexion 5/5 5/5 Ankle Plantar Flexion 5/5 5/5        Reflexes    RJ BJ TJ KJ AJ Plantars Jay's   Right 2+ 2+ 2+ 1+ 2+ Downgoing Not present   Left 2+ 2+ 2+ 2+ 2+ Downgoing Not present      Sensory:  Light touch, Temperature, position sense, and vibration sense are intact in fingers and toes  Coordination:  Rapid alternating movements and fine finger movements are intact  There is no dysmetria on finger-to-nose and heel-knee-shin  There are no abnormal or extraneous movements  Romberg mildly positive  Gait/Stance:  Guarded steady casual gait with reduced stride and slowed pace  Unable to toe walk and minimal heel walking     Deferred tandem gait due to feeling off balance    Lab Results   Component Value Date    WBC 6 67 05/25/2022    HGB 14 4 05/25/2022 HCT 44 0 05/25/2022    MCV 84 05/25/2022     05/25/2022     Lab Results   Component Value Date    HGBA1C 10 9 (H) 05/25/2022     Lab Results   Component Value Date    ALT 18 05/25/2022    AST 11 05/25/2022    ALKPHOS 88 05/25/2022    BILITOT 0 4 02/25/2015     Lab Results   Component Value Date    GLUCOSE 246 (H) 02/25/2015    CALCIUM 8 7 05/25/2022     09/11/2017    K 4 1 05/25/2022    CO2 28 05/25/2022     05/25/2022    BUN 12 05/25/2022    CREATININE 0 66 05/25/2022     Chol 217      Review of reports and notes reveal:  Independent Interpretation of images or specimens:  CTA head and neck with and without contrast  Result Date: 5/24/2022  CT brain: No acute intracranial abnormality  CT angiography: Minor noncalcified atherosclerotic disease involving the left common carotid artery  Normal bifurcation and cervical internal carotid arteries  Normal intracranial vasculature  6 mm pulmonary nodule within the anterior medial left upper lobe  No prior studies for comparison  Based on current Fleischner Society 2017 Guidelines on incidental pulmonary nodule, followup non-contrast CT is recommended at 6-12 months from the initial  examination and, if stable at that time, an additional followup is recommended for 18-24 months from the initial examination  This examination was marked "immediate notification" in Epic in order to begin the standard process by which the radiology reading room liaison alerts the referring practitioner  Workstation performed: [id number]     XR chest portable  Result Date: 5/24/2022  No acute cardiopulmonary disease  Workstation performed: [id number]     MRI brain wo contrast  Result Date: 5/25/2022  No acute abnormality  Small amount of fluid in the right mastoid air cells  Workstation performed: [id number]           Thank you for this consult  Total time of encounter: 70 Minutes  More than 50% of time was spent in counseling and coordination of care of patient

## 2022-05-25 NOTE — ASSESSMENT & PLAN NOTE
Patient presented to the ED with complaints of dizziness  She states for the past month she has noticed that if she moves abruptly she feels dizzy like the room is spinning  For the past several days she has had URI symptoms with nasal congestion  This morning when she woke up she felt very dizzy and had to hold on to things to walk around  She had nausea with no vomiting  In the ER, CT of the head and CTA head and neck had no acute findings  EKG revealed NSR with first degree AV block  Labs are unremarkable  She feels better after fluids and meclizine, dizziness and nausea has resolved  She still feels slightly "unsteady" when ambulating  · Patient was on telemetry showed sinus rhythm no events overnight  · Start aspirin and statin; continue both on discharge  · Patient did experience some dizziness when looking to the left  · Lipid panel abnormal, recommend heart healthy diet and continue with statin; discussed with patient  · Orthostatic BP negative  · MRI negative for acute process  · Meclizine p o  Q 8 hours p r n  Dizziness, E scribed to patient's pharmacy  · Neurology input appreciated, recommending to continue aspirin statin secondary to patient's comorbidities and risk status  · PT/OT evaluation treatment performed, recommending patient to follow-up with balance center for vestibular exercises and recommend home with rolling walker  · Rolling walker delivered to patient prior to discharge  · Patient be discharged home today, follow-up with primary care physician 1-2 weeks post discharge  · Meclizine q 8 hours p r n   Dizziness, E scribed to patient's pharmacy

## 2022-05-25 NOTE — SPEECH THERAPY NOTE
Consult received  Records reviewed  Pt admitted c symptoms concerning for CVA/TIA  Pt passed RN Dysphagia Assessment  Communication deficits denied  NIH score 0  MRI results negative  No additional inpatient Speech Pathology evaluation appears indicated at this time  Please re-consult if additional concerns arise  Thank you

## 2022-05-25 NOTE — ASSESSMENT & PLAN NOTE
Patient reports seasonal allergies, indicated that she was taking non drowsy antihistamine however she was very tired had to go to bed for the rest of the day  Did discuss strategy of taking antihistamine medication prior to sleep, she is agreeable to trying this  Prescribed Flonase for patient which she is agreeable to trying also    Follow-up with primary care physician 1-2 weeks post discharge

## 2022-05-25 NOTE — INCIDENTAL FINDINGS
The following findings require follow up:  Radiographic finding   Findin mm pulmonary nodule within the anterior medial left upper lobe seen on CTA of head and neck   Follow up required: Follow-up noncontrast CT is recommended 6-12 months from initial examination and if stable at that time an additional follow-up is recommended for 18-24 months from the initial examination     Follow up should be done within 6-12 month(s)    Please notify the following clinician to assist with the follow up:   Dr Luis A Grimaldo, family doctor

## 2022-05-25 NOTE — NURSING NOTE
Pt  Taken off the floor via w/c, accompanied by her spouse and PCA Ken Lawrenceit  Pt  Condition stable at this time

## 2022-05-25 NOTE — PLAN OF CARE
Problem: Potential for Falls  Goal: Patient will remain free of falls  Description: INTERVENTIONS:  - Educate patient/family on patient safety including physical limitations  - Instruct patient to call for assistance with activity   - Consult OT/PT to assist with strengthening/mobility   - Keep Call bell within reach  - Keep bed low and locked with side rails adjusted as appropriate  - Keep care items and personal belongings within reach  - Initiate and maintain comfort rounds  - Make Fall Risk Sign visible to staff  - Offer Toileting every 2 Hours, in advance of need  - Initiate/Maintain bed alarm  - Obtain necessary fall risk management equipment: yes  - Apply yellow socks and bracelet for high fall risk patients  - Consider moving patient to room near nurses station  Outcome: Progressing     Problem: DISCHARGE PLANNING  Goal: Discharge to home or other facility with appropriate resources  Description: INTERVENTIONS:  - Identify barriers to discharge w/patient and caregiver  - Arrange for needed discharge resources and transportation as appropriate  - Identify discharge learning needs (meds, wound care, etc )  - Arrange for interpretive services to assist at discharge as needed  - Refer to Case Management Department for coordinating discharge planning if the patient needs post-hospital services based on physician/advanced practitioner order or complex needs related to functional status, cognitive ability, or social support system  Outcome: Progressing     Problem: Knowledge Deficit  Goal: Patient/family/caregiver demonstrates understanding of disease process, treatment plan, medications, and discharge instructions  Description: Complete learning assessment and assess knowledge base    Interventions:  - Provide teaching at level of understanding  - Provide teaching via preferred learning methods  Outcome: Progressing     Problem: NEUROSENSORY - ADULT  Goal: Achieves stable or improved neurological status  Description: INTERVENTIONS  - Monitor and report changes in neurological status  - Monitor vital signs such as temperature, blood pressure, glucose, and any other labs ordered   - Initiate measures to prevent increased intracranial pressure  - Monitor for seizure activity and implement precautions if appropriate      Outcome: Progressing  Goal: Remains free of injury related to seizures activity  Description: INTERVENTIONS  - Maintain airway, patient safety  and administer oxygen as ordered  - Monitor patient for seizure activity, document and report duration and description of seizure to physician/advanced practitioner  - If seizure occurs,  ensure patient safety during seizure  - Reorient patient post seizure  - Seizure pads on all 4 side rails  - Instruct patient/family to notify RN of any seizure activity including if an aura is experienced  - Instruct patient/family to call for assistance with activity based on nursing assessment  - Administer anti-seizure medications if ordered    Outcome: Progressing  Goal: Achieves maximal functionality and self care  Description: INTERVENTIONS  - Monitor swallowing and airway patency with patient fatigue and changes in neurological status  - Encourage and assist patient to increase activity and self care     - Encourage visually impaired, hearing impaired and aphasic patients to use assistive/communication devices  Outcome: Progressing

## 2022-05-25 NOTE — CASE MANAGEMENT
Case Management Discharge Planning Note    Patient name Ronal Robles  Location [facility]  [address] MRN [id number]  : 1945 Date 2022       Current Admission Date: 2022  Current Admission Diagnosis:Dizziness   Patient Active Problem List    Diagnosis Date Noted    Dizziness 2022    Abrasion, right lower leg, initial encounter  2022    Chronic sacroiliac strain 2021    Diabetic polyneuropathy associated with type 2 diabetes mellitus ([facility] [address] ) 2021    Colon cancer screening 09/10/2019    Fatigue 2019    Severe obesity (BMI 35 0-39  9) with comorbidity [facility] ) 2017    Benign essential hypertension 2017    Decreased dorsalis pedis pulse 2017    Hyperlipidemia, mixed 2017    Vitamin D deficiency 2017    Type 2 diabetes mellitus, without long-term current use of insulin ([facility] ) 2017    Multiple thyroid nodules 2017    Carpal tunnel syndrome 2014    Synovial cyst 2013      LOS (days): 0  Geometric Mean LOS (GMLOS) (days):   Days to GMLOS:     OBJECTIVE:            Current admission status: Observation   Preferred Pharmacy:   [facility] [address] [address] [address]  Phone: [phone] Fax: [phone]    Primary Care Provider: Zoie Aguilera MD    Primary Insurance: MEDICARE  Secondary Insurance: Yolanda Woods    DISCHARGE DETAILS:    Discharge planning discussed with[de-identified] Patient  Freedom of Choice: Yes  Comments - Freedom of Choice: Choice is for [employer] for DME provider    CM contacted family/caregiver?: No- see comments (DECLINED)  Were Treatment Team discharge recommendations reviewed with patient/caregiver?: Yes  Did patient/caregiver verbalize understanding of patient care needs?: Yes  Were patient/caregiver advised of the risks associated with not following Treatment Team discharge recommendations?: Yes    [address] [place] [address] Care         Is the patient interested in [place] [address] at discharge?: No    DME Referral Provided  DME referral completed for the following items[de-identified] Cortez Alcantara  DME Supplier Name[de-identified] [employer] (delivered bedside )    Other Referral/Resources/Interventions Provided:  Interventions: DME    Treatment Team Recommendation: Home  Discharge Destination Plan[de-identified] Home (w/ VT follow-up )  Transport at Discharge : Family

## 2022-05-25 NOTE — DISCHARGE SUMMARY
Trang U  67   Discharge- Alessandra [facility] [place] 1945, 69 y o  female MRN: [id number]  Unit/Bed#: [place] Encounter: [id number]  Primary Care Provider: Joaquina Trevino MD   Date and time admitted to hospital: 5/24/2022 10:45 AM    * Dizziness  Assessment & Plan  Patient presented to the ED with complaints of dizziness  She states for the past month she has noticed that if she moves abruptly she feels dizzy like the room is spinning  For the past several days she has had URI symptoms with nasal congestion  This morning when she woke up she felt very dizzy and had to hold on to things to walk around  She had nausea with no vomiting  In the ER, CT of the head and CTA head and neck had no acute findings  EKG revealed NSR with first degree AV block  Labs are unremarkable  She feels better after fluids and meclizine, dizziness and nausea has resolved  She still feels slightly "unsteady" when ambulating  · Patient was on telemetry showed sinus rhythm no events overnight  · Start aspirin and statin; continue both on discharge  · Patient did experience some dizziness when looking to the left  · Lipid panel abnormal, recommend heart healthy diet and continue with statin; discussed with patient  · Orthostatic BP negative  · MRI negative for acute process  · Meclizine p o  Q 8 hours p r n  Dizziness, E scribed to patient's pharmacy  · Neurology input appreciated, recommending to continue aspirin statin secondary to patient's comorbidities and risk status  · PT/OT evaluation treatment performed, recommending patient to follow-up with balance center for vestibular exercises and recommend home with rolling walker  · Rolling walker delivered to patient prior to discharge  · Patient be discharged home today, follow-up with primary care physician 1-2 weeks post discharge  · Meclizine q 8 hours p r n   Dizziness, E scribed to patient's pharmacy    Type 2 diabetes mellitus, without long-term current use of insulin [facility])  Assessment & Plan  Lab Results   Component Value Date    HGBA1C 10 9 (H) 05/25/2022       Recent Labs     05/24/22  2216 05/25/22  0245 05/25/22  0740 05/25/22  1121   POCGLU 277* 255* 278* 275*       Blood Sugar Average: Last 72 hrs:  (P) 621 [id number]     Did discuss with patient usage of metformin, she indicated that she will try to use it once again and will discuss with her primary care physician possibly changing to a different medication  Patient would benefit from therapeutic lifestyle changes including weight loss and exercise, diet modification  Patient indicated that she will attempt this, that this is been a wake-up call  Follow-up with primary care physician 1-2 weeks post discharge    Severe obesity (BMI 35 0-39  9) with comorbidity [facility])  Assessment & Plan  Dietary and lifestyle modifications     Seasonal allergies  Assessment & Plan  Patient reports seasonal allergies, indicated that she was taking non drowsy antihistamine however she was very tired had to go to bed for the rest of the day  Did discuss strategy of taking antihistamine medication prior to sleep, she is agreeable to trying this  Prescribed Flonase for patient which she is agreeable to trying also  Follow-up with primary care physician 1-2 weeks post discharge        Discharging Physician / Practitioner: RAJAN Lund  PCP: Deitra Mortimer, MD  Admission Date: 5/24/2022  Discharge Date: 05/25/22    Reason for Admission: Nausea (Nausea and vomiting at home - (pt triaged at [id number], see down time paper work))        Medical Problems             Resolved Problems  Date Reviewed: 5/25/2022   None                 Consultations During Hospital Stay:  IP CONSULT TO CASE MANAGEMENT  IP CONSULT TO NEUROLOGY    Procedures Performed:     · None    Significant Findings / Test Results:     · MRI of brain and CTA of head and neck performed on 05/24, results below      Results from last 7 days   Lab Units 05/25/22  0516 05/24/22  0901   WBC Thousand/uL 6 67 7 02   HEMOGLOBIN g/dL 14 4 15 6*   PLATELETS Thousands/uL 274 282     Results from last 7 days   Lab Units 05/25/22  0516 05/24/22  0901   SODIUM mmol/L 139 134*   POTASSIUM mmol/L 4 1 3 9   CHLORIDE mmol/L 103 99*   CO2 mmol/L 28 28   BUN mg/dL 12 13   CREATININE mg/dL 0 66 0 76   CALCIUM mg/dL 8 7 9 2   TOTAL BILIRUBIN mg/dL 0 42 0 53   ALK PHOS U/L 88 105   ALT U/L 18 26   AST U/L 11 14             Lab Results   Component Value Date/Time    HGBA1C 10 9 (H) 05/25/2022 05:16 AM    HGBA1C 10 8 (H) 09/11/2017 12:00 AM     Results from last 7 days   Lab Units 05/25/22  1121 05/25/22  0740 05/25/22  0245 05/24/22  2216 05/24/22  1559 05/24/22  1207   POC GLUCOSE mg/dl 275* 278* 255* 277* 258* 270*         Blood Culture: No results found for: BLOODCX  Urine Culture:   Lab Results   Component Value Date    URINECX No Growth <1000 cfu/mL 01/13/2022    URINECX No Growth <1000 cfu/mL 10/19/2020     Sputum Culture: No components found for: SPUTUMCX  Wound Culture: No results found for: WOUNDCULT     MRI brain wo contrast   Final Result by Veronika Olson MD (05/25 1604)      No acute abnormality  Small amount of fluid in the right mastoid air cells  Workstation performed: [id number]         CTA head and neck with and without contrast   Final Result by Mikey Dick DO (05/24 3164)      CT brain: No acute intracranial abnormality  CT angiography: Minor noncalcified atherosclerotic disease involving the left common carotid artery  Normal bifurcation and cervical internal carotid arteries  Normal intracranial vasculature  6 mm pulmonary nodule within the anterior medial left upper lobe  No prior studies for comparison   Based on current Fleischner Society 2017 Guidelines on incidental pulmonary nodule, followup non-contrast CT is recommended at 6-12 months from the initial    examination and, if stable at that time, an additional followup is recommended for 18-24 months from the initial examination  This examination was marked "immediate notification" in Epic in order to begin the standard process by which the radiology reading room liaison alerts the referring practitioner  Workstation performed: [id number]                Incidental Findings:   · 6 mm pulmonary nodule left lung seen on imaging, recommending follow-up noncontrast CT in 6-12 months     Test Results Pending at Discharge (will require follow up): · None     Outpatient Tests Requested:  · None    Complications:  None    Reason for Admission:   Chief Complaint   Patient presents with    Nausea     Nausea and vomiting at home - (pt triaged at [phone], see down time paper work)       Hospital Course:     Per HPI: Lori Monroy is a 69 y o  female patient with a PMH of diabetes, endometrial cancer who originally presented to the hospital on 5/24/2022 due to dizziness  Patient stated that for the past month she has noticed that if she moves abruptly she feels dizzy like the room is spinning  For the past several days she had upper respiratory infection symptoms with nasal congestion  On morning of admission she woke up felt very dizzy and had to hold onto things to walk around  She had nausea with no vomiting  In the ER CT of head and CTA of head and neck was performed with no acute findings  A 6 mm pulmonary nodule was seen on imaging, recommend follow-up noncontrast CT in 6-12 months  This was discussed with the patient at the bedside  EKG shows sinus rhythm first-degree AV block  She received IV hydration and meclizine, dizziness and nausea resolved  Patient indicated that she still felt slightly unsteady when ambulating  She was admitted for further evaluation and treatment  MRI of the brain was performed which showed no acute intracranial abnormality    Neurology was consulted, recommending patient to continue on statin and aspirin on discharge as lipid panel abnormal and patient has additional risk factor of diabetes  Patient reports that she had not been taking her metformin secondary to side effect of diarrhea, discussed importance of controlling her diabetes better, she indicated that she would take her metformin and speak with her primary care physician if there was some other medication that might work better for her  She is to follow up with her primary care physician 1-2 weeks post discharge  PT/OT evaluation treatment were performed, recommending outpatient therapy with balance center for vestibular exercises  Patient will also be discharged with prescription for p r n  Meclizine  This was E scribed to her pharmacy  This was discussed with the patient in detail at the bedside, she verbalized understanding and is agreeable to the plan  Patient is discharged home today  Hospital Course:    Please see above list of diagnoses and related plan for additional information  Condition at Discharge: good       Discharge Day Visit / Exam:     Subjective:  Patient seen sitting up in chair, reports that earlier she had some chest pressure, attributed to possibly not being able to get comfortable in the bed  Was not able to sleep very well  EKG was performed shows sinus rhythm no changes  Troponin x1 drawn negative  When patient revisited later the in the a m  She reported that the pressure was totally gone and that she felt very good  Did report some dizziness when looking to the left  Reported that she walked with a rolling walker in physical therapy and felt much more stable  Is hopeful that she will be able to go home today      Vitals: Blood Pressure: 164/82 (05/25/22 0829)  Pulse: 89 (05/25/22 0829)  Temperature: 98 2 °F (36 8 °C) (05/25/22 0829)  Temp Source: Oral (05/24/22 1549)  Respirations: 16 (05/25/22 0829)  Height: 5' 6" (167 6 cm) (05/24/22 1734)  Weight - Scale: 104 kg (229 lb 4 5 oz) (05/24/22 1734)  SpO2: 94 % (05/25/22 0829)     Exam:   Physical Exam  Vitals and nursing note reviewed  Constitutional:       General: She is not in acute distress  Appearance: She is not ill-appearing  HENT:      Head: Normocephalic  Nose: Nose normal       Mouth/Throat:      Mouth: Mucous membranes are moist    Eyes:      Extraocular Movements: Extraocular movements intact  Conjunctiva/sclera: Conjunctivae normal       Pupils: Pupils are equal, round, and reactive to light  Cardiovascular:      Rate and Rhythm: Normal rate and regular rhythm  Pulses: Normal pulses  Heart sounds: Normal heart sounds  Pulmonary:      Effort: Pulmonary effort is normal    Abdominal:      General: Bowel sounds are normal  There is no distension  Palpations: Abdomen is soft  Tenderness: There is no abdominal tenderness  Genitourinary:     Comments: Voiding spontaneously  Musculoskeletal:         General: Normal range of motion  Cervical back: Normal range of motion  Right lower leg: No edema  Left lower leg: No edema  Skin:     General: Skin is warm and dry  Capillary Refill: Capillary refill takes less than 2 seconds  Neurological:      General: No focal deficit present  Mental Status: She is alert and oriented to person, place, and time  Psychiatric:         Mood and Affect: Mood normal          Behavior: Behavior normal          Thought Content: Thought content normal          Judgment: Judgment normal            Discharge instructions/Information to patient and family:   See after visit summary for information provided to patient and family  Provisions for Follow-Up Care:  See after visit summary for information related to follow-up care and any pertinent home health orders  Disposition:     Home    Planned Readmission:  No     Discharge Statement:  I spent greater than 30 minutes discharging the patient  This time was spent on the day of discharge   I had direct contact with the patient on the day of discharge  Greater than 50% of the total time was spent examining patient, answering all patient questions, arranging and discussing plan of care with patient as well as directly providing post-discharge instructions  Additional time then spent on discharge activities  Discharge Medications:  See after visit summary for reconciled discharge medications provided to patient and family        ** Please Note: This note has been constructed using a voice recognition system **

## 2022-05-26 ENCOUNTER — TRANSITIONAL CARE MANAGEMENT (OUTPATIENT)
Dept: FAMILY MEDICINE CLINIC | Facility: CLINIC | Age: 77
End: 2022-05-26

## 2022-05-27 ENCOUNTER — OFFICE VISIT (OUTPATIENT)
Dept: FAMILY MEDICINE CLINIC | Facility: CLINIC | Age: 77
End: 2022-05-27
Payer: MEDICARE

## 2022-05-27 VITALS — SYSTOLIC BLOOD PRESSURE: 140 MMHG | DIASTOLIC BLOOD PRESSURE: 82 MMHG

## 2022-05-27 DIAGNOSIS — E78.2 HYPERLIPIDEMIA, MIXED: ICD-10-CM

## 2022-05-27 DIAGNOSIS — R91.1 LUNG NODULE < 6CM ON CT: ICD-10-CM

## 2022-05-27 DIAGNOSIS — E11.69 TYPE 2 DIABETES MELLITUS WITH OTHER SPECIFIED COMPLICATION, WITHOUT LONG-TERM CURRENT USE OF INSULIN (HCC): Primary | ICD-10-CM

## 2022-05-27 DIAGNOSIS — E11.65 UNCONTROLLED TYPE 2 DIABETES MELLITUS WITH HYPERGLYCEMIA (HCC): ICD-10-CM

## 2022-05-27 DIAGNOSIS — I10 BENIGN ESSENTIAL HYPERTENSION: ICD-10-CM

## 2022-05-27 DIAGNOSIS — R42 DIZZINESS: ICD-10-CM

## 2022-05-27 DIAGNOSIS — E66.01 SEVERE OBESITY (BMI 35.0-39.9) WITH COMORBIDITY (HCC): ICD-10-CM

## 2022-05-27 PROBLEM — S80.811A ABRASION, RIGHT LOWER LEG, INITIAL ENCOUNTER: Status: RESOLVED | Noted: 2022-03-03 | Resolved: 2022-05-27

## 2022-05-27 PROBLEM — IMO0001 LUNG NODULE < 6CM ON CT: Status: ACTIVE | Noted: 2022-05-27

## 2022-05-27 LAB
ATRIAL RATE: 84 BPM
P AXIS: 43 DEGREES
PR INTERVAL: 218 MS
QRS AXIS: 41 DEGREES
QRSD INTERVAL: 98 MS
QT INTERVAL: 382 MS
QTC INTERVAL: 451 MS
T WAVE AXIS: 50 DEGREES
VENTRICULAR RATE: 84 BPM

## 2022-05-27 PROCEDURE — 93010 ELECTROCARDIOGRAM REPORT: CPT | Performed by: INTERNAL MEDICINE

## 2022-05-27 PROCEDURE — 99496 TRANSJ CARE MGMT HIGH F2F 7D: CPT | Performed by: FAMILY MEDICINE

## 2022-05-27 RX ORDER — METFORMIN HYDROCHLORIDE 500 MG/1
1000 TABLET, EXTENDED RELEASE ORAL 2 TIMES DAILY WITH MEALS
Qty: 120 TABLET | Refills: 5
Start: 2022-05-27 | End: 2022-07-20 | Stop reason: SDUPTHER

## 2022-05-27 NOTE — ASSESSMENT & PLAN NOTE
Blood pressure today remains borderline  Would consider the addition of an ACE-inhibitor    Presently she is not inclined to add any new medications

## 2022-05-27 NOTE — ASSESSMENT & PLAN NOTE
We discussed the importance of improvement in diet exercise weight loss in regards to her diabetes  She agrees with this plan

## 2022-05-27 NOTE — ASSESSMENT & PLAN NOTE
Incidentally discovered lung nodule  She will need a follow-up exam in 6-12 months  We ordered this today and send her a MyChart message as we neglected to discuss it during her visit  Will follow up with it in 3 months at her next visit as well

## 2022-05-27 NOTE — ASSESSMENT & PLAN NOTE
The patient is going to maintain compliance with metformin 1000 b i d  with meals  She is going to work on improvement in her diet and exercise regimen when able  Will see her back in 3 months with a hemoglobin A1c  She does feel motivated to get her A1c to goal   She states that 12 years ago I did it with diet exercise and I can do it again  We encouraged her to call a week with report of her blood sugars  She is also encouraged to maintain compliance with her Lipitor  She agrees with this plan    Lab Results   Component Value Date    HGBA1C 10 9 (H) 05/25/2022

## 2022-05-27 NOTE — ASSESSMENT & PLAN NOTE
Felt to be related to benign positional vertigo    She will continue her meclizine as needed and will arrange appointment with physical therapy for evaluation and treatment of BPV

## 2022-05-27 NOTE — PROGRESS NOTES
Assessment/Plan:     Type 2 diabetes mellitus, without long-term current use of insulin ([facility] [address] )  The patient is going to maintain compliance with metformin 1000 b i d  with meals  She is going to work on improvement in her diet and exercise regimen when able  Will see her back in 3 months with a hemoglobin A1c  She does feel motivated to get her A1c to goal   She states that 12 years ago I did it with diet exercise and I can do it again  We encouraged her to call a week with report of her blood sugars  She is also encouraged to maintain compliance with her Lipitor  She agrees with this plan  Lab Results   Component Value Date    HGBA1C 10 9 (H) 05/25/2022       Benign essential hypertension  Blood pressure today remains borderline  Would consider the addition of an ACE-inhibitor  Presently she is not inclined to add any new medications    Severe obesity (BMI 35 0-39  9) with comorbidity ([facility] )  We discussed the importance of improvement in diet exercise weight loss in regards to her diabetes  She agrees with this plan  Hyperlipidemia, mixed  Continue with atorvastatin  Lipid profile 3 months    Dizziness  Felt to be related to benign positional vertigo  She will continue her meclizine as needed and will arrange appointment with physical therapy for evaluation and treatment of BPV    Lung nodule < 6cm on CT  Incidentally discovered lung nodule  She will need a follow-up exam in 6-12 months  We ordered this today and send her a MyChart message as we neglected to discuss it during her visit  Will follow up with it in 3 months at her next visit as well  Diagnoses and all orders for this visit:    Type 2 diabetes mellitus with other specified complication, without long-term current use of insulin ([address] )    Uncontrolled type 2 diabetes mellitus with hyperglycemia (HCC)  -     metFORMIN (GLUCOPHAGE-XR) 500 mg 24 hr tablet;  Take 2 tablets (1,000 mg total) by mouth 2 (two) times a day with meals    Benign essential hypertension    Severe obesity (BMI 35 0-39  9) with comorbidity ([address] )    Hyperlipidemia, mixed    Dizziness    Lung nodule < 6cm on CT  -     CT lung nodule follow-up; Future         Subjective:     Patient ID: Roberto Vinson is a 69 y o  female  Admitted to [facility] with dizziness  W/u was negative other than elevation BS  C/w metformin for 2 days  Taking meclizine and feeling "woozy"  Lightheaded not vertigo  No c/o with atoravstatin  FBS was 245 this am  Was over 300 last week  No sx  Of neuropathy  HPI  The patient is a 71-year-old female who presents today for a transition of care visit  She was admitted to [facility] on 05/24 and discharged on 05/25  Admitting diagnosis was dizziness  There was concern for possible CVA  She had a CT of the head as well as CTA of the head neck with no acute findings  Workup for acute coronary syndrome was negative  She improved with fluids and meclizine  Her dizziness and nausea improved though she still feels unsteady  She was seen by Neurology  Her diagnosis was benign positional vertigo  She was asked to resume taking her metformin for her uncontrolled diabetes  She was also started on aspirin and statin  Since arrival home she continues to feel somewhat off balance  She does not have lorenzo vertigo  She describes it as a feeling of woozy  She has been compliant with her atorvastatin at bedtime  Her metformin 500 XL two with breakfast and 2 with dinner  Her blood sugars were over 300 last week and are now in the 250 range  She has no symptoms of neuropathy  No chest pain or worsened shortness of breath  She is aware that she needs to improve her diet exercise regimen  She denies polyuria polydipsia polyphagia  Review of Systems    Per the HPI  Objective:     Physical Exam  Constitutional:       Appearance: She is obese  She is not ill-appearing     Eyes:      Pupils: Pupils are equal, round, and reactive to light    Neck:      Vascular: No carotid bruit  Comments: No JVD  Cardiovascular:      Rate and Rhythm: Normal rate and regular rhythm  Pulmonary:      Effort: Pulmonary effort is normal       Breath sounds: Normal breath sounds  Musculoskeletal:      Right lower leg: No edema  Left lower leg: No edema  Neurological:      Mental Status: She is alert and oriented to person, place, and time  Comments: No nystagmus noted  She does feel lightheaded with a rising  She is not ataxic with ambulation   Psychiatric:         Mood and Affect: Mood normal          Thought Content: Thought content normal          Judgment: Judgment normal            Vitals:    05/27/22 1132   BP: 140/82   BP Location: Left arm   Patient Position: Sitting   Cuff Size: Large       Transitional Care Management Review:  Ronal Robles is a 69 y o  female here for TCM follow up  During the TCM phone call patient stated:    TCM Call (since 4/26/2022)     Date and time call was made  5/26/2022 10:49 AM    Hospital care reviewed  Records reviewed    Patient was hospitialized at  [address] [place] [address]    Date of Admission  05/24/22    Date of discharge  05/25/22    Diagnosis  Dizziness    Disposition  Home    Current Symptoms  None      TCM Call (since 4/26/2022)     Post hospital issues  None    Scheduled for follow up?   Yes    Did you obtain your prescribed medications  Yes    Do you need help managing your prescriptions or medications  No    Is transportation to your appointment needed  No    I have advised the patient to call PCP with any new or worsening symptoms  JUDITH Christensen MD

## 2022-05-28 LAB
DME PARACHUTE DELIVERY DATE ACTUAL: NORMAL
DME PARACHUTE DELIVERY DATE EXPECTED: NORMAL
DME PARACHUTE DELIVERY DATE REQUESTED: NORMAL
DME PARACHUTE DELIVERY NOTE: NORMAL
DME PARACHUTE ITEM DESCRIPTION: NORMAL
DME PARACHUTE ORDER STATUS: NORMAL
DME PARACHUTE SUPPLIER NAME: NORMAL
DME PARACHUTE SUPPLIER PHONE: NORMAL

## 2022-05-31 ENCOUNTER — TELEPHONE (OUTPATIENT)
Dept: NEUROLOGY | Facility: CLINIC | Age: 77
End: 2022-05-31

## 2022-05-31 LAB
DME PARACHUTE DELIVERY DATE ACTUAL: NORMAL
DME PARACHUTE DELIVERY DATE REQUESTED: NORMAL
DME PARACHUTE ITEM DESCRIPTION: NORMAL
DME PARACHUTE ORDER STATUS: NORMAL
DME PARACHUTE SUPPLIER NAME: NORMAL
DME PARACHUTE SUPPLIER PHONE: NORMAL

## 2022-05-31 NOTE — TELEPHONE ENCOUNTER
HFU - patient has been scheduled for her HFU and placed on [facility] for sooner appointment     HFU - [facility] - 5/24 to 5/25 for dizziness - medicare/[employer] PPO    Jacqueline Huang will need follow up in 8-10 weeks with general attending or advance practitioner  She will not require outpatient neurological testing

## 2022-06-03 ENCOUNTER — HOSPITAL ENCOUNTER (OUTPATIENT)
Dept: RADIOLOGY | Facility: HOSPITAL | Age: 77
Discharge: HOME | End: 2022-06-03
Payer: MEDICARE

## 2022-06-03 DIAGNOSIS — R91.1 LUNG NODULE < 6CM ON CT: ICD-10-CM

## 2022-06-03 DIAGNOSIS — E11.69 TYPE 2 DIABETES MELLITUS WITH OTHER SPECIFIED COMPLICATION, WITHOUT LONG-TERM CURRENT USE OF INSULIN (HCC): Primary | ICD-10-CM

## 2022-06-03 PROCEDURE — G1004 CDSM NDSC: HCPCS

## 2022-06-03 PROCEDURE — 71250 CT THORAX DX C-: CPT

## 2022-06-03 NOTE — PROGRESS NOTES
The patient's blood sugars remain in the low 200s despite metformin 1000 b i d  She is also having significant diarrhea with the increased dose  We are going to have her cut her metformin XR Dosepak to 500 b i d  and and Jardiance 10    Recheck A1c in 3 months

## 2022-06-06 ENCOUNTER — EVALUATION (OUTPATIENT)
Dept: PHYSICAL THERAPY | Facility: CLINIC | Age: 77
End: 2022-06-06
Payer: MEDICARE

## 2022-06-06 DIAGNOSIS — R42 DIZZINESS: ICD-10-CM

## 2022-06-06 DIAGNOSIS — R42 DIZZINESS: Primary | ICD-10-CM

## 2022-06-06 PROCEDURE — 97163 PT EVAL HIGH COMPLEX 45 MIN: CPT | Performed by: PHYSICAL THERAPIST

## 2022-06-06 RX ORDER — MECLIZINE HYDROCHLORIDE 25 MG/1
25 TABLET ORAL EVERY 8 HOURS
Qty: 30 TABLET | Refills: 0 | Status: SHIPPED | OUTPATIENT
Start: 2022-06-06

## 2022-06-06 NOTE — PROGRESS NOTES
PT Evaluation          Insurance:  AMA/CMS Eval/ Re-eval POC expires Ernst Ignacio #/ Referral # Total     Start date  Expiration date Extension  Visit limitation? PT only or  PT+OT? Co-Insurance   CMS/ [facility] 2022                                                                           Date                 Used 1/10                         Today's date: 2022  Patient name: Bert Kee  : 1945  MRN: [id number]  Referring provider: Isabel Avalos, *  Dx:   Encounter Diagnosis     ICD-10-CM    1  Dizziness  R42 Ambulatory referral to Physical Therapy         Assessment  Assessment details: Patient is a 69 y o  Female who presents to skilled outpatient PT with recent onset of vertigo occurring on 2022  Was seen in the hospital and was in for 2 5 day prior to release  Followed up with PCP who recommend Physical Therapy for Dizziness  Positional testing conducted this date with negative findings for BPPV  Oculomotor screen conducted this date with abnormal findings for VOR x 1, VOR cx and Head thrust test on Left side  Finding indicate Hypofunction on left side  Patient does report change in hearing on left side  Patient will benefit from follow up with ENT  Patient will benefit from skilled PT consisting of vestibular rehab to return [facility] to [facility] of symptom free  Patient verbalized understanding of POC  Please contact me if you have any questions or recommendations  Thank you for the referral and the opportunity to share in Eren Becker's care        Cut off score   All date taken from [facility]TA Neuro Section or Rehab Measures    DGI:  MDC for Vestibular Disorders: 4 points  [address] for Geriatrics/Community Dwelling Older Adults: 3 Points  Falls risk cut off: <19/24    FGA:  MCID: 4 points  Geriatrics/Community Dwelling Older Adults: </= 22/30 fall risk  Geriatrics/Community Dwelling Older Adults: </= 20/30 unexplained falls in the next 6 months  Parkinsons: </= 18/30 fall risk    mCTSIB (normed on ages 19-56, lower number is less sway or better static balance)  Eyes open firm surface (norm 0 21-0 48)  Eyes closed firm surface (norm 0 48-0 99)  Eyes open foam surface (norm 0 38-0 71)  Eyes closed foam surface (norm 0 70-2 22)    DHI:  0-39: low perception of handicap  40-69: moderate perception of handicap  : severe perception of handicap  > 60: increased risk for falls        Impairments: abnormal coordination, abnormal gait, abnormal or restricted ROM, activity intolerance, impaired balance, impaired physical strength, lacks appropriate HEP, poor posture, poor body mechanics, safety issue and abnormal movement  Understanding of Dx/Px/POC: Good  Prognosis: Good                                                                       Goals    Vestibular Short Term Goals:  - Patient will be independent with simple HEP  - Patient will tolerate 60 seconds of oculomotor exercises with minimal increase in symptoms  - Patient will demonstrate 10% decrease in symptom severity scoring with independent use of modalities  - Patient will be able to tolerate 30 seconds with eyes closed on foam surface without any loss of balance demonstrating improvement in vestibular system  - Patient will improve FGA score by 4 points per MDC to promote improved safety with dynamic tasks    Vestibular Long Term Goals:  - Patient will display decreased forward head and rounded shoulders to promote improved resting posture and cervical mobility  - Patient will be independent with complex HEP  - Patient will tolerate >=2 minutes of oculomotor exercises to facilitate return to reading and computer work  - Patient will report >= 50% improvement on symptom severity scoring  - Patient will demonstrate ability to perform HT in gait without veering  - Patient will score low risk for falls with FGA test with score of 23/30 or higher per current research data  - Patient will report baseline dizziness of 1/10 or less   - Patient will report 2/10 dizziness or less with visual stimulating surround with duration of 2 minutes   - Patient will report subjective improvement to 90% or higher to promote return to PLOF    Plan  Patient would benefit from: PT Eval  Planned therapy interventions: balance, HEP, manual therapy, neuromuscular re-education, patient education  Frequency: 2x per week  Plan of Care beginning date: 2022  Plan of Care expiration date: 3 months - 2022  Treatment plan discussed with: Patient        Subjective Evaluation    History of Present Illness  - Mechanism of injury: patient reports having vertigo attack on May 24th and was sent to hospital by ambulance  Was in for 2 5 days and was released  Dx was vertigo, and advised patient to follow up here for treatment  Was referred to PT by PCP         Dizziness Subjective  - How long does dizziness last: most of the day   - How would you describe the dizziness: unsteady, woozy   - Rolling in bed: No  - Supine to/from sit: Yes  - Recent hearing loss: Yes  - Tinnitus: Yes; Left   - Aural fullness/ear pain: Yes  - Vision changes: No  - History of recent viral infections: No  - History of migraines: No    Red Flag Screen  - Numbness: No  - Tingling: No  - Weakness: No  - Unilateral hearing loss: No  - Slurred speech: No  - Progressive hearing loss: No  - Tremors: No  - Poor coordination: No  - UMN signs: No  - LoC: No  - Rigidity: No  - Visual field loss: No  - Memory loss: No  - CN dysfunction: No  - Vertical nystagmus: No    Pain  Current pain ratin/10  At best pain ratin/10  At worst pain ratin/10  Location: NA  Aggravating factors: 0    Social Support  Steps to enter house: 1  Stairs in house: No    Lives in: [facility] with: Spouse     Employment status: retired   Hand dominance: right     Treatments  Previous treatment: none   Current treatment: none   Diagnostic Testing: none       Objective     BPPV Objective  Integrity Testing  - mVBI: Denies any passing out sensation     Positional Testing  - R Sherron-Hallpike: Negative   - L Sherron-Hallpike: Negative   - R Roll Test: Negative   - L Roll Test: Negative     Oculomotor Screen  - Baseline Symptoms: 1/10  - Baseline Observation: none   - Gaze Holding Nystagmus: H: Normal and V: Normal Dizziness: 0/10, Observation: none   - Spontaneous Nystagmus Room Light: H: Normal and V: Normal Dizziness: 0/10, Observation: none  - Smooth Pursuits (central): H: Normal and V: Normal Dizziness: 2/10, Observation: able to track   - Saccades (central): H: Normal and V: Normal Dizziness: 2/10, Observation: able to track   - [place] (normal: < 4"/10 cm - central): H: Normal and V: Normal Dizziness: 2/10, Observation: none  - VORx1: H: Abnormal and V: Abnormal Dizziness: 5/10, Observation: rocking sensation   - VOR Cancel (central): H: ABNormal Dizziness: 1/10, Observation: jerky, slower movement   - Head Thrust (moderate to severe hypofunction): H: Abnormal Dizziness: 4/10, Observation: Left side       Outcome Measures Initial Eval  6/6/2022        mCTSIB  - FTEO (firm)  - FTEC (firm)  - FTEO (foam)  - FTEC (foam)   Defer        DGI Defer        FGA Defer        10 meter Defer        DHI 65/100                                                          Precautions: fall risk  Past Medical History:   Diagnosis Date    Diabetes mellitus ([facility] [address] )     Endometrial carcinoma ([facility] [facility] )     Hypertension     Rectal bleeding 9/10/2019    Sciatic radiculitis 1/28/2021    Varicella

## 2022-06-08 ENCOUNTER — OFFICE VISIT (OUTPATIENT)
Dept: PHYSICAL THERAPY | Facility: CLINIC | Age: 77
End: 2022-06-08
Payer: MEDICARE

## 2022-06-08 DIAGNOSIS — R91.1 LUNG NODULE < 6CM ON CT: Primary | ICD-10-CM

## 2022-06-08 DIAGNOSIS — R42 DIZZINESS: Primary | ICD-10-CM

## 2022-06-08 PROCEDURE — 97530 THERAPEUTIC ACTIVITIES: CPT | Performed by: PHYSICAL THERAPIST

## 2022-06-08 PROCEDURE — 97112 NEUROMUSCULAR REEDUCATION: CPT | Performed by: PHYSICAL THERAPIST

## 2022-06-08 NOTE — PROGRESS NOTES
Daily Note     Insurance:  AMA/CMS Eval/ Re-eval POC expires Reather Clore #/ Referral # Total     Start date  Expiration date Extension  Visit limitation? PT only or  PT+OT? Co-Insurance   CMS/ [facility] 2022                                                                           Date                Used 1/10                  Today's date: 2022  Patient name: Raúl Pablo  : 1945  MRN: [id number]  Referring provider: Edgardo Francois, *  Dx:   Encounter Diagnosis     ICD-10-CM    1  Dizziness  R42                   Subjective: Patient reports that she continues to have dizziness      Objective: See treatment diary below    VOR x1 (30 sec, self-paced)  - H: 4/10  - V: 5/10    VOR x1: (30 sec, self-paced, sitting)  - H: 1/10  - V: 2/10    VOR Cx (10x ea, seated)  - H: 2/10    TA:   - Patient education  - HEP: VOR x1, VOR Cx      Assessment: Patient tolerated treatment fairly  She reports minor dizziness upon arrival that improves when sitting  Patient states that she frequently feels dizziness and imbalance multiple times throughout the day, requiring her to use UE support  When standing, patient demonstrating increased dizziness and postural sway with VOR x1, needing extended rest break to reduce symptoms  Attempted seated VOR exercises, with patient reporting reduced symptoms  Provided patient with HEP, instructed patient safety of exercising at home, patient verbalized agreement  Also discussed with patient multiple sources of dizziness, plans to further narrow down cause of her dizziness  Patient will benefit from skilled PT services to reduce her dizziness and help her return to her PLOF  Plan: Continue per plan of care            Outcome Measures Initial Eval  2022        mCTSIB  - FTEO (firm)  - FTEC (firm)  - FTEO (foam)  - FTEC (foam)   Defer        DGI Defer        FGA Defer        10 meter Defer        [address] 65/100

## 2022-06-13 ENCOUNTER — APPOINTMENT (OUTPATIENT)
Dept: PHYSICAL THERAPY | Facility: CLINIC | Age: 77
End: 2022-06-13
Payer: MEDICARE

## 2022-06-17 ENCOUNTER — APPOINTMENT (OUTPATIENT)
Dept: PHYSICAL THERAPY | Facility: CLINIC | Age: 77
End: 2022-06-17
Payer: MEDICARE

## 2022-06-20 ENCOUNTER — OFFICE VISIT (OUTPATIENT)
Dept: PHYSICAL THERAPY | Facility: CLINIC | Age: 77
End: 2022-06-20
Payer: MEDICARE

## 2022-06-20 DIAGNOSIS — R42 DIZZINESS: Primary | ICD-10-CM

## 2022-06-20 PROCEDURE — 97112 NEUROMUSCULAR REEDUCATION: CPT | Performed by: PHYSICAL THERAPIST

## 2022-06-20 PROCEDURE — 97530 THERAPEUTIC ACTIVITIES: CPT | Performed by: PHYSICAL THERAPIST

## 2022-06-20 NOTE — PROGRESS NOTES
Daily Note     Insurance:  AMA/CMS Eval/ Re-eval POC expires Carmita Aguirre #/ Referral # Total     Start date  Expiration date Extension  Visit limitation? PT only or  PT+OT? 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      Objective: See treatment diary below    BP: 160/86  HR: 89  SpO2: 97%      VOR x1 (30 sec, self-paced)  - H: 4/10  - V: 510    VOR x1: (30 sec, self-paced, standing no UE support)  - H: 010  - V: 0/10    VOR Cx (10x ea, seated)  - H: 0/10  - H (standing): 0/10      TA:   - Patient education  - mCTSIB  - HEP: VOR x1, VOR Cx      Assessment: Patient tolerated treatment fairly  She reports significant increase in dizziness during VOR x1 standing that reduces with each additional trial  Significant postural sway observed with all standing exercises, frequently using UE support to keep balance  During mCTSIB, patient demonstrates increased reliance on visual input to maintain balance  Plan to add static and dynamic balance exercises next session  Patient will benefit from skilled PT services to reduce her dizziness and help her return to her PLOF  Plan: Continue per plan of care            Outcome Measures Initial Eval  2022       mCTSIB  - FTEO (firm)  - FTEC (firm)  - FTEO (foam)  - FTEC (foam)   Defer   30 sec  4 5 sec  30 sec  5 5 sec       DGI Defer        FGA Defer        10 meter Defer        [address] 65/100

## 2022-06-22 ENCOUNTER — OFFICE VISIT (OUTPATIENT)
Dept: PHYSICAL THERAPY | Facility: CLINIC | Age: 77
End: 2022-06-22
Payer: MEDICARE

## 2022-06-22 DIAGNOSIS — R42 DIZZINESS: Primary | ICD-10-CM

## 2022-06-22 PROCEDURE — 97112 NEUROMUSCULAR REEDUCATION: CPT | Performed by: PHYSICAL THERAPIST

## 2022-06-22 NOTE — PROGRESS NOTES
Daily Note     Insurance:  AMA/CMS Eval/ Re-eval POC expires Emile La #/ Referral # Total     Start date  Expiration date Extension  Visit limitation? PT only or  PT+OT? Co-Insurance   CMS/ 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      Objective: See treatment diary below    BP: 150/88  HR: 89  SpO2: 97%      VOR x1 (60 sec, self-paced)  - H: 2-3/10  - V: 0/10    VOR x1 (60 sec, self-paced)  - H: 210  - V: 1/10    VOR Cx (20 reps)  - H: 2-3/10  - V: 2-3/10    FTEC: 30 sec x 4 reps: LOB: 2 times: cud for toe curl     Gait with head nods and turns: in // bars 3 cycles down and back    H: no dizzy, unsteady with tapping // bars 25% of time   V: No dizzy, unsteady with tapping // bar 6 times     Step taps with 3 sec hold on 8 inch step FWD 15 reps       TA:   - Patient education  - mCTSIB  - HEP: VOR x1, VOR Cx      Assessment: Patient tolerated treatment well with overall reduction in wooziness since last seen by this PT  Was able to tolerate progression to standing activities  Wooziness was 2-3/10 at most  Challenged with balance activities noted above with self confidence, however once used to the activity patient was able to perform without LOB  Patient will benefit from skilled PT services to reduce her dizziness and help her return to her PLOF  Plan: Continue per plan of care            Outcome Measures Initial Eval  2022       mCTSIB  - FTEO (firm)  - FTEC (firm)  - FTEO (foam)  - FTEC (foam)   Defer   30 sec  4 5 sec  30 sec  5 5 sec       DGI Defer        FGA Defer        10 meter Defer        [address] 65/100

## 2022-06-27 ENCOUNTER — APPOINTMENT (OUTPATIENT)
Dept: PHYSICAL THERAPY | Facility: CLINIC | Age: 77
End: 2022-06-27
Payer: MEDICARE

## 2022-06-29 ENCOUNTER — OFFICE VISIT (OUTPATIENT)
Dept: PHYSICAL THERAPY | Facility: CLINIC | Age: 77
End: 2022-06-29
Payer: MEDICARE

## 2022-06-29 DIAGNOSIS — R42 DIZZINESS: Primary | ICD-10-CM

## 2022-06-29 PROCEDURE — 97112 NEUROMUSCULAR REEDUCATION: CPT | Performed by: PHYSICAL THERAPIST

## 2022-06-29 NOTE — PROGRESS NOTES
Daily Note     Insurance:  AMA/CMS Eval/ Re-eval POC expires Lucy Rudolph #/ Referral # Total     Start date  Expiration date Extension  Visit limitation? PT only or  PT+OT? 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      Objective: See treatment diary below        VOR x1 (90 sec, self-paced)  - H: 2-3/10  - V: 010    VOR x1 (90 sec, self-paced)  - H: 210  - V: 1/10    VOR Cx (20 reps)  - H: 4/10  - V: 4/10      Standing marches: 10x 1 UE, 10x 2 finger, 10x 0 UE    FTEC: 30 sec, 3x ea    Step ups FWD: 20x 0-1 UE support    Step ups LAT: 20x 0-1 UE support      TA:   - HEP: VOR x1, VOR Cx, Step ups FWD, Step taps LAT      Assessment: Patient tolerated treatment well  Continued with VOR exercises, reporting increased difficulty with VOR Cx and imbalance/dizziness up to /10  Increased static and dynamic balance exercises, improving patient confidence and overall reducing UE support needed  Added FWD step ups and LAT step taps with UE support to HEP  Patient will benefit from skilled PT services to reduce her dizziness and help her return to her PLOF  Plan: Continue per plan of care             Outcome Measures Initial Eval  2022       mCTSIB  - FTEO (firm)  - FTEC (firm)  - FTEO (foam)  - FTEC (foam)   Defer   30 sec  4 5 sec  30 sec  5 5 sec       DGI Defer        FGA Defer        10 meter Defer        [address] 65/100

## 2022-07-05 ENCOUNTER — OFFICE VISIT (OUTPATIENT)
Dept: PHYSICAL THERAPY | Facility: CLINIC | Age: 77
End: 2022-07-05
Payer: MEDICARE

## 2022-07-05 DIAGNOSIS — R42 DIZZINESS: Primary | ICD-10-CM

## 2022-07-05 PROCEDURE — 97112 NEUROMUSCULAR REEDUCATION: CPT

## 2022-07-05 NOTE — PROGRESS NOTES
Daily Note     Insurance:  AMA/CMS Eval/ Re-eval POC expires Staci Lino #/ Referral # Total     Start date  Expiration date Extension  Visit limitation? PT only or  PT+OT? Co-Insurance   CMS/ [facility] 2022                                                                        Today's date: 2022  Patient name: Frederic Quan  : 1945  MRN: [id number]  Referring provider: Deepak Elias*  Dx:   Encounter Diagnosis     ICD-10-CM    1  Dizziness  R42                   Subjective: Patient reports that her dizziness is feeling better overall except when she turns too quickly  Objective: See treatment diary below    NMR:    VOR x1 (60 sec, self-paced)  - H: 0/10    VOR x1 (120 sec, self-paced)  - H: 0/10  - V: 0/10    VOR Cx (20 reps)  - H: 0/10  - V: 0/10    VOR Cx, busy background (20 reps)  - H: 0/10  - CW: 0/10  - CCW: 0/10    Standing marches with 3 sec holds: 10x 2 finger    FTEC: 2 x 30 sec    Standing FA on foam + ball tossx 25 reps    Walking with head turns/nods 2 x 50 feet each    3 steps FWD > 180 deg turn > 3 steps BKWD > 180 deg turn, 2 x 50 feet; 2/10 dizziness    Step ups FWD (6"): 20x 0 UE support    Step ups LAT (6"): 20x 0 UE support    Step taps from foam to 8" step: 20x, light touch 1 finger      Assessment: Patient tolerated treatment well today  Progressed VOR cx to busy background with no dizziness elicited  Increased focus on dynamic balance tasks with good performance; patient did rely on light UE support as needed  Able to elicit mild dizziness with 180 deg turns; may consider habituation-based interventions in upcoming sessions  Patient will benefit from skilled PT services to reduce her dizziness and help her return to her PLOF  Plan: Continue per plan of care             Outcome Measures Initial Eval  2022       mCTSIB  - FTEO (firm)  - FTEC (firm)  - FTEO (foam)  - FTEC (foam)   Defer   30 sec  4 5 sec  30 sec  5 5 sec       DGI Defer FGA Defer        10 meter Defer        [address] 08/100

## 2022-07-07 ENCOUNTER — EVALUATION (OUTPATIENT)
Dept: PHYSICAL THERAPY | Facility: CLINIC | Age: 77
End: 2022-07-07
Payer: MEDICARE

## 2022-07-07 DIAGNOSIS — R42 DIZZINESS: Primary | ICD-10-CM

## 2022-07-07 PROCEDURE — 97112 NEUROMUSCULAR REEDUCATION: CPT

## 2022-07-07 NOTE — PROGRESS NOTES
PT Re-Evaluation          Insurance:  AMA/CMS Eval/ Re-eval POC expires Miguel Benavidez #/ Referral # Total     Start date  Expiration date Extension  Visit limitation? PT only or  PT+OT? Co-Insurance   CMS/ [facility] 2022                                                                           Date                 Used 1/10                         Today's date: 2022  Patient name: Jack Lemon  : 1945  MRN: [id number]  Referring provider: Jonathon Sierra  Dx:   Encounter Diagnosis     ICD-10-CM    1  Dizziness  R42          Assessment  Assessment details 22: Patient is a 69 y o  Female who presents to skilled outpatient PT with recent onset of vertigo occurring on 2022  Pt denies dizziness over the past 7-10 days  She has not taken meclizine in weeks  Her Dizziness Handicap Inventory score is reduced to 0/100  Her FOTO score improved from 53 to 70  No dizziness was provoked with VOR x1 activity with a visual contrast background, in PT  Her gait speed of 0 88 m/sec indicates she is a community ambulator, but not fast enough to cross the street safely, at self-selected speed  Her FGA score of 16/30 indicates HIGH risk for falls  She is agreeable to continue PT and would benefit from continued balance training for improved safety  She would benefit from habituation activity for quick turns  Initial consult with ENT scheduled for 22 for dizziness with hypofunction suspected, and subjective reports of decreased hearing left, tinnitus left  Patient will benefit from follow up with ENT  Patient will benefit from skilled PT consisting of vestibular rehab to return patient to OF of symptom free  Patient verbalized understanding of POC  Please contact me if you have any questions or recommendations   Thank you for the referral and the opportunity to share in Debbie Becker's care       Cut off score   All date taken from [facility] Neuro Section or Rehab Measures    DGI:  MDC for Vestibular Disorders: 4 points  [address] for Geriatrics/Community Dwelling Older Adults: 3 Points  Falls risk cut off: <19/24    FGA:  MCID: 4 points  Geriatrics/Community Dwelling Older Adults: </= 22/30 fall risk  Geriatrics/Community Dwelling Older Adults: </= 20/30 unexplained falls in the next 6 months  Parkinsons: </= 18/30 fall risk    mCTSIB (normed on ages 19-56, lower number is less sway or better static balance)  Eyes open firm surface (norm 0 21-0 48)  Eyes closed firm surface (norm 0 48-0 99)  Eyes open foam surface (norm 0 38-0 71)  Eyes closed foam surface (norm 0 70-2 22)    DHI:  0-39: low perception of handicap  40-69: moderate perception of handicap  : severe perception of handicap  > 60: increased risk for falls        Impairments: abnormal coordination, abnormal gait, abnormal or restricted ROM, activity intolerance, impaired balance, impaired physical strength, lacks appropriate HEP, poor posture, poor body mechanics, safety issue and abnormal movement  Understanding of Dx/Px/POC: Good  Prognosis: Good                                                                       Goals    Vestibular Short Term Goals:  - Patient will be independent with simple HEP MET  - Patient will tolerate 60 seconds of oculomotor exercises with minimal increase in symptoms MET  - Patient will demonstrate 10% decrease in symptom severity scoring with independent use of modalities MET  - Patient will be able to tolerate 30 seconds with eyes closed on foam surface without any loss of balance demonstrating improvement in vestibular system  - Patient will improve FGA score by 4 points per MDC to promote improved safety with dynamic tasks    Vestibular Long Term Goals:  - Patient will display decreased forward head and rounded shoulders to promote improved resting posture and cervical mobility  - Patient will be independent with complex HEP  - Patient will tolerate >=2 minutes of oculomotor exercises to facilitate return to reading and computer work  - Patient will report >= 50% improvement on symptom severity scoring MET  - Patient will demonstrate ability to perform HT in gait without veering  - Patient will score low risk for falls with FGA test with score of 23/30 or higher per current research data  - Patient will report baseline dizziness of 1/10 or less MET  - Patient will report 2/10 dizziness or less with visual stimulating surround with duration of 2 minutes MET  - Patient will report subjective improvement to 90% or higher to promote return to PLOF    Plan  Patient would benefit from: PT Eval  Planned therapy interventions: balance, HEP, manual therapy, neuromuscular re-education, patient education  Frequency: 2x per week  Plan of Care beginning date: 6/6/2022  Plan of Care expiration date: 3 months - 10/7/2022  Treatment plan discussed with: Patient        Subjective Evaluation    -Updates 7/7/22:  Angela Leggett knows how to do her HEP  She denies having close calls, but then states maybe a little when she turns quickly  She no longer has dizziness  Last dizziness noted 7-10 days ago upon standing up quickly, lasting only a few seconds  She stopped taking meclizine a few weeks ago  She is avoiding only jogging, but she plans to get back to it  She will be ordering running Nordic poles  Seeing ENT on 7/20/22, for initial consult  She reports frequent ringing in her L ear, which can make it difficult for her to hear vs hearing changes  Initially, pt requested today to be her last session, but after reassessment, she is in agreement that she would benefit from additional PT sessions  History of Present Illness IE  - Mechanism of injury: patient reports having vertigo attack on May 24th and was sent to hospital by ambulance  Was in for 2 5 days and was released   Dx was vertigo, and advised patient to follow up here for treatment  Was referred to PT by PCP         Dizziness Subjective  - How long does dizziness last: most of the day   - How would you describe the dizziness: unsteady, woozy   - Rolling in bed: No  - Supine to/from sit: Yes  - Recent hearing loss: Yes  - Tinnitus: Yes; Left   - Aural fullness/ear pain: Yes  - Vision changes: No  - History of recent viral infections: No  - History of migraines: No    Red Flag Screen  - Numbness: No  - Tingling: No  - Weakness: No  - Unilateral hearing loss: No  - Slurred speech: No  - Progressive hearing loss: No  - Tremors: No  - Poor coordination: No  - UMN signs: No  - LoC: No  - Rigidity: No  - Visual field loss: No  - Memory loss: No  - CN dysfunction: No  - Vertical nystagmus: No    Pain  Current pain ratin/10  At best pain ratin/10  At worst pain ratin/10  Location: NA  Aggravating factors: 0    Social Support  Steps to enter house: 1  Stairs in house: No    Lives in: [facility]   Lives with: Spouse     Employment status: retired   Hand dominance: right     Treatments  Previous treatment: none   Current treatment: none   Diagnostic Testing: none       Objective     BPPV Objective  Integrity Testing  - mVBI: Denies any passing out sensation     Positional Testing  - R Sherron-Hallpike: Negative   - L Sherron-Hallpike: Negative   - R Roll Test: Negative   - L Roll Test: Negative     Oculomotor Screen  - Baseline Symptoms: 1/10  - Baseline Observation: none   - Gaze Holding Nystagmus: H: Normal and V: Normal Dizziness: 0/10, Observation: none   - Spontaneous Nystagmus Room Light: H: Normal and V: Normal Dizziness: 0/10, Observation: none  - Smooth Pursuits (central): H: Normal and V: Normal Dizziness: 2/10, Observation: able to track   - Saccades (central): H: Normal and V: Normal Dizziness: 2/10, Observation: able to track   - [employer]ibb (normal: < 4"/10 cm - central): H: Normal and V: Normal Dizziness: 2/10, Observation: none  - VORx1: H: Abnormal and V: Abnormal Dizziness: 5/10, Observation: rocking sensation   - VOR Cancel (central): H: ABNormal Dizziness: 1/10, Observation: jerky, slower movement   - Head Thrust (moderate to severe hypofunction): H: Abnormal Dizziness: 4/10, Observation: Left side     HOME exercises as of 7/7/22: VOR x 1, no contrast, ball tossing, gait with head turns (feels a little unsteady on her legs at this time,)  Step ups  Add next visit: 180 or 360 degree turns habituation             Outcome Measures Initial Eval  6/6/2022 6/20  PN  7/7/22         mCTSIB  - FTEO (firm)  - FTEC (firm)  - FTEO (foam)  - FTEC (foam)    Defer    30 sec  4 5 sec  30 sec  5 5 sec    30sec  2sec  30sec  1 sec         DGI Defer    NT         FGA Defer    16/30         10 meter Defer    11 3 sec  = 0 88 m/sec          DHI 65/100    0/100          FOTO  53    70                                                                                     Precautions: fall risk  Past Medical History:   Diagnosis Date    [facility] mellitus [facility] [address] [facility]     Endometrial carcinoma ([facility] [address] [facility]     Hypertension     Rectal bleeding 9/10/2019    Sciatic radiculitis 1/28/2021    Varicella

## 2022-07-11 ENCOUNTER — OFFICE VISIT (OUTPATIENT)
Dept: PHYSICAL THERAPY | Facility: CLINIC | Age: 77
End: 2022-07-11
Payer: MEDICARE

## 2022-07-11 DIAGNOSIS — R42 DIZZINESS: Primary | ICD-10-CM

## 2022-07-11 PROCEDURE — 97112 NEUROMUSCULAR REEDUCATION: CPT

## 2022-07-11 NOTE — PROGRESS NOTES
Daily Note     Insurance:  AMA/CMS Eval/ Re-eval POC expires Armando Wolfe #/ Referral # Total     Start date  Expiration date Extension  Visit limitation? PT only or  PT+OT? Co-Insurance   CMS/ [facility] 2022                                                                        Today's date: 2022  Patient name: Hyacinth Martinez  : 1945  MRN: [id number]  Referring provider: Claudene Berne Mar*  Dx:   Encounter Diagnosis     ICD-10-CM    1  Dizziness  R42                   Subjective: Patient reports that she continues to experience no dizziness but feels her balance needs work  Objective: See treatment diary below    NMR:  - Blaze pods w/ side stepping and tandem on foam x 3 minutes   - 180 deg turns with blaze pods UE and LE x 3 minutes   - Standing marches with 3 sec holds: x 15, light finger touch  - Tandem ambulation 10 feet x 6 reps, light touch   - FTEC: 2 x 30 sec  - Walking with head turns/nods 2 x 50 feet each  - Walking opposite hand to knee, 6 x 20 feet   - LE cone taps x 3 minutes, light UE support   - FWD/BCK over 6" scar x 10 B/L, light UE support       Assessment: Patient tolerated treatment well today with increased focus on balance interventions per recent progress note  Patient required frequent UE light touch to steady, particularly when SLS was required  Patient challenged with scar negotiation with frequent knocking over of scar due to inadequate foot clearance  Patient will benefit from skilled PT services to improve balance and promote return to her PLOF  Plan: Continue per plan of care               Outcome Measures Initial Eval  2022  PN  22         mCTSIB  - FTEO (firm)  - FTEC (firm)  - FTEO (foam)  - FTEC (foam)    Defer    30 sec  4 5 sec  30 sec  5 5 sec    30sec  2sec  30sec  1 sec         DGI Defer    NT         FGA Defer    16/30         10 meter Defer    11 3 sec  = 0 88 m/sec          DHI 65/100    0/100          FOTO [place] [id number]

## 2022-07-13 ENCOUNTER — APPOINTMENT (OUTPATIENT)
Dept: PHYSICAL THERAPY | Facility: CLINIC | Age: 77
End: 2022-07-13
Payer: MEDICARE

## 2022-07-18 ENCOUNTER — APPOINTMENT (OUTPATIENT)
Dept: PHYSICAL THERAPY | Facility: CLINIC | Age: 77
End: 2022-07-18
Payer: MEDICARE

## 2022-07-20 DIAGNOSIS — E11.65 UNCONTROLLED TYPE 2 DIABETES MELLITUS WITH HYPERGLYCEMIA (HCC): ICD-10-CM

## 2022-07-21 ENCOUNTER — APPOINTMENT (OUTPATIENT)
Dept: PHYSICAL THERAPY | Facility: CLINIC | Age: 77
End: 2022-07-21
Payer: MEDICARE

## 2022-07-23 RX ORDER — BLOOD SUGAR DIAGNOSTIC
STRIP MISCELLANEOUS
Qty: 100 STRIP | Refills: 0 | Status: SHIPPED | OUTPATIENT
Start: 2022-07-23

## 2022-07-23 RX ORDER — METFORMIN HYDROCHLORIDE 500 MG/1
1000 TABLET, EXTENDED RELEASE ORAL 2 TIMES DAILY WITH MEALS
Qty: 120 TABLET | Refills: 0 | Status: SHIPPED | OUTPATIENT
Start: 2022-07-23 | End: 2022-08-18

## 2022-07-25 ENCOUNTER — APPOINTMENT (OUTPATIENT)
Dept: PHYSICAL THERAPY | Facility: CLINIC | Age: 77
End: 2022-07-25
Payer: MEDICARE

## 2022-08-05 ENCOUNTER — EVALUATION (OUTPATIENT)
Dept: PHYSICAL THERAPY | Facility: CLINIC | Age: 77
End: 2022-08-05
Payer: MEDICARE

## 2022-08-05 DIAGNOSIS — R42 DIZZINESS: Primary | ICD-10-CM

## 2022-08-05 PROCEDURE — 97530 THERAPEUTIC ACTIVITIES: CPT

## 2022-08-05 NOTE — PROGRESS NOTES
PT Re-Evaluation / Progress Note         Insurance:  AMA/CMS Eval/ Re-eval POC expires Juliana Yale #/ Referral # Total     Start date  Expiration date Extension  Visit limitation? PT only or  PT+OT? Co-Insurance   CMS/ [facility] 2022                                                               Today's date: 2022  Patient name: Ankush Lea  : 1945  MRN: [id number]  Referring provider: Ely Elias*  Dx:   Encounter Diagnosis     ICD-10-CM    1  Dizziness  R42          Assessment  Assessment details 22: Patient is a 69 y o  Female who has been attending skilled outpatient PT with original vertigo which began in May and subsequent balance deficits  Patient returns to PT today following 3-week hiatus due to family visiting  She is experiencing dizziness which she describes as feeling off balance, and she is using walking sticks  Patient displayed saccadic movements when crossing midline in smooth pursuits, with other components of VOMS WNL  She was unable to maintain gaze in head thrust test bilaterally, indicating potential bilateral vestibular hypofunction  Notable findings with mCTSIB include significant difficulty maintaining stance with eyes closed, suggestive of reliance on visual system for balance  Her FGA score today of 16/30 is unchanged from prior progress note and deems her at high risk of falls  Patient's history of neuropathy, evident vestibular hypofunction, and overall visual reliance likely contribute to multifaceted imbalance which in turn presents as overall dysequilibrium  Trialed use of vibration plate today to address somatosensory input in relation to neuropathy, and plan to focus primarily on balance interventions for better integration of all 3 systems to maintain balance  She has met 3/5 short term goals and 4/9 long term goals   Patient will benefit from continued skilled PT to improve balance, reduce fall risk, and promote return to PLOF  Patient will benefit from follow up with ENT  Patient will benefit from skilled PT consisting of vestibular rehab to return patient to PLOF of symptom free  Patient verbalized understanding of POC  Please contact me if you have any questions or recommendations  Thank you for the referral and the opportunity to share in Marian Becker's care        Cut off score   All date taken from [facility] Neuro Section or Rehab Measures    DGI:  MDC for Vestibular Disorders: 4 points  [address] for Geriatrics/Community Dwelling Older Adults: 3 Points  Falls risk cut off: <19/24    FGA:  MCID: 4 points  Geriatrics/Community Dwelling Older Adults: </= 22/30 fall risk  Geriatrics/Community Dwelling Older Adults: </= 20/30 unexplained falls in the next 6 months  Parkinsons: </= 18/30 fall risk    mCTSIB (normed on ages 19-56, lower number is less sway or better static balance)  Eyes open firm surface (norm 0 21-0 48)  Eyes closed firm surface (norm 0 48-0 99)  Eyes open foam surface (norm 0 38-0 71)  Eyes closed foam surface (norm 0 70-2 22)    DHI:  0-39: low perception of handicap  40-69: moderate perception of handicap  : severe perception of handicap  > 60: increased risk for falls        Impairments: abnormal coordination, abnormal gait, abnormal or restricted ROM, activity intolerance, impaired balance, impaired physical strength, lacks appropriate HEP, poor posture, poor body mechanics, safety issue and abnormal movement  Understanding of Dx/Px/POC: Good  Prognosis: Good                                                                       Goals    Vestibular Short Term Goals:  - Patient will be independent with simple HEP - MET  - Patient will tolerate 60 seconds of oculomotor exercises with minimal increase in symptoms - MET  - Patient will demonstrate 10% decrease in symptom severity scoring with independent use of modalities - MET  - Patient will be able to tolerate 30 seconds with eyes closed on foam surface without any loss of balance demonstrating improvement in vestibular system - NOT MET  - Patient will improve FGA score by 4 points per MDC to promote improved safety with dynamic tasks - NOT MET     Vestibular Long Term Goals:  - Patient will display decreased forward head and rounded shoulders to promote improved resting posture and cervical mobility - ONGOING  - Patient will be independent with complex HEP - ONGOING  - Patient will tolerate >=2 minutes of oculomotor exercises to facilitate return to reading and computer work - MET  - Patient will report >= 50% improvement on symptom severity scoring - MET  - Patient will demonstrate ability to perform HT in gait without veering - ONGOING  - Patient will score low risk for falls with FGA test with score of 23/30 or higher per current research data - ONGOING  - Patient will report baseline dizziness of 1/10 or less - MET  - Patient will report 2/10 dizziness or less with visual stimulating surround with duration of 2 minutes - MET  - Patient will report subjective improvement to 90% or higher to promote return to PLOF - ONGOING    Plan  Patient would benefit from: PT Eval  Planned therapy interventions: balance, HEP, manual therapy, neuromuscular re-education, patient education  Frequency: 2x per week  Plan of Care beginning date: 6/6/22  Plan of Care expiration date: 3months - 9/6/22  Treatment plan discussed with: Patient      Subjective Evaluation    - Update 8/5/22: Patient reports with walking sticks  She reports she has been dizzy the past two days, when she gets up  She denies room spinning and lightheadedness, describes it as unsteadiness  She has ringing in her ears "on and off" and has ENT appointment on 8/16  Dizziness occurs 2-3 times per day and lasts until she sits back down      -Updates 7/7/22:  Valrie Nageotte knows how to do her HEP    She denies having close calls, but then states maybe a little when she turns quickly  She no longer has dizziness  Last dizziness noted 7-10 days ago upon standing up quickly, lasting only a few seconds  She stopped taking meclizine a few weeks ago  She is avoiding only jogging, but she plans to get back to it  She will be ordering running Nordic poles  Seeing ENT on 22, for initial consult  She reports frequent ringing in her L ear, which can make it difficult for her to hear vs hearing changes  Initially, pt requested today to be her last session, but after reassessment, she is in agreement that she would benefit from additional PT sessions  History of Present Illness IE  - Mechanism of injury: patient reports having vertigo attack on May 24th and was sent to hospital by ambulance  Was in for 2 5 days and was released  Dx was vertigo, and advised patient to follow up here for treatment  Was referred to PT by PCP         Dizziness Subjective  - How long does dizziness last: most of the day   - How would you describe the dizziness: unsteady, woozy   - Rolling in bed: No  - Supine to/from sit: Yes  - Recent hearing loss: Yes  - Tinnitus: Yes; Left   - Aural fullness/ear pain: Yes  - Vision changes: No  - History of recent viral infections: No  - History of migraines: No    Red Flag Screen  - Numbness: No  - Tingling: No  - Weakness: No  - Unilateral hearing loss: No  - Slurred speech: No  - Progressive hearing loss: No  - Tremors: No  - Poor coordination: No  - UMN signs: No  - LoC: No  - Rigidity: No  - Visual field loss: No  - Memory loss: No  - CN dysfunction: No  - Vertical nystagmus: No    Pain  Current pain ratin/10  At best pain ratin/10  At worst pain ratin/10  Location: NA  Aggravating factors: 0    Social Support  Steps to enter house: 1  Stairs in house: No    Lives in: [facility] with: Spouse     Employment status: retired   Hand dominance: right     Treatments  Previous treatment: none   Current treatment: none   Diagnostic Testing: none       Objective     Integrity Testing  - mVBI: Denies any passing out sensation     Oculomotor Screen - UPDATED 8/5  - Baseline Symptoms: 1/10  - Baseline Observation: none   - Gaze Holding Nystagmus: H: Normal and V: Normal Dizziness: 0/10, Observation: none   - Spontaneous Nystagmus Room Light: H: Normal and V: Normal Dizziness: 0/10, Observation: none  - Smooth Pursuits (central): H: Abnormal and V: Normal Dizziness: 0/10, Observation: saccadic when crossing midline  - Saccades (central): H: Normal and V: Normal Dizziness: 0/10, Observation: able to track   - [employer] (normal: < 4"/10 cm - central): H: Normal and V: Normal Dizziness: 0/10  - VORx1: H: Normal Dizziness: 0/10  - VOR Cancel (central): H: Normal Dizziness: 2/10  - Head Thrust (moderate to severe hypofunction): H: Abnormal Dizziness: 0/10, Observation: Both sides   - Head shaking nystagmus test: negative     HOME exercises as of 7/7/22: VOR x 1, no contrast, ball tossing, gait with head turns (feels a little unsteady on her legs at this time,)  Step ups  Add next visit: 180 or 360 degree turns habituation       Interventions 8/5:   - Vibration plate, level 50 for 5 minutes     Outcome Measures Initial Eval  6/6/2022 6/20  PN  7/7/22  RE  8/5/22       mCTSIB  - FTEO (firm)  - FTEC (firm)  - FTEO (foam)  - FTEC (foam)    Defer    30 sec  4 5 sec  30 sec  5 5 sec    30sec  2sec  30sec  1 sec    30 sec   6 sec   30 sec  2 sec        DGI Defer    NT         FGA Defer    16/30  16/30       10 meter Defer    0 88 m/sec   1 04 m/s        DHI 65/100    0/100  20/100        FOTO  53    70          ABC        77%                                                                   Precautions: fall risk  Past Medical History:   Diagnosis Date    [facility] [address] [facility]     Endometrial carcinoma [facility] [address] [facility]     Hypertension     Rectal bleeding 9/10/2019    Sciatic radiculitis 1/28/2021    Varicella

## 2022-08-08 ENCOUNTER — OFFICE VISIT (OUTPATIENT)
Dept: PHYSICAL THERAPY | Facility: CLINIC | Age: 77
End: 2022-08-08
Payer: MEDICARE

## 2022-08-08 DIAGNOSIS — R42 DIZZINESS: Primary | ICD-10-CM

## 2022-08-08 PROCEDURE — 97112 NEUROMUSCULAR REEDUCATION: CPT

## 2022-08-08 NOTE — PROGRESS NOTES
Daily Note     Insurance:  AMA/CMS Eval/ Re-eval POC expires Shashi Medina #/ Referral # Total     Start date  Expiration date Extension  Visit limitation? PT only or  PT+OT? Co-Insurance   CMS/ [facility] 2022                                                                        Today's date: 2022  Patient name: Felicita Cardoza  : 1945  MRN: [id number]  Referring provider: Chino Elias*  Dx:   Encounter Diagnosis     ICD-10-CM    1  Dizziness  R42                   Subjective: Patient reports that she continues to experience no dizziness but feels her balance needs work  Objective: See treatment diary below    NMR:  - Side stepping on foam beams + cone taps: 4 cycles down/back  - Side stepping on foam beam x 4 cycles (VC's for weight shift and 0 UE support, able to perform approximately 50% of the time)   - Standing marches with 3 sec holds: x 15, light finger touch  - Tandem ambulation 10 feet x 10 reps, light touch   - FTEC: 2 x 30 sec  - Walking with head turns/nods 2 x 70 feet each  - Walking opposite hand to knee 60 ft x 10 cycles   - FWD/BCK over 6" scar x 10 B/L, light UE support       Assessment: Patient tolerated treatment well today with increased focus on dynamic balance  Patient most challenged by a narrow TARUN and multi-directional head movements  She requires frequent UE light touch to steady, particularly when SLS was required  Patient education on ankle strategy and provided with HEP for FTEO on foam with weight shifts to enable further progression  Patient will benefit from skilled PT services to improve balance and promote return to her PLOF  Plan: Continue per plan of care             Outcome Measures Initial Eval  2022  PN  22  RE  22       mCTSIB  - FTEO (firm)  - FTEC (firm)  - FTEO (foam)  - FTEC (foam)    Defer    30 sec  4 5 sec  30 sec  5 5 sec    30sec  2sec  30sec  1 sec    30 sec   6 sec   30 sec  2 sec        DGI Defer    NT         FGA Defer    16/30 16/30       10 meter Defer    0 88 m/sec   1 04 m/s        DHI 65/100    0/100  20/100        FOTO  53    70          ABC        77%

## 2022-08-15 ENCOUNTER — OFFICE VISIT (OUTPATIENT)
Dept: PHYSICAL THERAPY | Facility: CLINIC | Age: 77
End: 2022-08-15
Payer: MEDICARE

## 2022-08-15 DIAGNOSIS — R42 DIZZINESS: Primary | ICD-10-CM

## 2022-08-15 PROCEDURE — 97112 NEUROMUSCULAR REEDUCATION: CPT

## 2022-08-15 NOTE — PROGRESS NOTES
Daily Note     Insurance:  AMA/CMS Eval/ Re-eval POC expires Miguel Benavidez #/ Referral # Total     Start date  Expiration date Extension  Visit limitation? PT only or  PT+OT? Co-Insurance   CMS/ [facility] 2022                                                                        Today's date: 8/15/2022  Patient name: Jack Lemon  : 1945  MRN: [id number]  Referring provider: Jonathon Sierra  Dx:   Encounter Diagnosis     ICD-10-CM    1  Dizziness  R42        Start Time:   Stop Time: 1500  Total time in clinic (min): 30 minutes    Patient co-treated by Physical Therapy Student Corrinne Pates, under my direct supervision  Subjective: Patient reports that she is feeling wobbly on her feet today  Objective: See treatment diary below    NMR:  - Side stepping on foam beam x 4 cycles - B UE support  - Standing marches with 3 sec holds: x 15, light finger touch  - Tandem ambulation 10 feet x 10 reps, light touch   - Walking with head turns/nods 2 x 70 feet each  - Walking opposite hand to knee 60 ft x 10 cycles   - FWD/BCK over 6" scar x 10 B/L, light UE support       Assessment: Patient tolerated treatment well  Patient requires UE support to steady herself during SLB  She was able to negotiate hurdles and tandem walking with one UE using light touch  One LOB noted during walking with head turns that patient self corrected  Patient would benefit from continued skilled PT to improve balance  Plan: Continue per plan of care             Outcome Measures Initial Eval  2022  PN  22  RE  22       mCTSIB  - FTEO (firm)  - FTEC (firm)  - FTEO (foam)  - FTEC (foam)    Defer    30 sec  4 5 sec  30 sec  5 5 sec    30sec  2sec  30sec  1 sec    30 sec   6 sec   30 sec  2 sec        DGI Defer    NT         FGA Defer    1630  1630       10 meter Defer    0 88 m/sec   1 04 m/s        DHI 65/100    0/100  20/100        FOTO  53    70          ABC        77%

## 2022-08-16 ENCOUNTER — OFFICE VISIT (OUTPATIENT)
Dept: AUDIOLOGY | Facility: CLINIC | Age: 77
End: 2022-08-16
Payer: MEDICARE

## 2022-08-16 ENCOUNTER — OFFICE VISIT (OUTPATIENT)
Dept: OTOLARYNGOLOGY | Facility: CLINIC | Age: 77
End: 2022-08-16
Payer: MEDICARE

## 2022-08-16 VITALS
DIASTOLIC BLOOD PRESSURE: 86 MMHG | TEMPERATURE: 97.9 F | HEIGHT: 68 IN | WEIGHT: 231 LBS | SYSTOLIC BLOOD PRESSURE: 150 MMHG | BODY MASS INDEX: 35.01 KG/M2

## 2022-08-16 DIAGNOSIS — R42 DIZZINESS: ICD-10-CM

## 2022-08-16 DIAGNOSIS — H90.3 SENSORINEURAL HEARING LOSS (SNHL), BILATERAL: Primary | ICD-10-CM

## 2022-08-16 DIAGNOSIS — E55.9 VITAMIN D DEFICIENCY: ICD-10-CM

## 2022-08-16 DIAGNOSIS — J30.2 SEASONAL ALLERGIES: ICD-10-CM

## 2022-08-16 DIAGNOSIS — R42 DIZZINESS: Primary | ICD-10-CM

## 2022-08-16 DIAGNOSIS — I10 BENIGN ESSENTIAL HYPERTENSION: ICD-10-CM

## 2022-08-16 DIAGNOSIS — E04.2 MULTIPLE THYROID NODULES: ICD-10-CM

## 2022-08-16 DIAGNOSIS — H90.3 SENSORINEURAL HEARING LOSS (SNHL), BILATERAL: ICD-10-CM

## 2022-08-16 DIAGNOSIS — R53.83 OTHER FATIGUE: ICD-10-CM

## 2022-08-16 PROCEDURE — 99214 OFFICE O/P EST MOD 30 MIN: CPT | Performed by: NURSE PRACTITIONER

## 2022-08-16 PROCEDURE — 92557 COMPREHENSIVE HEARING TEST: CPT | Performed by: AUDIOLOGIST

## 2022-08-16 PROCEDURE — 99204 OFFICE O/P NEW MOD 45 MIN: CPT | Performed by: NURSE PRACTITIONER

## 2022-08-16 PROCEDURE — 92567 TYMPANOMETRY: CPT | Performed by: AUDIOLOGIST

## 2022-08-16 RX ORDER — AZELASTINE 1 MG/ML
1 SPRAY, METERED NASAL 2 TIMES DAILY
Qty: 1 ML | Refills: 6 | Status: SHIPPED | OUTPATIENT
Start: 2022-08-16

## 2022-08-16 RX ORDER — FLUTICASONE PROPIONATE 50 MCG
1 SPRAY, SUSPENSION (ML) NASAL 2 TIMES DAILY
Qty: 1 G | Refills: 6 | Status: SHIPPED | OUTPATIENT
Start: 2022-08-16 | End: 2022-10-11

## 2022-08-16 NOTE — PROGRESS NOTES
ADULT ENT HEARING EVALUATION - [facility] AUDIOLOGY      Patient Name: Robert Dickinson   MRN:  [id number]   :  1945   Age: 69 y o  Gender: female   DOS: 2022     HISTORY:     Robert Dickinson, a 69 y o  female, was seen on 2022 at the referral of RAJAN Carvajal,  for an evaluation of hearing as part of her ENT visit  Ms Gregorio Becker's primary complaint is dizziness  She denied otalgia, otorrhea, aural fullness and tinnitus  Ms Alan Brandt has not had her hearing tested previously  RESULTS:    Otoscopic Evaluation:   Right Ear: Unremarkable, canal clear   Left Ear: Unremarkable, canal clear    Tympanometry:   Right Ear: Type A; normal middle ear pressure and static compliance    Left Ear: Type A; normal middle ear pressure and static compliance     Audiometry:  Conventional pure tone audiometry from 250 - 8000 Hz  obtained with good reliability and revealed the following:     Right Ear: mild to moderately-severe sensorineural hearing loss (SNHL)   Left Ear: mild to severe sensorineural hearing loss (SNHL)     Speech Audiometry:    Speech Reception (SRT)   Right Ear: 20 dB HL   Left Ear: 25 dB HL    Word Recognition Scores (WRS):  Right Ear: excellent (100 % correct)     Left Ear: excellent (100 % correct)   Stimuli: [id number]    *see attached audiogram*      RECOMMENDATIONS:    1 ) Follow-up with referring provider  Consider vestibular testing to determine otologic involvement  2 ) Based on today's findings and patient symptoms, Ms Alan Brandt is a candidate for a trial with amplification  A hearing aid evaluation to further discuss Ms Gregorio Vidaless lifestyle, communication needs, and develop a treatment plan for their hearing loss is recommended  3 ) Annual audiograms, sooner if problems/concerns arise  It was a pleasure working with Ms Alan Brandt today  Thank you for referring this patient  Aracelis Guallpa    [address] [id number]

## 2022-08-16 NOTE — PROGRESS NOTES
Assessment/Plan:    Dizziness  Discussed possible causes of vertigo including neurological, cardiac, autoimmune, Otitis media, sinusitis, chemical imbalance, medication side effects, and inner ear concerns  Based on report of symptoms high suspicion for BPPV at start of symptoms, now having residual dizziness  Suggest audiogram today to further evaluate bilateral ears to rule out otitis media and meniere's disease  Audiogram today indicating bilateral mild sloping to moderately/severe SNHL, tymps type A bilaterally, and word discrimination 90%  Treatment options include at home epley's, nasal steroids, Astelin nasal spray, oral steroids, lab studies, vestibular therapy, VNG testing, neurology consultation, MRI brain with IAC  After discussion agreed to Flonase nasal spray, Astelin nasal spray  Consider further testing with lab studies  Follow up in 6 to 8 weeks       Multiple thyroid nodules  Reviewed history of thyroid nodules  TSH level 07/2021  1 59    Last thyroid US 10/2021  Right lobe:  4 1 x 3 0 x 1 9 cm  Left lobe:  3 4 x 2 1 x 1 1 cm  Isthmus:  0 1 cm  Right mid posterior nodule measuring 1 8 x 1 7 x 1 7 cm  TR 3 (3 points), FNA if >2 5 cm  Follow if >1 5 cm  This nodule has had a previous benign biopsy  As it is stable, no further sampling recommended at this time  Further surveillance at 2 year intervals could be considered to   confirm continued stability for a period of greater than 5 years  RIGHT lower pole nodule measuring 1 4 x 1 4 x 1 1 cm  TR 5 (7 or > points), Highly suspicious  FNA if > 1 cm  Follow if > 0 5 cm  This nodule has had a previous benign biopsy  As it is stable, no further sampling recommended at this time  Further surveillance at 2 year intervals   could be considered to confirm continued stability for a period of greater than 5 years  Right isthmic nodule measuring 0 7 x 0 4 x 0 7 cm  TR 3 (3 points), FNA if >2 5 cm  Follow if >1 5 cm      FNAB of nodules 12/2019  Both nodules [place] II    Based on findings, recommend annual thyroid us to monitor progression of the nodules  Pt agreed and order placed  Diagnoses and all orders for this visit:    Dizziness  -     Ambulatory referral to Audiology  -     PTH, intact; Future  -     Basic metabolic panel; Future  -     TORI w/Reflex; Future  -     RF Screen w/ Reflex to Titer; Future  -     TSH, 3rd generation with Free T4 reflex; Future  -     T4; Future  -     Vitamin D 25 hydroxy; Future  -     azelastine (ASTELIN) 0 1 % nasal spray; 1 spray into each nostril 2 (two) times a day Use in each nostril as directed  -     fluticasone (FLONASE) 50 mcg/act nasal spray; 1 spray into each nostril 2 (two) times a day    Multiple thyroid nodules  -     US thyroid; Future  -     PTH, intact; Future  -     Basic metabolic panel; Future  -     TORI w/Reflex; Future  -     RF Screen w/ Reflex to Titer; Future  -     TSH, 3rd generation with Free T4 reflex; Future  -     T4; Future  -     Vitamin D 25 hydroxy; Future    Vitamin D deficiency  -     PTH, intact; Future  -     Basic metabolic panel; Future  -     TORI w/Reflex; Future  -     RF Screen w/ Reflex to Titer; Future  -     TSH, 3rd generation with Free T4 reflex; Future  -     T4; Future  -     Vitamin D 25 hydroxy; Future    Other fatigue  -     PTH, intact; Future  -     Basic metabolic panel; Future  -     TORI w/Reflex; Future  -     RF Screen w/ Reflex to Titer; Future  -     TSH, 3rd generation with Free T4 reflex; Future  -     T4; Future  -     Vitamin D 25 hydroxy; Future    Benign essential hypertension  -     PTH, intact; Future  -     Basic metabolic panel; Future  -     TORI w/Reflex; Future  -     RF Screen w/ Reflex to Titer; Future  -     TSH, 3rd generation with Free T4 reflex; Future  -     T4; Future  -     Vitamin D 25 hydroxy; Future    Seasonal allergies  -     PTH, intact; Future  -     Basic metabolic panel;  Future  -     TORI w/Reflex; Future  -     RF Screen w/ Reflex to Titer; Future  -     TSH, 3rd generation with Free T4 reflex; Future  -     T4; Future  -     Vitamin D 25 hydroxy; Future  -     azelastine (ASTELIN) 0 1 % nasal spray; 1 spray into each nostril 2 (two) times a day Use in each nostril as directed  -     fluticasone (FLONASE) 50 mcg/act nasal spray; 1 spray into each nostril 2 (two) times a day    Sensorineural hearing loss (SNHL), bilateral          Subjective:      Patient ID: Colonel Sánchez is a 69 y o  female  Presents today as a new patient due to dizziness and thyroid nodules  May 24th 2022 episode of vertigo and led to hospitalization  Since then physical therapy  Some improvement with PT  Feels dizzy if gets up too quickly or swings body too fast   Feels not quite back to her baseline  No headaches  Some sinus pain and pressure  Stopped taking Claritin  No current use of Flonase nasal spray  Left ear feels hearing decreased  Vacuum sensation in left ear at times  The following portions of the patient's history were reviewed and updated as appropriate: allergies, current medications, past family history, past medical history, past social history, past surgical history and problem list     Review of Systems   Constitutional: Negative  HENT: Positive for congestion  Negative for ear discharge, ear pain, hearing loss, nosebleeds, postnasal drip, rhinorrhea, sinus pressure, sinus pain, sore throat, tinnitus and voice change  Eyes: Negative  Respiratory: Negative for chest tightness and shortness of breath  Cardiovascular: Negative  Gastrointestinal: Negative  Endocrine: Negative  Musculoskeletal: Negative  Skin: Negative for color change  Neurological: Positive for dizziness  Negative for numbness and headaches  Psychiatric/Behavioral: Negative            Objective:      /86 (BP Location: Left arm, Patient Position: Sitting, Cuff Size: Adult)   Temp 97 9 °F (36 6 °C) (Temporal)   Ht 5' 8" (1 727 m)   Wt 105 kg (231 lb)   BMI 35 12 kg/m²          Physical Exam  Constitutional:       Appearance: She is well-developed  HENT:      Head: Normocephalic  Right Ear: Hearing, tympanic membrane, ear canal and external ear normal  No decreased hearing noted  No drainage or tenderness  Tympanic membrane is not perforated or erythematous  Left Ear: Hearing, tympanic membrane, ear canal and external ear normal  No decreased hearing noted  No drainage or tenderness  Tympanic membrane is not perforated or erythematous  Nose: Nose normal  No nasal deformity or septal deviation  Mouth/Throat:      Mouth: Mucous membranes are not pale and not dry  No oral lesions  Dentition: Normal dentition  Pharynx: Uvula midline  No oropharyngeal exudate  Neck:      Trachea: No tracheal deviation  Cardiovascular:      Rate and Rhythm: Normal rate  Pulmonary:      Effort: Pulmonary effort is normal  No accessory muscle usage or respiratory distress  Musculoskeletal:      Right shoulder: Normal range of motion  Cervical back: Full passive range of motion without pain, normal range of motion and neck supple  Lymphadenopathy:      Cervical: No cervical adenopathy  Skin:     General: Skin is warm and dry  Neurological:      Mental Status: She is alert and oriented to person, place, and time  Cranial Nerves: No cranial nerve deficit  Sensory: No sensory deficit  Psychiatric:         Behavior: Behavior is cooperative

## 2022-08-17 ENCOUNTER — OFFICE VISIT (OUTPATIENT)
Dept: PHYSICAL THERAPY | Facility: CLINIC | Age: 77
End: 2022-08-17
Payer: MEDICARE

## 2022-08-17 DIAGNOSIS — E11.65 UNCONTROLLED TYPE 2 DIABETES MELLITUS WITH HYPERGLYCEMIA (HCC): ICD-10-CM

## 2022-08-17 DIAGNOSIS — R42 DIZZINESS: Primary | ICD-10-CM

## 2022-08-17 PROBLEM — H90.3 SENSORINEURAL HEARING LOSS (SNHL), BILATERAL: Status: ACTIVE | Noted: 2022-08-17

## 2022-08-17 PROCEDURE — 97112 NEUROMUSCULAR REEDUCATION: CPT

## 2022-08-17 NOTE — ASSESSMENT & PLAN NOTE
Discussed possible causes of vertigo including neurological, cardiac, autoimmune, Otitis media, sinusitis, chemical imbalance, medication side effects, and inner ear concerns  Based on report of symptoms high suspicion for BPPV at start of symptoms, now having residual dizziness  Suggest audiogram today to further evaluate bilateral ears to rule out otitis media and meniere's disease  Audiogram today indicating bilateral mild sloping to moderately/severe SNHL, tymps type A bilaterally, and word discrimination 90%  Treatment options include at home epley's, nasal steroids, Astelin nasal spray, oral steroids, lab studies, vestibular therapy, VNG testing, neurology consultation, MRI brain with IAC      After discussion agreed to Flonase nasal spray, Astelin nasal spray  Consider further testing with lab studies  Follow up in 6 to 8 weeks

## 2022-08-17 NOTE — PROGRESS NOTES
Daily Note     Insurance:  AMA/CMS Eval/ Re-eval POC expires Tana Medina #/ Referral # Total     Start date  Expiration date Extension  Visit limitation? PT only or  PT+OT? Co-Insurance   CMS/ [facility] 2022                                                                        Today's date: 2022  Patient name: Luli Leigh  : 1945  MRN: [id number]  Referring provider: Carol Sierra  Dx:   Encounter Diagnosis     ICD-10-CM    1  Dizziness  R42                   Subjective: Patient reports to PT, seen by ENT office yesterday  No new complaints, when asked to describe her dizziness she reports "off balance"  Denies recent fall  Objective: See treatment diary below  PT verbal/ tactile cues for technique and progression  NMR:  - Side stepping  + cone taps (5): 4 cycles down/back  - Side stepping on foam beam x 4 cycles    - Standing marches with 3 sec holds: x 15, light finger touch- NOT TODAY  - Tandem ambulation 10 feet x 10 reps, NO light touch   - Walking opposite hand to knee 60 ft x 10 cycles NOT TODAY  - FWD/BCK over 6" scar x 10 B/L, light UE support NOT TODAY  - X1 view 30"x2 H/V each  - Blue Foam HTEO  - A-P sway 30 x EO/ EC each  - Blue Foam FAEOHT 10x H/V each, 4 LOB  - Blue Foam FAEC 10"x10, 2 LOB  - Dynamic Gait Drills- Head Turns, stop/ start, change speed x 5 minutes total      Assessment: Patient tolerated treatment well today with increased focus on integration of vestibular function with balance  Note patient with (+) Head Impulse on initial exam indicative of vestibular hypofunction, adjusted there-ex to address accordingly  Written HEP issued (x1 view for 30 seconds, 3x/day- paitient voiced understanding)  Patient will benefit from skilled PT services to improve balance and promote return to her PLOF  Plan: Continue per plan of care             Outcome Measures Initial Eval  2022  PN  22  RE  22       mCTSIB  - FTEO (firm)  - FTEC (firm)  - FTEO (foam)  - FTEC (foam)    Defer    30 sec  4 5 sec  30 sec  5 5 sec    30sec  2sec  30sec  1 sec    30 sec   6 sec   30 sec  2 sec        DGI Defer    NT         FGA Defer    16/30  16/30       10 meter Defer    0 88 m/sec   1 04 m/s        DHI 65/100    0/100  20/100        FOTO  53    70          ABC        77%

## 2022-08-18 RX ORDER — METFORMIN HYDROCHLORIDE 500 MG/1
TABLET, EXTENDED RELEASE ORAL
Qty: 120 TABLET | Refills: 0 | Status: SHIPPED | OUTPATIENT
Start: 2022-08-18 | End: 2022-09-02

## 2022-08-18 NOTE — ASSESSMENT & PLAN NOTE
Reviewed history of thyroid nodules  TSH level 07/2021  1 59    Last thyroid US 10/2021  Right lobe:  4 1 x 3 0 x 1 9 cm  Left lobe:  3 4 x 2 1 x 1 1 cm  Isthmus:  0 1 cm  Right mid posterior nodule measuring 1 8 x 1 7 x 1 7 cm  TR 3 (3 points), FNA if >2 5 cm  Follow if >1 5 cm  This nodule has had a previous benign biopsy  As it is stable, no further sampling recommended at this time  Further surveillance at 2 year intervals could be considered to   confirm continued stability for a period of greater than 5 years  RIGHT lower pole nodule measuring 1 4 x 1 4 x 1 1 cm  TR 5 (7 or > points), Highly suspicious  FNA if > 1 cm  Follow if > 0 5 cm  This nodule has had a previous benign biopsy  As it is stable, no further sampling recommended at this time  Further surveillance at 2 year intervals   could be considered to confirm continued stability for a period of greater than 5 years  Right isthmic nodule measuring 0 7 x 0 4 x 0 7 cm  TR 3 (3 points), FNA if >2 5 cm  Follow if >1 5 cm  FNAB of nodules 12/2019  Both nodules [place] II    Based on findings, recommend annual thyroid us to monitor progression of the nodules  Pt agreed and order placed

## 2022-08-22 ENCOUNTER — APPOINTMENT (OUTPATIENT)
Dept: PHYSICAL THERAPY | Facility: CLINIC | Age: 77
End: 2022-08-22
Payer: MEDICARE

## 2022-08-24 ENCOUNTER — OFFICE VISIT (OUTPATIENT)
Dept: PHYSICAL THERAPY | Facility: CLINIC | Age: 77
End: 2022-08-24
Payer: MEDICARE

## 2022-08-24 DIAGNOSIS — R42 DIZZINESS: Primary | ICD-10-CM

## 2022-08-24 PROCEDURE — 97112 NEUROMUSCULAR REEDUCATION: CPT | Performed by: PHYSICAL THERAPIST

## 2022-08-24 NOTE — PROGRESS NOTES
Daily Note     Insurance:  AMA/CMS Eval/ Re-eval POC expires Krystin Fajardo #/ Referral # Total     Start date  Expiration date Extension  Visit limitation? PT only or  PT+OT? Co-Insurance   CMS/ [facility] 2022                                                                        Today's date: 2022  Patient name: Nitin Gomez  : 1945  MRN: [id number]  Referring provider: Kat Elias*  Dx:   Encounter Diagnosis     ICD-10-CM    1  Dizziness  R42                   Subjective: Patient reports no dizziness since last visit  Has been keeping up with HEP, and will occasionally have dizziness during VORx1 when turning her head too fast       Patient co-treated by Physical Therapy Student, Ryan Fowler, under my direct supervision  Objective: See treatment diary below  PT verbal/ tactile cues for technique and progression  CCG/min A required with balance exercises  NMR:  - Side stepping  + cone taps (5): 4 cycles down/back  - Side stepping on foam beam x 4 cycles    - Standing marches with 3 sec holds: x 15, light finger touch- NOT TODAY  - Tandem ambulation 10 feet x 10 reps, NO light touch (multiple LOB with Lloyd)  - Walking opposite hand to knee 60 ft x 10 cycles (single LOB Lloyd)  - FWD/BCK over 6" scar x 10 B/L, light UE support NOT TODAY  - X1 view 30"x2 H/V each  - Blue Foam HTEO  - A-P sway 30 x EO/ EC each  - Blue Foam FAEOHT 20x H/V each, 4 LOB CCG/min A  - Blue Foam FAEC 10"x10, 2 LOB  - Dynamic Gait Drills- Head Turns, stop/ start, change speed x 5 minutes total      Assessment: Patient tolerated treatment well today  Patient reported no dizziness throughout session, although reported increased difficulty with VORx1 vertical  Patient demonstrating excessive anterior and R lateral sway during dynamic balance, with multiple LOB noted requiring Lloyd to correct LOB  Patient will benefit from skilled PT services to improve balance and promote return to her PLOF  Plan: Continue per plan of care             Outcome Measures Initial Eval  6/6/2022 6/20  PN  7/7/22  RE  8/5/22       mCTSIB  - FTEO (firm)  - FTEC (firm)  - FTEO (foam)  - FTEC (foam)    Defer    30 sec  4 5 sec  30 sec  5 5 sec    30sec  2sec  30sec  1 sec    30 sec   6 sec   30 sec  2 sec        DGI Defer    NT         FGA Defer    16/30  16/30       10 meter Defer    0 88 m/sec   1 04 m/s        DHI 65/100    0/100  20/100        FOTO  53    70          ABC        77%

## 2022-08-30 ENCOUNTER — RA CDI HCC (OUTPATIENT)
Dept: OTHER | Facility: HOSPITAL | Age: 77
End: 2022-08-30

## 2022-08-30 NOTE — PROGRESS NOTES
[id number]  [facility] 75  coding opportunities          Chart Reviewed number of suggestions sent to Provider: 1     Patients Insurance     Medicare Insurance: [employer]

## 2022-09-09 ENCOUNTER — OFFICE VISIT (OUTPATIENT)
Dept: FAMILY MEDICINE CLINIC | Facility: CLINIC | Age: 77
End: 2022-09-09
Payer: MEDICARE

## 2022-09-09 VITALS
BODY MASS INDEX: 35.31 KG/M2 | SYSTOLIC BLOOD PRESSURE: 134 MMHG | WEIGHT: 233 LBS | HEIGHT: 68 IN | DIASTOLIC BLOOD PRESSURE: 80 MMHG

## 2022-09-09 DIAGNOSIS — I10 BENIGN ESSENTIAL HYPERTENSION: ICD-10-CM

## 2022-09-09 DIAGNOSIS — E11.69 TYPE 2 DIABETES MELLITUS WITH OTHER SPECIFIED COMPLICATION, WITHOUT LONG-TERM CURRENT USE OF INSULIN (HCC): Primary | ICD-10-CM

## 2022-09-09 LAB — SL AMB POCT HEMOGLOBIN AIC: 9.5 (ref ?–6.5)

## 2022-09-09 PROCEDURE — 83036 HEMOGLOBIN GLYCOSYLATED A1C: CPT | Performed by: FAMILY MEDICINE

## 2022-09-09 PROCEDURE — 99214 OFFICE O/P EST MOD 30 MIN: CPT | Performed by: FAMILY MEDICINE

## 2022-09-09 NOTE — PROGRESS NOTES
Assessment/Plan:  Type 2 diabetes mellitus, without long-term current use of insulin ([address] )  She did see some significant improvement in her hemoglobin A1c though still significantly above goal   I discussed this with her again and her risk for development of other complications in addition to her current neuropathy  She is aware of her noncompliance with exercise regimen  She has also been concerned about treating with any other medication due to potential side effects  I asked her to investigate Januvia to see if she would consider this agent  She is going to call back should she choose to begin this  She has also been resistant to beginning a statin  We also discussed I will be retiring on September 30th and she will need to begin seeing a new primary physician  We recommended either [facility] or [address] [place] [address]  Lab Results   Component Value Date    HGBA1C 9 5 (A) 09/09/2022       Benign essential hypertension  Blood pressure is acceptable today  Lung nodule < 6cm on CT  She has plans to schedule this follow-up examination over the winter  We encouraged her to do this  She declines DEXA scan  Diagnoses and all orders for this visit:    Type 2 diabetes mellitus with other specified complication, without long-term current use of insulin ([address] )  -     POCT hemoglobin A1c    Benign essential hypertension          Subjective:   Chief Complaint   Patient presents with    Follow-up     3 month check A1C        Patient ID: Tonie Carter is a 69 y o  female  No P/P/P but nocturia x 2-3  No visual disturbance  My neuropathy is better  HPI  The patient is a pleasant 20-year-old female who presents today for follow-up of type 2 diabetes  She has been able to tolerate metformin XR at a dose of 1000 b i d  She does occasionally continue to have diarrhea related to this    She has been trying to improve her diet though because of the heat her exercise has not been at goal which she is aware of  She has gained 3-4 lb since her last visit  She denies any polyuria polydipsia polyphagia but does admit to nocturia 2-3 times per night  She has no visual disturbance  She feels that her symptoms of neuropathy are improved  She denies cardiovascular pulmonary complaint  She picked up her prescription for Jardiance but after reading potential side effects she declined to begin it  The following portions of the patient's history were reviewed and updated as appropriate: allergies, current medications, past family history, past medical history, past social history, past surgical history and problem list     ROS    Per the HPI  Objective:    Physical Exam  Vitals and nursing note reviewed  Constitutional:       Appearance: She is obese  Neck:      Vascular: No carotid bruit  Cardiovascular:      Rate and Rhythm: Normal rate and regular rhythm  Pulses: no weak pulses          Dorsalis pedis pulses are 1+ on the right side and 1+ on the left side  Pulmonary:      Effort: Pulmonary effort is normal       Breath sounds: Normal breath sounds  Musculoskeletal:      Right lower leg: No edema  Left lower leg: No edema  Feet:      Right foot:      Skin integrity: No ulcer, skin breakdown, erythema, warmth, callus or dry skin  Left foot:      Skin integrity: No ulcer, skin breakdown, erythema, warmth, callus or dry skin  Neurological:      Mental Status: She is alert and oriented to person, place, and time  Psychiatric:         Mood and Affect: Mood normal          Thought Content: Thought content normal          Judgment: Judgment normal        Patient's shoes and socks removed  Right Foot/Ankle   Right Foot Inspection  Skin Exam: skin normal and skin intact  No dry skin, no warmth, no callus, no erythema, no maceration, no abnormal color, no pre-ulcer, no ulcer and no callus  Toe Exam: ROM and strength within normal limits  Sensory   Monofilament testing: diminished    Vascular  Capillary refills: < 3 seconds  The right DP pulse is 1+  Left Foot/Ankle  Left Foot Inspection  Skin Exam: skin normal and skin intact  No dry skin, no warmth, no erythema, no maceration, normal color, no pre-ulcer, no ulcer and no callus  Toe Exam: ROM and strength within normal limits  Sensory   Monofilament testing: diminished    Vascular  Capillary refills: < 3 seconds  The left DP pulse is 1+       Assign Risk Category  No deformity present  Loss of protective sensation  No weak pulses  Risk: 1      Wt Readings from Last 12 Encounters:   09/09/22 106 kg (233 lb)   08/16/22 105 kg (231 lb)   05/24/22 104 kg (229 lb 4 5 oz)   04/18/22 104 kg (230 lb)   03/16/22 107 kg (235 lb)   03/03/22 108 kg (237 lb)   01/13/22 107 kg (236 lb 12 8 oz)   10/15/21 107 kg (235 lb)   07/22/21 105 kg (232 lb)   07/01/21 107 kg (235 lb)   06/28/21 107 kg (235 lb)   04/08/21 109 kg (239 lb 9 6 oz)   ]

## 2022-09-09 NOTE — ASSESSMENT & PLAN NOTE
She did see some significant improvement in her hemoglobin A1c though still significantly above goal   I discussed this with her again and her risk for development of other complications in addition to her current neuropathy  She is aware of her noncompliance with exercise regimen  She has also been concerned about treating with any other medication due to potential side effects  I asked her to investigate Januvia to see if she would consider this agent  She is going to call back should she choose to begin this  She has also been resistant to beginning a statin  We also discussed I will be retiring on September 30th and she will need to begin seeing a new primary physician  We recommended either [facility] or [address] [place] [address]    Lab Results   Component Value Date    HGBA1C 9 5 (A) 09/09/2022

## 2022-09-29 ENCOUNTER — OFFICE VISIT (OUTPATIENT)
Dept: NEUROLOGY | Facility: CLINIC | Age: 77
End: 2022-09-29
Payer: MEDICARE

## 2022-09-29 VITALS
WEIGHT: 237 LBS | BODY MASS INDEX: 35.92 KG/M2 | OXYGEN SATURATION: 97 % | HEART RATE: 78 BPM | TEMPERATURE: 97.2 F | DIASTOLIC BLOOD PRESSURE: 80 MMHG | HEIGHT: 68 IN | SYSTOLIC BLOOD PRESSURE: 158 MMHG

## 2022-09-29 DIAGNOSIS — E11.9 TYPE 2 DIABETES MELLITUS, WITHOUT LONG-TERM CURRENT USE OF INSULIN (HCC): ICD-10-CM

## 2022-09-29 DIAGNOSIS — E78.2 HYPERLIPIDEMIA, MIXED: ICD-10-CM

## 2022-09-29 DIAGNOSIS — H81.10 BPPV (BENIGN PAROXYSMAL POSITIONAL VERTIGO): Primary | ICD-10-CM

## 2022-09-29 PROCEDURE — 99214 OFFICE O/P EST MOD 30 MIN: CPT | Performed by: NURSE PRACTITIONER

## 2022-09-29 NOTE — PROGRESS NOTES
Patient ID: Harinder Darling is a 69 y o  female  Assessment/Plan:       Diagnoses and all orders for this visit:    BPPV (benign paroxysmal positional vertigo)    Hyperlipidemia, mixed    Type 2 diabetes mellitus, without long-term current use of insulin ([address] )       Follow up with PCP to assist with vascular risk management  LDL < 70 and A1C <7 0  Follow up with ENT re: vertigo and hearing loss  Follow up with Neurology office as needed  Subjective/HPI:  Harinder Darling is a 73yr old female was [facility] follow-up from May with BPPV  Patient woke with episode of vertigo as her room was swirling with an onset of nausea and vomiting  She was unable to stand or move  She had ataxia in the ER was given meclizine and IV fluid  CTA to evaluate posterior circulation, noted no significant LD or stenosis  Patient did have some mild left EC carotid stenosis less than 50%, remaining vessels were normal in caliber  MRI completed no acute infarction seen, did note fluid in the right mastoid cells  Patient previously had some upper respiratory symptoms for a few days off and on with sinus headache, left ear discomfort and tinnitus  She was recommended to go to ENT and outpatient [facility]  Patient is in the Neurology office today as a hospital follow-up  She denies having there bouts of vertigo since being in the hospital    She has been seen by ENT who sent her for audiology  She has some slight hearing loss in the left ear but not enough to warrant hearing aids at this point time  She did go to vestibular rehab and did therapy for approximately 2 weeks  Stated that she did gain much benefit from the therapy however had difficulties tolerating it due to her knees  Patient does try to do some of the home exercises they gave her to continue her therapies at home    Patient denies any headache, dizziness, lightheadedness, vision changes, speech or swallowing deficits imbalances or falls   She does report on occasion feeling off balance when she turns quickly, discussed sensory deficits causing a sense of disequilibrium and recommended patient take care in being cognizant of her movements  Reviewed patient's vascular risks including her diabetes and hyperlipidemia  Patient's last LDL was 140 and her A1c was 9 5 which is down from 10 9  Patient stated her current primary care physician is retiring, she has an appointment scheduled in the next month with a new provider who will be doing a full head-to-toe exam with follow-up labs  Patient was provided her vascular risk goals will continue to work with her new PCP to obtain them  Patient has no other concerns or questions for Neurology  She is doing well post hospitalization of follows with ENT for her vertigo  Patient can follow-up in the outpatient neurology office as needed        The following portions of the patient's history were reviewed and updated as appropriate: allergies, current medications, past family history, past medical history, past social history, past surgical history and problem list         Past Medical History:   Diagnosis Date    Diabetes mellitus ([facility] [address] )     Endometrial carcinoma ([facility] [address] )     Hypertension     Rectal bleeding 9/10/2019    Sciatic radiculitis 1/28/2021    Varicella        Past Surgical History:   Procedure Laterality Date    HYSTERECTOMY  2006   [address]    TUBAL LIGATION  1992    US GUIDED THYROID BIOPSY  12/12/2019       Social History     Socioeconomic History    Marital status: /Civil Union     Spouse name: None    Number of children: None    Years of education: None    Highest education level: None   Occupational History    Occupation: Retired    Tobacco Use    Smoking status: Never Smoker    Smokeless tobacco: Never Used   Vaping Use    Vaping Use: Never used   Substance and Sexual Activity    Alcohol use: Never    Drug use: Never    Sexual activity: Not Currently     Partners: Male     Birth control/protection: Post-menopausal   Other Topics Concern    None   Social History Narrative    Denied: History of alcohol use - As per [employer]    Daily caffeine consumption, 2-3 servings a day    Denied: History of drug use - As per [employer]    Retired from employment: [employer]       Social Determinants of Health     Financial Resource Strain: Not on file   Food Insecurity: No Food Insecurity    Worried About [address] in the Last Year: Never true    Ran Out of Food in the Last Year: Never true   Transportation Needs: No Transportation Needs    Lack of Transportation (Medical): No    Lack of Transportation (Non-Medical):  No   Physical Activity: Not on file   Stress: Not on file   Social Connections: Not on file   Intimate Partner Violence: Not on file   Housing Stability: Low Risk     Unable to Pay for Housing in the Last Year: No    Number of Places Lived in the Last Year: 1    Unstable Housing in the Last Year: No       Family History   Problem Relation Age of Onset    Stroke Mother     Diabetes Mother     Cancer Mother     Lung cancer Father     Breast cancer Sister 66    COPD Brother     Coronary artery disease Brother     Heart failure Brother     Leukemia Son     HIV Brother     Cancer Family          Current Outpatient Medications:     aspirin 81 mg chewable tablet, Chew 1 tablet (81 mg total) daily, Disp: 30 tablet, Rfl: 0    azelastine (ASTELIN) 0 1 % nasal spray, 1 spray into each nostril 2 (two) times a day Use in each nostril as directed, Disp: 1 mL, Rfl: 6    Blood Glucose Monitoring Suppl (ONE TOUCH ULTRA 2) w/Device KIT, , Disp: , Rfl:     glucose blood (OneTouch Ultra) test strip, Use as instructed, Disp: 100 strip, Rfl: 0    metFORMIN (GLUCOPHAGE-XR) 500 mg 24 hr tablet, TAKE 2 TABLETS BY MOUTH TWICE A DAY WITH FOOD, Disp: 360 tablet, Rfl: 0    fluticasone (FLONASE) 50 mcg/act nasal spray, 1 spray into each nostril 2 (two) times a day (Patient not taking: Reported on 9/29/2022), Disp: 1 g, Rfl: 6    Allergies   Allergen Reactions    Gabapentin Hives        Blood pressure 158/80, pulse 78, temperature (!) 97 2 °F (36 2 °C), temperature source Tympanic, height 5' 8" (1 727 m), weight 108 kg (237 lb), SpO2 97 %  Objective:    Blood pressure 158/80, pulse 78, temperature (!) 97 2 °F (36 2 °C), temperature source Tympanic, height 5' 8" (1 727 m), weight 108 kg (237 lb), SpO2 97 %  Physical Exam  Vitals reviewed  Constitutional:       Appearance: Normal appearance  She is well-developed  HENT:      Head: Normocephalic  Nose: Nose normal       Mouth/Throat:      Mouth: Mucous membranes are moist    Eyes:      General: Lids are normal       Extraocular Movements: Nystagmus present  Pupils: Pupils are equal, round, and reactive to light  Cardiovascular:      Rate and Rhythm: Normal rate  Pulmonary:      Effort: Pulmonary effort is normal    Abdominal:      Palpations: Abdomen is soft  Musculoskeletal:      Cervical back: Normal range of motion  Skin:     General: Skin is warm and dry  Neurological:      Mental Status: She is alert  Coordination: Romberg sign negative  Deep Tendon Reflexes: Strength normal and reflexes are normal and symmetric  Psychiatric:         Attention and Perception: Attention and perception normal          Mood and Affect: Mood and affect normal          Speech: Speech normal          Behavior: Behavior normal  Behavior is cooperative  Thought Content: Thought content normal          Cognition and Memory: Cognition and memory normal          Judgment: Judgment normal          Neurological Exam  Mental Status  Alert  Oriented to person, place, time and situation  Memory is normal  Recent and remote memory are intact  Speech is normal  Language is fluent with no aphasia   Attention and concentration are normal  Fund of knowledge is appropriate for level of education  Cranial Nerves  CN II: Visual acuity is normal  Visual fields full to confrontation  CN III, IV, VI: Nystagmus present: Mild horizontal nystagmus with leftward gaze  Normal lids and orbits bilaterally  Pupils equal round and reactive to light bilaterally  CN V: Facial sensation is normal   CN VII: Full and symmetric facial movement  CN VIII: Hearing is normal   CN IX, X: Palate elevates symmetrically  Normal gag reflex  CN XI: Shoulder shrug strength is normal   CN XII: Tongue midline without atrophy or fasciculations  Motor  Normal muscle bulk throughout  Normal muscle tone  No abnormal involuntary movements  Strength is 5/5 throughout all four extremities  Sensory  Light touch is normal in upper and lower extremities  Temperature is normal in upper and lower extremities  Vibration is normal in upper and lower extremities  Proprioception is normal in upper and lower extremities  Reflexes  Deep tendon reflexes are 2+ and symmetric in all four extremities  Right pathological reflexes: Chrissy's absent  Left pathological reflexes: Chrissy's absent  Coordination  Right: Finger-to-nose normal  Rapid alternating movement normal  Heel-to-shin normal Left: Finger-to-nose normal  Rapid alternating movement normal  Heel-to-shin normal     Gait  Casual gait is normal including stance, stride, and arm swing  Normal toe walking  Normal heel walking  Normal tandem gait  Romberg is absent  Able to rise from chair without using arms  ROS:    Review of Systems   Constitutional: Negative  Negative for appetite change and fever  HENT: Positive for tinnitus (left ear)  Negative for hearing loss, trouble swallowing and voice change  Eyes: Negative  Negative for photophobia and pain  Respiratory: Negative  Negative for shortness of breath  Cardiovascular: Negative  Negative for palpitations  Gastrointestinal: Negative  Negative for nausea and vomiting  Endocrine: Negative  Negative for cold intolerance  Genitourinary: Positive for frequency  Negative for dysuria and urgency  Musculoskeletal: Negative  Negative for myalgias and neck pain  Skin: Negative  Negative for rash  Neurological: Negative  Negative for dizziness, tremors, seizures, syncope, facial asymmetry, speech difficulty, weakness, light-headedness and headaches  Hematological: Negative  Does not bruise/bleed easily  Psychiatric/Behavioral: Negative  Negative for confusion, hallucinations and sleep disturbance  ROS reviewed and discussed with patient

## 2022-09-29 NOTE — PATIENT INSTRUCTIONS
Follow up with PCP to assist with vascular risk management  LDL < 70 and A1C <7 0  Follow up with ENT re: vertigo and hearing loss  Follow up with Neurology office as needed

## 2022-09-30 ENCOUNTER — HOSPITAL ENCOUNTER (OUTPATIENT)
Dept: ULTRASOUND IMAGING | Facility: HOSPITAL | Age: 77
Discharge: HOME | End: 2022-09-30
Payer: MEDICARE

## 2022-09-30 DIAGNOSIS — E04.2 MULTIPLE THYROID NODULES: ICD-10-CM

## 2022-09-30 PROCEDURE — 76536 US EXAM OF HEAD AND NECK: CPT

## 2022-10-11 ENCOUNTER — OFFICE VISIT (OUTPATIENT)
Dept: FAMILY MEDICINE CLINIC | Facility: CLINIC | Age: 77
End: 2022-10-11
Payer: MEDICARE

## 2022-10-11 ENCOUNTER — TELEPHONE (OUTPATIENT)
Dept: FAMILY MEDICINE CLINIC | Facility: CLINIC | Age: 77
End: 2022-10-11

## 2022-10-11 VITALS
SYSTOLIC BLOOD PRESSURE: 152 MMHG | OXYGEN SATURATION: 98 % | HEIGHT: 68 IN | WEIGHT: 234 LBS | DIASTOLIC BLOOD PRESSURE: 90 MMHG | HEART RATE: 91 BPM | TEMPERATURE: 97.9 F | BODY MASS INDEX: 35.46 KG/M2

## 2022-10-11 DIAGNOSIS — G47.8 OTHER SLEEP DISORDERS: ICD-10-CM

## 2022-10-11 DIAGNOSIS — R06.83 SNORING: ICD-10-CM

## 2022-10-11 DIAGNOSIS — I10 BENIGN ESSENTIAL HYPERTENSION: ICD-10-CM

## 2022-10-11 DIAGNOSIS — T63.464A YELLOW JACKET STING, UNDETERMINED INTENT, INITIAL ENCOUNTER: Primary | ICD-10-CM

## 2022-10-11 PROCEDURE — 99214 OFFICE O/P EST MOD 30 MIN: CPT | Performed by: FAMILY MEDICINE

## 2022-10-11 RX ORDER — EPINEPHRINE 0.3 MG/.3ML
0.3 INJECTION SUBCUTANEOUS ONCE
Qty: 0.6 ML | Refills: 0 | Status: SHIPPED | OUTPATIENT
Start: 2022-10-11 | End: 2022-10-11

## 2022-10-11 RX ORDER — BETAMETHASONE DIPROPIONATE 0.5 MG/G
CREAM TOPICAL 2 TIMES DAILY
Qty: 30 G | Refills: 0 | Status: SHIPPED | OUTPATIENT
Start: 2022-10-11

## 2022-10-11 NOTE — PATIENT INSTRUCTIONS
Benadryl nightly   Can add on over the counter Pepcid   Use betamethasone cream twice a day as needed for itchy rash   Use EpiPen only for Anaphylaxis

## 2022-10-11 NOTE — PROGRESS NOTES
Assessment/Plan:   Rick Santos was seen today for insect bite  Diagnoses and all orders for this visit:    Yellow jacket sting, undetermined intent, initial encounter  -     betamethasone dipropionate (DIPROSONE) 0 05 % cream; Apply topically 2 (two) times a day 2 weeks maximum  Do not use on face or groin  -     EPINEPHrine (EPIPEN) 0 3 mg/0 3 mL SOAJ; Inject 0 3 mL (0 3 mg total) into a muscle once for 1 dose  C/w allergic reaction   Local treatment reviewed  No need for oral steroids, avoid with high sugars  Use cold compresses ice/keep elevated  Snoring  -     Home Study; Future    Other sleep disorders   -     Home Study; Future  Pt agrees to home sleep study for suspected sleep apnea  Benign essential hypertension   Suspect LISA contributing  Will address more at her well visit  Old records indicate she declined more meds for blood pressure at last visit when it was about the same  Patient Instructions   Benadryl nightly   Can add on over the counter Pepcid   Use betamethasone cream twice a day as needed for itchy rash   Use EpiPen only for Anaphylaxis     No follow-ups on file  Subjective:    HPI  Rick Santos is a 69 y o  female who presents with:  Chief Complaint     Insect Bite        HPI     Insect Bite      Additional comments: Left hand            Last edited by Nneka Gallo on 10/11/2022  3:34 PM  (History)        ---Above per clinical staff & reviewed  ---        Today:  First visit here today for acute visit    Rudy Hodge   Yesterday afternoon 4 stings from yellow jacket  Itching, swelling   2 benadryl   - 1 last ngiht and 1 this am   Nothing helped   2 ASA last night   10 - 12 yrs ago got stung  by many yellow jackets on both her legs and was told that she would likely have a worse reaction the next time  Admits sugars not running well, over 200  Feels this is due to not exercising recently and not watching diet  Says she knows she needs to get back on this       The following portions of the patient's history were reviewed and updated as appropriate: allergies, current medications, past family history, past medical history, past social history, past surgical history and problem list   Review of Systems  ROS:  all others negative - no chest pain, SOB, normal urine and bowels  no GERD  sleeping well  mood good  Objective:    /90 (BP Location: Left arm, Patient Position: Sitting, Cuff Size: Standard)   Pulse 91   Temp 97 9 °F (36 6 °C) (Temporal)   Ht 5' 8" (1 727 m)   Wt 106 kg (234 lb)   SpO2 98%   BMI 35 58 kg/m²   Wt Readings from Last 3 Encounters:   10/11/22 106 kg (234 lb)   09/29/22 108 kg (237 lb)   09/09/22 106 kg (233 lb)     BP Readings from Last 3 Encounters:   10/11/22 152/90   09/29/22 158/80   09/09/22 134/80       Current Medications:  Current Outpatient Medications   Medication Sig Dispense Refill   • aspirin 81 mg chewable tablet Chew 1 tablet (81 mg total) daily 30 tablet 0   • azelastine (ASTELIN) 0 1 % nasal spray 1 spray into each nostril 2 (two) times a day Use in each nostril as directed 1 mL 6   • betamethasone dipropionate (DIPROSONE) 0 05 % cream Apply topically 2 (two) times a day 2 weeks maximum  Do not use on face or groin  30 g 0   • Blood Glucose Monitoring Suppl (ONE TOUCH ULTRA 2) w/Device KIT      • EPINEPHrine (EPIPEN) 0 3 mg/0 3 mL SOAJ Inject 0 3 mL (0 3 mg total) into a muscle once for 1 dose 0 6 mL 0   • glucose blood (OneTouch Ultra) test strip Use as instructed 100 strip 0   • metFORMIN (GLUCOPHAGE-XR) 500 mg 24 hr tablet TAKE 2 TABLETS BY MOUTH TWICE A DAY WITH FOOD 360 tablet 0     No current facility-administered medications for this visit  Physical Exam   Constitutional: she appears well-developed and well-nourished  HENT: Head: Normocephalic     Right Ear: External ear normal  Tympanic membrane normal    Left Ear: External ear normal  Tympanic membrane normal    Nose: Nose normal  + mucosal edema, No rhinorrhea  Right sinus exhibits no maxillary sinus tenderness  Left sinus exhibits no maxillary sinus tenderness  Mouth/Throat: Oropharynx is clear and moist    Eyes: Normal conjunctiva  No erythema  No discharge  Neck: No pain on exam  Neck supple  Cardiovascular: Normal rate, regular rhythm and normal heart sounds  Pulmonary/Chest: Effort normal and breath sounds normal    Abdominal: Soft  Bowel sounds are normal  There is no tenderness  Lymphadenopathy: she has no cervical adenopathy  Neurological: she is alert and oriented to person, place, and time  Skin: Skin is warm and dry  Left hand generalized edema "puffy"  Warm  No signs/symptoms of cellulitis  Psychiatric: she has a normal mood and affect   her behavior is normal

## 2022-10-19 ENCOUNTER — TELEPHONE (OUTPATIENT)
Dept: SLEEP CENTER | Facility: CLINIC | Age: 77
End: 2022-10-19

## 2022-10-19 NOTE — TELEPHONE ENCOUNTER
----- Message from Roselia Rojo MD sent at 10/19/2022 11:40 AM EDT -----  Approved    ----- Message -----  From: Deepti Downs  Sent: 10/12/2022  10:06 AM EDT  To: Sleep Medicine Roseline Bellamy Provider    This home sleep study needs approval      If approved please sign and return to clerical pool  If denied please include reasons why  Also provide alternative testing if warranted  Please sign and return to clerical pool

## 2022-10-28 DIAGNOSIS — E11.65 UNCONTROLLED TYPE 2 DIABETES MELLITUS WITH HYPERGLYCEMIA (HCC): ICD-10-CM

## 2022-10-28 NOTE — TELEPHONE ENCOUNTER
Pt was given a 3 month supply in sept - please call pharmacy or pt to find out why we are getting refill request (came from last office)

## 2022-10-31 RX ORDER — METFORMIN HYDROCHLORIDE 500 MG/1
TABLET, EXTENDED RELEASE ORAL
Qty: 120 TABLET | Refills: 2 | OUTPATIENT
Start: 2022-10-31

## 2022-10-31 NOTE — TELEPHONE ENCOUNTER
Phone call placed to patient- she sis not out of medication  She will let us know when she needs a refill

## 2022-12-01 ENCOUNTER — OFFICE VISIT (OUTPATIENT)
Dept: URGENT CARE | Facility: CLINIC | Age: 77
End: 2022-12-01

## 2022-12-01 VITALS
HEIGHT: 68 IN | WEIGHT: 234 LBS | RESPIRATION RATE: 20 BRPM | TEMPERATURE: 98.9 F | OXYGEN SATURATION: 98 % | BODY MASS INDEX: 35.46 KG/M2 | HEART RATE: 87 BPM

## 2022-12-01 DIAGNOSIS — S61.219A LACERATION WITHOUT FOREIGN BODY OF UNSPECIFIED FINGER WITHOUT DAMAGE TO NAIL, INITIAL ENCOUNTER: Primary | ICD-10-CM

## 2022-12-01 NOTE — PROGRESS NOTES
[facility]        NAME: Jayy Arvizu is a 69 y o  female  : 1945    MRN: [id number]  DATE: 2022  TIME: 11:47 AM    Assessment and Plan   Laceration without foreign body of unspecified finger without damage to nail, initial encounter [[id number]]  1  Laceration without foreign body of unspecified finger without damage to nail, initial encounter              Patient Instructions   Patient Instructions   Steri strips placed   Call back if you notice signs of infections         Follow up with PCP in 3-5 days  Proceed to  ER if symptoms worsen  Chief Complaint     Chief Complaint   Patient presents with   • Finger Laceration     Finger laceration from can last night  5 pmrt index finger         History of Present Illness       The pt is a 59-year-old female presenting for a lac to her right index finger  IT happened last night around 5 pm  Wound is well approximated  Review of Systems   Review of Systems   Skin: Positive for wound  Current Medications       Current Outpatient Medications:   •  aspirin 81 mg chewable tablet, Chew 1 tablet (81 mg total) daily, Disp: 30 tablet, Rfl: 0  •  Blood Glucose Monitoring Suppl (ONE TOUCH ULTRA 2) w/Device KIT, , Disp: , Rfl:   •  EPINEPHrine (EPIPEN) 0 3 mg/0 3 mL SOAJ, Inject 0 3 mL (0 3 mg total) into a muscle once for 1 dose, Disp: 0 6 mL, Rfl: 0  •  glucose blood (OneTouch Ultra) test strip, Use as instructed, Disp: 100 strip, Rfl: 0  •  metFORMIN (GLUCOPHAGE-XR) 500 mg 24 hr tablet, TAKE 2 TABLETS BY MOUTH TWICE A DAY WITH FOOD, Disp: 360 tablet, Rfl: 0  •  azelastine (ASTELIN) 0 1 % nasal spray, 1 spray into each nostril 2 (two) times a day Use in each nostril as directed (Patient not taking: Reported on 2022), Disp: 1 mL, Rfl: 6  •  betamethasone dipropionate (DIPROSONE) 0 05 % cream, Apply topically 2 (two) times a day 2 weeks maximum  Do not use on face or groin   (Patient not taking: Reported on 2022), Disp: 30 g, Rfl: 0    Current Allergies     Allergies as of 12/01/2022 - Reviewed 12/01/2022   Allergen Reaction Noted   • Gabapentin Hives 02/15/2021            The following portions of the patient's history were reviewed and updated as appropriate: allergies, current medications, past family history, past medical history, past social history, past surgical history and problem list      Past Medical History:   Diagnosis Date   • Diabetes mellitus ([facility] [address] )    • Endometrial carcinoma ([facility] [address] )    • Hypertension    • Rectal bleeding 9/10/2019   • Sciatic radiculitis 1/28/2021   • Varicella        Past Surgical History:   Procedure Laterality Date   • HYSTERECTOMY  2006   • TONSILLECTOMY  1962   • TUBAL LIGATION  1992   • US GUIDED THYROID BIOPSY  12/12/2019       Family History   Problem Relation Age of Onset   • Stroke Mother    • Diabetes Mother    • Cancer Mother    • Lung cancer Father    • Breast cancer Sister 66   • COPD Brother    • Coronary artery disease Brother    • Heart failure Brother    • Leukemia Son    • HIV Brother    • Cancer Family          Medications have been verified  Objective   Pulse 87   Temp 98 9 °F (37 2 °C)   Resp 20   Ht 5' 8" (1 727 m)   Wt 106 kg (234 lb)   SpO2 98%   BMI 35 58 kg/m²        Physical Exam     Physical Exam  Vitals and nursing note reviewed  Constitutional:       General: She is not in acute distress  Appearance: Normal appearance  She is normal weight  She is not ill-appearing, toxic-appearing or diaphoretic  HENT:      Head: Normocephalic and atraumatic  Cardiovascular:      Rate and Rhythm: Normal rate and regular rhythm  Heart sounds: Normal heart sounds  No murmur heard  No friction rub  No gallop  Pulmonary:      Effort: Pulmonary effort is normal  No respiratory distress  Breath sounds: Normal breath sounds  No stridor  No wheezing, rhonchi or rales  Chest:      Chest wall: No tenderness  Skin:     General: Skin is warm and dry  Capillary Refill: Capillary refill takes less than 2 seconds  Comments: 1 cm well-approximated laceration, mild surrounding erythema on distal 2nd phalanx    Neurological:      Mental Status: She is alert

## 2023-01-01 NOTE — ASSESSMENT & PLAN NOTE
The patient's A1c improved from 12 5-10 2  We congratulated her for this significant improvement  We did note that she still has a long way to get to goal   Also her inability to maintain regimen due to her 's recent illness probably impacted her A1c negatively  She is motivated to begin exercising and improving her diet  We are also going to increase her metformin to 1000 b i d  She is asked to return in 4-6 months for follow-up A1c  She is in agreement with this plan      Lab Results   Component Value Date    HGBA1C 10 2 (A) 11/14/2019 [place]

## 2023-02-14 ENCOUNTER — TELEPHONE (OUTPATIENT)
Dept: OBGYN CLINIC | Facility: CLINIC | Age: 78
End: 2023-02-14

## 2023-02-14 ENCOUNTER — APPOINTMENT (OUTPATIENT)
Dept: LAB | Facility: HOSPITAL | Age: 78
End: 2023-02-14

## 2023-02-14 DIAGNOSIS — E04.2 MULTIPLE THYROID NODULES: ICD-10-CM

## 2023-02-14 DIAGNOSIS — E55.9 VITAMIN D DEFICIENCY: ICD-10-CM

## 2023-02-14 DIAGNOSIS — R30.0 DYSURIA: Primary | ICD-10-CM

## 2023-02-14 DIAGNOSIS — R53.83 OTHER FATIGUE: ICD-10-CM

## 2023-02-14 DIAGNOSIS — I10 BENIGN ESSENTIAL HYPERTENSION: ICD-10-CM

## 2023-02-14 DIAGNOSIS — N30.90 CYSTITIS: Primary | ICD-10-CM

## 2023-02-14 DIAGNOSIS — R42 DIZZINESS: ICD-10-CM

## 2023-02-14 DIAGNOSIS — J30.2 SEASONAL ALLERGIES: ICD-10-CM

## 2023-02-14 DIAGNOSIS — R30.0 DYSURIA: ICD-10-CM

## 2023-02-14 LAB
BACTERIA UR QL AUTO: ABNORMAL /HPF
BILIRUB UR QL STRIP: NEGATIVE
CLARITY UR: CLEAR
COLOR UR: ABNORMAL
GLUCOSE UR STRIP-MCNC: ABNORMAL MG/DL
HGB UR QL STRIP.AUTO: NEGATIVE
KETONES UR STRIP-MCNC: NEGATIVE MG/DL
LEUKOCYTE ESTERASE UR QL STRIP: ABNORMAL
NITRITE UR QL STRIP: NEGATIVE
NON-SQ EPI CELLS URNS QL MICRO: ABNORMAL /HPF
PH UR STRIP.AUTO: 6 [PH]
PROT UR STRIP-MCNC: NEGATIVE MG/DL
RBC #/AREA URNS AUTO: ABNORMAL /HPF
SP GR UR STRIP.AUTO: 1.01 (ref 1–1.03)
UROBILINOGEN UR QL STRIP.AUTO: 0.2 E.U./DL
WBC #/AREA URNS AUTO: ABNORMAL /HPF

## 2023-02-14 RX ORDER — SULFAMETHOXAZOLE AND TRIMETHOPRIM 800; 160 MG/1; MG/1
1 TABLET ORAL EVERY 12 HOURS SCHEDULED
Qty: 6 TABLET | Refills: 0 | Status: SHIPPED | OUTPATIENT
Start: 2023-02-14 | End: 2023-02-17

## 2023-02-14 NOTE — TELEPHONE ENCOUNTER
Pt c/o bladder pain and strong urinary odor  Ordered UA & culture to be completed lab     Encouraged increased hydration in the mean time

## 2023-02-15 LAB — BACTERIA UR CULT: NORMAL

## 2023-03-01 ENCOUNTER — TELEPHONE (OUTPATIENT)
Dept: SLEEP CENTER | Facility: CLINIC | Age: 78
End: 2023-03-01

## 2023-06-21 ENCOUNTER — RA CDI HCC (OUTPATIENT)
Dept: OTHER | Facility: HOSPITAL | Age: 78
End: 2023-06-21

## 2023-06-21 NOTE — PROGRESS NOTES
[id number]   [facility]  coding opportunities          Chart Reviewed number of suggestions sent to Provider: 1     Patients Insurance     Medicare Insurance: [employer]

## 2023-06-26 PROBLEM — E66.812 CLASS 2 SEVERE OBESITY DUE TO EXCESS CALORIES WITH SERIOUS COMORBIDITY AND BODY MASS INDEX (BMI) OF 35.0 TO 35.9 IN ADULT (HCC): Status: ACTIVE | Noted: 2017-09-11

## 2023-06-27 ENCOUNTER — OFFICE VISIT (OUTPATIENT)
Dept: FAMILY MEDICINE CLINIC | Facility: CLINIC | Age: 78
End: 2023-06-27
Payer: MEDICARE

## 2023-06-27 VITALS
SYSTOLIC BLOOD PRESSURE: 170 MMHG | RESPIRATION RATE: 18 BRPM | BODY MASS INDEX: 35.16 KG/M2 | DIASTOLIC BLOOD PRESSURE: 80 MMHG | TEMPERATURE: 97.5 F | HEIGHT: 68 IN | WEIGHT: 232 LBS | OXYGEN SATURATION: 93 % | HEART RATE: 104 BPM

## 2023-06-27 DIAGNOSIS — Z12.31 ENCOUNTER FOR SCREENING MAMMOGRAM FOR BREAST CANCER: ICD-10-CM

## 2023-06-27 DIAGNOSIS — E11.29 TYPE 2 DIABETES MELLITUS WITH PROTEINURIA (HCC): ICD-10-CM

## 2023-06-27 DIAGNOSIS — I10 BENIGN ESSENTIAL HYPERTENSION: ICD-10-CM

## 2023-06-27 DIAGNOSIS — E66.01 SEVERE OBESITY (BMI 35.0-39.9) WITH COMORBIDITY (HCC): ICD-10-CM

## 2023-06-27 DIAGNOSIS — M85.88 OTHER SPECIFIED DISORDERS OF BONE DENSITY AND STRUCTURE, OTHER SITE: ICD-10-CM

## 2023-06-27 DIAGNOSIS — Z88.9 H/O MULTIPLE ALLERGIES: ICD-10-CM

## 2023-06-27 DIAGNOSIS — R80.9 TYPE 2 DIABETES MELLITUS WITH PROTEINURIA (HCC): ICD-10-CM

## 2023-06-27 DIAGNOSIS — E11.69 TYPE 2 DIABETES MELLITUS WITH OTHER SPECIFIED COMPLICATION, WITHOUT LONG-TERM CURRENT USE OF INSULIN (HCC): ICD-10-CM

## 2023-06-27 DIAGNOSIS — H81.13 BENIGN PAROXYSMAL POSITIONAL VERTIGO DUE TO BILATERAL VESTIBULAR DISORDER: ICD-10-CM

## 2023-06-27 DIAGNOSIS — H93.13 TINNITUS OF BOTH EARS: ICD-10-CM

## 2023-06-27 DIAGNOSIS — R42 VERTIGO: ICD-10-CM

## 2023-06-27 DIAGNOSIS — Z12.11 SCREENING FOR COLON CANCER: ICD-10-CM

## 2023-06-27 DIAGNOSIS — R42 DIZZINESS: ICD-10-CM

## 2023-06-27 DIAGNOSIS — N39.3 FEMALE STRESS INCONTINENCE: ICD-10-CM

## 2023-06-27 DIAGNOSIS — E11.42 TYPE 2 DIABETES MELLITUS WITH DIABETIC POLYNEUROPATHY, WITHOUT LONG-TERM CURRENT USE OF INSULIN (HCC): ICD-10-CM

## 2023-06-27 DIAGNOSIS — R91.1 LUNG NODULE: ICD-10-CM

## 2023-06-27 DIAGNOSIS — E04.2 MULTIPLE THYROID NODULES: ICD-10-CM

## 2023-06-27 DIAGNOSIS — E78.2 HYPERLIPIDEMIA, MIXED: ICD-10-CM

## 2023-06-27 DIAGNOSIS — E55.9 VITAMIN D DEFICIENCY: ICD-10-CM

## 2023-06-27 DIAGNOSIS — Z71.89 COUNSELING REGARDING ADVANCED DIRECTIVES: ICD-10-CM

## 2023-06-27 DIAGNOSIS — Z13.820 SCREENING FOR OSTEOPOROSIS: ICD-10-CM

## 2023-06-27 DIAGNOSIS — Z00.00 ENCOUNTER FOR MEDICARE ANNUAL WELLNESS EXAM: Primary | ICD-10-CM

## 2023-06-27 LAB
CREAT UR-MCNC: 33.7 MG/DL
MICROALBUMIN UR-MCNC: 21.3 MG/L (ref 0–20)
MICROALBUMIN/CREAT 24H UR: 63 MG/G CREATININE (ref 0–30)
SL AMB POCT HEMOGLOBIN AIC: 12.2 (ref ?–6.5)

## 2023-06-27 PROCEDURE — G0439 PPPS, SUBSEQ VISIT: HCPCS | Performed by: FAMILY MEDICINE

## 2023-06-27 PROCEDURE — 82043 UR ALBUMIN QUANTITATIVE: CPT | Performed by: FAMILY MEDICINE

## 2023-06-27 PROCEDURE — 82570 ASSAY OF URINE CREATININE: CPT | Performed by: FAMILY MEDICINE

## 2023-06-27 PROCEDURE — 99495 TRANSJ CARE MGMT MOD F2F 14D: CPT | Performed by: FAMILY MEDICINE

## 2023-06-27 PROCEDURE — 83036 HEMOGLOBIN GLYCOSYLATED A1C: CPT | Performed by: FAMILY MEDICINE

## 2023-06-27 RX ORDER — ASPIRIN 81 MG/1
81 TABLET, CHEWABLE ORAL DAILY
Qty: 30 TABLET | Refills: 0 | Status: SHIPPED
Start: 2023-06-27 | End: 2023-07-27

## 2023-06-27 RX ORDER — ROSUVASTATIN CALCIUM 20 MG/1
20 TABLET, COATED ORAL DAILY
Qty: 90 TABLET | Refills: 3 | Status: SHIPPED | OUTPATIENT
Start: 2023-06-27

## 2023-06-27 NOTE — PROGRESS NOTES
Diabetic Foot Exam    Patient's shoes and socks removed  Right Foot/Ankle   Right Foot Inspection  Skin Exam: skin normal, skin intact and dry skin  No warmth, no callus, no erythema, no maceration, no abnormal color, no pre-ulcer, no ulcer and no callus  Toe Exam: ROM and strength within normal limits  Sensory   Monofilament testing: intact    Vascular  Capillary refills: elevated  The right DP pulse is 2+  Left Foot/Ankle  Left Foot Inspection  Skin Exam: skin normal and skin intact  No warmth, no erythema, no maceration, normal color, no pre-ulcer, no ulcer and no callus  Toe Exam: ROM and strength within normal limits  Sensory   Monofilament testing: intact    Vascular  Capillary refills: elevated  The left DP pulse is 2+       Assign Risk Category  No deformity present  Loss of protective sensation  No weak pulses  Risk: 1       Assessment and Plan:     Problem List Items Addressed This Visit        Unprioritized    Benign essential hypertension    Relevant Medications    losartan (Cozaar) 50 mg tablet    BPPV (benign paroxysmal positional vertigo)    Counseling regarding advanced directives    Dizziness    Relevant Medications    aspirin 81 mg chewable tablet    Hyperlipidemia, mixed    Relevant Medications    rosuvastatin (CRESTOR) 20 MG tablet    Lung nodule    Relevant Orders    CT lung nodule follow-up    Multiple thyroid nodules    Relevant Orders    TSH, 3rd generation with Free T4 reflex (Completed)    Type 2 diabetes mellitus, without long-term current use of insulin (HCC)    Relevant Medications    rosuvastatin (CRESTOR) 20 MG tablet    Other Relevant Orders    Comprehensive metabolic panel (Completed)    Ambulatory Referral to Endocrinology    Hemoglobin A1C    Comprehensive metabolic panel    Lipid Panel with Direct LDL reflex    POCT hemoglobin A1c (Completed)    Albumin / creatinine urine ratio (Completed)    Vitamin D deficiency    Relevant Medications    ergocalciferol (VITAMIN D2) 50,000 units    Other Relevant Orders    PTH, intact (Completed)    CBC and differential (Completed)    Vitamin D 25 hydroxy (Completed)   Other Visit Diagnoses     Encounter for Medicare annual wellness exam    -  Primary    Type 2 diabetes mellitus with proteinuria ([address] )        Relevant Medications    losartan (Cozaar) 50 mg tablet    Severe obesity (BMI 35 0-39  9) with comorbidity [facility])        Vertigo        Relevant Orders    Ambulatory Referral to Otolaryngology    Ambulatory Referral to Neurology    Female stress incontinence        Tinnitus of both ears        Relevant Orders    Ambulatory Referral to Otolaryngology    H/O multiple allergies        Relevant Orders    Ambulatory Referral to Otolaryngology    Screening for colon cancer        Relevant Orders    Ambulatory referral to Gastroenterology    Screening for osteoporosis        Relevant Orders    DXA bone density spine hip and pelvis    Other specified disorders of bone density and structure, other site        Relevant Orders    DXA bone density spine hip and pelvis    Encounter for screening mammogram for breast cancer        Relevant Orders    Mammo screening bilateral w 3d & cad           Preventive health issues were discussed with patient, and age appropriate screening tests were ordered as noted in patient's After Visit Summary  Personalized health advice and appropriate referrals for health education or preventive services given if needed, as noted in patient's After Visit Summary       History of Present Illness:     Patient presents for a Medicare Wellness Visit    HPI   Patient Care Team:  Moisés Henley DO as PCP - General (Family Medicine)  MD Javed Kaye Od (Optometry)     Review of Systems:     Review of Systems     Problem List:     Patient Active Problem List   Diagnosis   • Benign essential hypertension   • Carpal tunnel syndrome   • Decreased dorsalis pedis pulse   • Type 2 diabetes mellitus, without long-term current use of insulin (HCC)   • Hyperlipidemia, mixed   • Multiple thyroid nodules   • Class 2 severe obesity due to excess calories with serious comorbidity and body mass index (BMI) of 35 0 to 35 9 in adult [facility])   • Synovial cyst   • Vitamin D deficiency   • Fatigue   • Colon cancer screening   • Chronic sacroiliac strain   • Diabetic polyneuropathy associated with type 2 diabetes mellitus (HCC)   • Dizziness   • Seasonal allergies   • Lung nodule   • Sensorineural hearing loss (SNHL), bilateral   • BPPV (benign paroxysmal positional vertigo)   • Counseling regarding advanced directives      Past Medical and Surgical History:     Past Medical History:   Diagnosis Date   • Cancer ([facility] [address] )    • Diabetes mellitus ([facility] [address] )    • Endometrial carcinoma ([facility] [address] )    • Hypertension    • Obesity    • Rectal bleeding 09/10/2019   • Sciatic radiculitis 01/28/2021   • Varicella      Past Surgical History:   Procedure Laterality Date   • HYSTERECTOMY  2006   • TONSILLECTOMY  1962   • TUBAL LIGATION  1992   • US GUIDED THYROID BIOPSY  12/12/2019      Family History:     Family History   Problem Relation Age of Onset   • Stroke Mother    • Diabetes Mother    • Cancer Mother    • Lung cancer Father    • Breast cancer Sister 66   • COPD Brother    • Coronary artery disease Brother    • Heart failure Brother    • Leukemia Son    • HIV Brother    • Cancer Family       Social History:     Social History     Socioeconomic History   • Marital status: /Civil Union     Spouse name: None   • Number of children: None   • Years of education: None   • Highest education level: None   Occupational History   • Occupation: Retired    Tobacco Use   • Smoking status: Never   • Smokeless tobacco: Never   Vaping Use   • Vaping Use: Never used   Substance and Sexual Activity   • Alcohol use: Not Currently   • Drug use: Never   • Sexual activity: Not Currently     Partners: Male     Birth control/protection: Post-menopausal   Other Topics Concern   • None   Social History Narrative    Denied: History of alcohol use - As per [employer]    Daily caffeine consumption, 2-3 servings a day    Denied: History of drug use - As per [employer]    Retired from employment: [employer]       Social Determinants of Health     Financial Resource Strain: [address]  (6/27/2023)    Overall Financial Resource Strain (CARDIA)    • Difficulty of Paying Living Expenses: Not hard at all   Food Insecurity: No Food Insecurity (5/25/2022)    Hunger Vital Sign    • Worried About Running Out of Food in the Last Year: Never true    • Ran Out of Food in the Last Year: Never true   Transportation Needs: No Transportation Needs (6/27/2023)    PRAPARE - Transportation    • Lack of Transportation (Medical): No    • Lack of Transportation (Non-Medical):  No   Physical Activity: Not on file   Stress: Not on file   Social Connections: Not on file   Intimate Partner Violence: Not on file   Housing Stability: Low Risk  (5/25/2022)    Housing Stability Vital Sign    • Unable to Pay for Housing in the Last Year: No    • Number of Places Lived in the Last Year: 1    • Unstable Housing in the Last Year: No      Medications and Allergies:     Current Outpatient Medications   Medication Sig Dispense Refill   • aspirin 81 mg chewable tablet Chew 1 tablet (81 mg total) daily 30 tablet 0   • Blood Glucose Monitoring Suppl (ONE TOUCH ULTRA 2) w/Device KIT      • EPINEPHrine (EPIPEN) 0 3 mg/0 3 mL SOAJ Inject 0 3 mL (0 3 mg total) into a muscle once for 1 dose 0 6 mL 0   • ergocalciferol (VITAMIN D2) 50,000 units Take 1 capsule (50,000 Units total) by mouth once a week 12 capsule 1   • glucose blood (OneTouch Ultra) test strip Use as instructed 100 strip 0   • losartan (Cozaar) 50 mg tablet Take 1 tablet (50 mg total) by mouth daily 90 tablet 3   • rosuvastatin (CRESTOR) 20 MG tablet Take 1 tablet (20 mg total) by mouth daily 90 tablet 3     No current facility-administered medications for this visit  Allergies   Allergen Reactions   • Gabapentin Hives      Immunizations:     Immunization History   Administered Date(s) Administered   • COVID-19 MODERNA VACC 0 25 ML IM BOOSTER 11/05/2021, 04/15/2022   • COVID-19 MODERNA VACC 0 5 ML IM 03/05/2021, 04/05/2021, 11/05/2021, 04/15/2022, 09/06/2022   • Influenza Quadrivalent Preservative Free 3 years and older IM 01/11/2018   • Influenza Split High Dose Preservative Free IM 10/14/2016   • Influenza, high dose seasonal 0 7 mL 10/15/2018, 11/14/2019, 10/07/2020, 10/15/2021, 09/06/2022   • Pneumococcal Conjugate 13-Valent 11/14/2019   • Pneumococcal Polysaccharide PPV23 12/31/2020   • Tdap 03/03/2022      Health Maintenance:         Topic Date Due   • Colorectal Cancer Screening  02/11/2023   • Breast Cancer Screening: Mammogram  04/18/2023   • Hepatitis C Screening  Completed         Topic Date Due   • COVID-19 Vaccine (7 - Eliza Liming series) 01/06/2023      Medicare Screening Tests and Risk Assessments:     Dayami Dudley is here for her Subsequent Wellness visit  Health Risk Assessment:   Patient rates overall health as good  Patient feels that their physical health rating is slightly worse  Patient is satisfied with their life  Eyesight was rated as slightly worse  Hearing was rated as same  Patient feels that their emotional and mental health rating is same  Patients states they are never, rarely angry  Patient states they are always unusually tired/fatigued  Pain experienced in the last 7 days has been none  Patient states that she has experienced no weight loss or gain in last 6 months  Depression Screening:   PHQ-2 Score: 0      Fall Risk Screening: In the past year, patient has experienced: no history of falling in past year      Urinary Incontinence Screening:   Patient has leaked urine accidently in the last six months  Home Safety:  Patient does not have trouble with stairs inside or outside of their home   Patient has working smoke alarms and has working carbon monoxide detector  Home safety hazards include: none  Nutrition:   Current diet is Regular  Medications:   Patient is not currently taking any over-the-counter supplements  Patient is able to manage medications  Activities of Daily Living (ADLs)/Instrumental Activities of Daily Living (IADLs):   Walk and transfer into and out of bed and chair?: Yes  Dress and groom yourself?: Yes    Bathe or shower yourself?: Yes    Feed yourself? Yes  Do your laundry/housekeeping?: Yes  Manage your money, pay your bills and track your expenses?: Yes  Make your own meals?: Yes    Do your own shopping?: Yes    Previous Hospitalizations:   Any hospitalizations or ED visits within the last 12 months?: No      Advance Care Planning:   Living will: Yes    Advanced directive: Yes    Advanced directive counseling given: Yes    Five wishes given: Yes    End of Life Decisions reviewed with patient: Yes      Comments: Advanced directive at home  DNR   Jey Ashford      Cognitive Screening:   Provider or family/friend/caregiver concerned regarding cognition?: No    PREVENTIVE SCREENINGS      Cardiovascular Screening:    General: Screening Not Indicated and History Lipid Disorder      Diabetes Screening:     General: Screening Not Indicated and History Diabetes      Colorectal Cancer Screening:     General: Risks and Benefits Discussed      Breast Cancer Screening:     General: Screening Current      Cervical Cancer Screening:    General: Screening Not Indicated      Osteoporosis Screening:    General: Risks and Benefits Discussed      Abdominal Aortic Aneurysm (AAA) Screening:        General: Screening Not Indicated      Lung Cancer Screening:     General: Screening Not Indicated      Hepatitis C Screening:    General: Screening Current    Screening, Brief Intervention, and Referral to Treatment (SBIRT)    Screening  Typical number of drinks in a day: 0  Typical number of drinks in a "week: 0  Interpretation: Low risk drinking behavior  Single Item Drug Screening:  How often have you used an illegal drug (including marijuana) or a prescription medication for non-medical reasons in the past year? never    Single Item Drug Screen Score: 0  Interpretation: Negative screen for possible drug use disorder    No results found  Physical Exam:     /80   Pulse 104   Temp 97 5 °F (36 4 °C)   Resp 18   Ht 5' 8\" (1 727 m)   Wt 105 kg (232 lb)   SpO2 93%   BMI 35 28 kg/m²      Physical Exam  Cardiovascular:      Pulses: no weak pulses          Dorsalis pedis pulses are 2+ on the right side and 2+ on the left side  Musculoskeletal:        Feet:    Feet:      Right foot:      Skin integrity: Dry skin present  No ulcer, skin breakdown, erythema, warmth or callus  Left foot:      Skin integrity: No ulcer, skin breakdown, erythema, warmth or callus            Doyce Perone, DO  "

## 2023-06-27 NOTE — PROGRESS NOTES
1  Encounter for Medicare annual wellness exam  See other note     2  Type 2 diabetes mellitus with diabetic polyneuropathy, without long-term current use of insulin (HCC)  Uncontrolled  Pt now willing to start treatment  Did not tolerate metformin  Willing to try [facility]  Will check on coverage   Agrees to start statin, asa and ace/arb if needed   Does not want to use insulin   Refer to endo   - Comprehensive metabolic panel; Future  - Ambulatory Referral to Endocrinology; Future  - rosuvastatin (CRESTOR) 20 MG tablet; Take 1 tablet (20 mg total) by mouth daily  Dispense: 90 tablet; Refill: 3  - Hemoglobin A1C; Future  - Comprehensive metabolic panel; Future  - Lipid Panel with Direct LDL reflex; Future    3  Type 2 diabetes mellitus with proteinuria (HCC)  See above  - losartan (Cozaar) 50 mg tablet; Take 1 tablet (50 mg total) by mouth daily  Dispense: 90 tablet; Refill: 3    4  Type 2 diabetes mellitus with other specified complication, without long-term current use of insulin (HCC)  See above   - POCT hemoglobin A1c  - Albumin / creatinine urine ratio; Future  - Albumin / creatinine urine ratio    5  Vitamin D deficiency  Update labs - start treatment   - PTH, intact; Future  - CBC and differential; Future  - Vitamin D 25 hydroxy; Future  - ergocalciferol (VITAMIN D2) 50,000 units; Take 1 capsule (50,000 Units total) by mouth once a week  Dispense: 12 capsule; Refill: 1    6  Multiple thyroid nodules  Previous biopsy benign   - TSH, 3rd generation with Free T4 reflex; Future    7  Lung nodule  Strong passive smoke exposure history - update CT scan   - CT lung nodule follow-up; Future    8  Hyperlipidemia, mixed  Start   - rosuvastatin (CRESTOR) 20 MG tablet; Take 1 tablet (20 mg total) by mouth daily  Dispense: 90 tablet; Refill: 3    9  Benign essential hypertension  Not at goal - start   - losartan (Cozaar) 50 mg tablet; Take 1 tablet (50 mg total) by mouth daily  Dispense: 90 tablet; Refill: 3    10  Dizziness  Ongoing for many years  Will need to see ent and neuro as cause was not found and no treatment has worked  interferes with her daily life  - aspirin 81 mg chewable tablet; Chew 1 tablet (81 mg total) daily  Dispense: 30 tablet; Refill: 0    11  Severe obesity (BMI 35 0-39  9) with comorbidity ([address] )  Encouraged diet and exercise to help for a healthier BMI   mounjaro should help this and sugars  12  Vertigo  See above  - Ambulatory Referral to Otolaryngology; Future  - Ambulatory Referral to Neurology; Future    13  Benign paroxysmal positional vertigo due to bilateral vestibular disorder  See above  15  Female stress incontinence  Doing Kegels     15  Tinnitus of both ears  Refer   - Ambulatory Referral to Otolaryngology; Future    16  H/O multiple allergies  Refer per pt request   - Ambulatory Referral to Otolaryngology; Future    17  Screening for colon cancer  Agrees to colonoscopy   - Ambulatory referral to Gastroenterology; Future    18  Screening for osteoporosis  Agrees to ypdate   - DXA bone density spine hip and pelvis; Future    19  Other specified disorders of bone density and structure, other site  - DXA bone density spine hip and pelvis; Future    20  Encounter for screening mammogram for breast cancer  Update   - Mammo screening bilateral w 3d & cad; Future    21  Counseling regarding advanced directives  Advanced directive at home  DNR   Josh Godwin  Return in about 4 months (around 10/27/2023) for labs CC   nurse visit soon BP and calibrate cuff        Subjective:   Arsen Hagen is a 69 y o  female here today for a follow-up on her current medical conditions:    Patient Active Problem List   Diagnosis   • Benign essential hypertension   • Carpal tunnel syndrome   • Decreased dorsalis pedis pulse   • Type 2 diabetes mellitus, without long-term current use of insulin (HCC)   • Hyperlipidemia, mixed   • Multiple thyroid nodules   • Class 2 severe obesity due to excess calories with serious comorbidity and body mass index (BMI) of 35 0 to 35 9 in adult [facility])   • Synovial cyst   • Vitamin D deficiency   • Fatigue   • Colon cancer screening   • Chronic sacroiliac strain   • Diabetic polyneuropathy associated with type 2 diabetes mellitus (HCC)   • Dizziness   • Seasonal allergies   • Lung nodule   • Sensorineural hearing loss (SNHL), bilateral   • BPPV (benign paroxysmal positional vertigo)   • Counseling regarding advanced directives       Patient Care Team:  Shannen Sosa DO as PCP - General (Family Medicine)  MD Jorge Hudson Od (Optometry)    Current Medications:  Current Outpatient Medications   Medication Sig Dispense Refill   • aspirin 81 mg chewable tablet Chew 1 tablet (81 mg total) daily 30 tablet 0   • Blood Glucose Monitoring Suppl (ONE TOUCH ULTRA 2) w/Device KIT      • EPINEPHrine (EPIPEN) 0 3 mg/0 3 mL SOAJ Inject 0 3 mL (0 3 mg total) into a muscle once for 1 dose 0 6 mL 0   • ergocalciferol (VITAMIN D2) 50,000 units Take 1 capsule (50,000 Units total) by mouth once a week 12 capsule 1   • glucose blood (OneTouch Ultra) test strip Use as instructed 100 strip 0   • losartan (Cozaar) 50 mg tablet Take 1 tablet (50 mg total) by mouth daily 90 tablet 3   • rosuvastatin (CRESTOR) 20 MG tablet Take 1 tablet (20 mg total) by mouth daily 90 tablet 3     No current facility-administered medications for this visit  HPI:  Chief Complaint   Patient presents with   • Medicare Wellness Visit     -- Above per clinical staff and reviewed  --    PHQ-2/9 Depression Screening    Little interest or pleasure in doing things: 0 - not at all  Feeling down, depressed, or hopeless: 0 - not at all  PHQ-2 Score: 0  PHQ-2 Interpretation: Negative depression screen       ?     cmp HbA1C, urine micro, TSH PTH cbc vit D   last bw a year ago  HbA1C > 10  ASCVD risk > 43   struggling vertigo per  - used to do exercises with her head   resistent to meds   consider "Januvia  woudl not take statin?   no more meds for bp  declined   dexa  lung nodule - due for lung CT   declines multiple meds for DM2 due to fears of   saw neuro - referred back to PEPPER Zhang/ Dr Ricardo Will BPPV   concerned - [facility]  BP, diabetes and not eating well   probelms wth vision - only c  an see light   anger   Today:  Advanced directive at home  DNR   Ban Beatty  Agrees to mammo, dexa, colonoscopy     Metformin caused  \"worse than diarrhea\" - gush without warning   Did not take [facility]   Does not want to take insulin   A lot of sugar, butter, cheese and yogurt   Used to be vegetarian and then vegan   In 2006 lost 80 pounds in 9 months after hysterectomy   Stopped her avid exercise after COVID   Gained back 20 pounds  Likes bread  140/80 at home  - did not have home cuff calibrated     Was in [place] during [place] eating produce  Father and first  chain smokers       The following portions of the patient's history were reviewed and updated as appropriate: allergies, current medications, past family history, past medical history, past social history, past surgical history and problem list     Objective:  Vitals:  /80   Pulse 104   Temp 97 5 °F (36 4 °C)   Resp 18   Ht 5' 8\" (1 727 m)   Wt 105 kg (232 lb)   SpO2 93%   BMI 35 28 kg/m²    Wt Readings from Last 3 Encounters:   06/27/23 105 kg (232 lb)   12/01/22 106 kg (234 lb)   10/11/22 106 kg (234 lb)      BP Readings from Last 3 Encounters:   06/27/23 170/80   10/11/22 152/90   09/29/22 158/80        Review of Systems   She has no other concerns  No unexpected weight changes  No chest pain, SOB, or palpitations  No GERD  No changes in bowels or bladder  Sleeping well  No mood changes  Physical Exam   Constitutional:  she appears well-developed and well-nourished  HENT: Head: Normocephalic  Neck: Neck supple  Cardiovascular: Normal rate, regular rhythm and normal heart sounds   " Pulmonary/Chest: Effort normal and breath sounds normal  No wheezes, rales, or rhonchi  Abdominal: Soft  Bowel sounds are normal  There is no tenderness  No hepatosplenomegaly  Musculoskeletal: she exhibits no edema  Lymphadenopathy: she has no cervical adenopathy  Neurological: she is alert and oriented to person, place, and time  Skin: Skin is warm and dry  Psychiatric: she has a normal mood and affect  her behavior is normal  Thought content normal      BMI Counseling: Body mass index is 35 28 kg/m²  The BMI is above normal  Nutrition recommendations include decreasing portion sizes  Exercise recommendations include exercising 3-5 times per week  Rationale for BMI follow-up plan is due to patient being overweight or obese  Depression Screening and Follow-up Plan: Patient was screened for depression during today's encounter  They screened negative with a PHQ-2 score of 0  Urinary Incontinence Plan of Care: counseling topics discussed: weight loss and improving blood sugar control

## 2023-06-27 NOTE — PATIENT INSTRUCTIONS
Vaccines you are due for: Shingrix     As of January 2023 most vaccines are being covered by Medicare Part D * This means you may now be able to get Shingrix or Tdap at no charge to you  Please ask at the pharmacy to see if this is covered  If you are the pharmacist can administer these vaccines for you  Tdap is due every 10 years as a preventive vaccine  Shingrix is given as two parts  The second dose is given 2 - 6 months after the first dose  *The only exceptions are flu, pneumonia, hepatitis B, and COVID-19 vaccinations, which are covered by Part B - this means they can be given at the pharmacy or the doctor's office  Immunization History   Administered Date(s) Administered    COVID-19 MODERNA VACC 0 25 ML IM BOOSTER 11/05/2021, 04/15/2022    COVID-19 MODERNA VACC 0 5 ML IM 03/05/2021, 04/05/2021, 11/05/2021, 04/15/2022, 09/06/2022    Influenza Quadrivalent Preservative Free 3 years and older IM 01/11/2018    Influenza Split High Dose Preservative Free IM 10/14/2016    Influenza, high dose seasonal 0 7 mL 10/15/2018, 11/14/2019, 10/07/2020, 10/15/2021, 09/06/2022    Pneumococcal Conjugate 13-Valent 11/14/2019    Pneumococcal Polysaccharide PPV23 12/31/2020    Tdap 03/03/2022        Medicare Preventive Visit Patient Instructions  Thank you for completing your Welcome to Medicare Visit or Medicare Annual Wellness Visit today  Your next wellness visit will be due in one year (6/27/2024)  The screening/preventive services that you may require over the next 5-10 years are detailed below  Some tests may not apply to you based off risk factors and/or age  Screening tests ordered at today's visit but not completed yet may show as past due  Also, please note that scanned in results may not display below    Preventive Screenings:  Service Recommendations Previous Testing/Comments   Colorectal Cancer Screening  * Colonoscopy    * Fecal Occult Blood Test (FOBT)/Fecal Immunochemical Test (FIT)  * Fecal DNA/Cologuard Test  * Flexible Sigmoidoscopy Age: 39-70 years old   Colonoscopy: every 10 years (may be performed more frequently if at higher risk)  OR  FOBT/FIT: every 1 year  OR  Cologuard: every 3 years  OR  Sigmoidoscopy: every 5 years  Screening may be recommended earlier than age 39 if at higher risk for colorectal cancer  Also, an individualized decision between you and your healthcare provider will decide whether screening between the ages of 74-80 would be appropriate  Colonoscopy: 02/11/2020  FOBT/FIT: Not on file  Cologuard: Not on file  Sigmoidoscopy: Not on file          Breast Cancer Screening Age: 36 years old  Frequency: every 1-2 years  Not required if history of left and right mastectomy Mammogram: 04/18/2022    Screening Current   Cervical Cancer Screening Between the ages of 21-29, pap smear recommended once every 3 years  Between the ages of 33-67, can perform pap smear with HPV co-testing every 5 years  Recommendations may differ for women with a history of total hysterectomy, cervical cancer, or abnormal pap smears in past  Pap Smear: 04/08/2021    Screening Not Indicated   Hepatitis C Screening Once for adults born between 1945 and 1965  More frequently in patients at high risk for Hepatitis C Hep C Antibody: 05/24/2019    Screening Current   Diabetes Screening 1-2 times per year if you're at risk for diabetes or have pre-diabetes Fasting glucose: 258 mg/dL (5/25/2022)  A1C: 9 5 (9/9/2022)  Screening Not Indicated  History Diabetes   Cholesterol Screening Once every 5 years if you don't have a lipid disorder  May order more often based on risk factors  Lipid panel: 05/25/2022    Screening Not Indicated  History Lipid Disorder     Other Preventive Screenings Covered by Medicare:  Abdominal Aortic Aneurysm (AAA) Screening: covered once if your at risk  You're considered to be at risk if you have a family history of AAA    Lung Cancer Screening: covers low dose CT scan once per year if you meet all of the following conditions: (1) Age 50-69; (2) No signs or symptoms of lung cancer; (3) Current smoker or have quit smoking within the last 15 years; (4) You have a tobacco smoking history of at least 20 pack years (packs per day multiplied by number of years you smoked); (5) You get a written order from a healthcare provider  Glaucoma Screening: covered annually if you're considered high risk: (1) You have diabetes OR (2) Family history of glaucoma OR (3)  aged 48 and older OR (3)  American aged 72 and older  Osteoporosis Screening: covered every 2 years if you meet one of the following conditions: (1) You're estrogen deficient and at risk for osteoporosis based off medical history and other findings; (2) Have a vertebral abnormality; (3) On glucocorticoid therapy for more than 3 months; (4) Have primary hyperparathyroidism; (5) On osteoporosis medications and need to assess response to drug therapy  Last bone density test (DXA Scan): Not on file  HIV Screening: covered annually if you're between the age of 12-76  Also covered annually if you are younger than 13 and older than 72 with risk factors for HIV infection  For pregnant patients, it is covered up to 3 times per pregnancy      Immunizations:  Immunization Recommendations   Influenza Vaccine Annual influenza vaccination during flu season is recommended for all persons aged >= 6 months who do not have contraindications   Pneumococcal Vaccine   * Pneumococcal conjugate vaccine = PCV13 (Prevnar 13), PCV15 (Vaxneuvance), PCV20 (Prevnar 20)  * Pneumococcal polysaccharide vaccine = PPSV23 (Pneumovax) Adults 25-60 years old: 1-3 doses may be recommended based on certain risk factors  Adults 72 years old: 1-2 doses may be recommended based off what pneumonia vaccine you previously received   Hepatitis B Vaccine 3 dose series if at intermediate or high risk (ex: diabetes, end stage renal disease, liver disease)   Tetanus (Td) Vaccine - COST NOT COVERED BY MEDICARE PART B Following completion of primary series, a booster dose should be given every 10 years to maintain immunity against tetanus  Td may also be given as tetanus wound prophylaxis  Tdap Vaccine - COST NOT COVERED BY MEDICARE PART B Recommended at least once for all adults  For pregnant patients, recommended with each pregnancy  Shingles Vaccine (Shingrix) - COST NOT COVERED BY MEDICARE PART B  2 shot series recommended in those aged 48 and above     Health Maintenance Due:      Topic Date Due    Colorectal Cancer Screening  02/11/2023    Breast Cancer Screening: Mammogram  04/18/2023    Hepatitis C Screening  Completed     Immunizations Due:      Topic Date Due    COVID-19 Vaccine (7 - Moderna series) 01/06/2023     Advance Directives   What are advance directives? Advance directives are legal documents that state your wishes and plans for medical care  These plans are made ahead of time in case you lose your ability to make decisions for yourself  Advance directives can apply to any medical decision, such as the treatments you want, and if you want to donate organs  What are the types of advance directives? There are many types of advance directives, and each state has rules about how to use them  You may choose a combination of any of the following:  Living will: This is a written record of the treatment you want  You can also choose which treatments you do not want, which to limit, and which to stop at a certain time  This includes surgery, medicine, IV fluid, and tube feedings  Durable power of  for healthcare [facility]): This is a written record that states who you want to make healthcare choices for you when you are unable to make them for yourself  This person, called a proxy, is usually a family member or a friend  You may choose more than 1 proxy  Do not resuscitate (DNR) order:  A DNR order is used in case your heart stops beating or you stop breathing   It is a request not to have certain forms of treatment, such as CPR  A DNR order may be included in other types of advance directives  Medical directive: This covers the care that you want if you are in a coma, near death, or unable to make decisions for yourself  You can list the treatments you want for each condition  Treatment may include pain medicine, surgery, blood transfusions, dialysis, IV or tube feedings, and a ventilator (breathing machine)  Values history: This document has questions about your views, beliefs, and how you feel and think about life  This information can help others choose the care that you would choose  Why are advance directives important? An advance directive helps you control your care  Although spoken wishes may be used, it is better to have your wishes written down  Spoken wishes can be misunderstood, or not followed  Treatments may be given even if you do not want them  An advance directive may make it easier for your family to make difficult choices about your care  Urinary Incontinence   Urinary incontinence (UI)  is when you lose control of your bladder  UI develops because your bladder cannot store or empty urine properly  The 3 most common types of UI are stress incontinence, urge incontinence, or both  Medicines:   May be given to help strengthen your bladder control  Report any side effects of medication to your healthcare provider  Do pelvic muscle exercises often:  Your pelvic muscles help you stop urinating  Squeeze these muscles tight for 5 seconds, then relax for 5 seconds  Gradually work up to squeezing for 10 seconds  Do 3 sets of 15 repetitions a day, or as directed  This will help strengthen your pelvic muscles and improve bladder control  Train your bladder:  Go to the bathroom at set times, such as every 2 hours, even if you do not feel the urge to go  You can also try to hold your urine when you feel the urge to go   For example, hold your urine for 5 minutes when you feel the urge to go  As that becomes easier, hold your urine for 10 minutes  Self-care:   Keep a UI record  Write down how often you leak urine and how much you leak  Make a note of what you were doing when you leaked urine  Drink liquids as directed  You may need to limit the amount of liquid you drink to help control your urine leakage  Do not drink any liquid right before you go to bed  Limit or do not have drinks that contain caffeine or alcohol  Prevent constipation  Eat a variety of high-fiber foods  Good examples are high-fiber cereals, beans, vegetables, and whole-grain breads  Walking is the best way to trigger your intestines to have a bowel movement  Exercise regularly and maintain a healthy weight  Weight loss and exercise will decrease pressure on your bladder and help you control your leakage  Use a catheter as directed  to help empty your bladder  A catheter is a tiny, plastic tube that is put into your bladder to drain your urine  Go to behavior therapy as directed  Behavior therapy may be used to help you learn to control your urge to urinate  Weight Management   Why it is important to manage your weight:  Being overweight increases your risk of health conditions such as heart disease, high blood pressure, type 2 diabetes, and certain types of cancer  It can also increase your risk for osteoarthritis, sleep apnea, and other respiratory problems  Aim for a slow, steady weight loss  Even a small amount of weight loss can lower your risk of health problems  How to lose weight safely:  A safe and healthy way to lose weight is to eat fewer calories and get regular exercise  You can lose up about 1 pound a week by decreasing the number of calories you eat by 500 calories each day  Healthy meal plan for weight management:  A healthy meal plan includes a variety of foods, contains fewer calories, and helps you stay healthy   A healthy meal plan includes the following:  Eat whole-grain foods more often  A healthy meal plan should contain fiber  Fiber is the part of grains, fruits, and vegetables that is not broken down by your body  Whole-grain foods are healthy and provide extra fiber in your diet  Some examples of whole-grain foods are whole-wheat breads and pastas, oatmeal, brown rice, and bulgur  Eat a variety of vegetables every day  Include dark, leafy greens such as spinach, kale, rosey greens, and mustard greens  Eat yellow and orange vegetables such as carrots, sweet potatoes, and winter squash  Eat a variety of fruits every day  Choose fresh or canned fruit (canned in its own juice or light syrup) instead of juice  Fruit juice has very little or no fiber  Eat low-fat dairy foods  Drink fat-free (skim) milk or 1% milk  Eat fat-free yogurt and low-fat cottage cheese  Try low-fat cheeses such as mozzarella and other reduced-fat cheeses  Choose meat and other protein foods that are low in fat  Choose beans or other legumes such as split peas or lentils  Choose fish, skinless poultry (chicken or turkey), or lean cuts of red meat (beef or pork)  Before you cook meat or poultry, cut off any visible fat  Use less fat and oil  Try baking foods instead of frying them  Add less fat, such as margarine, sour cream, regular salad dressing and mayonnaise to foods  Eat fewer high-fat foods  Some examples of high-fat foods include french fries, doughnuts, ice cream, and cakes  Eat fewer sweets  Limit foods and drinks that are high in sugar  This includes candy, cookies, regular soda, and sweetened drinks  Exercise:  Exercise at least 30 minutes per day on most days of the week  Some examples of exercise include walking, biking, dancing, and swimming  You can also fit in more physical activity by taking the stairs instead of the elevator or parking farther away from stores  Ask your healthcare provider about the best exercise plan for you        © Copyright [employer] 2018 Information is for End User's use only and may not be sold, redistributed or otherwise used for commercial purposes  All illustrations and images included in CareNotes® are the copyrighted property of A D A M , Inc  or Earl Bryant    Weight Loss Medications - please read carefully   Destiny Kales are FDA approved for weight loss with a prior authorization  Call to see if these are approved by your insurance  We have the additional options of *Ozempic for pre-diabetes and diabetes or **Mounjaro if you have type 2 diabetes only  Call to see if one of the above medications are covered by your insurance, and then send us a AIS message to let us know which one so we can send it in  The medication will need to be increased either weekly (for Saxenda) or monthly (for the others), so we cannot send it to a mail away pharmacy until you get to the maintenance dose  Once  the prescription gets sent in we will need to get a prior auth from [employer]  Allow 1 - 2 weeks for this process  If you have not hear anything regarding approval or denial from our office by this time, please reach out to us  Once you start the medication you will need to contact our office every 2 - 3 weeks in order to get the next dose  Let us know your current dose and what pharmacy you want it sent to  You can call or send a AIS message  Do not send a Medication Refill message, or have the pharmacy contact us,  or the last dose will be refilled automatically  [address]  This is how to start [address]  It is a daily injection to help with weight loss  WEEK 1: 0 6mg daily  -if tolerated,  WEEK 2: 1 2mg daily  -if tolerated,  WEEK 3: 1 8mg daily  -if tolerated,  WEEK 4: 2 4mg daily  -if tolerated,  WEEK 5: 3mg daily and then continue at this dose     If you miss more than 3 days of the medication you should restart this from the beginning  VISIT [facility] to see about coupons, how to give yourself injections, and more information     If you develop nausea or vomiting, STOP the medication for a few days, then DECREASE to the previous dose that you tolerated well  Stay well hydrated  You can inject in your stomach, upper leg or upper arm   Unused pens should be stored in the refrigerator, but the pen in use can he kept at room temperature for up to 30 days   If you develop severe abdominal pain, pain radiating from your abdomen to the back, or fever associated with abdominal pain/vomiting, please call or go to the ER as this can be a sign of pancreatitis which can be an adverse effect of the medication  Action: Cause slower gastric emptying which will increase feeling full with meals  This feeling fullness will allow for you to reduce calorie intake sooner than usual and begin to lose some weight  These medications also will reduce production of glucose form your liver to allow for better control of your sugars  Reminders: These medications do not cause low blood sugars  Keep in the refrigerator until you use it once, then keep it out of refrigerator  You ONLY need to prime the pen upon open it monthly  Clean your hands and site of injection to avoid infection risks  Store used needles in old plastic laundry detergent bin or coffee bin, then tape it when full and discard in garbage  Side Effects:  Common side effects include full sensation with eating, nausea which usually improves over the first 1-2 weeks  Soreness, redness or lump at site of injection  Dangers:  Pancreatitis can happen with this medicine for some people; therefore if you have ever had pancreatitis or have severe nausea vomiting and abdominal pain while on this medicine stop it immediately and call us or go to ER for assistance  Do not use if you have had a history of thyroid cancer or a family history of MEN syndrome  If you are diabetic and use this medication with insulin or sulfonylureas there is a risk of low blood sugar  Monitor your sugars more closely  If you are not able to urinate properly this could be a sign of a kidney problem  Let us know right away  In the clinical trials there were issues with gallbladder problems like gallstones  Let us know if you develop severe abdominal pain  If you have new depression or thoughts of suicide you should also contact us right away, and call 911 or go to your nearest emergency room  Ozempic  How to start Ozempic  Month 1:  0 25 mg weekly x 4 weeks   Month 2:  0 5 mg weekly x 4 weeks   Month 3:  1 mg weekly x 4 weeks   Month 4:  2 mg weekly thereafter     Go to the Ozempic or GoodRx website to look coupons to save on this medication  About the Ozempic® Pen  Ozempic® (semaglutide) injection 0 5 mg or 1 mg   Ozempic PI (isma-pi com)     Ozempic is a  WEEKLY non-insulin injectable  GLP-1Agonist  Victoza (liraglutide) daily, Byetta (exenatide) twice daily, Adlyxin (lixisenatide)  Trulicity (dulaglutide) Bydureon(Exenatide) , Ozempic (semaglutide), Rybelsus (semaglutide),          Action: Cause slower gastric emptying which will increase feeling full with meals  This feeling fullness will allow for you to reduce calorie intake sooner than usual and begin to lose some weight  These medications also will reduce production of glucose form your liver to allow for better control of your sugars  Reminders: These medications do not cause low blood sugars  Keep in the refrigerator until you use it once, then keep it out of refrigerator  Clean your hands and site of injection to avoid infection risks  Store used needles in old plastic laundry detergent bin or coffee bin, then tape it when full and discard in garbage  Side Effects:  Common side effects include full sensation with eating, nausea which usually improves over the first 1-2 weeks  Soreness, redness or lump at site of injection       Dangers:  Pancreatitis can happen with this medicine for some people; therefore if you have ever had pancreatitis or have severe nausea vomiting and abdominal pain while on this medicine stop it immediately and call us or go to ER for assistance  Do not use if you have had a history of thyroid canceror a family history of MEN syndrome  [facility]FORT SHYAM   ZFDUQR dosing:  Week 1 - 4:  0 25 mg weekly   Week 5 - 8:  0 5 mg weekly   Week 9 - 12:  1 mg weekly   Week 13 - 16:  1 7 mg weekly   Maintenance from there:  2 4 mg weekly  [facility]FORT SHYAM is a WEEKLY non-insulin injectable  GLP-1Agonist  Semaglutide [facility]) FDA approved in 2014 as adjunct to reduced calorie diet and increased physical activity for chronic weight management in adults with initial BMI of ? 30 kg/m2 or ? 27 kg/m2 with ? 1 weight-related comorbid condition  There were several studies run over 68 week periods of time  During this time they diabetic patients lose on average 6 2% of their body weight  In a different trial involving non-diabetics the weight loss was closer to 12 - 15%  Action: Cause slower gastric emptying which will increase feeling full with meals  This feeling fullness will allow for you to reduce calorie intake sooner than usual and begin to lose some weight  These medications also will reduce production of glucose form your liver to allow for better control of your sugars  Reminders: These medications do not cause low blood sugars  Keep in the refrigerator until you use it once, then keep it out of refrigerator  Clean your hands and site of injection to avoid infection risks  Store used needles in old plastic laundry detergent bin or coffee bin, then tape it when full and discard in garbage  Side Effects:  Common side effects include full sensation with eating, nausea which usually improves over the first 1-2 weeks  Soreness, redness or lump at site of injection  Constipation, stomach pain, headache, fatigue, indigestion, dizziness, bloating, burping        Dangers:  Pancreatitis can happen with this medicine for some people; therefore if you have ever had pancreatitis or have severe nausea vomiting and abdominal pain while on this medicine stop it immediately and call us or go to ER for assistance  Similarly gallbladder disease  Do not use if you have had a history of thyroid canceror a family history of MEN syndrome  It can cause low blood sugar if mixed with certain other diabetes medications  If you have type 2 diabetes you should already be seeing an eye doctor - let them know you are on this medication and contact them if there are changes in your vision  See the package insert for all serious possible side effects       Mounjarno   Mounjaro (tirzepatide) dosing instructions  Month 1 -  2 5 mg weekly x 4 weeks   Month 2 - 5 mg weekly x 4 weeks  Month 3 - 7 5 mg weekly x 4 week   Month 4 - 10 mg weekly x 4 weeks  Month 5 - 12 5 mg weekly x 4 weeks   Week 6 and thereafter - 15 mg weekly     You can inject this in your stomach, thigh, or upper arm  For savings card program go to [employer] or  [id number] ([phone])  Common side effects  nausea, diarrhea, decreased appetite, vomiting, constipation, indigestion, and stomach pain  To help these try eating smaller meals, stop eating when you feel full, avoid eating fat or fatty foods, try eating bland foods like toast, crackers or rice  If you take birth control pills by mouth these may not work as well while you are on this medication and you may want to increase your dose or use another form of birth control  Contraindicated with medullary thyroid carcinoma MTC or MEN2  It may cause tumors of the thyroid, including thyroid cancer  Call right away and stop medication if severe pain in stomach with or without vomiting (pancreatitis)   Watch for low blood sugar if taking this medication with a sulfanuria or insulin  Drink plenty of fluids to avoid dehydration     If you having gallbladder issues such as pain in your upper abdomen, yellowing of your skin, fever, saida colored stools, stop this medicine can all your doctor  Call if you have sudden change in your vision

## 2023-06-28 ENCOUNTER — APPOINTMENT (OUTPATIENT)
Dept: LAB | Facility: HOSPITAL | Age: 78
End: 2023-06-28
Payer: MEDICARE

## 2023-06-28 ENCOUNTER — TELEPHONE (OUTPATIENT)
Dept: ENDOCRINOLOGY | Facility: CLINIC | Age: 78
End: 2023-06-28

## 2023-06-28 DIAGNOSIS — E55.9 VITAMIN D DEFICIENCY: ICD-10-CM

## 2023-06-28 DIAGNOSIS — E11.42 TYPE 2 DIABETES MELLITUS WITH DIABETIC POLYNEUROPATHY, WITHOUT LONG-TERM CURRENT USE OF INSULIN (HCC): ICD-10-CM

## 2023-06-28 DIAGNOSIS — E04.2 MULTIPLE THYROID NODULES: ICD-10-CM

## 2023-06-28 LAB
25(OH)D3 SERPL-MCNC: 10.7 NG/ML (ref 30–100)
ALBUMIN SERPL BCP-MCNC: 4 G/DL (ref 3.5–5)
ALP SERPL-CCNC: 76 U/L (ref 34–104)
ALT SERPL W P-5'-P-CCNC: 13 U/L (ref 7–52)
ANION GAP SERPL CALCULATED.3IONS-SCNC: 6 MMOL/L
AST SERPL W P-5'-P-CCNC: 13 U/L (ref 13–39)
BASOPHILS # BLD AUTO: 0.03 THOUSANDS/ÂΜL (ref 0–0.1)
BASOPHILS NFR BLD AUTO: 1 % (ref 0–1)
BILIRUB SERPL-MCNC: 0.71 MG/DL (ref 0.2–1)
BUN SERPL-MCNC: 11 MG/DL (ref 5–25)
CALCIUM SERPL-MCNC: 9.6 MG/DL (ref 8.4–10.2)
CHLORIDE SERPL-SCNC: 98 MMOL/L (ref 96–108)
CO2 SERPL-SCNC: 31 MMOL/L (ref 21–32)
CREAT SERPL-MCNC: 0.71 MG/DL (ref 0.6–1.3)
EOSINOPHIL # BLD AUTO: 0.22 THOUSAND/ÂΜL (ref 0–0.61)
EOSINOPHIL NFR BLD AUTO: 4 % (ref 0–6)
ERYTHROCYTE [DISTWIDTH] IN BLOOD BY AUTOMATED COUNT: 12.5 % (ref 11.6–15.1)
GFR SERPL CREATININE-BSD FRML MDRD: 82 ML/MIN/1.73SQ M
GLUCOSE P FAST SERPL-MCNC: 273 MG/DL (ref 65–99)
HCT VFR BLD AUTO: 47 % (ref 34.8–46.1)
HGB BLD-MCNC: 15.8 G/DL (ref 11.5–15.4)
IMM GRANULOCYTES # BLD AUTO: 0.02 THOUSAND/UL (ref 0–0.2)
IMM GRANULOCYTES NFR BLD AUTO: 0 % (ref 0–2)
LYMPHOCYTES # BLD AUTO: 1.6 THOUSANDS/ÂΜL (ref 0.6–4.47)
LYMPHOCYTES NFR BLD AUTO: 26 % (ref 14–44)
MCH RBC QN AUTO: 28.3 PG (ref 26.8–34.3)
MCHC RBC AUTO-ENTMCNC: 33.6 G/DL (ref 31.4–37.4)
MCV RBC AUTO: 84 FL (ref 82–98)
MONOCYTES # BLD AUTO: 0.52 THOUSAND/ÂΜL (ref 0.17–1.22)
MONOCYTES NFR BLD AUTO: 9 % (ref 4–12)
NEUTROPHILS # BLD AUTO: 3.69 THOUSANDS/ÂΜL (ref 1.85–7.62)
NEUTS SEG NFR BLD AUTO: 60 % (ref 43–75)
NRBC BLD AUTO-RTO: 0 /100 WBCS
PLATELET # BLD AUTO: 289 THOUSANDS/UL (ref 149–390)
PMV BLD AUTO: 9.8 FL (ref 8.9–12.7)
POTASSIUM SERPL-SCNC: 4.3 MMOL/L (ref 3.5–5.3)
PROT SERPL-MCNC: 7.4 G/DL (ref 6.4–8.4)
PTH-INTACT SERPL-MCNC: 80.3 PG/ML (ref 12–88)
RBC # BLD AUTO: 5.58 MILLION/UL (ref 3.81–5.12)
SODIUM SERPL-SCNC: 135 MMOL/L (ref 135–147)
TSH SERPL DL<=0.05 MIU/L-ACNC: 2.54 UIU/ML (ref 0.45–4.5)
WBC # BLD AUTO: 6.08 THOUSAND/UL (ref 4.31–10.16)

## 2023-06-28 PROCEDURE — 83970 ASSAY OF PARATHORMONE: CPT

## 2023-06-28 PROCEDURE — 36415 COLL VENOUS BLD VENIPUNCTURE: CPT

## 2023-06-28 PROCEDURE — 80053 COMPREHEN METABOLIC PANEL: CPT

## 2023-06-28 PROCEDURE — 84443 ASSAY THYROID STIM HORMONE: CPT

## 2023-06-28 PROCEDURE — 82306 VITAMIN D 25 HYDROXY: CPT

## 2023-06-28 PROCEDURE — 85025 COMPLETE CBC W/AUTO DIFF WBC: CPT

## 2023-06-28 RX ORDER — ERGOCALCIFEROL 1.25 MG/1
50000 CAPSULE ORAL WEEKLY
Qty: 12 CAPSULE | Refills: 1 | Status: SHIPPED | OUTPATIENT
Start: 2023-06-28

## 2023-06-28 RX ORDER — LOSARTAN POTASSIUM 50 MG/1
50 TABLET ORAL DAILY
Qty: 90 TABLET | Refills: 3 | Status: SHIPPED | OUTPATIENT
Start: 2023-06-28

## 2023-06-29 ENCOUNTER — TELEPHONE (OUTPATIENT)
Dept: FAMILY MEDICINE CLINIC | Facility: CLINIC | Age: 78
End: 2023-06-29

## 2023-06-29 DIAGNOSIS — E11.42 TYPE 2 DIABETES MELLITUS WITH DIABETIC POLYNEUROPATHY, WITHOUT LONG-TERM CURRENT USE OF INSULIN (HCC): Primary | ICD-10-CM

## 2023-06-29 PROBLEM — Z71.89 COUNSELING REGARDING ADVANCED DIRECTIVES: Status: ACTIVE | Noted: 2023-06-29

## 2023-06-29 NOTE — TELEPHONE ENCOUNTER
Patient called and her [employer] drug plan covers Monjouro with no prior authorization at this time    Please send to pharmacy

## 2023-07-07 ENCOUNTER — HOSPITAL ENCOUNTER (OUTPATIENT)
Dept: RADIOLOGY | Facility: HOSPITAL | Age: 78
Discharge: HOME | End: 2023-07-07
Payer: MEDICARE

## 2023-07-07 DIAGNOSIS — IMO0001 LUNG NODULE < 6CM ON CT: ICD-10-CM

## 2023-07-07 PROCEDURE — G1004 CDSM NDSC: HCPCS

## 2023-07-07 PROCEDURE — 71250 CT THORAX DX C-: CPT

## 2023-07-10 ENCOUNTER — TELEPHONE (OUTPATIENT)
Dept: FAMILY MEDICINE CLINIC | Facility: CLINIC | Age: 78
End: 2023-07-10

## 2023-07-10 DIAGNOSIS — R91.1 LUNG NODULE: Primary | ICD-10-CM

## 2023-07-10 NOTE — TELEPHONE ENCOUNTER
Emily Marrero from [address] [facility] called, the CT scan has significant findings and is in [facility]

## 2023-07-13 NOTE — TELEPHONE ENCOUNTER
Called patient, left message for her to call the office back to review imaging result. Please order follow-up imaging so that this can be scheduled.

## 2023-07-13 NOTE — TELEPHONE ENCOUNTER
Patient has seen result via [address] [place] [address]. Will send additional message advising patient that additional imaging is recommended.

## 2023-07-16 ENCOUNTER — PATIENT MESSAGE (OUTPATIENT)
Dept: FAMILY MEDICINE CLINIC | Facility: CLINIC | Age: 78
End: 2023-07-16

## 2023-07-16 DIAGNOSIS — E11.65 UNCONTROLLED TYPE 2 DIABETES MELLITUS WITH HYPERGLYCEMIA (HCC): ICD-10-CM

## 2023-07-19 RX ORDER — BLOOD SUGAR DIAGNOSTIC
STRIP MISCELLANEOUS
Qty: 200 STRIP | Refills: 1 | Status: SHIPPED | OUTPATIENT
Start: 2023-07-19

## 2023-07-27 ENCOUNTER — CLINICAL SUPPORT (OUTPATIENT)
Dept: FAMILY MEDICINE CLINIC | Facility: CLINIC | Age: 78
End: 2023-07-27

## 2023-07-27 ENCOUNTER — APPOINTMENT (EMERGENCY)
Dept: RADIOLOGY | Facility: HOSPITAL | Age: 78
End: 2023-07-27
Payer: MEDICARE

## 2023-07-27 ENCOUNTER — HOSPITAL ENCOUNTER (EMERGENCY)
Facility: HOSPITAL | Age: 78
Discharge: HOME | End: 2023-07-27
Attending: EMERGENCY MEDICINE
Payer: MEDICARE

## 2023-07-27 ENCOUNTER — TELEPHONE (OUTPATIENT)
Dept: FAMILY MEDICINE CLINIC | Facility: CLINIC | Age: 78
End: 2023-07-27

## 2023-07-27 VITALS
OXYGEN SATURATION: 95 % | HEART RATE: 92 BPM | DIASTOLIC BLOOD PRESSURE: 74 MMHG | SYSTOLIC BLOOD PRESSURE: 124 MMHG | RESPIRATION RATE: 24 BRPM

## 2023-07-27 VITALS
TEMPERATURE: 98.7 F | RESPIRATION RATE: 16 BRPM | WEIGHT: 223 LBS | OXYGEN SATURATION: 94 % | DIASTOLIC BLOOD PRESSURE: 64 MMHG | SYSTOLIC BLOOD PRESSURE: 135 MMHG | BODY MASS INDEX: 33.91 KG/M2 | HEART RATE: 75 BPM

## 2023-07-27 DIAGNOSIS — R07.9 CHEST PAIN: Primary | ICD-10-CM

## 2023-07-27 DIAGNOSIS — I10 BENIGN ESSENTIAL HYPERTENSION: Primary | ICD-10-CM

## 2023-07-27 DIAGNOSIS — N39.0 UTI (URINARY TRACT INFECTION): ICD-10-CM

## 2023-07-27 LAB
2HR DELTA HS TROPONIN: 0 NG/L
ALBUMIN SERPL BCP-MCNC: 4.6 G/DL (ref 3.5–5)
ALP SERPL-CCNC: 76 U/L (ref 34–104)
ALT SERPL W P-5'-P-CCNC: 14 U/L (ref 7–52)
ANION GAP SERPL CALCULATED.3IONS-SCNC: 9 MMOL/L
APTT PPP: 29 SECONDS (ref 23–37)
AST SERPL W P-5'-P-CCNC: 15 U/L (ref 13–39)
BACTERIA UR QL AUTO: ABNORMAL /HPF
BASOPHILS # BLD AUTO: 0.06 THOUSANDS/ÂΜL (ref 0–0.1)
BASOPHILS NFR BLD AUTO: 1 % (ref 0–1)
BILIRUB SERPL-MCNC: 0.58 MG/DL (ref 0.2–1)
BILIRUB UR QL STRIP: NEGATIVE
BUN SERPL-MCNC: 22 MG/DL (ref 5–25)
CALCIUM SERPL-MCNC: 9.9 MG/DL (ref 8.4–10.2)
CARDIAC TROPONIN I PNL SERPL HS: 3 NG/L
CARDIAC TROPONIN I PNL SERPL HS: 3 NG/L
CHLORIDE SERPL-SCNC: 103 MMOL/L (ref 96–108)
CLARITY UR: ABNORMAL
CO2 SERPL-SCNC: 26 MMOL/L (ref 21–32)
COLOR UR: ABNORMAL
CREAT SERPL-MCNC: 0.76 MG/DL (ref 0.6–1.3)
EOSINOPHIL # BLD AUTO: 0.23 THOUSAND/ÂΜL (ref 0–0.61)
EOSINOPHIL NFR BLD AUTO: 3 % (ref 0–6)
ERYTHROCYTE [DISTWIDTH] IN BLOOD BY AUTOMATED COUNT: 13 % (ref 11.6–15.1)
GFR SERPL CREATININE-BSD FRML MDRD: 75 ML/MIN/1.73SQ M
GLUCOSE SERPL-MCNC: 258 MG/DL (ref 65–140)
GLUCOSE UR STRIP-MCNC: ABNORMAL MG/DL
HCT VFR BLD AUTO: 45 % (ref 34.8–46.1)
HGB BLD-MCNC: 15.2 G/DL (ref 11.5–15.4)
HGB UR QL STRIP.AUTO: ABNORMAL
IMM GRANULOCYTES # BLD AUTO: 0.04 THOUSAND/UL (ref 0–0.2)
IMM GRANULOCYTES NFR BLD AUTO: 0 % (ref 0–2)
INR PPP: 0.96 (ref 0.84–1.19)
KETONES UR STRIP-MCNC: NEGATIVE MG/DL
LEUKOCYTE ESTERASE UR QL STRIP: ABNORMAL
LYMPHOCYTES # BLD AUTO: 2.16 THOUSANDS/ÂΜL (ref 0.6–4.47)
LYMPHOCYTES NFR BLD AUTO: 23 % (ref 14–44)
MCH RBC QN AUTO: 27.8 PG (ref 26.8–34.3)
MCHC RBC AUTO-ENTMCNC: 33.8 G/DL (ref 31.4–37.4)
MCV RBC AUTO: 82 FL (ref 82–98)
MONOCYTES # BLD AUTO: 0.65 THOUSAND/ÂΜL (ref 0.17–1.22)
MONOCYTES NFR BLD AUTO: 7 % (ref 4–12)
NEUTROPHILS # BLD AUTO: 6.16 THOUSANDS/ÂΜL (ref 1.85–7.62)
NEUTS SEG NFR BLD AUTO: 66 % (ref 43–75)
NITRITE UR QL STRIP: NEGATIVE
NON-SQ EPI CELLS URNS QL MICRO: ABNORMAL /HPF
NRBC BLD AUTO-RTO: 0 /100 WBCS
PH UR STRIP.AUTO: 5.5 [PH]
PLATELET # BLD AUTO: 307 THOUSANDS/UL (ref 149–390)
PMV BLD AUTO: 10 FL (ref 8.9–12.7)
POTASSIUM SERPL-SCNC: 4 MMOL/L (ref 3.5–5.3)
PROT SERPL-MCNC: 7.7 G/DL (ref 6.4–8.4)
PROT UR STRIP-MCNC: ABNORMAL MG/DL
PROTHROMBIN TIME: 12.9 SECONDS (ref 11.6–14.5)
RBC # BLD AUTO: 5.47 MILLION/UL (ref 3.81–5.12)
RBC #/AREA URNS AUTO: ABNORMAL /HPF
SODIUM SERPL-SCNC: 138 MMOL/L (ref 135–147)
SP GR UR STRIP.AUTO: 1.02 (ref 1–1.03)
UROBILINOGEN UR QL STRIP.AUTO: 0.2 E.U./DL
WBC # BLD AUTO: 9.3 THOUSAND/UL (ref 4.31–10.16)
WBC #/AREA URNS AUTO: ABNORMAL /HPF

## 2023-07-27 PROCEDURE — 85025 COMPLETE CBC W/AUTO DIFF WBC: CPT | Performed by: EMERGENCY MEDICINE

## 2023-07-27 PROCEDURE — 36415 COLL VENOUS BLD VENIPUNCTURE: CPT | Performed by: EMERGENCY MEDICINE

## 2023-07-27 PROCEDURE — 85730 THROMBOPLASTIN TIME PARTIAL: CPT | Performed by: EMERGENCY MEDICINE

## 2023-07-27 PROCEDURE — 84484 ASSAY OF TROPONIN QUANT: CPT | Performed by: EMERGENCY MEDICINE

## 2023-07-27 PROCEDURE — 85610 PROTHROMBIN TIME: CPT | Performed by: EMERGENCY MEDICINE

## 2023-07-27 PROCEDURE — 81001 URINALYSIS AUTO W/SCOPE: CPT | Performed by: EMERGENCY MEDICINE

## 2023-07-27 PROCEDURE — 71045 X-RAY EXAM CHEST 1 VIEW: CPT

## 2023-07-27 PROCEDURE — 80053 COMPREHEN METABOLIC PANEL: CPT | Performed by: EMERGENCY MEDICINE

## 2023-07-27 PROCEDURE — 93005 ELECTROCARDIOGRAM TRACING: CPT

## 2023-07-27 RX ORDER — ASPIRIN 81 MG/1
162 TABLET, CHEWABLE ORAL ONCE
Status: COMPLETED | OUTPATIENT
Start: 2023-07-27 | End: 2023-07-27

## 2023-07-27 RX ORDER — CEPHALEXIN 500 MG/1
500 CAPSULE ORAL ONCE
Status: COMPLETED | OUTPATIENT
Start: 2023-07-27 | End: 2023-07-27

## 2023-07-27 RX ORDER — CEPHALEXIN 500 MG/1
500 CAPSULE ORAL EVERY 6 HOURS SCHEDULED
Qty: 14 CAPSULE | Refills: 0 | Status: SHIPPED | OUTPATIENT
Start: 2023-07-27 | End: 2023-08-03

## 2023-07-27 RX ADMIN — ASPIRIN 81 MG CHEWABLE TABLET 162 MG: 81 TABLET CHEWABLE at 17:15

## 2023-07-27 RX ADMIN — CEPHALEXIN 500 MG: 500 CAPSULE ORAL at 20:17

## 2023-07-27 NOTE — TELEPHONE ENCOUNTER
Per CC'd chart from [address] [place] [address] today:    Sri Olsen at 7/27/2023  1:20 PM    Status: Signed   Patient presents for nurse visit to check BP and calibrate machine. Patient's BP with home cuff was 116/64. Patient's manual BP was 124/74. Patient given BP log sheets to record BP readings at home.     While patient was here for her nurse visit, patient states she has been having chest pain and shortness of breath that started last week. Patient states the chest pain is located in the middle of her chest and it does not radiate to any side. Patient has not been exerting herself more than normal, has not been exercising as much due to the heat. Patient has a history of vertigo, which was resolved but has been getting worse again for the past month. Patient feels dizzy and has a lack of balance when she first wakes up in the AM. This resolves after approximately 30 minutes. Patient's pain scale is 4/10 when this happens, and describes it as more pressure than pain. Patient states that, when she had this happen often in the 80's, she would take nitroglycerin, which helped, but does not do this at this time. Patient states she now takes 2 aspirin 81mg tablets, which helps her pain. Patient does not take her BP when this happens. Patient denies lightheadedness, nausea, vomiting, syncope, other cold like symptoms. Patient also noted that she started [facility] 3 weeks ago. Katlyn Gonzalez MD at 7/27/2023  1:20 PM    Status: Addendum   Reviewed note from BP check today. BP ok but resp rate is 24 at visit and pt with CP, SOB, dizziness over the past week. Concern for unstable angina. Recommend ER eval.            Phone call placed to pt, no answer. LM for call back to let pt know she should proceed to ER at this time. Also sent a [employer] message recommending ER and asking for call back or message back to confirm receipt. Emergency contact is pt's , but the phone number is the same as pt's. Will try again in 30 mins.

## 2023-07-27 NOTE — ED PROVIDER NOTES
History  Chief Complaint   Patient presents with   • Chest Pain     States intermittent chest pain for 10 days. States occurred again today when carried groceries into house. Tearful, states under a lot of stress     68 yo female c/o episodes of intermittent substernal chest pain x 10 days. Pain is usually associated with stress/anxiety or activity. The pain is described as "crushing". It is non-radiating. She takes a baby aspirin and rests and it goes away in a few minutes. No sob, nausea, vomiting, passing out. No fever or cough. She has dizziness in the morning and has h/o same, not associated with the chest pain. Non-smoker. She has h/o HTN, DM and high chol. She is taking care of  who had knee surgery recently. Today chest pain happened with carrying heavy groceries from car. History provided by:  Patient   used: No    Chest Pain  Associated symptoms: dizziness    Associated symptoms: no abdominal pain, no back pain, no cough, no fever, no headache, no nausea, no shortness of breath and not vomiting        Prior to Admission Medications   Prescriptions Last Dose Informant Patient Reported? Taking?    Blood Glucose Monitoring Suppl (ONE TOUCH ULTRA 2) w/Device KIT   Yes No   EPINEPHrine (EPIPEN) 0.3 mg/0.3 mL SOAJ   No No   Sig: Inject 0.3 mL (0.3 mg total) into a muscle once for 1 dose   aspirin 81 mg chewable tablet   No No   Sig: Chew 1 tablet (81 mg total) daily   ergocalciferol (VITAMIN D2) 50,000 units   No No   Sig: Take 1 capsule (50,000 Units total) by mouth once a week   glucose blood (OneTouch Ultra) test strip   No No   Sig: Use to check sugars twice a day   losartan (Cozaar) 50 mg tablet   No No   Sig: Take 1 tablet (50 mg total) by mouth daily   rosuvastatin (CRESTOR) 20 MG tablet   No No   Sig: Take 1 tablet (20 mg total) by mouth daily   tirzepatide (Mounjaro) 2.5 MG/0.5ML   No No   Sig: Inject 0.5 mL (2.5 mg total) under the skin once a week Facility-Administered Medications: None       Past Medical History:   Diagnosis Date   • Cancer ([address])    • Diabetes mellitus ([address])    • Endometrial carcinoma ([address])    • Hypertension    • Obesity    • Rectal bleeding 09/10/2019   • Sciatic radiculitis 01/28/2021   • Varicella        Past Surgical History:   Procedure Laterality Date   • HYSTERECTOMY  2006   • TONSILLECTOMY  1962   • TUBAL LIGATION  1992   • US GUIDED THYROID BIOPSY  12/12/2019       Family History   Problem Relation Age of Onset   • Stroke Mother    • Diabetes Mother    • Cancer Mother    • Lung cancer Father    • Breast cancer Sister 66   • COPD Brother    • Coronary artery disease Brother    • Heart failure Brother    • Leukemia Son    • HIV Brother    • Cancer Family      I have reviewed and agree with the history as documented. E-Cigarette/Vaping   • E-Cigarette Use Never User      E-Cigarette/Vaping Substances   • Nicotine No    • THC No    • CBD No    • Flavoring No    • Other No    • Unknown No      Social History     Tobacco Use   • Smoking status: Never   • Smokeless tobacco: Never   Vaping Use   • Vaping Use: Never used   Substance Use Topics   • Alcohol use: Not Currently   • Drug use: Never       Review of Systems   Constitutional: Negative. Negative for fever. HENT: Negative. Eyes: Negative. Respiratory: Negative. Negative for cough and shortness of breath. Cardiovascular: Positive for chest pain. Gastrointestinal: Negative. Negative for abdominal pain, diarrhea, nausea and vomiting. Genitourinary: Negative. Negative for dysuria and flank pain. Musculoskeletal: Negative. Negative for back pain and myalgias. Skin: Negative. Negative for rash. Neurological: Positive for dizziness. Negative for headaches. Hematological: Does not bruise/bleed easily. Psychiatric/Behavioral: Negative. All other systems reviewed and are negative. Physical Exam  Physical Exam  Vitals and nursing note reviewed. Constitutional:       General: She is not in acute distress. Appearance: She is well-developed. She is not ill-appearing or diaphoretic. HENT:      Head: Normocephalic and atraumatic. Eyes:      General: No scleral icterus. Conjunctiva/sclera: Conjunctivae normal.   Cardiovascular:      Rate and Rhythm: Normal rate and regular rhythm. Heart sounds: Normal heart sounds. No murmur heard. Pulmonary:      Effort: Pulmonary effort is normal. No respiratory distress. Breath sounds: Normal breath sounds. Chest:      Chest wall: No tenderness. Abdominal:      General: Bowel sounds are normal. There is no distension. Palpations: Abdomen is soft. Tenderness: There is no abdominal tenderness. Musculoskeletal:         General: No deformity. Normal range of motion. Cervical back: Normal range of motion and neck supple. Right lower leg: No edema. Left lower leg: No edema. Skin:     General: Skin is warm and dry. Coloration: Skin is not pale. Findings: No rash. Neurological:      General: No focal deficit present. Mental Status: She is alert and oriented to person, place, and time. Cranial Nerves: No cranial nerve deficit.    Psychiatric:         Mood and Affect: Mood normal.         Behavior: Behavior normal.         Vital Signs  ED Triage Vitals   Temperature Pulse Respirations Blood Pressure SpO2   07/27/23 1656 07/27/23 1659 07/27/23 1659 07/27/23 1659 07/27/23 1659   98.7 °F (37.1 °C) 94 20 (!) 178/83 95 %      Temp Source Heart Rate Source Patient Position - Orthostatic VS BP Location FiO2 (%)   07/27/23 1656 07/27/23 1659 07/27/23 1659 07/27/23 1659 --   Tympanic Monitor Sitting Left arm       Pain Score       07/27/23 1656       2           Vitals:    07/27/23 1659 07/27/23 1830 07/27/23 1900   BP: (!) 178/83 123/57 115/57   Pulse: 94 86 79   Patient Position - Orthostatic VS: Sitting  Lying         Visual Acuity      ED Medications  Medications aspirin chewable tablet 162 mg (162 mg Oral Given 7/27/23 1715)       Diagnostic Studies  Results Reviewed     Procedure Component Value Units Date/Time    HS Troponin I 2hr [[id number]]  (Normal) Collected: 07/27/23 1914    Lab Status: Final result Specimen: Blood from Arm, Right Updated: 07/27/23 1949     hs TnI 2hr 3 ng/L      Delta 2hr hsTnI 0 ng/L     HS Troponin I 4hr [[id number]]     Lab Status: No result Specimen: Blood     Urine Microscopic [[id number]]  (Abnormal) Collected: 07/27/23 1751    Lab Status: Final result Specimen: Urine, Clean Catch Updated: 07/27/23 1818     RBC, UA 0-1 /hpf      WBC, UA 30-50 /hpf      Epithelial Cells Occasional /hpf      Bacteria, UA Moderate /hpf     UA (URINE) with reflex to Scope [[id number]]  (Abnormal) Collected: 07/27/23 1751    Lab Status: Final result Specimen: Urine, Clean Catch Updated: 07/27/23 1803     Color, UA Light Yellow     Clarity, UA Slightly Cloudy     Specific Gravity, UA 1.025     pH, UA 5.5     Leukocytes, UA Moderate     Nitrite, UA Negative     Protein, UA Trace mg/dl      Glucose,  (1/4%) mg/dl      Ketones, UA Negative mg/dl      Urobilinogen, UA 0.2 E.U./dl      Bilirubin, UA Negative     Occult Blood, UA Trace-Intact    HS Troponin 0hr (reflex protocol) [[id number]]  (Normal) Collected: 07/27/23 1712    Lab Status: Final result Specimen: Blood from Arm, Right Updated: 07/27/23 1743     hs TnI 0hr 3 ng/L     Comprehensive metabolic panel [[id number]]  (Abnormal) Collected: 07/27/23 1712    Lab Status: Final result Specimen: Blood from Arm, Right Updated: 07/27/23 1734     Sodium 138 mmol/L      Potassium 4.0 mmol/L      Chloride 103 mmol/L      CO2 26 mmol/L      ANION GAP 9 mmol/L      BUN 22 mg/dL      Creatinine 0.76 mg/dL      Glucose 258 mg/dL      Calcium 9.9 mg/dL      AST 15 U/L      ALT 14 U/L      Alkaline Phosphatase 76 U/L      Total Protein 7.7 g/dL      Albumin 4.6 g/dL      Total Bilirubin 0.58 mg/dL      eGFR 75 ml/min/1.73sq m Narrative:      National Kidney Disease Foundation guidelines for Chronic Kidney Disease (CKD):   •  Stage 1 with normal or high GFR (GFR > 90 mL/min/1.73 square meters)  •  Stage 2 Mild CKD (GFR = 60-89 mL/min/1.73 square meters)  •  Stage 3A Moderate CKD (GFR = 45-59 mL/min/1.73 square meters)  •  Stage 3B Moderate CKD (GFR = 30-44 mL/min/1.73 square meters)  •  Stage 4 Severe CKD (GFR = 15-29 mL/min/1.73 square meters)  •  Stage 5 End Stage CKD (GFR <15 mL/min/1.73 square meters)  Note: GFR calculation is accurate only with a steady state creatinine    Protime-INR [[id number]]  (Normal) Collected: 07/27/23 1712    Lab Status: Final result Specimen: Blood from Arm, Right Updated: 07/27/23 1730     Protime 12.9 seconds      INR 0.96    APTT [[id number]]  (Normal) Collected: 07/27/23 1712    Lab Status: Final result Specimen: Blood from Arm, Right Updated: 07/27/23 1730     PTT 29 seconds     CBC and differential [[id number]]  (Abnormal) Collected: 07/27/23 1712    Lab Status: Final result Specimen: Blood from Arm, Right Updated: 07/27/23 1718     WBC 9.30 Thousand/uL      RBC 5.47 Million/uL      Hemoglobin 15.2 g/dL      Hematocrit 45.0 %      MCV 82 fL      MCH 27.8 pg      MCHC 33.8 g/dL      RDW 13.0 %      MPV 10.0 fL      Platelets 957 Thousands/uL      nRBC 0 /100 WBCs      Neutrophils Relative 66 %      Immat GRANS % 0 %      Lymphocytes Relative 23 %      Monocytes Relative 7 %      Eosinophils Relative 3 %      Basophils Relative 1 %      Neutrophils Absolute 6.16 Thousands/µL      Immature Grans Absolute 0.04 Thousand/uL      Lymphocytes Absolute 2.16 Thousands/µL      Monocytes Absolute 0.65 Thousand/µL      Eosinophils Absolute 0.23 Thousand/µL      Basophils Absolute 0.06 Thousands/µL                  XR chest 1 view portable   ED Interpretation by Kenrick Su MD (90/03 [id number])   NAD                 Procedures  ECG 12 Lead Documentation Only    Date/Time: 7/27/2023 5:00 PM    Performed by: Rosaura White Catina Alvarado MD  Authorized by: Cirilo Taylor MD    Indications / Diagnosis:  Chest pain  ECG reviewed by me, the ED Provider: no    Patient location:  ED  Previous ECG:     Previous ECG:  Unavailable  Interpretation:     Interpretation: abnormal    Rate:     ECG rate:  95    ECG rate assessment: normal    Rhythm:     Rhythm: sinus rhythm    Ectopy:     Ectopy: none    QRS:     QRS axis:  Normal  Conduction:     Conduction: abnormal      Abnormal conduction: 1st degree    ST segments:     ST segments:  Normal  T waves:     T waves: normal               ED Course             HEART Risk Score    Flowsheet Row Most Recent Value   Heart Score Risk Calculator    History 1 Filed at: 07/27/2023 1954   ECG 0 Filed at: 07/27/2023 1954   Age 2 Filed at: 07/27/2023 1954   Risk Factors 2 Filed at: 07/27/2023 1954   Troponin 0 Filed at: 07/27/2023 1954   HEART Score 5 Filed at: 07/27/2023 1954                        SBIRT 22yo+    Flowsheet Row Most Recent Value   LACHO: How many times in the past year have you. .. Used an illegal drug or used a prescription medication for non-medical reasons? Never Filed at: 07/27/2023 1659                    Medical Decision Making  Differential diagnosis includes ACS, anxiety/stress, musculoskeletal pain. Pt. Wants to go home. EKG normal, trop 3 with delta trop 0, heart score 5. Will discharge with ambulatory referral to cardiology, advised baby aspirin daily and to return if any worsening or recurrent pain. Will treat for UTI as well, UA with 30-50 WBC and bacteria. Amount and/or Complexity of Data Reviewed  Labs: ordered. Radiology: ordered and independent interpretation performed. Risk  OTC drugs. Prescription drug management.           Disposition  Final diagnoses:   Chest pain     Time reflects when diagnosis was documented in both MDM as applicable and the Disposition within this note     Time User Action Codes Description Comment    9/53/3460  8:68 PM Genevie Finn Add [R07.9] Chest pain       ED Disposition     ED Disposition   Discharge    Condition   Stable    Date/Time   Thu Jul 27, 2023  8:00 PM    [facility] discharge to home/self care. Follow-up Information     Follow up With Specialties Details Why [address] [place] [address],  Cardiology   [address] [place] [address]  [phone]            Patient's Medications   Discharge Prescriptions    No medications on file       No discharge procedures on file.     PDMP Review     None          ED Provider  Electronically Signed by           Sandra Marmolejo MD  74/88/83 [id number]       Sandra Marmolejo MD  73/38/99 [id number]

## 2023-07-27 NOTE — PROGRESS NOTES
Reviewed note from BP check today. BP ok but resp rate is 24 at visit and pt with CP, SOB, dizziness over the past week. Concern for unstable angina.    Recommend ER eval.

## 2023-07-28 LAB
ATRIAL RATE: 95 BPM
P AXIS: 50 DEGREES
PR INTERVAL: 214 MS
QRS AXIS: 49 DEGREES
QRSD INTERVAL: 96 MS
QT INTERVAL: 338 MS
QTC INTERVAL: 424 MS
T WAVE AXIS: 47 DEGREES
VENTRICULAR RATE: 95 BPM

## 2023-07-28 PROCEDURE — 93010 ELECTROCARDIOGRAM REPORT: CPT | Performed by: INTERNAL MEDICINE

## 2023-07-28 NOTE — DISCHARGE INSTRUCTIONS
Take baby aspirin daily, rest.  Follow up outpatient cardiology. Return to ER over weekend if worsening or recurrent pain.

## 2023-07-31 ENCOUNTER — CONSULT (OUTPATIENT)
Dept: CARDIOLOGY CLINIC | Facility: CLINIC | Age: 78
End: 2023-07-31
Payer: MEDICARE

## 2023-07-31 VITALS
WEIGHT: 227 LBS | HEART RATE: 78 BPM | OXYGEN SATURATION: 94 % | HEIGHT: 68 IN | BODY MASS INDEX: 34.4 KG/M2 | DIASTOLIC BLOOD PRESSURE: 72 MMHG | SYSTOLIC BLOOD PRESSURE: 144 MMHG

## 2023-07-31 DIAGNOSIS — E78.2 HYPERLIPIDEMIA, MIXED: ICD-10-CM

## 2023-07-31 DIAGNOSIS — E11.42 TYPE 2 DIABETES MELLITUS WITH DIABETIC POLYNEUROPATHY, WITHOUT LONG-TERM CURRENT USE OF INSULIN (HCC): ICD-10-CM

## 2023-07-31 DIAGNOSIS — I10 BENIGN ESSENTIAL HYPERTENSION: Primary | ICD-10-CM

## 2023-07-31 DIAGNOSIS — R07.2 PRECORDIAL PAIN: ICD-10-CM

## 2023-07-31 PROCEDURE — 99213 OFFICE O/P EST LOW 20 MIN: CPT | Performed by: INTERNAL MEDICINE

## 2023-07-31 PROCEDURE — 93000 ELECTROCARDIOGRAM COMPLETE: CPT | Performed by: INTERNAL MEDICINE

## 2023-07-31 NOTE — PROGRESS NOTES
Consultation - Cardiology Office  [facility]. Ra Sanders 69 y.o. female MRN: [id number]  : 1945  Unit/Bed#:  Encounter: [id number]      ASSESSMENT:  Crushing chest pain  Was evaluated in the ED on 2023  Had been having intermittent substernal chest pain for 10 days, usually associated with anxiety/stress or activity    Mixed hyperlipidemia  On Crestor 20 mg    Essential hypertension  BP today is 144/72. It was 115/57 4 days ago in the ED  On losartan 50 mg    Type 2 diabetes mellitus    Obesity, BMI 34.52    Elevated ASCVD risk score of 46.3%        RECOMMENDATIONS:  Echocardiogram  Exercise stress test            Thank you for your consultation. If you have any question please call me at [address]      Primary Care Physician Requesting Consult: Ginger Rizo DO      Reason for Consult / Principal Problem: Chest pain        HPI :     Ra Sanders is a 69y.o. year old female who was referred by primary care doctor for cardiac evaluation. She has a history of obesity, diabetes mellitus, hypertension, and hyperlipidemia. She had intermittent crushing chest pain for about 10 days and was evaluated in the ED. She attributes that to possible anxiety because of her daughter visiting her which she does once a year. Her symptoms improved once the daughter left. However she has as mentioned above significant CAD risk factors warranting further evaluation    Review of Systems   Cardiovascular: Positive for chest pain. All other systems reviewed and are negative.       Historical Information   Past Medical History:   Diagnosis Date   • Cancer ([address])    • Diabetes mellitus ([address])    • Endometrial carcinoma ([address])    • Hypertension    • Obesity    • Rectal bleeding 09/10/2019   • Sciatic radiculitis 2021   • Varicella      Past Surgical History:   Procedure Laterality Date   • HYSTERECTOMY     • TONSILLECTOMY     • TUBAL LIGATION     • US GUIDED THYROID BIOPSY  12/12/2019     Social History     Substance and Sexual Activity   Alcohol Use Not Currently     Social History     Substance and Sexual Activity   Drug Use Never     Social History     Tobacco Use   Smoking Status Never   Smokeless Tobacco Never     Family History:   Family History   Problem Relation Age of Onset   • Stroke Mother    • Diabetes Mother    • Cancer Mother    • Lung cancer Father    • Breast cancer Sister 66   • COPD Brother    • Coronary artery disease Brother    • Heart failure Brother    • Leukemia Son    • HIV Brother    • Cancer Family        Meds/Allergies     Allergies   Allergen Reactions   • Gabapentin Hives       Current Outpatient Medications:   •  Blood Glucose Monitoring Suppl (ONE TOUCH ULTRA 2) w/Device KIT, , Disp: , Rfl:   •  cephalexin (KEFLEX) 500 mg capsule, Take 1 capsule (500 mg total) by mouth every 6 (six) hours for 7 days, Disp: 14 capsule, Rfl: 0  •  ergocalciferol (VITAMIN D2) 50,000 units, Take 1 capsule (50,000 Units total) by mouth once a week, Disp: 12 capsule, Rfl: 1  •  glucose blood (OneTouch Ultra) test strip, Use to check sugars twice a day, Disp: 200 strip, Rfl: 1  •  losartan (Cozaar) 50 mg tablet, Take 1 tablet (50 mg total) by mouth daily, Disp: 90 tablet, Rfl: 3  •  rosuvastatin (CRESTOR) 20 MG tablet, Take 1 tablet (20 mg total) by mouth daily, Disp: 90 tablet, Rfl: 3  •  tirzepatide (Mounjaro) 2.5 MG/0.5ML, Inject 0.5 mL (2.5 mg total) under the skin once a week, Disp: 2 mL, Rfl: 0  •  aspirin 81 mg chewable tablet, Chew 1 tablet (81 mg total) daily, Disp: 30 tablet, Rfl: 0  •  EPINEPHrine (EPIPEN) 0.3 mg/0.3 mL SOAJ, Inject 0.3 mL (0.3 mg total) into a muscle once for 1 dose, Disp: 0.6 mL, Rfl: 0    Vitals: Blood pressure 144/72, pulse 78, height 5' 8" (1.727 m), weight 103 kg (227 lb), SpO2 94 %. ?  Body mass index is 34.52 kg/m².   Vitals:    07/31/23 1439   Weight: 103 kg (227 lb)     BP Readings from Last 3 Encounters:   07/31/23 144/72   07/27/23 135/64   07/27/23 124/74       Physical Exam  PHYSICAL EXAMINATION:  Neurologic:  Alert & oriented x 3, no new focal deficits, Not in any acute distress,  Constitutional: Obesity  Eyes:  Pupil equal and reacting to light, conjunctiva normal, No JVP, No LNP   HENT:  Atraumatic, oropharynx moist, Neck- normal range of motion, no tenderness,  Neck supple   Respiratory:  Bilateral air entry, mostly clear to auscultation  Cardiovascular: S1-S2 regular rhythm  GI:  Soft, nondistended, normal bowel sounds, nontender, no hepatosplenomegaly appreciated. Musculoskeletal: no tenderness, no deformities. Skin:  Well hydrated, no rash   Lymphatic:  No lymphadenopathy noted   Extremities: Trace lower extremity edema    Diagnostic Studies Review Cardio:      EKG: Normal sinus rhythm, heart rate 78/min    Cardiac testing:   No results found for this or any previous visit. Imaging:  Chest X-Ray:   No Chest XR results available for this patient. CT-scan of the chest:     No CTA results available for this patient.   Lab Review   Lab Results   Component Value Date    WBC 9.30 07/27/2023    HGB 15.2 07/27/2023    HCT 45.0 07/27/2023    MCV 82 07/27/2023    RDW 13.0 07/27/2023     07/27/2023     BMP:  Lab Results   Component Value Date    SODIUM 138 07/27/2023    K 4.0 07/27/2023     07/27/2023    CO2 26 07/27/2023    ANIONGAP 5 02/25/2015    BUN 22 07/27/2023    CREATININE 0.76 07/27/2023    GLUC 258 (H) 07/27/2023    GLUF 273 (H) 06/28/2023    CALCIUM 9.9 07/27/2023    CORRECTEDCA 9.5 05/25/2022    EGFR 75 07/27/2023    MG 1.9 05/24/2022     LFT:  Lab Results   Component Value Date    AST 15 07/27/2023    ALT 14 07/27/2023    ALKPHOS 76 07/27/2023    TP 7.7 07/27/2023    ALB 4.6 07/27/2023      Lab Results   Component Value Date    [id number] 2.535 06/28/2023     No components found for: "TSH3"  Lab Results   Component Value Date    HGBA1C 12.2 (A) 06/27/2023     Lipid Profile:   Lab Results Component Value Date    CHOLESTEROL 217 (H) 05/25/2022    HDL 41 (L) 05/25/2022    LDLCALC 140 (H) 05/25/2022    TRIG 182 (H) 05/25/2022     Lab Results   Component Value Date    CHOLESTEROL 217 (H) 05/25/2022    CHOLESTEROL 240 (H) 03/16/2022     No results found for: "CKTOTAL", "CKMB", "CKMBINDEX", "TROPONINI"  No results found for: "NTBNP"   Recent Results (from the past 672 hour(s))   ECG 12 lead    Collection Time: 07/27/23  4:58 PM   Result Value Ref Range    Ventricular Rate 95 BPM    Atrial Rate 95 BPM    DE Interval 214 ms    QRSD Interval 96 ms    QT Interval 338 ms    QTC Interval 424 ms    P Axis 50 degrees    QRS Axis 49 degrees    T Wave Axis 47 degrees   Comprehensive metabolic panel    Collection Time: 07/27/23  5:12 PM   Result Value Ref Range    Sodium 138 135 - 147 mmol/L    Potassium 4.0 3.5 - 5.3 mmol/L    Chloride 103 96 - 108 mmol/L    CO2 26 21 - 32 mmol/L    ANION GAP 9 mmol/L    BUN 22 5 - 25 mg/dL    Creatinine 0.76 0.60 - 1.30 mg/dL    Glucose 258 (H) 65 - 140 mg/dL    Calcium 9.9 8.4 - 10.2 mg/dL    AST 15 13 - 39 U/L    ALT 14 7 - 52 U/L    Alkaline Phosphatase 76 34 - 104 U/L    Total Protein 7.7 6.4 - 8.4 g/dL    Albumin 4.6 3.5 - 5.0 g/dL    Total Bilirubin 0.58 0.20 - 1.00 mg/dL    eGFR 75 ml/min/1.73sq m   CBC and differential    Collection Time: 07/27/23  5:12 PM   Result Value Ref Range    WBC 9.30 4.31 - 10.16 Thousand/uL    RBC 5.47 (H) 3.81 - 5.12 Million/uL    Hemoglobin 15.2 11.5 - 15.4 g/dL    Hematocrit 45.0 34.8 - 46.1 %    MCV 82 82 - 98 fL    MCH 27.8 26.8 - 34.3 pg    MCHC 33.8 31.4 - 37.4 g/dL    RDW 13.0 11.6 - 15.1 %    MPV 10.0 8.9 - 12.7 fL    Platelets 543 321 - 741 Thousands/uL    nRBC 0 /100 WBCs    Neutrophils Relative 66 43 - 75 %    Immat GRANS % 0 0 - 2 %    Lymphocytes Relative 23 14 - 44 %    Monocytes Relative 7 4 - 12 %    Eosinophils Relative 3 0 - 6 %    Basophils Relative 1 0 - 1 %    Neutrophils Absolute 6.16 1.85 - 7.62 Thousands/µL    Immature Grans Absolute 0.04 0.00 - 0.20 Thousand/uL    Lymphocytes Absolute 2.16 0.60 - 4.47 Thousands/µL    Monocytes Absolute 0.65 0.17 - 1.22 Thousand/µL    Eosinophils Absolute 0.23 0.00 - 0.61 Thousand/µL    Basophils Absolute 0.06 0.00 - 0.10 Thousands/µL   Protime-INR    Collection Time: 07/27/23  5:12 PM   Result Value Ref Range    Protime 12.9 11.6 - 14.5 seconds    INR 0.96 0.84 - 1.19   APTT    Collection Time: 07/27/23  5:12 PM   Result Value Ref Range    PTT 29 23 - 37 seconds   HS Troponin 0hr (reflex protocol)    Collection Time: 07/27/23  5:12 PM   Result Value Ref Range    hs TnI 0hr 3 "Refer to ACS Flowchart"- see link ng/L   UA (URINE) with reflex to Scope    Collection Time: 07/27/23  5:51 PM   Result Value Ref Range    Color, UA Light Yellow     Clarity, UA Slightly Cloudy     Specific Gravity, UA 1.025 1.000 - 1.030    pH, UA 5.5 5.0, 5.5, 6.0, 6.5, 7.0, 7.5, 8.0, 8.5, 9.0    Leukocytes, UA Moderate (A) Negative    Nitrite, UA Negative Negative    Protein, UA Trace (A) Negative mg/dl    Glucose,  (1/4%) (A) Negative mg/dl    Ketones, UA Negative Negative mg/dl    Urobilinogen, UA 0.2 0.2, 1.0 E.U./dl E.U./dl    Bilirubin, UA Negative Negative    Occult Blood, UA Trace-Intact (A) Negative   Urine Microscopic    Collection Time: 07/27/23  5:51 PM   Result Value Ref Range    RBC, UA 0-1 None Seen, 0-1, 1-2, 2-4, 0-5 /hpf    WBC, UA 30-50 (A) None Seen, 0-1, 1-2, 0-5, 2-4 /hpf    Epithelial Cells Occasional None Seen, Occasional /hpf    Bacteria, UA Moderate (A) None Seen, Occasional /hpf   HS Troponin I 2hr    Collection Time: 07/27/23  7:14 PM   Result Value Ref Range    hs TnI 2hr 3 "Refer to ACS Flowchart"- see link ng/L    Delta 2hr hsTnI 0 <20 ng/L           Dr. Priya Figueroa [place], [id number] [facility]      "This note has been constructed using a voice recognition system. Therefore there may be syntax, spelling, and/or grammatical errors.  Please call if you have any questions. "

## 2023-08-01 DIAGNOSIS — E11.42 TYPE 2 DIABETES MELLITUS WITH DIABETIC POLYNEUROPATHY, WITHOUT LONG-TERM CURRENT USE OF INSULIN (HCC): ICD-10-CM

## 2023-08-02 ENCOUNTER — PATIENT MESSAGE (OUTPATIENT)
Dept: FAMILY MEDICINE CLINIC | Facility: CLINIC | Age: 78
End: 2023-08-02

## 2023-08-02 DIAGNOSIS — E11.42 TYPE 2 DIABETES MELLITUS WITH DIABETIC POLYNEUROPATHY, WITHOUT LONG-TERM CURRENT USE OF INSULIN (HCC): Primary | ICD-10-CM

## 2023-08-02 RX ORDER — TIRZEPATIDE 2.5 MG/.5ML
INJECTION, SOLUTION SUBCUTANEOUS
OUTPATIENT
Start: 2023-08-02

## 2023-08-02 NOTE — PATIENT COMMUNICATION
Spoke to patient, patient stated that she is ready for the next dosage of the [employer]. Please advise.

## 2023-08-15 ENCOUNTER — OFFICE VISIT (OUTPATIENT)
Dept: AUDIOLOGY | Facility: CLINIC | Age: 78
End: 2023-08-15
Payer: MEDICARE

## 2023-08-15 ENCOUNTER — OFFICE VISIT (OUTPATIENT)
Dept: OTOLARYNGOLOGY | Facility: CLINIC | Age: 78
End: 2023-08-15
Payer: MEDICARE

## 2023-08-15 VITALS
BODY MASS INDEX: 33.8 KG/M2 | HEIGHT: 68 IN | DIASTOLIC BLOOD PRESSURE: 82 MMHG | SYSTOLIC BLOOD PRESSURE: 134 MMHG | TEMPERATURE: 97.6 F | WEIGHT: 223 LBS

## 2023-08-15 DIAGNOSIS — J30.2 SEASONAL ALLERGIES: ICD-10-CM

## 2023-08-15 DIAGNOSIS — R73.09 ELEVATED GLUCOSE LEVEL: ICD-10-CM

## 2023-08-15 DIAGNOSIS — H81.10 BENIGN PAROXYSMAL POSITIONAL VERTIGO, UNSPECIFIED LATERALITY: ICD-10-CM

## 2023-08-15 DIAGNOSIS — E55.9 VITAMIN D DEFICIENCY: ICD-10-CM

## 2023-08-15 DIAGNOSIS — H90.3 SENSORINEURAL HEARING LOSS (SNHL), BILATERAL: Primary | ICD-10-CM

## 2023-08-15 DIAGNOSIS — R42 DIZZINESS: Primary | ICD-10-CM

## 2023-08-15 DIAGNOSIS — H90.3 SENSORINEURAL HEARING LOSS (SNHL), BILATERAL: ICD-10-CM

## 2023-08-15 DIAGNOSIS — E04.2 MULTIPLE THYROID NODULES: ICD-10-CM

## 2023-08-15 DIAGNOSIS — M54.2 NECK PAIN: ICD-10-CM

## 2023-08-15 PROCEDURE — 99214 OFFICE O/P EST MOD 30 MIN: CPT | Performed by: NURSE PRACTITIONER

## 2023-08-15 PROCEDURE — 92567 TYMPANOMETRY: CPT | Performed by: AUDIOLOGIST

## 2023-08-15 PROCEDURE — 92557 COMPREHENSIVE HEARING TEST: CPT | Performed by: AUDIOLOGIST

## 2023-08-15 NOTE — ASSESSMENT & PLAN NOTE
Symptoms include sensation of imbalance, whooshing noise in ears more in mornings    Discussed possible causes of vertigo including neurological, cardiac, autoimmune, Otitis media, sinusitis, chemical imbalance, medication side effects, and inner ear concerns. Based on report of symptoms high suspicion for BPPV at start of symptoms, now having residual dizziness.       Suggest audiogram today to further evaluate bilateral ears to rule out otitis media and meniere's disease. Audiogram today indicating bilateral mild sloping to moderately/severe SNHL, tymps type A bilaterally, and word discrimination 90%. Reassured overall hearing stable since last visit.       Treatment options include at home epley's, nasal steroids, Astelin nasal spray, oral steroids, lab studies, vestibular therapy, VNG testing, neurology consultation, MRI brain with IAC. After discussion agreed to Medications:  Over the counter  Fluticasone nasal spray (Flonase) one spray each nostril twice per day for 6 weeks  Claritin one tablet daily for 6 weeks  Home exercises for balance  Labs after 09/15/2023    Follow up in 6 to 8 weeks        Multiple thyroid nodules  Reviewed history of thyroid nodules.       TSH level 06/2023  2.53  Vitamin D low (continue supplementation and repeat labs in near future)     Last thyroid US 10/2022  Right lobe: 4.9 x 3.0 x 1.8 cm. Volume 12.8 mL  Left lobe:  3.3 x 1.7 x 1.1 cm. Volume 3.0 mL  Isthmus: 0.2  cm. RIGHT midgland nodule measuring 1.2 x 1.0 x 1.0 cm. TR 3 (3 points), FNA if >2.5 cm. Follow if >1.5 cm. This nodule remains stable for greater than 5 years and has had a prior benign biopsy. If there is a high level of clinical concern, continued surveillance at 2 year intervals   could be considered, otherwise no additional workup recommended.     RIGHT lower pole nodule measuring 1.5 x 1.4 x 1.1 cm. Unchanged from prior. TR 4 (4-6 points), FNA if > 1.5 cm. Follow if > 1cm.   This nodule remains stable for greater than 5 years and has had a prior benign biopsy. If there is a high level of clinical concern, continued surveillance at 2 year intervals could be considered, otherwise no additional workup recommended.     FNAB of nodules 12/2019  Both nodules [place]     Based on findings, recommend bi-annual thyroid us to monitor progression of the nodules.   Pt agreed and order placed, due 10/2024.

## 2023-08-15 NOTE — PATIENT INSTRUCTIONS
Medications:  Over the counter  Fluticasone nasal spray (Flonase) one spray each nostril twice per day for 6 weeks    Claritin one tablet daily for 6 weeks    Home exercises for balance    Labs after 09/15/2023

## 2023-08-15 NOTE — PROGRESS NOTES
Assessment/Plan:    Sensorineural hearing loss (SNHL), bilateral  Symptoms include sensation of imbalance, whooshing noise in ears more in mornings    Discussed possible causes of vertigo including neurological, cardiac, autoimmune, Otitis media, sinusitis, chemical imbalance, medication side effects, and inner ear concerns. Based on report of symptoms high suspicion for BPPV at start of symptoms, now having residual dizziness.       Suggest audiogram today to further evaluate bilateral ears to rule out otitis media and meniere's disease. Audiogram today indicating bilateral mild sloping to moderately/severe SNHL, tymps type A bilaterally, and word discrimination 90%. Reassured overall hearing stable since last visit.       Treatment options include at home epley's, nasal steroids, Astelin nasal spray, oral steroids, lab studies, vestibular therapy, VNG testing, neurology consultation, MRI brain with IAC. After discussion agreed to Medications:  Over the counter  Fluticasone nasal spray (Flonase) one spray each nostril twice per day for 6 weeks  Claritin one tablet daily for 6 weeks  Home exercises for balance  Labs after 09/15/2023    Follow up in 6 to 8 weeks        Multiple thyroid nodules  Reviewed history of thyroid nodules.       TSH level 06/2023  2.53  Vitamin D low (continue supplementation and repeat labs in near future)     Last thyroid US 10/2022  Right lobe: 4.9 x 3.0 x 1.8 cm. Volume 12.8 mL  Left lobe:  3.3 x 1.7 x 1.1 cm. Volume 3.0 mL  Isthmus: 0.2  cm. RIGHT midgland nodule measuring 1.2 x 1.0 x 1.0 cm. TR 3 (3 points), FNA if >2.5 cm. Follow if >1.5 cm. This nodule remains stable for greater than 5 years and has had a prior benign biopsy. If there is a high level of clinical concern, continued surveillance at 2 year intervals   could be considered, otherwise no additional workup recommended.     RIGHT lower pole nodule measuring 1.5 x 1.4 x 1.1 cm. Unchanged from prior.  TR 4 (4-6 points), FNA if > 1.5 cm. Follow if > 1cm. This nodule remains stable for greater than 5 years and has had a prior benign biopsy. If there is a high level of clinical concern, continued surveillance at 2 year intervals could be considered, otherwise no additional workup recommended.     FNAB of nodules 12/2019  Both nodules [place] II     Based on findings, recommend bi-annual thyroid us to monitor progression of the nodules. Pt agreed and order placed, due 10/2024.              Diagnoses and all orders for this visit:    Sensorineural hearing loss (SNHL), bilateral  -     Ambulatory referral to Audiology  -     Vitamin D 25 hydroxy; Future  -     TSH, 3rd generation with Free T4 reflex; Future  -     T4; Future  -     PTH, intact; Future  -     TORI w/Reflex; Future  -     RF Screen w/ Reflex to Titer; Future  -     Comprehensive metabolic panel; Future  -     CBC and differential; Future  -     Hemoglobin A1C; Future    Benign paroxysmal positional vertigo, unspecified laterality  -     Ambulatory referral to Audiology  -     Vitamin D 25 hydroxy; Future  -     TSH, 3rd generation with Free T4 reflex; Future  -     T4; Future  -     PTH, intact; Future  -     TORI w/Reflex; Future  -     RF Screen w/ Reflex to Titer; Future  -     Comprehensive metabolic panel; Future  -     CBC and differential; Future  -     Hemoglobin A1C; Future    Multiple thyroid nodules  -     US thyroid; Future  -     Vitamin D 25 hydroxy; Future  -     TSH, 3rd generation with Free T4 reflex; Future  -     T4; Future  -     PTH, intact; Future  -     TORI w/Reflex; Future  -     RF Screen w/ Reflex to Titer; Future  -     Comprehensive metabolic panel; Future  -     CBC and differential; Future  -     Hemoglobin A1C; Future    Vitamin D deficiency  -     Vitamin D 25 hydroxy; Future  -     TSH, 3rd generation with Free T4 reflex; Future  -     T4; Future  -     PTH, intact; Future  -     TORI w/Reflex;  Future  -     RF Screen w/ Reflex to Titer; Future  -     Comprehensive metabolic panel; Future  -     CBC and differential; Future  -     Hemoglobin A1C; Future    Neck pain  -     Vitamin D 25 hydroxy; Future  -     TSH, 3rd generation with Free T4 reflex; Future  -     T4; Future  -     PTH, intact; Future  -     TORI w/Reflex; Future  -     RF Screen w/ Reflex to Titer; Future  -     Comprehensive metabolic panel; Future  -     CBC and differential; Future  -     Hemoglobin A1C; Future    Seasonal allergies  -     Vitamin D 25 hydroxy; Future  -     TSH, 3rd generation with Free T4 reflex; Future  -     T4; Future  -     PTH, intact; Future  -     TORI w/Reflex; Future  -     RF Screen w/ Reflex to Titer; Future  -     Comprehensive metabolic panel; Future  -     CBC and differential; Future  -     Hemoglobin A1C; Future    Elevated glucose level  -     Vitamin D 25 hydroxy; Future  -     TSH, 3rd generation with Free T4 reflex; Future  -     T4; Future  -     PTH, intact; Future  -     TORI w/Reflex; Future  -     RF Screen w/ Reflex to Titer; Future  -     Comprehensive metabolic panel; Future  -     CBC and differential; Future  -     Hemoglobin A1C; Future          Subjective:      Patient ID: Kiya Aguirre is a 69 y.o. female. Presents today as a follow up due to dizziness and thyroid nodules. May 24th 2022 episode of vertigo and led to hospitalization. Since then physical therapy. Some improvement with PT. Feels dizzy if gets up too quickly or swings body too fast.  Feels not quite back to her baseline. No headaches. Some sinus pain and pressure. Stopped taking Claritin. No current use of Flonase nasal spray. Left ear feels hearing decreased. Vacuum sensation in left ear at times. Since last visit, Sensation dizziness in morning. Occurring past few weeks. No recent URI, but sensation of headache with sinus pressure and sneezing. No fever. Using cane for ambulation. Knee concerns. Prior visit with neurology. Noise in ears, whooshing, sometimes with pulsing. Also light clicking, ringing. The following portions of the patient's history were reviewed and updated as appropriate: allergies, current medications, past family history, past medical history, past social history, past surgical history and problem list.    Review of Systems   Constitutional: Negative. HENT: Negative for congestion, ear discharge, ear pain, hearing loss, nosebleeds, postnasal drip, rhinorrhea, sinus pressure, sinus pain, sore throat, tinnitus and voice change. Respiratory: Negative for chest tightness and shortness of breath. Skin: Negative for color change. Neurological: Positive for dizziness. Negative for numbness and headaches. Psychiatric/Behavioral: Negative. Objective:      /82 (BP Location: Left arm, Patient Position: Sitting, Cuff Size: Adult)   Temp 97.6 °F (36.4 °C) (Temporal)   Ht 5' 8" (1.727 m)   Wt 101 kg (223 lb)   BMI 33.91 kg/m²          Physical Exam  Constitutional:       Appearance: She is well-developed. HENT:      Head: Normocephalic. Right Ear: Hearing, tympanic membrane, ear canal and external ear normal. No decreased hearing noted. No drainage or tenderness. Tympanic membrane is not perforated or erythematous. Left Ear: Hearing, tympanic membrane, ear canal and external ear normal. No decreased hearing noted. No drainage or tenderness. Tympanic membrane is not perforated or erythematous. Nose: Nose normal. No nasal deformity or septal deviation. Mouth/Throat:      Mouth: Mucous membranes are not pale and not dry. No oral lesions. Dentition: Normal dentition. Pharynx: Uvula midline. No oropharyngeal exudate. Neck:      Trachea: No tracheal deviation. Pulmonary:      Effort: Pulmonary effort is normal. No accessory muscle usage or respiratory distress. Musculoskeletal:      Cervical back: Full passive range of motion without pain and neck supple. Lymphadenopathy:      Cervical: No cervical adenopathy. Skin:     General: Skin is warm and dry. Neurological:      Mental Status: She is alert and oriented to person, place, and time. Cranial Nerves: No cranial nerve deficit. Sensory: No sensory deficit. Psychiatric:         Behavior: Behavior is cooperative.

## 2023-08-16 NOTE — PROGRESS NOTES
ENT HEARING EVALUATION    Name:  Blaise Wolff  :  1945  Age:  69 y.o. Date of Evaluation: 8/15/2023    History: ENT Audiogram / annual audiological evaluation   Reason for visit: lBaise Wolff is being seen today at the request of RAJAN Anderson for an evaluation of hearing as part of their initial ENT visit. Ms. Errol Jane reports that she is experiencing dizziness currently, "but it is not as bad as last year". She uses 2 walking sticks to help her ambulate. Tinnitus is reported, mostly noted in the morning. EVALUATION:    Otoscopic Evaluation:   Right Ear: Clear and healthy ear canal and tympanic membrane   Left Ear: Clear and healthy ear canal and tympanic membrane    Tympanometry:   Right: Type A - normal middle ear pressure and compliance   Left: Type A - normal middle ear pressure and compliance    Audiogram Results:  Minimal / mild SHL, 250-4000 Hz, sloping to a moderate degree, 3030-0309 Hz. Word recognition scores, in quiet, were obtained at 100% at 65 dBHL for both ears. *see attached audiogram    Thresholds are consistent with those obtained on 2022     RECOMMENDATIONS:  1. Follow up per Teodoro Murdock / consider vestibular evaluation with PT  2. Annual hearing evaluations for monitoring purposes / sooner should symptoms of vertigo and/or tinnitus increase or change.       Maryjo Halsted, Au.D., [facility], [place]# [id number]  Clinical Audiologist

## 2023-08-17 ENCOUNTER — HOSPITAL ENCOUNTER (OUTPATIENT)
Dept: NON INVASIVE DIAGNOSTICS | Facility: HOSPITAL | Age: 78
Discharge: HOME | End: 2023-08-17
Attending: INTERNAL MEDICINE
Payer: MEDICARE

## 2023-08-17 ENCOUNTER — TELEPHONE (OUTPATIENT)
Dept: CARDIOLOGY CLINIC | Facility: CLINIC | Age: 78
End: 2023-08-17

## 2023-08-17 VITALS
BODY MASS INDEX: 33.8 KG/M2 | SYSTOLIC BLOOD PRESSURE: 134 MMHG | HEART RATE: 78 BPM | DIASTOLIC BLOOD PRESSURE: 82 MMHG | HEIGHT: 68 IN | WEIGHT: 223 LBS

## 2023-08-17 DIAGNOSIS — R07.2 PRECORDIAL PAIN: ICD-10-CM

## 2023-08-17 DIAGNOSIS — I10 BENIGN ESSENTIAL HYPERTENSION: ICD-10-CM

## 2023-08-17 LAB
AORTIC ROOT: 3.2 CM
APICAL FOUR CHAMBER EJECTION FRACTION: 61 %
AV LVOT PEAK GRADIENT: 3 MMHG
AV PEAK GRADIENT: 13 MMHG
DOP CALC LVOT AREA: 3.14 CM2
DOP CALC LVOT DIAMETER: 2 CM
E WAVE DECELERATION TIME: 194 MS
FRACTIONAL SHORTENING: 32 (ref 28–44)
INTERVENTRICULAR SEPTUM IN DIASTOLE (PARASTERNAL SHORT AXIS VIEW): 0.7 CM
INTERVENTRICULAR SEPTUM: 0.7 CM (ref 0.6–1.1)
LAAS-AP2: 17.8 CM2
LAAS-AP4: 15.6 CM2
LEFT ATRIUM SIZE: 2.7 CM
LEFT ATRIUM VOLUME (MOD BIPLANE): 55 ML
LEFT INTERNAL DIMENSION IN SYSTOLE: 3 CM (ref 2.1–4)
LEFT VENTRICULAR INTERNAL DIMENSION IN DIASTOLE: 4.4 CM (ref 3.5–6)
LEFT VENTRICULAR POSTERIOR WALL IN END DIASTOLE: 0.9 CM
LEFT VENTRICULAR STROKE VOLUME: 53 ML
LVSV (TEICH): 53 ML
MAX HR PERCENT: 95 %
MAX HR: 137 BPM
MV E'TISSUE VEL-LAT: 6 CM/S
MV E'TISSUE VEL-SEP: 8 CM/S
MV PEAK A VEL: 0.89 M/S
MV PEAK E VEL: 71 CM/S
MV STENOSIS PRESSURE HALF TIME: 56 MS
MV VALVE AREA P 1/2 METHOD: 3.93
RA PRESSURE ESTIMATED: 8 MMHG
RATE PRESSURE PRODUCT: NORMAL
RIGHT ATRIUM AREA SYSTOLE A4C: 11.5 CM2
RIGHT VENTRICLE ID DIMENSION: 3.3 CM
RV PSP: 16 MMHG
SL CV LEFT ATRIUM LENGTH A2C: 4.8 CM
SL CV LV EF: 55
SL CV PED ECHO LEFT VENTRICLE DIASTOLIC VOLUME (MOD BIPLANE) 2D: 89 ML
SL CV PED ECHO LEFT VENTRICLE SYSTOLIC VOLUME (MOD BIPLANE) 2D: 36 ML
SL CV STRESS RECOVERY BP: NORMAL MMHG
SL CV STRESS RECOVERY HR: 86 BPM
SL CV STRESS RECOVERY O2 SAT: 97 %
STRESS ANGINA INDEX: 0
STRESS BASELINE BP: NORMAL MMHG
STRESS BASELINE HR: 93 BPM
STRESS O2 SAT REST: 95 %
STRESS PEAK HR: 126 BPM
STRESS POST ESTIMATED WORKLOAD: 4.8 METS
STRESS POST EXERCISE DUR MIN: 3 MIN
STRESS POST O2 SAT PEAK: 97 %
STRESS POST PEAK BP: 166 MMHG
TR MAX PG: 8 MMHG
TR PEAK VELOCITY: 1.5 M/S
TRICUSPID ANNULAR PLANE SYSTOLIC EXCURSION: 1.8 CM
TRICUSPID VALVE PEAK REGURGITATION VELOCITY: 1.45 M/S

## 2023-08-17 PROCEDURE — 93306 TTE W/DOPPLER COMPLETE: CPT

## 2023-08-17 PROCEDURE — 93018 CV STRESS TEST I&R ONLY: CPT | Performed by: INTERNAL MEDICINE

## 2023-08-17 PROCEDURE — 93306 TTE W/DOPPLER COMPLETE: CPT | Performed by: INTERNAL MEDICINE

## 2023-08-17 PROCEDURE — 93016 CV STRESS TEST SUPVJ ONLY: CPT | Performed by: INTERNAL MEDICINE

## 2023-08-17 PROCEDURE — 93017 CV STRESS TEST TRACING ONLY: CPT

## 2023-08-17 NOTE — TELEPHONE ENCOUNTER
----- Message from Keely Carmona MD sent at 8/17/2023  3:38 PM EDT -----  Please inform the patient that the stress test showed NO evidence of any significant blockage in the blood vessels of your heart.

## 2023-08-17 NOTE — TELEPHONE ENCOUNTER
----- Message from Rosalio Alarcon MD sent at 8/17/2023  3:38 PM EDT -----  Please call and inform the patient that the Echocardiogram showed normal pumping function of the heart. No significant valve abnormality was seen.

## 2023-08-18 LAB
CHEST PAIN STATEMENT: NORMAL
MAX DIASTOLIC BP: 80 MMHG
MAX HEART RATE: 137 BPM
MAX PREDICTED HEART RATE: 143 BPM
MAX. SYSTOLIC BP: 166 MMHG
PROTOCOL NAME: NORMAL
TARGET HR FORMULA: NORMAL
TEST INDICATION: NORMAL
TIME IN EXERCISE PHASE: NORMAL

## 2023-08-26 DIAGNOSIS — E11.65 UNCONTROLLED TYPE 2 DIABETES MELLITUS WITH HYPERGLYCEMIA (HCC): ICD-10-CM

## 2023-08-26 DIAGNOSIS — E11.42 TYPE 2 DIABETES MELLITUS WITH DIABETIC POLYNEUROPATHY, WITHOUT LONG-TERM CURRENT USE OF INSULIN (HCC): ICD-10-CM

## 2023-08-28 RX ORDER — BLOOD SUGAR DIAGNOSTIC
STRIP MISCELLANEOUS
Qty: 200 STRIP | Refills: 5 | Status: SHIPPED | OUTPATIENT
Start: 2023-08-28

## 2023-08-29 ENCOUNTER — TELEPHONE (OUTPATIENT)
Dept: ENDOCRINOLOGY | Facility: CLINIC | Age: 78
End: 2023-08-29

## 2023-09-06 ENCOUNTER — TELEPHONE (OUTPATIENT)
Dept: OBGYN CLINIC | Facility: CLINIC | Age: 78
End: 2023-09-06

## 2023-09-06 DIAGNOSIS — R39.9 UTI SYMPTOMS: Primary | ICD-10-CM

## 2023-09-06 NOTE — TELEPHONE ENCOUNTER
Patient called, she is having symptoms of a UTI. She is having pain with urination.  UA and culture ordered for her to complete today or tomorrow

## 2023-09-07 ENCOUNTER — APPOINTMENT (OUTPATIENT)
Dept: LAB | Facility: HOSPITAL | Age: 78
End: 2023-09-07
Payer: MEDICARE

## 2023-09-07 DIAGNOSIS — R39.9 UTI SYMPTOMS: ICD-10-CM

## 2023-09-07 DIAGNOSIS — E04.2 MULTIPLE THYROID NODULES: ICD-10-CM

## 2023-09-07 DIAGNOSIS — H81.10 BENIGN PAROXYSMAL POSITIONAL VERTIGO, UNSPECIFIED LATERALITY: ICD-10-CM

## 2023-09-07 DIAGNOSIS — M54.2 NECK PAIN: ICD-10-CM

## 2023-09-07 DIAGNOSIS — H90.3 SENSORINEURAL HEARING LOSS (SNHL), BILATERAL: ICD-10-CM

## 2023-09-07 DIAGNOSIS — J30.2 SEASONAL ALLERGIES: ICD-10-CM

## 2023-09-07 DIAGNOSIS — R73.09 ELEVATED GLUCOSE LEVEL: ICD-10-CM

## 2023-09-07 DIAGNOSIS — E55.9 VITAMIN D DEFICIENCY: ICD-10-CM

## 2023-09-07 LAB
25(OH)D3 SERPL-MCNC: 20.5 NG/ML (ref 30–100)
ALBUMIN SERPL BCP-MCNC: 4.3 G/DL (ref 3.5–5)
ALP SERPL-CCNC: 77 U/L (ref 34–104)
ALT SERPL W P-5'-P-CCNC: 14 U/L (ref 7–52)
ANA SER QL IA: NEGATIVE
ANION GAP SERPL CALCULATED.3IONS-SCNC: 9 MMOL/L
AST SERPL W P-5'-P-CCNC: 14 U/L (ref 13–39)
BACTERIA UR QL AUTO: ABNORMAL /HPF
BASOPHILS # BLD AUTO: 0.04 THOUSANDS/ÂΜL (ref 0–0.1)
BASOPHILS NFR BLD AUTO: 1 % (ref 0–1)
BILIRUB SERPL-MCNC: 0.53 MG/DL (ref 0.2–1)
BILIRUB UR QL STRIP: NEGATIVE
BUN SERPL-MCNC: 16 MG/DL (ref 5–25)
CALCIUM SERPL-MCNC: 9.8 MG/DL (ref 8.4–10.2)
CHLORIDE SERPL-SCNC: 101 MMOL/L (ref 96–108)
CLARITY UR: CLEAR
CO2 SERPL-SCNC: 26 MMOL/L (ref 21–32)
COLOR UR: ABNORMAL
CREAT SERPL-MCNC: 0.66 MG/DL (ref 0.6–1.3)
EOSINOPHIL # BLD AUTO: 0.28 THOUSAND/ÂΜL (ref 0–0.61)
EOSINOPHIL NFR BLD AUTO: 4 % (ref 0–6)
ERYTHROCYTE [DISTWIDTH] IN BLOOD BY AUTOMATED COUNT: 13.2 % (ref 11.6–15.1)
EST. AVERAGE GLUCOSE BLD GHB EST-MCNC: 212 MG/DL
GFR SERPL CREATININE-BSD FRML MDRD: 85 ML/MIN/1.73SQ M
GLUCOSE P FAST SERPL-MCNC: 185 MG/DL (ref 65–99)
GLUCOSE UR STRIP-MCNC: NEGATIVE MG/DL
HBA1C MFR BLD: 9 %
HCT VFR BLD AUTO: 45.5 % (ref 34.8–46.1)
HGB BLD-MCNC: 15.6 G/DL (ref 11.5–15.4)
HGB UR QL STRIP.AUTO: NEGATIVE
IMM GRANULOCYTES # BLD AUTO: 0.02 THOUSAND/UL (ref 0–0.2)
IMM GRANULOCYTES NFR BLD AUTO: 0 % (ref 0–2)
KETONES UR STRIP-MCNC: NEGATIVE MG/DL
LEUKOCYTE ESTERASE UR QL STRIP: ABNORMAL
LYMPHOCYTES # BLD AUTO: 1.85 THOUSANDS/ÂΜL (ref 0.6–4.47)
LYMPHOCYTES NFR BLD AUTO: 24 % (ref 14–44)
MCH RBC QN AUTO: 28.9 PG (ref 26.8–34.3)
MCHC RBC AUTO-ENTMCNC: 34.3 G/DL (ref 31.4–37.4)
MCV RBC AUTO: 84 FL (ref 82–98)
MONOCYTES # BLD AUTO: 0.55 THOUSAND/ÂΜL (ref 0.17–1.22)
MONOCYTES NFR BLD AUTO: 7 % (ref 4–12)
NEUTROPHILS # BLD AUTO: 5.1 THOUSANDS/ÂΜL (ref 1.85–7.62)
NEUTS SEG NFR BLD AUTO: 64 % (ref 43–75)
NITRITE UR QL STRIP: NEGATIVE
NON-SQ EPI CELLS URNS QL MICRO: ABNORMAL /HPF
NRBC BLD AUTO-RTO: 0 /100 WBCS
PH UR STRIP.AUTO: 6.5 [PH]
PLATELET # BLD AUTO: 292 THOUSANDS/UL (ref 149–390)
PMV BLD AUTO: 9.7 FL (ref 8.9–12.7)
POTASSIUM SERPL-SCNC: 4.3 MMOL/L (ref 3.5–5.3)
PROT SERPL-MCNC: 7.3 G/DL (ref 6.4–8.4)
PROT UR STRIP-MCNC: NEGATIVE MG/DL
PTH-INTACT SERPL-MCNC: 61.7 PG/ML (ref 12–88)
RBC # BLD AUTO: 5.4 MILLION/UL (ref 3.81–5.12)
RBC #/AREA URNS AUTO: ABNORMAL /HPF
SODIUM SERPL-SCNC: 136 MMOL/L (ref 135–147)
SP GR UR STRIP.AUTO: <=1.005 (ref 1–1.03)
T4 SERPL-MCNC: 10.76 UG/DL (ref 6.09–12.23)
TSH SERPL DL<=0.05 MIU/L-ACNC: 2.24 UIU/ML (ref 0.45–4.5)
UROBILINOGEN UR QL STRIP.AUTO: 0.2 E.U./DL
WBC # BLD AUTO: 7.84 THOUSAND/UL (ref 4.31–10.16)
WBC #/AREA URNS AUTO: ABNORMAL /HPF

## 2023-09-07 PROCEDURE — 87086 URINE CULTURE/COLONY COUNT: CPT

## 2023-09-07 PROCEDURE — 83036 HEMOGLOBIN GLYCOSYLATED A1C: CPT

## 2023-09-07 PROCEDURE — 85025 COMPLETE CBC W/AUTO DIFF WBC: CPT

## 2023-09-07 PROCEDURE — 81001 URINALYSIS AUTO W/SCOPE: CPT

## 2023-09-07 PROCEDURE — 80053 COMPREHEN METABOLIC PANEL: CPT

## 2023-09-07 PROCEDURE — 86038 ANTINUCLEAR ANTIBODIES: CPT

## 2023-09-08 LAB — RHEUMATOID FACT SER QL LA: NEGATIVE

## 2023-09-09 LAB — BACTERIA UR CULT: NORMAL

## 2023-09-12 ENCOUNTER — TELEPHONE (OUTPATIENT)
Dept: OTOLARYNGOLOGY | Facility: CLINIC | Age: 78
End: 2023-09-12

## 2023-09-25 DIAGNOSIS — E11.42 TYPE 2 DIABETES MELLITUS WITH DIABETIC POLYNEUROPATHY, WITHOUT LONG-TERM CURRENT USE OF INSULIN (HCC): ICD-10-CM

## 2023-09-26 NOTE — TELEPHONE ENCOUNTER
There are 2 open refills - call pt to see if she would like to increase.  Remind her to send [employer] message for next dose, not refill

## 2023-09-27 ENCOUNTER — ANNUAL EXAM (OUTPATIENT)
Dept: OBGYN CLINIC | Facility: CLINIC | Age: 78
End: 2023-09-27
Payer: MEDICARE

## 2023-09-27 VITALS
HEIGHT: 68 IN | WEIGHT: 225 LBS | SYSTOLIC BLOOD PRESSURE: 130 MMHG | BODY MASS INDEX: 34.1 KG/M2 | DIASTOLIC BLOOD PRESSURE: 78 MMHG

## 2023-09-27 DIAGNOSIS — L29.2 VULVAR ITCHING: ICD-10-CM

## 2023-09-27 DIAGNOSIS — Z01.419 WOMEN'S ANNUAL ROUTINE GYNECOLOGICAL EXAMINATION: Primary | ICD-10-CM

## 2023-09-27 DIAGNOSIS — Z12.11 SCREENING FOR COLON CANCER: ICD-10-CM

## 2023-09-27 PROCEDURE — G0101 CA SCREEN;PELVIC/BREAST EXAM: HCPCS | Performed by: PHYSICIAN ASSISTANT

## 2023-09-27 NOTE — PROGRESS NOTES
ASSESSMENT & PLAN:   Diagnoses and all orders for this visit:    Women's annual routine gynecological examination    Screening for colon cancer  -     Ambulatory referral to Gastroenterology; Future    Vulvar itching  Comments:  rec dove sensitive bar soap, hydrocortisone externally BID x 14 days      Pt to trial hydrocortisone for itching externally. Reach out in 2 weeks if sx not improved    The following were reviewed in today's visit: ASCCP guidelines (Pap screen not indicated after age 72), STD testing breast self exam, mammography screening ordered, STD testing, exercise and healthy diet. Patient to return to office in yearly for annual exam.     All questions have been answered to her satisfaction. CC:  Annual Gynecologic Examination  Chief Complaint   Patient presents with   • Gynecologic Exam     Patient is here today for her yearly exam, pap no longer in dicated, mammo 22-scheduled 10/6/23, DXA scheduled 23, colonoscopy 20-repeat 3 years-order placed. Patient states she is doing well, she has no concerns at this time. HPI: Dior Watson is a 69 y.o. [id number] who presents for annual gynecologic examination. She has the following concerns:  Vulvar itching      Health Maintenance:    Exercise: pt is active  Breast exams/breast awareness: yes  Last mammogram:   Colorectal cancer screenin  DEXA: scheduled    Past Medical History:   Diagnosis Date   • Cancer ([address])    • Diabetes mellitus ([address])    • Dizziness    • Endometrial carcinoma ([address])    • Hypertension    • Obesity    • Rectal bleeding 09/10/2019   • Sciatic radiculitis 2021   • Tonsillitis    • Varicella        Past Surgical History:   Procedure Laterality Date   • HYSTERECTOMY     • TONSILLECTOMY     • TUBAL LIGATION     • US GUIDED THYROID BIOPSY  2019       Past OB/Gyn History:   No LMP recorded.  Patient is postmenopausal.    Patient is menopausal.   Menopausal symptoms: None  Patient is not currently sexually active.      Family History  Family History   Problem Relation Age of Onset   • Stroke Mother    • Diabetes Mother    • Cancer Mother    • Lung cancer Father    • Breast cancer Sister 66        Half-sister   • COPD Brother    • Coronary artery disease Brother    • Heart failure Brother    • Leukemia Son    • Cancer Son         Leukemia age 23   • HIV Brother    • Cancer Family        Family history of uterine or ovarian cancer: no  Family history of breast cancer: yes  Family history of colon cancer: no    Social History:  Social History     Socioeconomic History   • Marital status: /Civil Union     Spouse name: Not on file   • Number of children: Not on file   • Years of education: Not on file   • Highest education level: Not on file   Occupational History   • Occupation: Retired    Tobacco Use   • Smoking status: Never   • Smokeless tobacco: Never   Vaping Use   • Vaping Use: Never used   Substance and Sexual Activity   • Alcohol use: Not Currently     Alcohol/week: 4.0 standard drinks of alcohol     Types: 4 Glasses of wine per week   • Drug use: Never   • Sexual activity: Not Currently     Partners: Male     Birth control/protection: Post-menopausal   Other Topics Concern   • Not on file   Social History Narrative    Denied: History of alcohol use - As per [employer]    Daily caffeine consumption, 2-3 servings a day    Denied: History of drug use - As per [employer]    Retired from employment: [employer]       Social Determinants of Health     Financial Resource Strain: [address]  (6/27/2023)    Overall Financial Resource Strain (CARDI)    • Difficulty of Paying Living Expenses: Not hard at all   Food Insecurity: No Food Insecurity (5/25/2022)    Hunger Vital Sign    • Worried About Running Out of Food in the Last Year: Never true    • Ran Out of Food in the Last Year: Never true   Transportation Needs: No Transportation Needs (6/27/2023) PRAPARE - Transportation    • Lack of Transportation (Medical): No    • Lack of Transportation (Non-Medical): No   Physical Activity: Not on file   Stress: Not on file   Social Connections: Not on file   Intimate Partner Violence: Not on file   Housing Stability: Low Risk  (5/25/2022)    Housing Stability Vital Sign    • Unable to Pay for Housing in the Last Year: No    • Number of Places Lived in the Last Year: 1    • Unstable Housing in the Last Year: No     Domestic violence screen: negative    Allergies: Allergies   Allergen Reactions   • Gabapentin Hives       Medications:    Current Outpatient Medications:   •  Blood Glucose Monitoring Suppl (ONE TOUCH ULTRA 2) w/Device KIT, , Disp: , Rfl:   •  ergocalciferol (VITAMIN D2) 50,000 units, Take 1 capsule (50,000 Units total) by mouth once a week, Disp: 12 capsule, Rfl: 1  •  glucose blood (OneTouch Ultra) test strip, Use to check sugars three times a day Dx uncontrolled DM2 E 11.65, Disp: 200 strip, Rfl: 5  •  losartan (Cozaar) 50 mg tablet, Take 1 tablet (50 mg total) by mouth daily, Disp: 90 tablet, Rfl: 3  •  rosuvastatin (CRESTOR) 20 MG tablet, Take 1 tablet (20 mg total) by mouth daily, Disp: 90 tablet, Rfl: 3  •  tirzepatide 7.5 MG/0.5ML, Inject 0.5 mL (7.5 mg total) under the skin every 7 days, Disp: 2 mL, Rfl: 0  •  aspirin 81 mg chewable tablet, Chew 1 tablet (81 mg total) daily, Disp: 30 tablet, Rfl: 0  •  EPINEPHrine (EPIPEN) 0.3 mg/0.3 mL SOAJ, Inject 0.3 mL (0.3 mg total) into a muscle once for 1 dose, Disp: 0.6 mL, Rfl: 0    Review of Systems:  Review of Systems   Constitutional: Negative for chills, fever and unexpected weight change. Respiratory: Negative for shortness of breath. Cardiovascular: Negative for chest pain. Gastrointestinal: Negative for abdominal pain, diarrhea, nausea and vomiting. Genitourinary:        Vulvar itching     Skin: Negative for rash. Psychiatric/Behavioral: Negative for dysphoric mood.  The patient is not nervous/anxious. Physical Exam:  /78 (BP Location: Left arm, Patient Position: Sitting, Cuff Size: Large)   Ht 5' 8" (1.727 m)   Wt 102 kg (225 lb)   BMI 34.21 kg/m²    Physical Exam  Constitutional:       Appearance: Normal appearance. Genitourinary:      Vulva and urethral meatus normal.      No lesions in the vagina. Right Labia: No rash or lesions. Left Labia: No lesions or rash. No vaginal discharge, erythema or bleeding. Right Adnexa: not tender and no mass present. Left Adnexa: not tender and no mass present. Cervix is absent. Uterus is absent. Breasts:     Breasts are symmetrical.      Right: No mass or skin change. Left: No mass or skin change. HENT:      Head: Normocephalic and atraumatic. Cardiovascular:      Rate and Rhythm: Normal rate and regular rhythm. Heart sounds: Normal heart sounds. No murmur heard. No friction rub. No gallop. Pulmonary:      Effort: Pulmonary effort is normal.      Breath sounds: Normal breath sounds. No wheezing, rhonchi or rales. Abdominal:      General: Abdomen is flat. There is no distension. Palpations: Abdomen is soft. Tenderness: There is no abdominal tenderness. Musculoskeletal:      Cervical back: Neck supple. Lymphadenopathy:      Upper Body:      Right upper body: No axillary adenopathy. Left upper body: No axillary adenopathy. Neurological:      General: No focal deficit present. Mental Status: She is alert. Skin:     General: Skin is warm and dry. Psychiatric:         Mood and Affect: Mood normal.         Behavior: Behavior normal.   Vitals reviewed.

## 2023-09-27 NOTE — TELEPHONE ENCOUNTER
Patient called saw the denied prescription for duplicate request.  Explained to patient and also relayed Dr Eloisa Peterson note. Patient stated she would like to increase, and will need for Saturday dose.

## 2023-10-04 ENCOUNTER — TELEPHONE (OUTPATIENT)
Age: 78
End: 2023-10-04

## 2023-10-04 NOTE — TELEPHONE ENCOUNTER
Patient called in get Covid vaccine. RN advised patient to check with retail and supermarket pharmacies to get vaccine for self and spouse.

## 2023-10-06 ENCOUNTER — HOSPITAL ENCOUNTER (OUTPATIENT)
Dept: RADIOLOGY | Facility: HOSPITAL | Age: 78
Discharge: HOME | End: 2023-10-06
Payer: MEDICARE

## 2023-10-06 DIAGNOSIS — Z12.31 ENCOUNTER FOR SCREENING MAMMOGRAM FOR BREAST CANCER: ICD-10-CM

## 2023-10-06 PROCEDURE — 77063 BREAST TOMOSYNTHESIS BI: CPT

## 2023-10-06 PROCEDURE — 77067 SCR MAMMO BI INCL CAD: CPT

## 2023-10-09 ENCOUNTER — CONSULT (OUTPATIENT)
Dept: ENDOCRINOLOGY | Facility: CLINIC | Age: 78
End: 2023-10-09
Payer: MEDICARE

## 2023-10-09 VITALS
BODY MASS INDEX: 33.95 KG/M2 | OXYGEN SATURATION: 98 % | WEIGHT: 224 LBS | HEART RATE: 86 BPM | DIASTOLIC BLOOD PRESSURE: 78 MMHG | SYSTOLIC BLOOD PRESSURE: 118 MMHG | HEIGHT: 68 IN

## 2023-10-09 DIAGNOSIS — E55.9 VITAMIN D DEFICIENCY: ICD-10-CM

## 2023-10-09 DIAGNOSIS — E11.42 DIABETIC POLYNEUROPATHY ASSOCIATED WITH TYPE 2 DIABETES MELLITUS (HCC): ICD-10-CM

## 2023-10-09 DIAGNOSIS — R20.0 NUMBNESS AND TINGLING: ICD-10-CM

## 2023-10-09 DIAGNOSIS — E78.2 HYPERLIPIDEMIA, MIXED: ICD-10-CM

## 2023-10-09 DIAGNOSIS — E11.42 TYPE 2 DIABETES MELLITUS WITH DIABETIC POLYNEUROPATHY, WITHOUT LONG-TERM CURRENT USE OF INSULIN (HCC): Primary | ICD-10-CM

## 2023-10-09 DIAGNOSIS — R20.2 NUMBNESS AND TINGLING: ICD-10-CM

## 2023-10-09 DIAGNOSIS — E04.2 MULTIPLE THYROID NODULES: ICD-10-CM

## 2023-10-09 PROCEDURE — 99205 OFFICE O/P NEW HI 60 MIN: CPT | Performed by: INTERNAL MEDICINE

## 2023-10-09 NOTE — PROGRESS NOTES
Velia Dodson 69 y.o. female MRN: [id number]    Encounter: [id number]  Referring Provider  No referring provider defined for this encounter. Assessment/Plan     1. Type 2 diabetes mellitus, poorly controlled     Recommend the following at this time    -Continue Mounjaro   - Recommended a consistent carbohydrate diet  Recommend diabetes education for protein/carb ratios on plant-based diet  - weight control and exercise as discussed ( ideal is 30 min, at least 5 times a week) . Plans to start exercising more now that weather is cooler   - home glucose monitoring and goals emphasized, requested patient bring in glucose monitor or log sheets to next visit. Provided log.   - discussed signs and symptoms of hypoglycemia and how to correct them appropriately  - counseled about the long term complications of uncontrolled diabetes, including, Nephropathy, Neuropathy, CVD, Retinopathy and importance  of adherence to diet, treatment plan and life style modifications   - importance of following up with Opthalmology and podiatry emphasized   - glycohemoglobin and other lab monitoring discussed, A1c goal value reviewed  - long term diabetic complications discussed  - labs immediately prior to next visit in 2-3 months      2. Hyperlipidemia  - continue statin therapy- started July 2023  -check Lipid panel before next visit     3. Hypertension  -Blood pressure controlled   - continue ARB, started July 2023    4. Vit D deficiency  -recent level 20.5, on 50,000u weekly supplementation     5. Neuropathy  -likely diabetic, check b12 level    6. Multiple thyroid nodules  -stable on imaging, benign FNA 2019, 2014  -repeat thyroid US 09/2024, patient was exposed to [place] radiation in [facility] and agreeable to following nodules         CC: Diabetes    History of Present Illness     HPI:  Velia Dodson is a 69 y.o. female presents for a new visit regarding diabetes management.    Also has a h/o , multiple thyroid nodules Vit D deficiency, lung nodule, sensorineural hearing loss, BPPV, HTN, HLD. Did see [facility] endocrinology once 2017 for uncontrolled DM A1c 11.7%, was not ready to start medications. A1c did improve w 80lb weight loss back in 2006, was vegan   At recent June PCP visit, A1c 12.2, repeat A1c September 2 months later was 9.0 after being initiated on Cappuccini . Feels her vision might be worsening. DM history:   Diagnosed around 20 years ago , pre 2006   Was on and off metformin for years, did not tolerate, bad diarrhea   complications of neuropathy , microalbuminuria, mild BL diabetic retinopathy   Last Eye exam: spring 2023, told some slight changes   Podiatrist- every 80 days, [facility]   Diabetes ed- no, not sure wants to bc eats plant based and does not want to be told to add animal products      Current regimen: Mounjaro 10mg weekly , started course early July, increased 1 w ago, tolerating well. Statin: rosuvastatin   ACE-I/ARB: lostartan     Appetite is good. Denies numbness or tingling in the hands , does have occasional tingling shins and feet. Exercise: around house, walks, mows, XC skis in winter. Not much organized activity but has gym equipment at home. Eats plant based, beans, tofu, broccoli. Home glucose monitoring: are performed regularly  Before breakfast: 140s-160  Before lunch: ==  Before dinner: ==  Bedtime: ==    Symptoms of hypoglycemia : never    History multiple thyroid nodules. Last FNA 2019 benign. Also FNA 2014 [facility]. Last thyroid US 9/2022, stable, rec 2 year repeat US if high clinical concern. Former [employer]. Lots intl travel. Was in [facility] during [place]. Didn't know, didn't take iodine. Review of Systems   Constitutional: Negative for activity change, fatigue and unexpected weight change (slow weight loss ).  Appetite change: less appetite on mounjaro    HENT: Negative for trouble swallowing and voice change. Cardiovascular: Negative for chest pain and leg swelling. Gastrointestinal: Positive for diarrhea (occ diarrhea). Negative for abdominal distention and abdominal pain. Skin: Negative for pallor and rash. Neurological: Positive for numbness (+neuropathy ). Psychiatric/Behavioral: Negative for agitation and confusion.        Historical Information   Past Medical History:   Diagnosis Date   • Cancer ([address])    • Diabetes mellitus ([address])    • Dizziness    • Endometrial carcinoma ([address])    • Hypertension    • Obesity    • Rectal bleeding 09/10/2019   • Sciatic radiculitis 01/28/2021   • Tonsillitis    • Varicella      Past Surgical History:   Procedure Laterality Date   • HYSTERECTOMY  2006   • TONSILLECTOMY  1962   • TUBAL LIGATION  1992   • US GUIDED THYROID BIOPSY  12/12/2019     Social History   Social History     Substance and Sexual Activity   Alcohol Use Not Currently   • Alcohol/week: 4.0 standard drinks of alcohol   • Types: 4 Glasses of wine per week     Social History     Substance and Sexual Activity   Drug Use Never     Social History     Tobacco Use   Smoking Status Never   Smokeless Tobacco Never     Family History:   Family History   Problem Relation Age of Onset   • Stroke Mother    • Diabetes Mother    • Cancer Mother    • Diabetes unspecified Mother    • Lung cancer Father    • Breast cancer Sister 66        Half-sister   • COPD Brother    • Coronary artery disease Brother    • Heart failure Brother    • HIV Brother    • Cancer Family    • Leukemia Son    • Cancer Son         Leukemia age 23       Meds/Allergies   Current Outpatient Medications   Medication Sig Dispense Refill   • aspirin 81 mg chewable tablet Chew 1 tablet (81 mg total) daily 30 tablet 0   • Blood Glucose Monitoring Suppl (ONE TOUCH ULTRA 2) w/Device KIT      • ergocalciferol (VITAMIN D2) 50,000 units Take 1 capsule (50,000 Units total) by mouth once a week 12 capsule 1   • glucose blood (OneTouch Ultra) test strip Use to check sugars three times a day Dx uncontrolled DM2 E 11.65 200 strip 5   • losartan (Cozaar) 50 mg tablet Take 1 tablet (50 mg total) by mouth daily 90 tablet 3   • rosuvastatin (CRESTOR) 20 MG tablet Take 1 tablet (20 mg total) by mouth daily 90 tablet 3   • tirzepatide 10 MG/0.5ML Inject 0.5 mL (10 mg total) under the skin every 7 days 2 mL 0   • EPINEPHrine (EPIPEN) 0.3 mg/0.3 mL SOAJ Inject 0.3 mL (0.3 mg total) into a muscle once for 1 dose 0.6 mL 0     No current facility-administered medications for this visit. Allergies   Allergen Reactions   • Gabapentin Hives       Objective   Vitals: Blood pressure 118/78, pulse 86, height 5' 8" (1.727 m), weight 102 kg (224 lb), SpO2 98 %. Physical Exam  Constitutional:       General: She is not in acute distress. Appearance: Normal appearance. She is obese. She is not ill-appearing, toxic-appearing or diaphoretic. HENT:      Head: Normocephalic and atraumatic. Nose: Nose normal.   Eyes:      Extraocular Movements: Extraocular movements intact. Conjunctiva/sclera: Conjunctivae normal.      Pupils: Pupils are equal, round, and reactive to light. Neck:      Thyroid: No thyroid mass, thyromegaly or thyroid tenderness. Pulmonary:      Effort: Pulmonary effort is normal. No respiratory distress. Abdominal:      General: There is no distension. Musculoskeletal:         General: No deformity. Cervical back: No tenderness. Right lower leg: Edema present. Left lower leg: Edema present. Skin:     General: Skin is warm and dry. Neurological:      General: No focal deficit present. Mental Status: She is alert. Mental status is at baseline. Psychiatric:         Mood and Affect: Mood normal.         Behavior: Behavior normal.         Thought Content: Thought content normal.         The history was obtained from the review of the chart, patient.     Lab Results:   Lab Results   Component Value Date/Time    Hemoglobin A1C 9.0 (H) 09/07/2023 07:52 AM    Hemoglobin A1C 12.2 (A) 06/27/2023 10:03 AM    WBC 7.84 09/07/2023 07:52 AM    WBC 9.30 07/27/2023 05:12 PM    WBC 6.08 06/28/2023 07:02 AM    Hemoglobin 15.6 (H) 09/07/2023 07:52 AM    Hemoglobin 15.2 07/27/2023 05:12 PM    Hemoglobin 15.8 (H) 06/28/2023 07:02 AM    Hematocrit 45.5 09/07/2023 07:52 AM    Hematocrit 45.0 07/27/2023 05:12 PM    Hematocrit 47.0 (H) 06/28/2023 07:02 AM    MCV 84 09/07/2023 07:52 AM    MCV 82 07/27/2023 05:12 PM    MCV 84 06/28/2023 07:02 AM    Platelets 658 89/74/4975 07:52 AM    Platelets 686 17/87/9765 05:12 PM    Platelets 340 45/59/2110 07:02 AM    BUN 16 09/07/2023 07:52 AM    BUN 22 07/27/2023 05:12 PM    BUN 11 06/28/2023 07:02 AM    Potassium 4.3 09/07/2023 07:52 AM    Potassium 4.0 07/27/2023 05:12 PM    Potassium 4.3 06/28/2023 07:02 AM    Chloride 101 09/07/2023 07:52 AM    Chloride 103 07/27/2023 05:12 PM    Chloride 98 06/28/2023 07:02 AM    CO2 26 09/07/2023 07:52 AM    CO2 26 07/27/2023 05:12 PM    CO2 31 06/28/2023 07:02 AM    Creatinine 0.66 09/07/2023 07:52 AM    Creatinine 0.76 07/27/2023 05:12 PM    Creatinine 0.71 06/28/2023 07:02 AM    AST 14 09/07/2023 07:52 AM    AST 15 07/27/2023 05:12 PM    AST 13 06/28/2023 07:02 AM    ALT 14 09/07/2023 07:52 AM    ALT 14 07/27/2023 05:12 PM    ALT 13 06/28/2023 07:02 AM    Total Protein 7.3 09/07/2023 07:52 AM    Total Protein 7.7 07/27/2023 05:12 PM    Total Protein 7.4 06/28/2023 07:02 AM    Albumin 4.3 09/07/2023 07:52 AM    Albumin 4.6 07/27/2023 05:12 PM    Albumin 4.0 06/28/2023 07:02 AM           Imaging Studies: I have personally reviewed pertinent reports. Portions of the record may have been created with voice recognition software. Occasional wrong word or "sound a like" substitutions may have occurred due to the inherent limitations of voice recognition software. Read the chart carefully and recognize, using context, where substitutions have occurred.

## 2023-10-09 NOTE — RESULT ENCOUNTER NOTE
[facility],  Your mammogram is normal. We recommend you schedule your next mammogram in one year.    Have a good day,   Dr. Perez Flatten

## 2023-10-09 NOTE — PATIENT INSTRUCTIONS
--check labs in 2 months, a1c. B12, lipids, bmp  --do glucose log, mix around what time you check your   --call your insurance, see if you're covered for a CGM and what their prerequisites are  --consider seeing diabetes education to cover proper protein/carb ratios
No

## 2023-10-10 DIAGNOSIS — E11.42 TYPE 2 DIABETES MELLITUS WITH DIABETIC POLYNEUROPATHY, WITHOUT LONG-TERM CURRENT USE OF INSULIN (HCC): Primary | ICD-10-CM

## 2023-10-13 ENCOUNTER — OFFICE VISIT (OUTPATIENT)
Dept: DIABETES SERVICES | Facility: CLINIC | Age: 78
End: 2023-10-13
Payer: MEDICARE

## 2023-10-13 VITALS — BODY MASS INDEX: 34.06 KG/M2 | WEIGHT: 224 LBS

## 2023-10-13 DIAGNOSIS — E11.42 TYPE 2 DIABETES MELLITUS WITH DIABETIC POLYNEUROPATHY, WITHOUT LONG-TERM CURRENT USE OF INSULIN (HCC): Primary | ICD-10-CM

## 2023-10-13 PROCEDURE — 97802 MEDICAL NUTRITION INDIV IN: CPT

## 2023-10-13 NOTE — PROGRESS NOTES
Medical Nutrition Therapy        Assessment    Visit Type: Initial visit  Chief complaint/Medical Diagnosis/reason for visit E11.42Type 2 Diabetes Mellitus with diabetic polyneuropathy without luzma term current use of insulin    HPI Silvano Dandy came in today for her Medical Nutrition therapy appointment. Silvano Dandy stated her goal is to lower her blood sugars and A1C, along with losing some weight. Maci's most current A1C = 9.0 as of 9/7/23. Silvano Dandy also stated she is having a problem with Vertigo, and has been having this issue since May 2022. Silvano Dandy also stated she is more of a Vegetarian, and was a Vegan at one time, and does not eat meat or poultry. Silvano Dandy stated she will eat tuna, shrimp and at times, salmon. Silvano Dandy stated she does like tofu, tempeh, cottage cheese, block cheese, peanut butter, and Greek yogurt. Obtained a 24 hour food recall. Problems identified in food recall include going too long without eating (irregular timing of meals), inconsistent carbohydrate intake, insufficient protein intake, and unbalanced meals. Provided patient with a 1600 calorie balanced individualized (Vegetarian) meal plan to assist with consistency, balance and portion control. Encouraged the consumption of balanced meals and snacks at appropriate times, for blood sugar control and to help alleviate Vertigo. Advised patient to keep carbohydrate intake to 30 grams per meal and 15 grams per snack to assist with glycemic control. Suggested balancing her protein intake with her carbohydrate intake for her 3 meals (2-3 oz protein/meal)and 2 snacks (1-oz protein/snack)/day. My Plate, Portion booklet and food labels were used to teach basic carbohydrate counting. Patient stated she will call at a later date if she desires additional Medical Nutrition Therapy. RD will remain available for further dietary questions/concerns.      Ht Readings from Last 1 Encounters:   10/09/23 5' 8" (1.727 m)     Wt Readings from Last 3 Encounters:   10/13/23 102 kg (224 lb)   10/09/23 102 kg (224 lb)   09/27/23 102 kg (225 lb)     Weight Change: No    Barriers to Learning: no barriers    Do you follow any special diet presently?: Yes - trying to eat healthy with vegetarian guidelines. Who shops: patient  Who cooks: patient    Food Log: Completed via the method of food recall    Breakfast:8-9AM - cottage cheese + fresh fruit + walnuts, water  Morning Snack:None  Lunch:11AM or 1PM - Large Green salad with cucumbers, greens, tomato, onions, broccoli, nuts, 1/2 orange with homemade nut salad dressing, water  Afternoon Snack: None  Dinner:5-5:30PM - steamed veggies + 1 fruit, or sometimes 1 sl bread, water  Evening Snack:7-7:30AM - raw nuts  Beverages: water  Eating out/Take out:on occasion  Exercise walks at times, when not having Vertigo issues. However, walks with 2 cross country poles to maintain balance at times. Calorie needs 1600kcals/day Carbs: 30g/meal,15g/snack       Protein:10-12 oz/day    Nutrition Diagnosis:  Food and nutrition related knowledge deficit  related to Lack of prior exposure to accurate nutrition related information as evidenced by Verbalizes inaccurate or incomplete information    Intervention: plate method, label reading, carbohydrate counting, increased protein intake, meal timing, weight/ BMI goals, meal planning, individualized meal plan, monitoring portion control, and exercise guidelines     Treatment Goals: Patient will consume 3 meals a day, Patient will consume snacks, and Patient will count carbohydrates    Monitoring and evaluation:    Term code indicator  FH 1.3.2 Food Intake Criteria: Consume 3 balanced meals/day at appropriate times. Term code indicator  FH 1.6.3 Carbohydrate Intake Criteria: 30 grams carbohydrate/meal and 15 grams carbohydrate/snack.   Term code indicator  FH 1.3.2 Food Intake Criteria: Consume 2 balanced snacks/day with proper amount of carbohydrate and protein at proper times. Materials Provided: Individualized meal plan, CHO counting, portion booklet    Patient’s Response to Instruction:  Comprehensionvery good  Motivationvery good  Expected Compliancevery good    Begin Time: 10AM  End Time: 11AM  Referring Provider: Radha Crook MD    Thank you for coming to the [address] [place] [address] for education today. Please feel free to call with any questions or concerns.     [employer]  [address] [place] [id number] [phone]

## 2023-10-13 NOTE — PATIENT INSTRUCTIONS
Consume 3 balanced meals/day at appropriate times. 30 grams carbohydrate/meal and 15 grams carbohydrate/snack. Consume 2 balanced snacks/day with proper amount of carbohydrate and protein at proper times.

## 2023-10-16 ENCOUNTER — OFFICE VISIT (OUTPATIENT)
Dept: CARDIOLOGY CLINIC | Facility: CLINIC | Age: 78
End: 2023-10-16
Payer: MEDICARE

## 2023-10-16 VITALS
OXYGEN SATURATION: 97 % | SYSTOLIC BLOOD PRESSURE: 140 MMHG | WEIGHT: 225 LBS | HEART RATE: 66 BPM | DIASTOLIC BLOOD PRESSURE: 90 MMHG | BODY MASS INDEX: 34.1 KG/M2 | HEIGHT: 68 IN

## 2023-10-16 DIAGNOSIS — I10 BENIGN ESSENTIAL HYPERTENSION: Primary | ICD-10-CM

## 2023-10-16 PROCEDURE — 99214 OFFICE O/P EST MOD 30 MIN: CPT | Performed by: INTERNAL MEDICINE

## 2023-10-16 RX ORDER — LOSARTAN POTASSIUM AND HYDROCHLOROTHIAZIDE 12.5; 5 MG/1; MG/1
1 TABLET ORAL DAILY
Qty: 90 TABLET | Refills: 2 | Status: SHIPPED | OUTPATIENT
Start: 2023-10-16

## 2023-10-16 NOTE — PROGRESS NOTES
Progress Note - Cardiology Office  [facility]    Magalys Gonzalez 69 y.o. female MRN: [id number]  : 1945  Encounter: [id number]      ASSESSMENT:  Crushing chest pain  Was evaluated in the ED on 2023  Had been having intermittent substernal chest pain for 10 days, usually associated with anxiety/stress or activity    Exercise stress test, 2023:  Negative for ischemia    TTE, 2023:  EF 55%, trace MR     Mixed hyperlipidemia  On Crestor 20 mg     Essential hypertension  BP today is 140/90 mmHg with heart rate of 66/min. Type 2 diabetes mellitus     Obesity, BMI 34.21     Elevated ASCVD risk score of 46.3%           RECOMMENDATIONS:  Change to losartan hydrochlorothiazide 50-12.5 mg daily  Low-salt diet  Regular cardiovascular exercise as tolerated  Weight loss strategies    Please call [phone] if any questions. HPI :     Magalys Gonzalez is a 69y.o. year old female who came for follow up. She generally feels well and denies any significant symptoms. She had 1 episode of chest pain/pressure which she thinks may be secondary to stress as she was having a dinner party at home. It resolved when the guests left. We discussed that her exercise stress test was negative for ischemia and her echocardiogram showed normal LV systolic function    REVIEW OF SYSTEMS:  Review of Systems   HENT:          Vertigo   Cardiovascular:  Positive for chest pain. All other systems reviewed and are negative.         Historical Information   Past Medical History:   Diagnosis Date    Cancer ([address])     Diabetes mellitus ([address])     Dizziness     Endometrial carcinoma ([address])     Hypertension     Obesity     Rectal bleeding 09/10/2019    Sciatic radiculitis 2021    Tonsillitis     Varicella      Past Surgical History:   Procedure Laterality Date    HYSTERECTOMY      [facility] THYROID BIOPSY  2019     Social History     Substance and Sexual Activity   Alcohol Use Not Currently    Alcohol/week: 4.0 standard drinks of alcohol    Types: 4 Glasses of wine per week     Social History     Substance and Sexual Activity   Drug Use Never     Social History     Tobacco Use   Smoking Status Never   Smokeless Tobacco Never     Family History:   Family History   Problem Relation Age of Onset    Stroke Mother     Diabetes Mother     Cancer Mother     Diabetes unspecified Mother     Lung cancer Father     Breast cancer Sister 66        Half-sister    COPD Brother     Coronary artery disease Brother     Heart failure Brother     HIV Brother     Cancer Family     Leukemia Son     Cancer Son         Leukemia age 23       Meds/Allergies     Allergies   Allergen Reactions    Gabapentin Hives       Current Outpatient Medications:     Blood Glucose Monitoring Suppl (ONE TOUCH ULTRA 2) w/Device KIT, , Disp: , Rfl:     ergocalciferol (VITAMIN D2) 50,000 units, Take 1 capsule (50,000 Units total) by mouth once a week, Disp: 12 capsule, Rfl: 1    glucose blood (OneTouch Ultra) test strip, Use to check sugars three times a day Dx uncontrolled DM2 E 11.65, Disp: 200 strip, Rfl: 5    losartan-hydrochlorothiazide (HYZAAR) 50-12.5 mg per tablet, Take 1 tablet by mouth daily, Disp: 90 tablet, Rfl: 2    rosuvastatin (CRESTOR) 20 MG tablet, Take 1 tablet (20 mg total) by mouth daily, Disp: 90 tablet, Rfl: 3    tirzepatide 10 MG/0.5ML, Inject 0.5 mL (10 mg total) under the skin every 7 days, Disp: 2 mL, Rfl: 0    aspirin 81 mg chewable tablet, Chew 1 tablet (81 mg total) daily, Disp: 30 tablet, Rfl: 0    EPINEPHrine (EPIPEN) 0.3 mg/0.3 mL SOAJ, Inject 0.3 mL (0.3 mg total) into a muscle once for 1 dose, Disp: 0.6 mL, Rfl: 0    Vitals: Blood pressure 140/90, pulse 66, height 5' 8" (1.727 m), weight 102 kg (225 lb), SpO2 97 %. Body mass index is 34.21 kg/m².   Vitals:    10/16/23 0910   Weight: 102 kg (225 lb)     BP Readings from Last 3 Encounters: 10/16/23 140/90   10/09/23 118/78   09/27/23 130/78       Physical Exam:  Physical Exam    Neurologic:  Alert & oriented x 3, no new focal deficits, Not in any acute distress,  Constitutional: Obese  Eyes:  Pupil equal and reacting to light, conjunctiva normal,   HENT:  Atraumatic, oropharynx moist, Neck- normal range of motion, no tenderness,  Neck supple, No JVP, No LNP   Respiratory:  Bilateral air entry, mostly clear to auscultation  Cardiovascular: S1-S2 regular with a I/VI systolic murmur   GI:  Soft, nondistended, normal bowel sounds, nontender, no hepatosplenomegaly appreciated. Musculoskeletal:  no tenderness, no deformities. Skin:  Well hydrated, no rash   Lymphatic:  No lymphadenopathy noted   Extremities: Trace lower extremity edema      Cardiac testing:     Results for orders placed during the hospital encounter of 08/17/23    Echo complete w/ contrast if indicated    Interpretation Summary    Left Ventricle: Left ventricular cavity size is normal. Wall thickness is normal. The left ventricular ejection fraction is 55% by visual estimation. . Systolic function is normal. Wall motion is normal. Diastolic function is normal for age. Tricuspid Valve: The right ventricular systolic pressure is normal. The estimated right ventricular systolic pressure is 54.28 mmHg. Imaging:  Chest X-Ray:   No Chest XR results available for this patient. CT-scan of the chest:     No CTA results available for this patient.   Lab Review   Lab Results   Component Value Date    WBC 7.84 09/07/2023    HGB 15.6 (H) 09/07/2023    HCT 45.5 09/07/2023    MCV 84 09/07/2023    RDW 13.2 09/07/2023     09/07/2023     BMP:  Lab Results   Component Value Date    SODIUM 136 09/07/2023    K 4.3 09/07/2023     09/07/2023    CO2 26 09/07/2023    ANIONGAP 5 02/25/2015    BUN 16 09/07/2023    CREATININE 0.66 09/07/2023    GLUC 258 (H) 07/27/2023    GLUF 185 (H) 09/07/2023    CALCIUM 9.8 09/07/2023    CORRECTEDCA 9.5 05/25/2022    EGFR 85 09/07/2023    MG 1.9 05/24/2022     LFT:  Lab Results   Component Value Date    AST 14 09/07/2023    ALT 14 09/07/2023    ALKPHOS 77 09/07/2023    TP 7.3 09/07/2023    ALB 4.3 09/07/2023      No components found for: "TSH3"  Lab Results   Component Value Date    CYH2YMVZUNSN 2.241 09/07/2023     Lab Results   Component Value Date    HGBA1C 9.0 (H) 09/07/2023     Lipid Profile:   Lab Results   Component Value Date    CHOLESTEROL 217 (H) 05/25/2022    HDL 41 (L) 05/25/2022    LDLCALC 140 (H) 05/25/2022    TRIG 182 (H) 05/25/2022     Lab Results   Component Value Date    CHOLESTEROL 217 (H) 05/25/2022    CHOLESTEROL 240 (H) 03/16/2022     No results found for: "CKTOTAL", "CKMB", "CKMBINDEX", "TROPONINI"  No results found for: "NTBNP"   No results found for this or any previous visit (from the past 672 hour(s)). Dr. Macho Nevarez MD, [facility] [place]      "This note has been constructed using a voice recognition system. Therefore there may be syntax, spelling, and/or grammatical errors.  Please call if you have any questions. "

## 2023-10-17 ENCOUNTER — OFFICE VISIT (OUTPATIENT)
Dept: GASTROENTEROLOGY | Facility: AMBULARY SURGERY CENTER | Age: 78
End: 2023-10-17
Payer: MEDICARE

## 2023-10-17 ENCOUNTER — TELEPHONE (OUTPATIENT)
Dept: GASTROENTEROLOGY | Facility: AMBULARY SURGERY CENTER | Age: 78
End: 2023-10-17

## 2023-10-17 VITALS
DIASTOLIC BLOOD PRESSURE: 68 MMHG | WEIGHT: 225 LBS | BODY MASS INDEX: 34.1 KG/M2 | SYSTOLIC BLOOD PRESSURE: 124 MMHG | OXYGEN SATURATION: 99 % | HEART RATE: 89 BPM | HEIGHT: 68 IN

## 2023-10-17 DIAGNOSIS — Z12.11 SCREENING FOR COLON CANCER: ICD-10-CM

## 2023-10-17 DIAGNOSIS — R19.4 CHANGE IN BOWEL HABITS: Primary | ICD-10-CM

## 2023-10-17 DIAGNOSIS — Z86.010 HISTORY OF COLON POLYPS: ICD-10-CM

## 2023-10-17 PROBLEM — Z86.0100 HISTORY OF COLON POLYPS: Status: ACTIVE | Noted: 2023-10-17

## 2023-10-17 PROCEDURE — 99204 OFFICE O/P NEW MOD 45 MIN: CPT | Performed by: INTERNAL MEDICINE

## 2023-10-17 RX ORDER — BISACODYL 5 MG/1
10 TABLET, DELAYED RELEASE ORAL ONCE
Qty: 2 TABLET | Refills: 0 | Status: SHIPPED | OUTPATIENT
Start: 2023-10-17 | End: 2023-10-17

## 2023-10-17 NOTE — TELEPHONE ENCOUNTER
Our mutual patient is scheduled for procedure: colonoscopy    On: November 10 , 2023     With: Dr. Angeles De Santiago MD    He/She is taking the following :  Mounjaro    Can this be stopped  7 days prior to the procedure    Physician Approving clearance: Priya Varner DO

## 2023-10-17 NOTE — ASSESSMENT & PLAN NOTE
Personal history of colon polyps- patient is at increased risk for colon cancer screening.   Rule out colorectal lesions including polyps or malignancy.    -Colonoscopy

## 2023-10-17 NOTE — PROGRESS NOTES
Consultation - [address] [place] [address] [facility]  Gordon Castillo 1945 female         Chief Complaint: Change in bowel habits    HPI: 81-year-old female with history of hypertension, diabetes mellitus reports having issues with her bowels since she was started on the medication Mounjaro about 4 months ago. Reports having constipation after the injection and then develops diarrhea with oozing also. Denies any blood or mucus in the stool. Good appetite, no recent weight loss. Denies any heartburn or acid reflux. Denies any difficulty swallowing. She has history of colon polyps and the last colonoscopy was in February 2020. Chaperon: Ms. Keen July: Review of Systems   Constitutional:  Negative for activity change, appetite change, chills, diaphoresis, fatigue, fever and unexpected weight change. HENT:  Negative for ear discharge, ear pain, facial swelling, hearing loss, nosebleeds, sore throat, tinnitus and voice change. Eyes:  Negative for pain, discharge, redness, itching and visual disturbance. Respiratory:  Negative for apnea, cough, chest tightness, shortness of breath and wheezing. Cardiovascular:  Negative for chest pain and palpitations. Gastrointestinal:         As noted in HPI   Endocrine: Negative for cold intolerance, heat intolerance and polyuria. Genitourinary:  Negative for difficulty urinating, dysuria, flank pain, hematuria and urgency. Musculoskeletal:  Negative for arthralgias, back pain, gait problem, joint swelling and myalgias. Skin:  Negative for rash and wound. Neurological:  Negative for dizziness, tremors, seizures, speech difficulty, light-headedness, numbness and headaches. Hematological:  Negative for adenopathy. Does not bruise/bleed easily. Psychiatric/Behavioral:  Negative for agitation, behavioral problems and confusion. The patient is not nervous/anxious.          Past Medical History:   Diagnosis Date    Cancer ([address] [facility]) Diabetes mellitus ([address] [facility])     Dizziness     Endometrial carcinoma ([address] [facility])     Hypertension     Obesity     Rectal bleeding 09/10/2019    Sciatic radiculitis 01/28/2021    Tonsillitis     Varicella       Past Surgical History:   Procedure Laterality Date    HYSTERECTOMY  2006    [facility] THYROID BIOPSY  12/12/2019     Social History     Socioeconomic History    Marital status: /Civil Union     Spouse name: Not on file    Number of children: 3    Years of education: Not on file    Highest education level: Not on file   Occupational History    Occupation: Retired    Tobacco Use    Smoking status: Never    Smokeless tobacco: Never   Vaping Use    Vaping Use: Never used   Substance and Sexual Activity    Alcohol use: Not Currently     Alcohol/week: 4.0 standard drinks of alcohol     Types: 4 Glasses of wine per week    Drug use: Never    Sexual activity: Not Currently     Partners: Male     Birth control/protection: Post-menopausal   Other Topics Concern    Not on file   Social History Narrative    Denied: History of alcohol use - As per [employer]    Daily caffeine consumption, 2-3 servings a day    Denied: History of drug use - As per [employer]    Retired from employment: [employer]       Social Determinants of Health     Financial Resource Strain: [address]  (6/27/2023)    Overall Financial Resource Strain (CARDIA)     Difficulty of Paying Living Expenses: Not hard at all   Food Insecurity: No Food Insecurity (5/25/2022)    Hunger Vital Sign     Worried About Running Out of Food in the Last Year: Never true     Ran Out of Food in the Last Year: Never true   Transportation Needs: No Transportation Needs (6/27/2023)    PRAPARE - Transportation     Lack of Transportation (Medical): No     Lack of Transportation (Non-Medical):  No   Physical Activity: Not on file   Stress: Not on file   Social Connections: Not on file   Intimate Partner Violence: Not on file   Housing Stability: Low Risk  (5/25/2022)    Housing Stability Vital Sign     Unable to Pay for Housing in the Last Year: No     Number of Places Lived in the Last Year: 1     Unstable Housing in the Last Year: No     Family History   Problem Relation Age of Onset    Stroke Mother     Diabetes Mother     Cancer Mother     Diabetes unspecified Mother     Lung cancer Father     Breast cancer Sister 66        Half-sister    COPD Brother     Coronary artery disease Brother     Heart failure Brother     HIV Brother     Cancer Family     Leukemia Son     Cancer Son         Leukemia age 23     Gabapentin  Current Outpatient Medications   Medication Sig Dispense Refill    aspirin 81 mg chewable tablet Chew 1 tablet (81 mg total) daily 30 tablet 0    bisacodyl (DULCOLAX) 5 mg EC tablet Take 2 tablets (10 mg total) by mouth once for 1 dose 2 tablet 0    Blood Glucose Monitoring Suppl (ONE TOUCH ULTRA 2) w/Device KIT       ergocalciferol (VITAMIN D2) 50,000 units Take 1 capsule (50,000 Units total) by mouth once a week 12 capsule 1    glucose blood (OneTouch Ultra) test strip Use to check sugars three times a day Dx uncontrolled DM2 E 11.65 200 strip 5    losartan-hydrochlorothiazide (HYZAAR) 50-12.5 mg per tablet Take 1 tablet by mouth daily 90 tablet 2    polyethylene glycol (GOLYTELY) 4000 mL solution Take 4,000 mL by mouth once for 1 dose 4000 mL 0    rosuvastatin (CRESTOR) 20 MG tablet Take 1 tablet (20 mg total) by mouth daily 90 tablet 3    tirzepatide 10 MG/0.5ML Inject 0.5 mL (10 mg total) under the skin every 7 days 2 mL 0    EPINEPHrine (EPIPEN) 0.3 mg/0.3 mL SOAJ Inject 0.3 mL (0.3 mg total) into a muscle once for 1 dose 0.6 mL 0     No current facility-administered medications for this visit. Blood pressure 124/68, pulse 89, height 5' 8" (1.727 m), weight 102 kg (225 lb), SpO2 99 %. PHYSICAL EXAM: Physical Exam  Constitutional:       Appearance: Normal appearance. She is well-developed.    HENT: Head: Normocephalic and atraumatic. Nose: Nose normal.   Eyes:      Conjunctiva/sclera: Conjunctivae normal.   Neck:      Thyroid: No thyromegaly. Vascular: No JVD. Trachea: No tracheal deviation. Cardiovascular:      Rate and Rhythm: Normal rate and regular rhythm. Heart sounds: Normal heart sounds. No murmur heard. No friction rub. No gallop. Pulmonary:      Effort: Pulmonary effort is normal. No respiratory distress. Breath sounds: Normal breath sounds. No wheezing or rales. Abdominal:      General: Bowel sounds are normal. There is no distension. Palpations: Abdomen is soft. There is no mass. Tenderness: There is no abdominal tenderness. There is no guarding. Hernia: No hernia is present. Musculoskeletal:         General: No tenderness or deformity. Cervical back: Neck supple. Right lower leg: No edema. Left lower leg: No edema. Lymphadenopathy:      Cervical: No cervical adenopathy. Skin:     General: Skin is warm and dry. Findings: No erythema or rash. Neurological:      Mental Status: She is alert and oriented to person, place, and time. Psychiatric:         Mood and Affect: Mood normal.         Behavior: Behavior normal.         Thought Content:  Thought content normal.          Lab Results   Component Value Date    WBC 7.84 09/07/2023    HGB 15.6 (H) 09/07/2023    HCT 45.5 09/07/2023    MCV 84 09/07/2023     09/07/2023     Lab Results   Component Value Date    GLUCOSE 246 (H) 02/25/2015    CALCIUM 9.8 09/07/2023     09/11/2017    K 4.3 09/07/2023    CO2 26 09/07/2023     09/07/2023    BUN 16 09/07/2023    CREATININE 0.66 09/07/2023     Lab Results   Component Value Date    ALT 14 09/07/2023    AST 14 09/07/2023    ALKPHOS 77 09/07/2023    BILITOT 0.4 02/25/2015     Lab Results   Component Value Date    INR 0.96 07/27/2023    PROTIME 12.9 07/27/2023       Mammo screening bilateral w 3d & cad    Result Date: 10/9/2023  Impression: No mammographic evidence of malignancy. No significant change ASSESSMENT/BI-RADS CATEGORY: Left: 2 - Benign Right: 2 - Benign Overall: 2 - Benign RECOMMENDATION:      - Routine screening mammogram in 1 year for both breasts. Workstation ID: [id number]       ASSESSMENT & PLAN:    Change in bowel habits  Most likely secondary to the medication Mounjaro. Rule out colorectal lesions including polyps or malignancy. -Advised to take fiber supplements and probiotics    -Schedule for colonoscopy. -High-fiber diet.    -Patient was given instructions about the colonoscopy prep.    -Patient was explained about the risks and benefits of the procedure. Risks including but not limited to bleeding, infection, perforation were explained in detail. Also explained about less than 100% sensitivity with the exam and other alternatives. History of colon polyps  Personal history of colon polyps- patient is at increased risk for colon cancer screening.   Rule out colorectal lesions including polyps or malignancy.    -Colonoscopy

## 2023-10-17 NOTE — H&P (VIEW-ONLY)
Consultation - [address] [place] [address] [facility]  Job Nicolas 1945 female         Chief Complaint: Change in bowel habits    HPI: 71-year-old female with history of hypertension, diabetes mellitus reports having issues with her bowels since she was started on the medication Mounjaro about 4 months ago. Reports having constipation after the injection and then develops diarrhea with oozing also. Denies any blood or mucus in the stool. Good appetite, no recent weight loss. Denies any heartburn or acid reflux. Denies any difficulty swallowing. She has history of colon polyps and the last colonoscopy was in February 2020. Chaperon: Ms. Fe Sanon: Review of Systems   Constitutional:  Negative for activity change, appetite change, chills, diaphoresis, fatigue, fever and unexpected weight change. HENT:  Negative for ear discharge, ear pain, facial swelling, hearing loss, nosebleeds, sore throat, tinnitus and voice change. Eyes:  Negative for pain, discharge, redness, itching and visual disturbance. Respiratory:  Negative for apnea, cough, chest tightness, shortness of breath and wheezing. Cardiovascular:  Negative for chest pain and palpitations. Gastrointestinal:         As noted in HPI   Endocrine: Negative for cold intolerance, heat intolerance and polyuria. Genitourinary:  Negative for difficulty urinating, dysuria, flank pain, hematuria and urgency. Musculoskeletal:  Negative for arthralgias, back pain, gait problem, joint swelling and myalgias. Skin:  Negative for rash and wound. Neurological:  Negative for dizziness, tremors, seizures, speech difficulty, light-headedness, numbness and headaches. Hematological:  Negative for adenopathy. Does not bruise/bleed easily. Psychiatric/Behavioral:  Negative for agitation, behavioral problems and confusion. The patient is not nervous/anxious.          Past Medical History:   Diagnosis Date    Cancer ([address] [facility]) HCC)   • Cardiomyopathy (CMS/HCC)         MEDICATIONS  Current Outpatient Medications   Medication Sig Dispense Refill   • Acetaminophen-Caffeine 500-65 MG Tab      • metoPROLOL succinate (TOPROL-XL) 200 MG 24 hr tablet Take 1 tablet by mouth every evening. 90 tablet 3   • cloNIDine (CATAPRES) 0.2 MG tablet Take 1 tablet by mouth every 12 hours. 60 tablet 3   • atorvastatin (LIPITOR) 40 MG tablet Take 1 tablet by mouth daily. 180 tablet 0   • montelukast (SINGULAIR) 10 MG tablet TAKE 1 TABLET BY MOUTH EVERY NIGHT 90 tablet 1   • hydrOXYzine (ATARAX) 10 MG tablet  Diabetes mellitus ([address] [facility])     Dizziness     Endometrial carcinoma ([address] [facility])     Hypertension     Obesity     Rectal bleeding 09/10/2019    Sciatic radiculitis 01/28/2021    Tonsillitis     Varicella       Past Surgical History:   Procedure Laterality Date    HYSTERECTOMY  2006    [facility] THYROID BIOPSY  12/12/2019     Social History     Socioeconomic History    Marital status: /Civil Union     Spouse name: Not on file    Number of children: 3    Years of education: Not on file    Highest education level: Not on file   Occupational History    Occupation: Retired    Tobacco Use    Smoking status: Never    Smokeless tobacco: Never   Vaping Use    Vaping Use: Never used   Substance and Sexual Activity    Alcohol use: Not Currently     Alcohol/week: 4.0 standard drinks of alcohol     Types: 4 Glasses of wine per week    Drug use: Never    Sexual activity: Not Currently     Partners: Male     Birth control/protection: Post-menopausal   Other Topics Concern    Not on file   Social History Narrative    Denied: History of alcohol use - As per [employer]    Daily caffeine consumption, 2-3 servings a day    Denied: History of drug use - As per [employer]    Retired from employment: [employer]       Social Determinants of Health     Financial Resource Strain: [address]  (6/27/2023)    Overall Financial Resource Strain (CARDIA)     Difficulty of Paying Living Expenses: Not hard at all   Food Insecurity: No Food Insecurity (5/25/2022)    Hunger Vital Sign     Worried About Running Out of Food in the Last Year: Never true     Ran Out of Food in the Last Year: Never true   Transportation Needs: No Transportation Needs (6/27/2023)    PRAPARE - Transportation     Lack of Transportation (Medical): No     Lack of Transportation (Non-Medical):  No   Physical Activity: Not on file   Stress: Not on file   Social Connections: Not on file   Intimate Partner Violence: Not Take 1 tablet by mouth 2 times daily as needed for Anxiety. Pt must attend appt 22 with new  provider for further refills. 60 tablet 0   • Aspirin 81 MG Cap Take 81 mg by mouth daily.     • furosemide (Lasix) 20 MG tablet Take 1 tablet by mouth daily. (Patient taking differently: Take 40 mg by mouth daily.) 90 tablet 3   • NIFEdipine XL (PROCARDIA XL) 90 MG 24 hr tablet Take 1 tablet by mouth daily. 90 tablet 3   • Cyanocobalamin (VITAMIN B12 PO) Take by mouth daily.     • Melatonin 10 MG Tab Take 10 mg by mouth at bedtime.     • Cholecalciferol (VITAMIN D3) 2000 units capsule Take 2,000 Units by mouth daily.     • famotidine (PEPCID) 20 MG tablet Take 1 tablet by mouth daily. (Patient taking differently: Take 20 mg by mouth as needed (heartburn).) 180 tablet 0   • albuterol 108 (90 Base) MCG/ACT inhaler INHALE 2 PUFFS BY MOUTH EVERY 4 HOURS AS NEEDED FOR SHORTNESS OF BREATH OR WHEEZING 8.5 g 6   • SYMBICORT 160-4.5 MCG/ACT inhaler INHALE TWO PUFFS BY MOUTH TWICE A DAY RINSE MOUTH AND THROAT AFTER EACH USE 10.2 g 4   • Daily Multiple Vitamins TABS Take 1 tablet by mouth daily.      • Ascorbic Acid 1000 MG Tab CR Take 1,000 mg by mouth daily. One tab daily       No current facility-administered medications for this visit.         SOCIAL: No tobacco, etoh, or drug use.  Social History     Socioeconomic History   • Marital status:      Spouse name: Not on file   • Number of children: Not on file   • Years of education: Not on file   • Highest education level: Not Blood Transfusions No   • Caffeine Concern No     Comment: drinks soda   • Occupational Exposure Not Asked   • Hobby Hazards Not Asked   • Sleep Concern No   • Stress Concern Yes     Comment: daughter is ill   • Weight Concern No     Comment: loss of weight from stress   • Special Diet No   • Back Care Yes     Comment: occational pull muscle   • Exercise No   • Bike Helmet Not Asked   • Seat Belt Yes   • Self-Exams No   Social History Narrative   • Not on file     Social Determinants of Health     Financial Resource Strain: Not on file   Food Insecurity: Not on file   Transportation Needs: Not on file   Physical Activity: Not on file   Stress: Not on file   Social Connections: Not on file   Intimate Partner Violence: Not At Risk   • Social Determinants: Intimate Partner Violence Past Fear: No   • Social Determinants: Intimate Partner Violence Current Fear: No       FAMILY HISTORY:  No early coronary artery disease noted.   Family History   Problem Relation Age of Onset   • Psychiatry Daughter         bipolar   • Hypertension Mother    • Hypertension Maternal Grandmother    • Stroke Maternal Grandmother        ALLERGY:    ALLERGIES:   Allergen Reactions   • Carvedilol GI UPSET   • Felodipine RASH   • Hydralazine HEADACHES and DIARRHEA   • Keflex [Cephalexin] NAUSEA     heartburn   • Norvasc [Amlodipine] Other (See Comments)   • Topiramate GI UPSET       PHYSICAL EXAM  Vitals:    12/21/22 1340   BP: 136/62   BP Location: RUE - Right upper extremity   Patient Position: Sitting   Cuff Size: Regular   Pulse: 64   SpO2: 96%   Weight: 68 kg (150 lb)   Height: 5' 6\" (1.676 m)     General: Appears stated age, no acute distress.  Eyes: Conjunctivae without exudates or hemorrhage. Pupils symmetric.  Neck: No JVD. No thyroid enlargement.  Cardiac: has ICD in place, Normal S1 and S2 ,regular rate and rhythm. No murmurs, rubs or gallops. Pedal pulses 1/2 bilaterally. Lower extremities without evidence of edema. No bruits  on file   Housing Stability: Low Risk  (5/25/2022)    Housing Stability Vital Sign     Unable to Pay for Housing in the Last Year: No     Number of Places Lived in the Last Year: 1     Unstable Housing in the Last Year: No     Family History   Problem Relation Age of Onset    Stroke Mother     Diabetes Mother     Cancer Mother     Diabetes unspecified Mother     Lung cancer Father     Breast cancer Sister 66        Half-sister    COPD Brother     Coronary artery disease Brother     Heart failure Brother     HIV Brother     Cancer Family     Leukemia Son     Cancer Son         Leukemia age 23     Gabapentin  Current Outpatient Medications   Medication Sig Dispense Refill    aspirin 81 mg chewable tablet Chew 1 tablet (81 mg total) daily 30 tablet 0    bisacodyl (DULCOLAX) 5 mg EC tablet Take 2 tablets (10 mg total) by mouth once for 1 dose 2 tablet 0    Blood Glucose Monitoring Suppl (ONE TOUCH ULTRA 2) w/Device KIT       ergocalciferol (VITAMIN D2) 50,000 units Take 1 capsule (50,000 Units total) by mouth once a week 12 capsule 1    glucose blood (OneTouch Ultra) test strip Use to check sugars three times a day Dx uncontrolled DM2 E 11.65 200 strip 5    losartan-hydrochlorothiazide (HYZAAR) 50-12.5 mg per tablet Take 1 tablet by mouth daily 90 tablet 2    polyethylene glycol (GOLYTELY) 4000 mL solution Take 4,000 mL by mouth once for 1 dose 4000 mL 0    rosuvastatin (CRESTOR) 20 MG tablet Take 1 tablet (20 mg total) by mouth daily 90 tablet 3    tirzepatide 10 MG/0.5ML Inject 0.5 mL (10 mg total) under the skin every 7 days 2 mL 0    EPINEPHrine (EPIPEN) 0.3 mg/0.3 mL SOAJ Inject 0.3 mL (0.3 mg total) into a muscle once for 1 dose 0.6 mL 0     No current facility-administered medications for this visit. Blood pressure 124/68, pulse 89, height 5' 8" (1.727 m), weight 102 kg (225 lb), SpO2 99 %. PHYSICAL EXAM: Physical Exam  Constitutional:       Appearance: Normal appearance. She is well-developed.    HENT: Head: Normocephalic and atraumatic. Nose: Nose normal.   Eyes:      Conjunctiva/sclera: Conjunctivae normal.   Neck:      Thyroid: No thyromegaly. Vascular: No JVD. Trachea: No tracheal deviation. Cardiovascular:      Rate and Rhythm: Normal rate and regular rhythm. Heart sounds: Normal heart sounds. No murmur heard. No friction rub. No gallop. Pulmonary:      Effort: Pulmonary effort is normal. No respiratory distress. Breath sounds: Normal breath sounds. No wheezing or rales. Abdominal:      General: Bowel sounds are normal. There is no distension. Palpations: Abdomen is soft. There is no mass. Tenderness: There is no abdominal tenderness. There is no guarding. Hernia: No hernia is present. Musculoskeletal:         General: No tenderness or deformity. Cervical back: Neck supple. Right lower leg: No edema. Left lower leg: No edema. Lymphadenopathy:      Cervical: No cervical adenopathy. Skin:     General: Skin is warm and dry. Findings: No erythema or rash. Neurological:      Mental Status: She is alert and oriented to person, place, and time. Psychiatric:         Mood and Affect: Mood normal.         Behavior: Behavior normal.         Thought Content:  Thought content normal.          Lab Results   Component Value Date    WBC 7.84 09/07/2023    HGB 15.6 (H) 09/07/2023    HCT 45.5 09/07/2023    MCV 84 09/07/2023     09/07/2023     Lab Results   Component Value Date    GLUCOSE 246 (H) 02/25/2015    CALCIUM 9.8 09/07/2023     09/11/2017    K 4.3 09/07/2023    CO2 26 09/07/2023     09/07/2023    BUN 16 09/07/2023    CREATININE 0.66 09/07/2023     Lab Results   Component Value Date    ALT 14 09/07/2023    AST 14 09/07/2023    ALKPHOS 77 09/07/2023    BILITOT 0.4 02/25/2015     Lab Results   Component Value Date    INR 0.96 07/27/2023    PROTIME 12.9 07/27/2023       Mammo screening bilateral w 3d & cad    Result Date: 10/9/2023  Impression: No mammographic evidence of malignancy. No significant change ASSESSMENT/BI-RADS CATEGORY: Left: 2 - Benign Right: 2 - Benign Overall: 2 - Benign RECOMMENDATION:      - Routine screening mammogram in 1 year for both breasts. Workstation ID: [id number]       ASSESSMENT & PLAN:    Change in bowel habits  Most likely secondary to the medication Mounjaro. Rule out colorectal lesions including polyps or malignancy. -Advised to take fiber supplements and probiotics    -Schedule for colonoscopy. -High-fiber diet.    -Patient was given instructions about the colonoscopy prep.    -Patient was explained about the risks and benefits of the procedure. Risks including but not limited to bleeding, infection, perforation were explained in detail. Also explained about less than 100% sensitivity with the exam and other alternatives. History of colon polyps  Personal history of colon polyps- patient is at increased risk for colon cancer screening.   Rule out colorectal lesions including polyps or malignancy.    -Colonoscopy disease. The luminograms are nearly normal with large coronary arteries that supply a large dilated, hypertrophied ventricle with an elevated LVEDP of 25, similar to her LVEF of 27%  Non Ischemic Dilated Cardiomyopathy with an excellent potential to respond to medical therapy and cardiac BiV ICD due to new LBBB.        Echo 6/19/20  Mildly increased left ventricular cavity size.  Left ventricular ejection fraction, 30 %.  Severe global left ventricular hypokinesis.  Grade I/IV diastolic dysfunction  Normal right ventricular systolic function.  No significant valve abnormalities.  Compared to prior study from October 10, 2016, LV systolic function has decreased.     Echo 10/23/20  Limited TTE for LVEF  Normal LV size. Moderate global LV systolic dysfunction, LVEF 31%  Normal right ventricular size and systolic function.  No pericardial effusion.  No significant change since the prior study.      ct 5/26/15  Slight thickening of the left adrenal gland. This is likely hyperplasia.  It's minimally more prominent than 2007.  Mild vascular calcification.  Slight lobulation of the right kidney. Slight enlargement of several  cysts. Largest measures 2.2 cm and is located laterally.  Left kidney is normal in size and shape. 2 cm cyst is noted laterally.  Nonobstructing calcification is noted inferiorly. No hydronephrosis.  Vascular calcification.   CT Pelvis Without Contrast:  Mild/moderate stool in the right colon. No obstruction. No mass or free  fluid. Bladder is grossly unremarkable  IMPRESSION:  No hydronephrosis. Slight enlargement of bilateral renal cysts. Benign  calcification inferiorly in the left kidney.  Vascular calcification.  Cholelithiasis        5/10/21  Definity contrast was utilized to better visualize the endocardial definition.  Moderately decreased left ventricular systolic function.  Normal left ventricular size and systolic function, EF 43 %.  Grade II/IV diastolic dysfunction, moderately elevated  with nephrologist with CKD  -medical therapies for chronic severe systolic HF continued   --as noted, has multiple medication allergies   --clonidine, nifedipine, toprol, lasix, BP much better   --no ace, arb, entresto, aldactone with Cr 2.18, follows with nephrology   --LDL 67, statin continued for mild CAD   -EP follow up, s/p recent BiV/ICD  -some orthostatic dizziness with orthostatic change on BP check today, discussed measures to reduce orthostatic dizziness on multiple BP meds  --has brain MRI pending per neuro too     Follow up 6months

## 2023-10-17 NOTE — ASSESSMENT & PLAN NOTE
Most likely secondary to the medication Mounjaro. Rule out colorectal lesions including polyps or malignancy. -Advised to take fiber supplements and probiotics    -Schedule for colonoscopy. -High-fiber diet.    -Patient was given instructions about the colonoscopy prep.    -Patient was explained about the risks and benefits of the procedure. Risks including but not limited to bleeding, infection, perforation were explained in detail. Also explained about less than 100% sensitivity with the exam and other alternatives.

## 2023-10-17 NOTE — PATIENT INSTRUCTIONS
Scheduled date of colonoscopy (as of today):  11/10/23  Physician performing colonoscopy:  Dr Kelin Krueger  Location of colonoscopy:  [facility]  Bowel prep reviewed with patient:  Divya/Dulcolax  Instructions reviewed with patient by:  Jose Manuel CRUZ   Clearances:

## 2023-10-27 ENCOUNTER — ANESTHESIA EVENT (OUTPATIENT)
Dept: ANESTHESIOLOGY | Facility: HOSPITAL | Age: 78
End: 2023-10-27

## 2023-10-27 ENCOUNTER — ANESTHESIA (OUTPATIENT)
Dept: ANESTHESIOLOGY | Facility: HOSPITAL | Age: 78
End: 2023-10-27

## 2023-11-10 ENCOUNTER — ANESTHESIA (OUTPATIENT)
Dept: GASTROENTEROLOGY | Facility: AMBULARY SURGERY CENTER | Age: 78
End: 2023-11-10

## 2023-11-10 ENCOUNTER — ANESTHESIA EVENT (OUTPATIENT)
Dept: GASTROENTEROLOGY | Facility: AMBULARY SURGERY CENTER | Age: 78
End: 2023-11-10

## 2023-11-10 ENCOUNTER — HOSPITAL ENCOUNTER (OUTPATIENT)
Dept: GASTROENTEROLOGY | Facility: AMBULARY SURGERY CENTER | Age: 78
Setting detail: OUTPATIENT SURGERY
End: 2023-11-10
Attending: INTERNAL MEDICINE
Payer: MEDICARE

## 2023-11-10 VITALS
RESPIRATION RATE: 18 BRPM | TEMPERATURE: 97.5 F | SYSTOLIC BLOOD PRESSURE: 117 MMHG | OXYGEN SATURATION: 95 % | HEART RATE: 76 BPM | DIASTOLIC BLOOD PRESSURE: 62 MMHG

## 2023-11-10 DIAGNOSIS — R19.4 CHANGE IN BOWEL HABITS: ICD-10-CM

## 2023-11-10 DIAGNOSIS — Z86.010 HISTORY OF COLON POLYPS: ICD-10-CM

## 2023-11-10 PROCEDURE — 45378 DIAGNOSTIC COLONOSCOPY: CPT | Performed by: INTERNAL MEDICINE

## 2023-11-10 RX ORDER — PROPOFOL 10 MG/ML
INJECTION, EMULSION INTRAVENOUS CONTINUOUS PRN
Status: DISCONTINUED | OUTPATIENT
Start: 2023-11-10 | End: 2023-11-10

## 2023-11-10 RX ORDER — PROPOFOL 10 MG/ML
INJECTION, EMULSION INTRAVENOUS AS NEEDED
Status: DISCONTINUED | OUTPATIENT
Start: 2023-11-10 | End: 2023-11-10

## 2023-11-10 RX ORDER — SODIUM CHLORIDE, SODIUM LACTATE, POTASSIUM CHLORIDE, CALCIUM CHLORIDE 600; 310; 30; 20 MG/100ML; MG/100ML; MG/100ML; MG/100ML
125 INJECTION, SOLUTION INTRAVENOUS CONTINUOUS
Status: DISCONTINUED | OUTPATIENT
Start: 2023-11-10 | End: 2023-11-14 | Stop reason: HOSPADM

## 2023-11-10 RX ORDER — SODIUM CHLORIDE, SODIUM LACTATE, POTASSIUM CHLORIDE, CALCIUM CHLORIDE 600; 310; 30; 20 MG/100ML; MG/100ML; MG/100ML; MG/100ML
INJECTION, SOLUTION INTRAVENOUS CONTINUOUS PRN
Status: DISCONTINUED | OUTPATIENT
Start: 2023-11-10 | End: 2023-11-10

## 2023-11-10 RX ADMIN — PROPOFOL 120 MCG/KG/MIN: 10 INJECTION, EMULSION INTRAVENOUS at 08:02

## 2023-11-10 RX ADMIN — SODIUM CHLORIDE, SODIUM LACTATE, POTASSIUM CHLORIDE, AND CALCIUM CHLORIDE: .6; .31; .03; .02 INJECTION, SOLUTION INTRAVENOUS at 07:51

## 2023-11-10 RX ADMIN — PROPOFOL 30 MG: 10 INJECTION, EMULSION INTRAVENOUS at 08:06

## 2023-11-10 RX ADMIN — PROPOFOL 70 MG: 10 INJECTION, EMULSION INTRAVENOUS at 08:02

## 2023-11-10 RX ADMIN — SODIUM CHLORIDE, SODIUM LACTATE, POTASSIUM CHLORIDE, AND CALCIUM CHLORIDE 125 ML/HR: .6; .31; .03; .02 INJECTION, SOLUTION INTRAVENOUS at 07:32

## 2023-11-10 NOTE — INTERVAL H&P NOTE
H&P reviewed. After examining the patient I find no changes in the patients condition since the H&P had been written.     Vitals:    11/10/23 0716   BP: (!) 172/77   Pulse: 86   Resp: 20   Temp: 97.5 °F (36.4 °C)   SpO2: 98%

## 2023-11-10 NOTE — ANESTHESIA POSTPROCEDURE EVALUATION
Post-Op Assessment Note    CV Status:  Stable  Pain Score: 0    Pain management: adequate     Mental Status:  Awake and sleepy   Hydration Status:  Euvolemic   PONV Controlled:  Controlled   Airway Patency:  Patent      Post Op Vitals Reviewed: Yes      Staff: Anesthesiologist         No notable events documented.     BP  98/62   Temp      Pulse  72   Resp   16   SpO2   96%

## 2023-11-15 ENCOUNTER — PATIENT MESSAGE (OUTPATIENT)
Dept: FAMILY MEDICINE CLINIC | Facility: CLINIC | Age: 78
End: 2023-11-15

## 2023-11-15 DIAGNOSIS — Z79.899 LONG-TERM USE OF HIGH-RISK MEDICATION: ICD-10-CM

## 2023-11-15 DIAGNOSIS — E55.9 VITAMIN D DEFICIENCY: ICD-10-CM

## 2023-11-15 DIAGNOSIS — E78.2 HYPERLIPIDEMIA, MIXED: ICD-10-CM

## 2023-11-15 DIAGNOSIS — E11.42 TYPE 2 DIABETES MELLITUS WITH DIABETIC POLYNEUROPATHY, WITHOUT LONG-TERM CURRENT USE OF INSULIN (HCC): Primary | ICD-10-CM

## 2023-11-15 NOTE — PATIENT COMMUNICATION
Please advise if additional labs are needed. Patient has outstanding labs from endocrinology dated 10/09/2023.

## 2023-11-20 ENCOUNTER — HOSPITAL ENCOUNTER (OUTPATIENT)
Dept: RADIOLOGY | Facility: HOSPITAL | Age: 78
Discharge: HOME | End: 2023-11-20
Payer: MEDICARE

## 2023-11-20 DIAGNOSIS — M85.88 OTHER SPECIFIED DISORDERS OF BONE DENSITY AND STRUCTURE, OTHER SITE: ICD-10-CM

## 2023-11-20 DIAGNOSIS — E11.42 TYPE 2 DIABETES MELLITUS WITH DIABETIC POLYNEUROPATHY, WITHOUT LONG-TERM CURRENT USE OF INSULIN (HCC): ICD-10-CM

## 2023-11-20 DIAGNOSIS — Z13.820 SCREENING FOR OSTEOPOROSIS: ICD-10-CM

## 2023-11-20 PROCEDURE — 77080 DXA BONE DENSITY AXIAL: CPT

## 2023-11-22 ENCOUNTER — TELEPHONE (OUTPATIENT)
Age: 78
End: 2023-11-22

## 2023-11-22 ENCOUNTER — NURSE TRIAGE (OUTPATIENT)
Dept: PHYSICAL THERAPY | Facility: OTHER | Age: 78
End: 2023-11-22

## 2023-11-22 DIAGNOSIS — M54.2 NECK PAIN, ACUTE: Primary | ICD-10-CM

## 2023-11-22 PROBLEM — Z78.0 OSTEOPENIA AFTER MENOPAUSE: Status: ACTIVE | Noted: 2023-11-22

## 2023-11-22 PROBLEM — M85.80 OSTEOPENIA AFTER MENOPAUSE: Status: ACTIVE | Noted: 2023-11-22

## 2023-11-22 LAB
DME PARACHUTE DELIVERY DATE REQUESTED: NORMAL
DME PARACHUTE ITEM DESCRIPTION: NORMAL
DME PARACHUTE ITEM DESCRIPTION: NORMAL
DME PARACHUTE ORDER STATUS: NORMAL
DME PARACHUTE SUPPLIER NAME: NORMAL
DME PARACHUTE SUPPLIER PHONE: NORMAL

## 2023-11-22 NOTE — TELEPHONE ENCOUNTER
Additional Information   Negative: Has the patient had unexplained weight loss? Negative: Is the patient experiencing urine retention? Negative: Does the patient have a fever? Negative: Is the patient experiencing blood in sputum? Negative: Has the patient experienced major trauma? (fall from height, high speed collision, direct blow to spine) and is also experiencing nausea, light-headedness, or loss of consciousness? Negative: Is the patient experiencing acute drop foot or paralysis? Negative: Is this a chronic condition? Protocols used: [address] [place] [address] Protocol    Nurse completed triage and NO RF s/s present. Referral entered for the [facility] site and contact/phone number info given to her as well. Patients information was sent to the preferred site and pt made aware clerical would be calling to schedule appointment. Nurse encouraged her to call site if she does not hear from clerical beforehand. Reminded her about the Holiday hours and assured the patient that she can call later today. Patient Agreed and thanked the nurse. Patient did not voice any additional questions or concerns at this time. Patient is aware current/past complaints, relevant dx, additional referrals and treatment/options will be discussed at the evaluation/consult. All information regarding plan to be evaluated by the therapist was reviewed. Patient is in agreement with nurse's explanation of intended care path discussed today. Patient very appreciative of CB and referral placement for the evaluation with an Advanced Spine Therapist/DPT. Nurse wished her well. No referral to close.

## 2023-11-22 NOTE — TELEPHONE ENCOUNTER
Transferred by Ortho. NO REF. Additional Information   Negative: Is this related to a work injury? Negative: Is this related to an MVA? Negative: Are you currently recieving homecare services? Background - Initial Assessment  Clinical complaint: Pain in the back of her neck since yesterday. No hx of neck pain in the past. No radiation. No numbness or tingling. NKI. Not seeing a Dr for this pain. Patient states pain was intermittent yesterday and now is persistent. Patient described pain as aching.   Date of onset:  yesterday 11/21/23  Frequency of pain: intermittent (yesterday) / persistent (today)  Quality of pain: aching    Protocols used: [address] Protocol

## 2023-11-28 ENCOUNTER — APPOINTMENT (OUTPATIENT)
Dept: PHYSICAL THERAPY | Facility: CLINIC | Age: 78
End: 2023-11-28
Payer: MEDICARE

## 2023-11-28 DIAGNOSIS — E11.42 TYPE 2 DIABETES MELLITUS WITH DIABETIC POLYNEUROPATHY, WITHOUT LONG-TERM CURRENT USE OF INSULIN (HCC): ICD-10-CM

## 2023-11-28 DIAGNOSIS — E11.65 UNCONTROLLED TYPE 2 DIABETES MELLITUS WITH HYPERGLYCEMIA (HCC): ICD-10-CM

## 2023-11-29 ENCOUNTER — EVALUATION (OUTPATIENT)
Dept: PHYSICAL THERAPY | Facility: CLINIC | Age: 78
End: 2023-11-29
Payer: MEDICARE

## 2023-11-29 DIAGNOSIS — E55.9 VITAMIN D DEFICIENCY: ICD-10-CM

## 2023-11-29 DIAGNOSIS — M54.2 NECK PAIN, ACUTE: Primary | ICD-10-CM

## 2023-11-29 PROCEDURE — 97110 THERAPEUTIC EXERCISES: CPT | Performed by: PHYSICAL THERAPIST

## 2023-11-29 PROCEDURE — 97161 PT EVAL LOW COMPLEX 20 MIN: CPT | Performed by: PHYSICAL THERAPIST

## 2023-11-29 RX ORDER — ERGOCALCIFEROL 1.25 MG/1
CAPSULE ORAL
Qty: 4 CAPSULE | Refills: 5 | Status: SHIPPED | OUTPATIENT
Start: 2023-11-29

## 2023-11-29 NOTE — PROGRESS NOTES
PT Evaluation     Today's date: 2023  Patient name: Magalys Gonzalez  : 1945  MRN: [id number]  Referring provider: Lucy Vaz PT  Dx:   Encounter Diagnosis     ICD-10-CM    1. Neck pain, acute  M54.2                      Assessment  Assessment details: Magalys Gonzalez is a 69 y.o. female who presents with pain, decreased ROM, and postural dysfunction. Due to these impairments, patient has difficulty performing ADL's. Patient's clinical presentation is consistent with their nature of acute neck pain related to a obstruction of resting position of lower cervical spine intervertebral joints that responded well to repeated motions. Patient has been educated in home exercise program and plan of care. Patient would benefit from skilled physical therapy services to address their aforementioned functional limitations and progress towards prior level of function and independence with home exercise program.     Impairments: abnormal or restricted ROM, activity intolerance, impaired physical strength, lacks appropriate home exercise program, pain with function, poor posture  and poor body mechanics  Understanding of Dx/Px/POC: good   Prognosis: good    Goals  Short Term Goals to be accomplished in 3 weeks:  STG1: Pt will be I with HEP  STG2: Pt will be I with posture management  STG3: Pt will demo Cervical AROM >50% improvement     Long Term Goals to be accomplished in 6 weeks:   LTG1: Pt will return to ADLs as per PLOF pain free  LTG2: Pt will demo cervical AROM WNL      Plan  Plan details:  HEP development, stretching, strengthening, A/AA/PROM, joint mobilizations, posture education, STM/MI as needed to reduce muscle tension, muscle reeducation, PLOC discussed and agreed upon with patient.       Patient would benefit from: PT eval and skilled physical therapy  Planned modality interventions: cryotherapy and thermotherapy: hydrocollator packs  Planned therapy interventions: manual therapy, neuromuscular re-education, self care, therapeutic activities, therapeutic exercise and home exercise program  Frequency: 2x week  Duration in weeks: 6  Treatment plan discussed with: patient      Subjective Evaluation    History of Present Illness  Mechanism of injury: Pt reports she has been having midline neck psin just below her hair line for the last 10 days. She was having cracking when turning. She tried taking aspirin but this did not help. Never had neck pain before. She feels her symptoms fluctuate but never go away. Pt has been using modalities and feels ice makes it worse. Pain worsens when she is standing and sitting, anything can worsen it. Generally lying down helps her symptoms, changing positions also helps her. She feels more fatigued with this pain, she is not as active during the day. Denies sleep disturbance. She cannot complete yard work because of this pain. Pt had polio as a child and has spinal curvature. Pt goals: Pain is gone.    Quality of life: good    Pain  Current pain ratin  At best pain ratin  At worst pain ratin          Objective     Postural Observations  Seated posture: poor  Standing posture: poor  Correction of posture: has no consistent effect      Neurological Testing     Sensation   Cervical/Thoracic   Left   Intact: light touch    Right   Intact: light touch    Active Range of Motion   Cervical/Thoracic Spine       Cervical  Subcranial protraction:  WFL   Subcranial retraction:   Restriction level: minimal  Flexion:  WFL and with pain  Extension:  Restriction level: minimal  Left lateral flexion:  WFL  Right lateral flexion:  WFL and with pain  Left rotation:  Restriction level: maximal  Right rotation:  Restriction level: moderate    Joint Play     Hypomobile: C5, C6, C7, T1 and T2   Mechanical Assessment    Cervical    Seated retraction: repeated movements   Seated Extension: repeated movements  Pain intensity: better  Pain level: decreased    Thoracic      Lumbar             NO AUTH REQ    Precautions: Standard.        Visit 1       11/29/23                           Neuro Re-Ed       Posture Lumbar roll                                                Ther Ex       Rep CS ret ext x8      Scap sq NV                                                Ther Activity                                          Modalities

## 2023-12-01 ENCOUNTER — APPOINTMENT (OUTPATIENT)
Dept: PHYSICAL THERAPY | Facility: CLINIC | Age: 78
End: 2023-12-01
Payer: MEDICARE

## 2023-12-05 ENCOUNTER — OFFICE VISIT (OUTPATIENT)
Dept: PHYSICAL THERAPY | Facility: CLINIC | Age: 78
End: 2023-12-05
Payer: MEDICARE

## 2023-12-05 DIAGNOSIS — M54.2 NECK PAIN, ACUTE: Primary | ICD-10-CM

## 2023-12-05 PROCEDURE — 97110 THERAPEUTIC EXERCISES: CPT | Performed by: PHYSICAL THERAPIST

## 2023-12-05 NOTE — PROGRESS NOTES
Daily Note     Today's date: 2023  Patient name: Yaya Pantoja  : 1945  MRN: [id number]  Referring provider: Kellie [place], PT  Dx:   Encounter Diagnosis     ICD-10-CM    1. Neck pain, acute  M54.2                      Subjective: Pt reports she is doing much better, she is doing HEP every 2 hours but occass every 3 hours. She does report she catches herself slouching at computer, she finds this interrupts her HEP productivity. Pt does not have neck pain to start, she does notice it intermittently, generally after computer use or standing in kitchen for long time cooking. Pain at it's worst 8/10 which feels like tightness. Objective: See treatment diary below. Inc time reviewing self management. HEP rev, good technique. CS AROM WFL throughout with tightness R lateral flexion. Rep CS ret ext with clin OP upper thoracic dec/B. HEP updated, inc time discussed prevention during function and computer use. Assessment: Tolerated treatment well. Patient demo excellent response to est POC and demo excellent prognosis toward est LTGs. Pt demo great comprehension for self management. Plan: Continue per plan of care. F/u in 1 week. NO AUTH REQ    Precautions: Standard.        Visit 1 2      23                          Neuro Re-Ed       Posture Lumbar roll Rev                                               Ther Ex       Rep CS ret ext x8 3x8-10    PA clin OP upper thoracic 2x10 (seated)     Scap sq NV 3x8-10                                               Ther Activity       HEP  Rev                                 Modalities

## 2023-12-08 ENCOUNTER — APPOINTMENT (OUTPATIENT)
Dept: LAB | Facility: HOSPITAL | Age: 78
End: 2023-12-08
Payer: MEDICARE

## 2023-12-08 DIAGNOSIS — R20.2 NUMBNESS AND TINGLING: ICD-10-CM

## 2023-12-08 DIAGNOSIS — E11.42 TYPE 2 DIABETES MELLITUS WITH DIABETIC POLYNEUROPATHY, WITHOUT LONG-TERM CURRENT USE OF INSULIN (HCC): ICD-10-CM

## 2023-12-08 DIAGNOSIS — E11.42 DIABETIC POLYNEUROPATHY ASSOCIATED WITH TYPE 2 DIABETES MELLITUS (HCC): ICD-10-CM

## 2023-12-08 DIAGNOSIS — R20.0 NUMBNESS AND TINGLING: ICD-10-CM

## 2023-12-08 DIAGNOSIS — Z79.899 LONG-TERM USE OF HIGH-RISK MEDICATION: ICD-10-CM

## 2023-12-08 DIAGNOSIS — E55.9 VITAMIN D DEFICIENCY: ICD-10-CM

## 2023-12-08 DIAGNOSIS — E78.2 HYPERLIPIDEMIA, MIXED: ICD-10-CM

## 2023-12-08 LAB
25(OH)D3 SERPL-MCNC: 31.1 NG/ML (ref 30–100)
ALBUMIN SERPL BCP-MCNC: 4.4 G/DL (ref 3.5–5)
ALP SERPL-CCNC: 64 U/L (ref 34–104)
ALT SERPL W P-5'-P-CCNC: 16 U/L (ref 7–52)
ANION GAP SERPL CALCULATED.3IONS-SCNC: 8 MMOL/L
AST SERPL W P-5'-P-CCNC: 16 U/L (ref 13–39)
BASOPHILS # BLD AUTO: 0.04 THOUSANDS/ÂΜL (ref 0–0.1)
BASOPHILS NFR BLD AUTO: 1 % (ref 0–1)
BILIRUB SERPL-MCNC: 0.59 MG/DL (ref 0.2–1)
BUN SERPL-MCNC: 15 MG/DL (ref 5–25)
CALCIUM SERPL-MCNC: 10.2 MG/DL (ref 8.4–10.2)
CHLORIDE SERPL-SCNC: 98 MMOL/L (ref 96–108)
CHOLEST SERPL-MCNC: 126 MG/DL
CO2 SERPL-SCNC: 30 MMOL/L (ref 21–32)
CREAT SERPL-MCNC: 0.78 MG/DL (ref 0.6–1.3)
CREAT UR-MCNC: 76.8 MG/DL
EOSINOPHIL # BLD AUTO: 0.22 THOUSAND/ÂΜL (ref 0–0.61)
EOSINOPHIL NFR BLD AUTO: 3 % (ref 0–6)
ERYTHROCYTE [DISTWIDTH] IN BLOOD BY AUTOMATED COUNT: 12.2 % (ref 11.6–15.1)
EST. AVERAGE GLUCOSE BLD GHB EST-MCNC: 171 MG/DL
GFR SERPL CREATININE-BSD FRML MDRD: 73 ML/MIN/1.73SQ M
GLUCOSE P FAST SERPL-MCNC: 144 MG/DL (ref 65–99)
HBA1C MFR BLD: 7.6 %
HCT VFR BLD AUTO: 45.9 % (ref 34.8–46.1)
HDLC SERPL-MCNC: 45 MG/DL
HGB BLD-MCNC: 15.2 G/DL (ref 11.5–15.4)
IMM GRANULOCYTES # BLD AUTO: 0.01 THOUSAND/UL (ref 0–0.2)
IMM GRANULOCYTES NFR BLD AUTO: 0 % (ref 0–2)
LDLC SERPL CALC-MCNC: 63 MG/DL (ref 0–100)
LYMPHOCYTES # BLD AUTO: 1.8 THOUSANDS/ÂΜL (ref 0.6–4.47)
LYMPHOCYTES NFR BLD AUTO: 24 % (ref 14–44)
MCH RBC QN AUTO: 28.1 PG (ref 26.8–34.3)
MCHC RBC AUTO-ENTMCNC: 33.1 G/DL (ref 31.4–37.4)
MCV RBC AUTO: 85 FL (ref 82–98)
MICROALBUMIN UR-MCNC: 27.7 MG/L
MICROALBUMIN/CREAT 24H UR: 36 MG/G CREATININE (ref 0–30)
MONOCYTES # BLD AUTO: 0.52 THOUSAND/ÂΜL (ref 0.17–1.22)
MONOCYTES NFR BLD AUTO: 7 % (ref 4–12)
NEUTROPHILS # BLD AUTO: 5.06 THOUSANDS/ÂΜL (ref 1.85–7.62)
NEUTS SEG NFR BLD AUTO: 65 % (ref 43–75)
NRBC BLD AUTO-RTO: 0 /100 WBCS
PLATELET # BLD AUTO: 293 THOUSANDS/UL (ref 149–390)
PMV BLD AUTO: 9.7 FL (ref 8.9–12.7)
POTASSIUM SERPL-SCNC: 4.1 MMOL/L (ref 3.5–5.3)
PROT SERPL-MCNC: 7.8 G/DL (ref 6.4–8.4)
RBC # BLD AUTO: 5.41 MILLION/UL (ref 3.81–5.12)
SODIUM SERPL-SCNC: 136 MMOL/L (ref 135–147)
TRIGL SERPL-MCNC: 92 MG/DL
VIT B12 SERPL-MCNC: 271 PG/ML (ref 180–914)
WBC # BLD AUTO: 7.65 THOUSAND/UL (ref 4.31–10.16)

## 2023-12-08 PROCEDURE — 36415 COLL VENOUS BLD VENIPUNCTURE: CPT

## 2023-12-08 PROCEDURE — 82306 VITAMIN D 25 HYDROXY: CPT

## 2023-12-08 PROCEDURE — 80061 LIPID PANEL: CPT

## 2023-12-08 PROCEDURE — 85025 COMPLETE CBC W/AUTO DIFF WBC: CPT

## 2023-12-08 PROCEDURE — 83036 HEMOGLOBIN GLYCOSYLATED A1C: CPT

## 2023-12-08 PROCEDURE — 82607 VITAMIN B-12: CPT

## 2023-12-08 PROCEDURE — 80053 COMPREHEN METABOLIC PANEL: CPT

## 2023-12-12 ENCOUNTER — RA CDI HCC (OUTPATIENT)
Dept: OTHER | Facility: HOSPITAL | Age: 78
End: 2023-12-12

## 2023-12-12 ENCOUNTER — APPOINTMENT (OUTPATIENT)
Dept: PHYSICAL THERAPY | Facility: CLINIC | Age: 78
End: 2023-12-12
Payer: MEDICARE

## 2023-12-12 NOTE — PROGRESS NOTES
F37.3244  [facility] coding opportunities          Chart Reviewed number of suggestions sent to Provider: 1     Patients Insurance     Medicare Insurance: [employer]

## 2023-12-15 ENCOUNTER — TELEPHONE (OUTPATIENT)
Dept: ADMINISTRATIVE | Facility: OTHER | Age: 78
End: 2023-12-15

## 2023-12-15 ENCOUNTER — OFFICE VISIT (OUTPATIENT)
Dept: FAMILY MEDICINE CLINIC | Facility: CLINIC | Age: 78
End: 2023-12-15
Payer: MEDICARE

## 2023-12-15 VITALS
RESPIRATION RATE: 16 BRPM | DIASTOLIC BLOOD PRESSURE: 74 MMHG | TEMPERATURE: 97.4 F | WEIGHT: 215.4 LBS | SYSTOLIC BLOOD PRESSURE: 132 MMHG | BODY MASS INDEX: 32.64 KG/M2 | OXYGEN SATURATION: 98 % | HEIGHT: 68 IN | HEART RATE: 66 BPM

## 2023-12-15 DIAGNOSIS — I10 BENIGN ESSENTIAL HYPERTENSION: ICD-10-CM

## 2023-12-15 DIAGNOSIS — E78.2 HYPERLIPIDEMIA, MIXED: ICD-10-CM

## 2023-12-15 DIAGNOSIS — E55.9 VITAMIN D DEFICIENCY: ICD-10-CM

## 2023-12-15 DIAGNOSIS — E11.42 TYPE 2 DIABETES MELLITUS WITH DIABETIC POLYNEUROPATHY, WITHOUT LONG-TERM CURRENT USE OF INSULIN (HCC): ICD-10-CM

## 2023-12-15 DIAGNOSIS — E66.09 CLASS 1 OBESITY DUE TO EXCESS CALORIES WITH SERIOUS COMORBIDITY AND BODY MASS INDEX (BMI) OF 32.0 TO 32.9 IN ADULT: Primary | ICD-10-CM

## 2023-12-15 DIAGNOSIS — E53.8 VITAMIN B12 DEFICIENCY: ICD-10-CM

## 2023-12-15 PROBLEM — E66.811 CLASS 1 OBESITY DUE TO EXCESS CALORIES WITH SERIOUS COMORBIDITY AND BODY MASS INDEX (BMI) OF 32.0 TO 32.9 IN ADULT: Status: ACTIVE | Noted: 2017-09-11

## 2023-12-15 PROCEDURE — 99214 OFFICE O/P EST MOD 30 MIN: CPT | Performed by: FAMILY MEDICINE

## 2023-12-15 RX ORDER — GREEN TEA/HOODIA GORDONII 315-12.5MG
500 CAPSULE ORAL DAILY
Qty: 90 TABLET | Refills: 2 | Status: SHIPPED | OUTPATIENT
Start: 2023-12-15

## 2023-12-15 NOTE — TELEPHONE ENCOUNTER
----- Message from Gloria Sweeney sent at 12/15/2023 10:12 AM EST -----  12/15/23 10:13 AM    Hello, our patient Elisabeth Ruzicka Dempsey has had the COVID vaccine completed/performed. Please assist in updating the patient chart by making an External outreach to [facility] in [facility] located in [facility]. The date of service is 2023.    Thank you,  Gloria TOUSSAINT

## 2023-12-15 NOTE — LETTER
Vaccination Request Form: COVID-19 aka SARS-CoV-2 (Moderna or Pfizer or J & J)  2nd REQUEST!      Date Requested: 23  Patient: Elisabeth Ruzicka Dempsey  Patient : 1945   Referring Provider: Ashli Gill DO       The above patient has informed us that they have had their   most recent COVID-19 aka SARS-CoV-2 (Moderna or Pfizer or J & J) administered at your facility. Please   complete this form and attach all corresponding documentation.    Date of Vaccine(s) Given  ______________________________    Lot Number(s) _______________________________________    Manufacture(s) ______________________________________    Dose Amount (s) _____________________________________    Expiration Date(s) ____________________________________    Comments __________________________________________________________  ____________________________________________________________________  ____________________________________________________________________  ____________________________________________________________________    Administering Facility  ________________________________________________    Vaccine Administered By (print name) ___________________________________      Form Completed By (print name) _______________________________________      Signature ___________________________________________________________      These reports are needed for  compliance.    Please fax this completed form and a copy of the Vaccine Document(s) to our office located at [address] [place] [address] as soon as possible to Fax [phone] luis fernando Abernathy: Phone [phone]    We thank you for your assistance in treating our mutual patient.

## 2023-12-15 NOTE — ASSESSMENT & PLAN NOTE
Well controlled.    Lab Results   Component Value Date    CHOLESTEROL 126 12/08/2023    CHOLESTEROL 217 (H) 05/25/2022    CHOLESTEROL 240 (H) 03/16/2022     Lab Results   Component Value Date    HDL 45 (L) 12/08/2023    HDL 41 (L) 05/25/2022    HDL 52 03/16/2022     Lab Results   Component Value Date    TRIG 92 12/08/2023    TRIG 182 (H) 05/25/2022    TRIG 143 03/16/2022     [facility] results found [facility]: [facility]  Lab Results   Component Value Date    LDLCALC 63 12/08/2023

## 2023-12-15 NOTE — LETTER
Vaccination Request Form: COVID-19 aka SARS-CoV-2 (Moderna or Pfizer or J & J)  Boosters      Date Requested: 12/15/23  Patient: Elisabeth Ruzicka Dempsey  Patient : 1945   Referring Provider: Ashli Gill DO       The above patient has informed us that they have had their   most recent COVID-19 aka SARS-CoV-2 (Moderna or Pfizer or J & J) administered at your facility. Please   complete this form and attach all corresponding documentation.    Date of Vaccine(s) Given  ______________________________    Lot Number(s) _______________________________________    Manufacture(s) ______________________________________    Dose Amount (s) _____________________________________    Expiration Date(s) ____________________________________    Comments __________________________________________________________  ____________________________________________________________________  ____________________________________________________________________  ____________________________________________________________________    Administering Facility  ________________________________________________    Vaccine Administered By (print name) ___________________________________      Form Completed By (print name) _______________________________________      Signature ___________________________________________________________      These reports are needed for  compliance.    Please fax this completed form and a copy of the Vaccine Document(s) to our office located at [address] [place] [address] as soon as possible to Fax [phone] luis fernando Abernathy: Phone [phone]    We thank you for your assistance in treating our mutual patient.

## 2023-12-15 NOTE — TELEPHONE ENCOUNTER
Upon review of the In Basket request and the patient's chart, initial outreach has been made via fax to facility. Please see Contacts section for details.     Thank you  Michela Guerrier

## 2023-12-15 NOTE — PROGRESS NOTES
1. Class 1 obesity due to excess calories with serious comorbidity and body mass index (BMI) of 32.0 to 32.9 in adult  Assessment & Plan:  Doing a great job with weight loss       2. Hyperlipidemia, mixed  Assessment & Plan:  Well controlled. Lab Results   Component Value Date    CHOLESTEROL 126 12/08/2023    CHOLESTEROL 217 (H) 05/25/2022    CHOLESTEROL 240 (H) 03/16/2022     Lab Results   Component Value Date    HDL 45 (L) 12/08/2023    HDL 41 (L) 05/25/2022    HDL 52 03/16/2022     Lab Results   Component Value Date    TRIG 92 12/08/2023    TRIG 182 (H) 05/25/2022    TRIG 143 03/16/2022     No results found for: "[address]"  Lab Results   Component Value Date    LDLCALC 63 12/08/2023            3. Benign essential hypertension  Assessment & Plan:  Well controlled     Orders:  -     Vitamin D 25 hydroxy; Future; Expected date: 06/15/2024  -     Vitamin B12; Future; Expected date: 06/15/2024  -     Comprehensive metabolic panel; Future; Expected date: 06/15/2024  -     Lipid panel; Future; Expected date: 06/15/2024  -     Albumin / creatinine urine ratio; Future; Expected date: 06/15/2024  -     Hemoglobin A1C; Future; Expected date: 06/15/2024    4. Type 2 diabetes mellitus with diabetic polyneuropathy, without long-term current use of insulin [facility])  Assessment & Plan:  Nice improvement with Mounjaro, diet changes. Keep up the great work! To have eye exam in next 2 weeks. Lab Results   Component Value Date    HGBA1C 7.6 (H) 12/08/2023     Orders:  -     Vitamin D 25 hydroxy; Future; Expected date: 06/15/2024  -     Vitamin B12; Future; Expected date: 06/15/2024  -     Comprehensive metabolic panel; Future; Expected date: 06/15/2024  -     Lipid panel; Future; Expected date: 06/15/2024  -     Albumin / creatinine urine ratio; Future; Expected date: 06/15/2024  -     Hemoglobin A1C; Future; Expected date: 06/15/2024    5. Vitamin D deficiency  Assessment & Plan:  Nice improvement up to 3.  Continue prescription     Orders:  -     Vitamin D 25 hydroxy; Future; Expected date: 06/15/2024  -     Vitamin B12; Future; Expected date: 06/15/2024  -     Comprehensive metabolic panel; Future; Expected date: 06/15/2024  -     Lipid panel; Future; Expected date: 06/15/2024  -     Albumin / creatinine urine ratio; Future; Expected date: 06/15/2024  -     Hemoglobin A1C; Future; Expected date: 06/15/2024    6. Vitamin B12 deficiency  Assessment & Plan:  New. Start SL B12 500 mcg daily. Recheck with follow up labs     Orders:  -     Cyanocobalamin (B-12) 500 MCG SUBL; Place 1 tablet (500 mcg total) under the tongue in the morning  -     Vitamin D 25 hydroxy; Future; Expected date: 06/15/2024  -     Vitamin B12; Future; Expected date: 06/15/2024  -     Comprehensive metabolic panel; Future; Expected date: 06/15/2024  -     Lipid panel; Future; Expected date: 06/15/2024  -     Albumin / creatinine urine ratio; Future; Expected date: 06/15/2024  -     Hemoglobin A1C; Future; Expected date: 06/15/2024         Return in about 6 months (around 6/15/2024) for Medicare Wellness Visit & labs .     Subjective:   Magalys Morris is a 69 y.o. female here today for a follow-up on her current medical conditions:    Patient Active Problem List   Diagnosis   • Benign essential hypertension   • Carpal tunnel syndrome   • Decreased dorsalis pedis pulse   • Type 2 diabetes mellitus, without long-term current use of insulin (HCC)   • Hyperlipidemia, mixed   • Multiple thyroid nodules   • Class 1 obesity due to excess calories with serious comorbidity and body mass index (BMI) of 32.0 to 32.9 in adult   • Synovial cyst   • Vitamin D deficiency   • Fatigue   • Colon cancer screening   • Chronic sacroiliac strain   • Diabetic polyneuropathy associated with type 2 diabetes mellitus (HCC)   • Dizziness   • Seasonal allergies   • Lung nodule   • Sensorineural hearing loss (SNHL), bilateral   • BPPV (benign paroxysmal positional vertigo)   • Counseling regarding advanced directives   • Change in bowel habits   • History of colon polyps   • Osteopenia after menopause   • Vitamin B12 deficiency       Patient Care Team:  Rochelle Holcomb DO as PCP - General (Family Medicine)  MD Isha Montgomery Od (Optometry)  Yudelka Munroe as Diabetes Educator (Diabetes Services)    Current Medications:  Current Outpatient Medications   Medication Sig Dispense Refill   • aspirin 81 mg chewable tablet Chew 1 tablet (81 mg total) daily (Patient taking differently: Chew 81 mg daily as needed) 30 tablet 0   • Blood Glucose Monitoring Suppl (ONE TOUCH ULTRA 2) w/Device KIT      • Cyanocobalamin (B-12) 500 MCG SUBL Place 1 tablet (500 mcg total) under the tongue in the morning 90 tablet 2   • ergocalciferol (VITAMIN D2) 50,000 units TAKE 1 CAPSULE BY MOUTH ONE TIME PER WEEK 4 capsule 5   • glucose blood (OneTouch Ultra) test strip Use to check sugars three times a day Dx uncontrolled DM2 E 11.65 200 strip 5   • losartan-hydrochlorothiazide (HYZAAR) 50-12.5 mg per tablet Take 1 tablet by mouth daily 90 tablet 2   • rosuvastatin (CRESTOR) 20 MG tablet Take 1 tablet (20 mg total) by mouth daily 90 tablet 3   • tirzepatide 15 MG/0.5ML Inject 0.5 mL (15 mg total) under the skin every 7 days 6 mL 3   • EPINEPHrine (EPIPEN) 0.3 mg/0.3 mL SOAJ Inject 0.3 mL (0.3 mg total) into a muscle once for 1 dose 0.6 mL 0     No current facility-administered medications for this visit. HPI:  Chief Complaint   Patient presents with   • Chronic Conditions     F/u, pt has no concerns     -- Above per clinical staff and reviewed. --    PHQ-2/9 Depression Screening         ? 12/7 HbA1C 9.0 to 7.6, sugar 144, cmp GFR 73, vit D 20.5 to 31.1, b12 271,  urine micro: cr improved 63 to 36, chol 217 to 126,  to 92,   ER 7/27/23 with CP when dtr was visiting. f/u with cardio.  ordered stress test.   order labs for 10/23 (ordere  d cmp, lipids, A1C)   cmp HbA1C, urine micro, TSH PTH cbc vit D last bw a year ago. HbA1C > 10. ASCVD risk > 43   struggling vertigo per  - used to do exercises with her head   resistent to meds. consider Januvia. woudl not take statin? . no more   meds for bp. declined dexa. lung nodule - due for lung CT   declines multiple meds for DM2 due to fears of . saw neuro - referred back to E[facility]/ Dr. Charles Fleming BPPV   concerned - [place]  BP, diabetes and not eating well   probel  ms wth vision - only can see light   anger   Today:  Right eye legally blind   Good eye some trouble seeing - thinks it is a cataract  Sugars 130-150 (300)   Seeing eye doctor in 2 weeks   Loves Mounjaro   Lost 17 pounds   1 -2 pounds a week   Gained 100 pounds when her child  of leukemia   Seeing dietician   Not exercising yet much - has a gym at home and has a gym membership   Mood not great with Leigha and anniversaries with her son       The following portions of the patient's history were reviewed and updated as appropriate: allergies, current medications, past family history, past medical history, past social history, past surgical history and problem list.    Objective:  Vitals:  /74   Pulse 66   Temp (!) 97.4 °F (36.3 °C) (Temporal)   Resp 16   Ht 5' 8" (1.727 m)   Wt 97.7 kg (215 lb 6.4 oz)   SpO2 98%   BMI 32.75 kg/m²    Wt Readings from Last 3 Encounters:   12/15/23 97.7 kg (215 lb 6.4 oz)   10/17/23 102 kg (225 lb)   10/16/23 102 kg (225 lb)      BP Readings from Last 3 Encounters:   12/15/23 132/74   11/10/23 117/62   10/17/23 124/68        Review of Systems   She has no other concerns. No unexpected weight changes. No chest pain, SOB, or palpitations. No GERD. No changes in bowels or bladder. Sleeping well. No mood changes. Physical Exam   Constitutional:  she appears well-developed and well-nourished. HENT: Head: Normocephalic. Neck: Neck supple. Cardiovascular: Normal rate, regular rhythm and normal heart sounds.    Pulmonary/Chest: Effort normal and breath sounds normal. No wheezes, rales, or rhonchi. Abdominal: Soft. Bowel sounds are normal. There is no tenderness. No hepatosplenomegaly. Musculoskeletal: she exhibits no edema. Lymphadenopathy: she has no cervical adenopathy. Neurological: she is alert and oriented to person, place, and time. Skin: Skin is warm and dry. Psychiatric: she has a normal mood and affect. her behavior is normal. Thought content normal.     BMI Counseling: Body mass index is 32.75 kg/m². The BMI is above normal. Nutrition recommendations include decreasing portion sizes. Exercise recommendations include exercising 3-5 times per week. Rationale for BMI follow-up plan is due to patient being overweight or obese.

## 2023-12-15 NOTE — ASSESSMENT & PLAN NOTE
Nice improvement with Mounjaro, diet changes. Keep up the great work! To have eye exam in next 2 weeks.    Lab Results   Component Value Date    HGBA1C 7.6 (H) 12/08/2023

## 2023-12-15 NOTE — LETTER
Vaccination Request Form: COVID-19 aka SARS-CoV-2 (Moderna or Pfizer or J & J)      Date Requested: 24  Patient: Elisabeth Ruzicka Dempsey  Patient : 1945   Referring Provider: Ashli Gill DO       The above patient has informed us that they have had their   most recent COVID-19 aka SARS-CoV-2 (Moderna or Pfizer or J & J) administered at your facility. Please   complete this form and attach all corresponding documentation.    Date of Vaccine(s) Given  __17-9-05____________________________    Lot Number(s) _______________________________________    Manufacture(s) ____________Moderna__________________________    Dose Amount (s) _____________________________________    Expiration Date(s) _________87-00-8751___________________________    Comments __________________________________________________________  ____________________________________________________________________  ____________________________________________________________________  ____________________________________________________________________    Administering Facility  ________________________________________________    Vaccine Administered By (print name) ___________________________________      Form Completed By (print name) _______________________________________      Signature ___________________________________________________________      These reports are needed for  compliance.    Please fax this completed form and a copy of the Vaccine Document(s) to our office located at [address] [place] [address] as soon as possible to Fax [phone] luis fernando Abernathy: Phone [phone]    We thank you for your assistance in treating our mutual patient.

## 2023-12-28 NOTE — TELEPHONE ENCOUNTER
As a follow-up, a second attempt has been made for outreach via fax to facility. Please see Contacts section for details.    Thank you  Michela Guerrier

## 2024-01-05 NOTE — TELEPHONE ENCOUNTER
Upon review of the In Basket request we were able to locate, review, and update the patient chart as requested for Immunization(s) Covid Booster.    Any additional questions or concerns should be emailed to the Practice Liaisons via the appropriate education email address, please do not reply via In Basket.    Thank you  Michela Guerrier

## 2024-01-10 ENCOUNTER — OFFICE VISIT (OUTPATIENT)
Dept: ENDOCRINOLOGY | Facility: CLINIC | Age: 79
End: 2024-01-10
Payer: MEDICARE

## 2024-01-10 VITALS
HEART RATE: 81 BPM | DIASTOLIC BLOOD PRESSURE: 78 MMHG | HEIGHT: 68 IN | BODY MASS INDEX: 32.13 KG/M2 | OXYGEN SATURATION: 100 % | WEIGHT: 212 LBS | SYSTOLIC BLOOD PRESSURE: 130 MMHG

## 2024-01-10 DIAGNOSIS — E04.2 MULTIPLE THYROID NODULES: ICD-10-CM

## 2024-01-10 DIAGNOSIS — E55.9 VITAMIN D DEFICIENCY: ICD-10-CM

## 2024-01-10 DIAGNOSIS — I10 BENIGN ESSENTIAL HYPERTENSION: Primary | ICD-10-CM

## 2024-01-10 DIAGNOSIS — E78.2 HYPERLIPIDEMIA, MIXED: ICD-10-CM

## 2024-01-10 DIAGNOSIS — E11.42 TYPE 2 DIABETES MELLITUS WITH DIABETIC POLYNEUROPATHY, WITHOUT LONG-TERM CURRENT USE OF INSULIN (HCC): ICD-10-CM

## 2024-01-10 DIAGNOSIS — E11.42 DIABETIC POLYNEUROPATHY ASSOCIATED WITH TYPE 2 DIABETES MELLITUS (HCC): ICD-10-CM

## 2024-01-10 PROCEDURE — 99214 OFFICE O/P EST MOD 30 MIN: CPT | Performed by: NURSE PRACTITIONER

## 2024-01-10 NOTE — ASSESSMENT & PLAN NOTE
History of thyroid nodules FNA biopsy in 2014, 2019 were benign. Last thyroid ultrasound from September 2022 with stability for 5 years.   Denies neck compressive symptoms.   No history of abnormal TFTs.

## 2024-01-10 NOTE — ASSESSMENT & PLAN NOTE
Lab Results   Component Value Date    HGBA1C 7.6 (H) 12/08/2023     HGA1C improving, likely lower due to increase in Mounjaro dose since most recent hemoglobin A1c in December 2023.     Treatment regimen:   Mounjaro 15 mg weekly     Discussed risks/complications associated with uncontrolled diabetes including organ involvement, heart attack, stroke, death.    Advised lifestyle modifications including attention to diet including the amount and types of carbohydrates consumed and regular activity.     Call for blood sugars less than 70 mg/dl or patterns over 250 mg/dl.     Discussed symptoms and treatment of hypoglycemia.  Reviewed risks associated with hypoglycemia. Always carry rapid acting carbohydrates and a glucometer (a way to check your blood sugar).    Recommendation for medical identification either bracelet, necklace.    Routine follow up for diabetic eye and foot exams.     Ordered blood work to complete prior to next visit.    Send glucose logs/CGM download in 1-2 weeks for review    Follow up in 3 months.

## 2024-01-10 NOTE — PROGRESS NOTES
Established Patient Progress Note    Chief Complaint:  Diabetes follow up visit    Impression & Plan:    Problem List Items Addressed This Visit          Endocrine    Type 2 diabetes mellitus, without long-term current use of insulin (HCC)       Lab Results   Component Value Date    HGBA1C 7.6 (H) 12/08/2023     HGA1C improving, likely lower due to increase in Mounjaro dose since most recent hemoglobin A1c in December 2023.     Treatment regimen:   Mounjaro 15 mg weekly     Discussed risks/complications associated with uncontrolled diabetes including organ involvement, heart attack, stroke, death.    Advised lifestyle modifications including attention to diet including the amount and types of carbohydrates consumed and regular activity.     Call for blood sugars less than 70 mg/dl or patterns over 250 mg/dl.     Discussed symptoms and treatment of hypoglycemia.  Reviewed risks associated with hypoglycemia. Always carry rapid acting carbohydrates and a glucometer (a way to check your blood sugar).    Recommendation for medical identification either bracelet, necklace.    Routine follow up for diabetic eye and foot exams.     Ordered blood work to complete prior to next visit.    Send glucose logs/CGM download in 1-2 weeks for review    Follow up in 3 months.            Multiple thyroid nodules     History of thyroid nodules FNA biopsy in 2014, 2019 were benign. Last thyroid ultrasound from September 2022 with stability for 5 years.   Denies neck compressive symptoms.   No history of abnormal TFTs.            Diabetic polyneuropathy associated with type 2 diabetes mellitus (HCC)     UTD on regular diabetic foot exams.               Cardiovascular and Mediastinum    Benign essential hypertension - Primary     BP reasonable on current regimen.             Other    Hyperlipidemia, mixed     On rosuvastatin.          Vitamin D deficiency     Recommend vitamin D supplementation.             No orders of the defined  types were placed in this encounter.      History of Present Illness:   Elisabeth Ruzicka Dempsey is a 78 y.o. female with a history of type 2 diabetes without long term use of insulin for the past 20 years. Reports complications of neuropathy, retinopathy.  Other significant past medical history includes hypertension, hyperlipidemia, hearing loss, BPPV, and obesity. Denies recent illness or hospitalizations. Denies recent severe hypoglycemic or severe hyperglycemic episodes. Denies any issues with her current regimen. Home glucose monitoring: are performed regularly 129-140 mg/dL, morning and fasting.     Current regimen:  Mounjaro 15 mg weekly    Previous GI side effects of metformin    Last Eye Exam: UTD  Last Foot Exam: UTD    Has hypertension: Taking losartan, compliant  Has hyperlipidemia: Taking rosuvastatin, tolerating   Thyroid disorders: History of thyroid nodules FNA biopsy in 2014, 2019 were benign. Last thyroid ultrasound from September 2022 with stability for 5 years. Denies neck compressive symptoms. No history of abnormal thyroid function tests     Patient Active Problem List   Diagnosis    Benign essential hypertension    Carpal tunnel syndrome    Decreased dorsalis pedis pulse    Type 2 diabetes mellitus, without long-term current use of insulin (HCC)    Hyperlipidemia, mixed    Multiple thyroid nodules    Class 1 obesity due to excess calories with serious comorbidity and body mass index (BMI) of 32.0 to 32.9 in adult    Synovial cyst    Vitamin D deficiency    Fatigue    Colon cancer screening    Chronic sacroiliac strain    Diabetic polyneuropathy associated with type 2 diabetes mellitus (HCC)    Dizziness    Seasonal allergies    Lung nodule    Sensorineural hearing loss (SNHL), bilateral    BPPV (benign paroxysmal positional vertigo)    Counseling regarding advanced directives    Change in bowel habits    History of colon polyps    Osteopenia after menopause    Vitamin B12 deficiency      Past  Medical History:   Diagnosis Date    Cancer (HCC)     Diabetes mellitus (HCC)     Dizziness     Endometrial carcinoma (HCC)     Hypertension     Obesity     Rectal bleeding 09/10/2019    Sciatic radiculitis 01/28/2021    Tonsillitis     Varicella       Past Surgical History:   Procedure Laterality Date    HYSTERECTOMY  2006    TONSILLECTOMY  1962    TUBAL LIGATION  1992    US GUIDED THYROID BIOPSY  12/12/2019      Family History   Problem Relation Age of Onset    Stroke Mother     Diabetes Mother     Cancer Mother     Diabetes unspecified Mother     Lung cancer Father     Breast cancer Sister 78        Half-sister    COPD Brother     Coronary artery disease Brother     Heart failure Brother     HIV Brother     Cancer Family     Leukemia Son     Cancer Son         Leukemia age 19     Social History     Tobacco Use    Smoking status: Never    Smokeless tobacco: Never   Substance Use Topics    Alcohol use: Not Currently     Alcohol/week: 4.0 standard drinks of alcohol     Allergies   Allergen Reactions    Bee Venom Hives and Swelling     Yellow jackets and wasps    Gabapentin Hives         Current Outpatient Medications:     aspirin 81 mg chewable tablet, Chew 1 tablet (81 mg total) daily (Patient taking differently: Chew 81 mg daily as needed), Disp: 30 tablet, Rfl: 0    Blood Glucose Monitoring Suppl (ONE TOUCH ULTRA 2) w/Device KIT, , Disp: , Rfl:     Cyanocobalamin (B-12) 500 MCG SUBL, Place 1 tablet (500 mcg total) under the tongue in the morning, Disp: 90 tablet, Rfl: 2    EPINEPHrine (EPIPEN) 0.3 mg/0.3 mL SOAJ, Inject 0.3 mL (0.3 mg total) into a muscle once for 1 dose, Disp: 0.6 mL, Rfl: 0    ergocalciferol (VITAMIN D2) 50,000 units, TAKE 1 CAPSULE BY MOUTH ONE TIME PER WEEK, Disp: 4 capsule, Rfl: 5    glucose blood (OneTouch Ultra) test strip, Use to check sugars three times a day Dx uncontrolled DM2 E 11.65, Disp: 200 strip, Rfl: 5    losartan-hydrochlorothiazide (HYZAAR) 50-12.5 mg per tablet, Take 1 tablet  "by mouth daily, Disp: 90 tablet, Rfl: 2    rosuvastatin (CRESTOR) 20 MG tablet, Take 1 tablet (20 mg total) by mouth daily, Disp: 90 tablet, Rfl: 3    tirzepatide 15 MG/0.5ML, Inject 0.5 mL (15 mg total) under the skin every 7 days, Disp: 6 mL, Rfl: 3    Review of Systems  See HPI.   All other systems reviewed and are negative.    Physical Exam:  Body mass index is 32.23 kg/m².  /78 (BP Location: Left arm, Patient Position: Sitting, Cuff Size: Large)   Pulse 81   Ht 5' 8\" (1.727 m)   Wt 96.2 kg (212 lb)   SpO2 100%   BMI 32.23 kg/m²    Wt Readings from Last 3 Encounters:   01/10/24 96.2 kg (212 lb)   12/15/23 97.7 kg (215 lb 6.4 oz)   10/17/23 102 kg (225 lb)     Physical Exam  Vitals reviewed.   Constitutional:       Appearance: Normal appearance.   Cardiovascular:      Rate and Rhythm: Normal rate and regular rhythm.      Pulses: Normal pulses.      Heart sounds: Normal heart sounds.   Pulmonary:      Effort: Pulmonary effort is normal.      Breath sounds: Normal breath sounds.   Skin:     General: Skin is warm and dry.      Capillary Refill: Capillary refill takes less than 2 seconds.   Neurological:      General: No focal deficit present.      Mental Status: He is alert and oriented to person, place, and time.   Psychiatric:         Mood and Affect: Mood normal.         Behavior: Behavior normal.       Labs:   Lab Results   Component Value Date    HGBA1C 7.6 (H) 12/08/2023    HGBA1C 9.0 (H) 09/07/2023    HGBA1C 12.2 (A) 06/27/2023     Lab Results   Component Value Date    CREATININE 0.78 12/08/2023    CREATININE 0.66 09/07/2023    CREATININE 0.76 07/27/2023    BUN 15 12/08/2023     09/11/2017    K 4.1 12/08/2023    CL 98 12/08/2023    CO2 30 12/08/2023     eGFR   Date Value Ref Range Status   12/08/2023 73 ml/min/1.73sq m Final     Lab Results   Component Value Date    CHOL 224 02/25/2015    HDL 45 (L) 12/08/2023    TRIG 92 12/08/2023     Lab Results   Component Value Date    ALT 16 12/08/2023 "    AST 16 12/08/2023    ALKPHOS 64 12/08/2023    BILITOT 0.4 02/25/2015     Lab Results   Component Value Date    FZR0ESTDLCWI 2.241 09/07/2023    IAK1YPLAGVXL 2.535 06/28/2023    YRQ7EJUEAVJU 1.890 04/26/2017     Lab Results   Component Value Date    FREET4 1.06 04/26/2017     Discussed with the patient and all questioned fully answered. She will call me if any problems arise.    Follow-up appointment in 6 months.     Counseled patient on diagnostic results, prognosis, risk and benefit of treatment options, instruction for management, importance of treatment compliance, Risk  factor reduction and impressions    RAJAN Alonso

## 2024-02-05 ENCOUNTER — TELEPHONE (OUTPATIENT)
Dept: NEUROLOGY | Facility: CLINIC | Age: 79
End: 2024-02-05

## 2024-02-05 NOTE — TELEPHONE ENCOUNTER
Patient called in as the received letter in mail about referral.    Patients JOSE LUIS sánchez/ Elmer HARDEN 9/29/22    Patient was scheduled with Elmer on 2/16/24 at 8am.

## 2024-02-13 LAB
LEFT EYE DIABETIC RETINOPATHY: NORMAL
RIGHT EYE DIABETIC RETINOPATHY: NORMAL
SEVERITY (EYE EXAM): NORMAL

## 2024-02-21 PROBLEM — Z12.11 COLON CANCER SCREENING: Status: RESOLVED | Noted: 2019-09-10 | Resolved: 2024-02-21

## 2024-03-06 ENCOUNTER — PATIENT MESSAGE (OUTPATIENT)
Dept: FAMILY MEDICINE CLINIC | Facility: CLINIC | Age: 79
End: 2024-03-06

## 2024-03-07 NOTE — PATIENT COMMUNICATION
She is seeing [facility] for her diabetes - please forward her request for a CGM to them.   Also, ask if her diabetic eye exam was done in the last year and if Dr. Oro

## 2024-03-08 DIAGNOSIS — E11.42 TYPE 2 DIABETES MELLITUS WITH DIABETIC POLYNEUROPATHY, WITHOUT LONG-TERM CURRENT USE OF INSULIN (HCC): Primary | ICD-10-CM

## 2024-03-08 RX ORDER — ACYCLOVIR 400 MG/1
1 TABLET ORAL
Qty: 3 EACH | Refills: 2 | Status: SHIPPED | OUTPATIENT
Start: 2024-03-08

## 2024-03-08 RX ORDER — ACYCLOVIR 400 MG/1
1 TABLET ORAL CONTINUOUS
Qty: 1 EACH | Refills: 0 | Status: SHIPPED | OUTPATIENT
Start: 2024-03-08

## 2024-03-11 NOTE — PATIENT COMMUNICATION
Please update her chart with the DM eye exam  She has a visit 3/18/24 with Dr. Morales for pre-op clearance.

## 2024-03-12 ENCOUNTER — TELEPHONE (OUTPATIENT)
Dept: ADMINISTRATIVE | Facility: OTHER | Age: 79
End: 2024-03-12

## 2024-03-12 NOTE — TELEPHONE ENCOUNTER
Upon review of the In Basket request we were able to locate, review, and update the patient chart as requested for Diabetic Eye Exam.    Any additional questions or concerns should be emailed to the Practice Liaisons via the appropriate education email address, please do not reply via In Basket.    Thank you  Janay Rogers MA

## 2024-03-12 NOTE — TELEPHONE ENCOUNTER
----- Message from Alley Black LPN sent at 3/11/2024  2:54 PM EDT -----  Regarding: JONES GUTIERREZ (DM Eye)  03/11/24 2:54 PM    Hello, our patient Elisabeth Ruzicka Dempsey has had Diabetic Eye Exam completed/performed. Please assist in updating the patient chart by pulling the document from Media Tab within Chart Review. The date of service is 2/13/24.     Thank you,  Alley TOUSSAINT

## 2024-03-13 ENCOUNTER — TELEPHONE (OUTPATIENT)
Age: 79
End: 2024-03-13

## 2024-03-13 ENCOUNTER — TELEPHONE (OUTPATIENT)
Dept: NEUROLOGY | Facility: CLINIC | Age: 79
End: 2024-03-13

## 2024-03-13 NOTE — TELEPHONE ENCOUNTER
Patient  call she  has receive  her  dexcom 7 she need to be schedule  for  training  patient would like a call back.  Thank you

## 2024-03-14 ENCOUNTER — TELEPHONE (OUTPATIENT)
Dept: FAMILY MEDICINE CLINIC | Facility: CLINIC | Age: 79
End: 2024-03-14

## 2024-03-14 NOTE — TELEPHONE ENCOUNTER
Pt returned call regarding pre-op clearance form. Pt states the surgeon had faxed a copy on 3/7; she will also bring a copy on Monday when she 's scheduled for preop as she doesn't have access to a fax machine

## 2024-03-14 NOTE — TELEPHONE ENCOUNTER
Spoke to patient and obtained the below Pre-op information:    Name of procedure: cataract  Diagnosis:   Date of procedure (within 30 days):4/1 & 15/24  Surgeon:Dr. Nolan  Location of procedure (hospital or out patient facility name): [facility] date/location/ type (Which labs? EKG? CXR?): no labs or xray; patient is see her Cardiologist on 3/25/24 and will have an EKG done at that time  Records (on epic or requested):   Type of anaesthesia:  general  Paperwork to complete for Pre-op (patient bringing vs. calling office to obtain prior to appointment): form being faxed to office  Pre-op appointment scheduled (date/time):

## 2024-03-18 ENCOUNTER — CONSULT (OUTPATIENT)
Dept: FAMILY MEDICINE CLINIC | Facility: CLINIC | Age: 79
End: 2024-03-18
Payer: MEDICARE

## 2024-03-18 VITALS
DIASTOLIC BLOOD PRESSURE: 70 MMHG | BODY MASS INDEX: 31.16 KG/M2 | HEART RATE: 80 BPM | OXYGEN SATURATION: 97 % | SYSTOLIC BLOOD PRESSURE: 134 MMHG | WEIGHT: 205.6 LBS | RESPIRATION RATE: 16 BRPM | HEIGHT: 68 IN | TEMPERATURE: 98.3 F

## 2024-03-18 DIAGNOSIS — E11.42 TYPE 2 DIABETES MELLITUS WITH DIABETIC POLYNEUROPATHY, WITHOUT LONG-TERM CURRENT USE OF INSULIN (HCC): ICD-10-CM

## 2024-03-18 DIAGNOSIS — I10 BENIGN ESSENTIAL HYPERTENSION: ICD-10-CM

## 2024-03-18 DIAGNOSIS — H25.9 AGE-RELATED CATARACT OF BOTH EYES, UNSPECIFIED AGE-RELATED CATARACT TYPE: ICD-10-CM

## 2024-03-18 DIAGNOSIS — Z01.818 PREOP EXAMINATION: Primary | ICD-10-CM

## 2024-03-18 DIAGNOSIS — E78.2 HYPERLIPIDEMIA, MIXED: ICD-10-CM

## 2024-03-18 PROCEDURE — 99214 OFFICE O/P EST MOD 30 MIN: CPT | Performed by: FAMILY MEDICINE

## 2024-03-18 PROCEDURE — G2211 COMPLEX E/M VISIT ADD ON: HCPCS | Performed by: FAMILY MEDICINE

## 2024-03-18 RX ORDER — BROMFENAC SODIUM 0.81 MG/ML
SOLUTION/ DROPS OPHTHALMIC
COMMUNITY
Start: 2024-03-14

## 2024-03-18 RX ORDER — PREDNISOLONE ACETATE 10 MG/ML
SUSPENSION/ DROPS OPHTHALMIC
COMMUNITY
Start: 2024-03-14

## 2024-03-18 NOTE — PATIENT INSTRUCTIONS
Skip the Mounjaro on 3/30 and 4/13/24 (the Saturdays prior to surgery)  No aspirin/aleve/ibuprofen.

## 2024-03-18 NOTE — ASSESSMENT & PLAN NOTE
Blood sugars have been well controlled overall.   Her last A1C was 7.6 as noted and BS have been improving further since this lab work was done.   She is aware to hold the Mounjaro dose prior to surgery (3/30 dose and 4/13 dose)    Lab Results   Component Value Date    HGBA1C 7.6 (H) 12/08/2023

## 2024-03-18 NOTE — PROGRESS NOTES
Presurgical Evaluation    Subjective:      Patient ID: Elisabeth Ruzicka Dempsey is a 79 y.o. female.    Chief Complaint   Patient presents with   • Pre-op Exam     Cataract surgeries on (R) 4/1/24 & (L) 4/15/24. Dr. Nolan at [facility]. No problems or concerns.        HPI    The following portions of the patient's history were reviewed and updated as appropriate: allergies, current medications, past family history, past medical history, past social history, past surgical history, and problem list.    Procedure date: 4/1/24 and 4/15/24    Surgeon:  Dr Dejah Nolan  Planned procedure:  Cataracts bilateral    Diagnosis for procedure:  Cataracts    Prior anesthesia: Has tolerated anesthesia in the past without difficulty.      CAD History: None     Pulmonary History: None    Renal history: None    Diabetes History:  Type 2  Controlled.  Has DexCom CGM.  Taking Mounjaro 15 mg weekly, on Saturday.   BS control has been good- fasting BS in the 110-120.     Lab Results   Component Value Date    HGBA1C 7.6 (H) 12/08/2023        Neurological History: None     On Immunosuppressant meds/biologics: No      Review of Systems   Constitutional:  Negative for chills and fever.   HENT:  Positive for postnasal drip. Negative for congestion.    Respiratory:  Positive for cough (states this is chronic, mild). Negative for chest tightness and shortness of breath.    Cardiovascular:  Negative for palpitations and leg swelling.   Gastrointestinal:  Positive for constipation (secondary to Mounjaro, improves w Magnesium). Negative for blood in stool.   Genitourinary:  Negative for difficulty urinating and dysuria.   Neurological:  Positive for dizziness (notes vertigo 2 years ago, some residual chronic dizziness. follows w neuro). Negative for light-headedness and headaches.   Hematological:  Does not bruise/bleed easily.         Current Outpatient Medications   Medication Sig Dispense Refill   • Blood Glucose Monitoring  Suppl (ONE TOUCH ULTRA 2) w/Device KIT      • bromfenac sodium 0.07 % SOLN      • Continuous Blood Gluc  (Dexcom G7 ) NAHID Use 1 each continuous 1 each 0   • Continuous Blood Gluc Sensor (Dexcom G7 Sensor) Use 1 Device every 10 days 3 each 2   • Cyanocobalamin (B-12) 500 MCG SUBL Place 1 tablet (500 mcg total) under the tongue in the morning 90 tablet 2   • EPINEPHrine (EPIPEN) 0.3 mg/0.3 mL SOAJ Inject 0.3 mL (0.3 mg total) into a muscle once for 1 dose 0.6 mL 0   • ergocalciferol (VITAMIN D2) 50,000 units TAKE 1 CAPSULE BY MOUTH ONE TIME PER WEEK 4 capsule 5   • glucose blood (OneTouch Ultra) test strip Use to check sugars three times a day Dx uncontrolled DM2 E 11.65 200 strip 5   • losartan-hydrochlorothiazide (HYZAAR) 50-12.5 mg per tablet Take 1 tablet by mouth daily 90 tablet 2   • prednisoLONE acetate (PRED FORTE) 1 % ophthalmic suspension      • rosuvastatin (CRESTOR) 20 MG tablet Take 1 tablet (20 mg total) by mouth daily 90 tablet 3   • tirzepatide 15 MG/0.5ML Inject 0.5 mL (15 mg total) under the skin every 7 days 6 mL 3   • aspirin 81 mg chewable tablet Chew 1 tablet (81 mg total) daily (Patient taking differently: Chew 81 mg daily as needed) 30 tablet 0     No current facility-administered medications for this visit.       Allergies on file:   Bee venom and Gabapentin    Patient Active Problem List   Diagnosis   • Benign essential hypertension   • Carpal tunnel syndrome   • Decreased dorsalis pedis pulse   • Type 2 diabetes mellitus, without long-term current use of insulin (HCC)   • Hyperlipidemia, mixed   • Multiple thyroid nodules   • Class 1 obesity due to excess calories with serious comorbidity and body mass index (BMI) of 32.0 to 32.9 in adult   • Synovial cyst   • Vitamin D deficiency   • Fatigue   • Chronic sacroiliac strain   • Diabetic polyneuropathy associated with type 2 diabetes mellitus (HCC)   • Dizziness   • Seasonal allergies   • Lung nodule   • Sensorineural hearing  "loss (SNHL), bilateral   • BPPV (benign paroxysmal positional vertigo)   • Counseling regarding advanced directives   • Change in bowel habits   • History of colon polyps   • Osteopenia after menopause   • Vitamin B12 deficiency        Past Medical History:   Diagnosis Date   • Cancer (HCC)    • Diabetes mellitus (HCC)    • Dizziness    • Endometrial carcinoma (HCC)    • Hypertension    • Obesity    • Rectal bleeding 09/10/2019   • Sciatic radiculitis 01/28/2021   • Tonsillitis    • Varicella        Past Surgical History:   Procedure Laterality Date   • HYSTERECTOMY  2006   • TONSILLECTOMY  1962   • TUBAL LIGATION  1992   • US GUIDED THYROID BIOPSY  12/12/2019       Family History   Problem Relation Age of Onset   • Stroke Mother    • Diabetes Mother    • Cancer Mother    • Diabetes unspecified Mother    • Lung cancer Father    • Breast cancer Sister 78        Half-sister   • COPD Brother    • Coronary artery disease Brother    • Heart failure Brother    • HIV Brother    • Cancer Family    • Leukemia Son    • Cancer Son         Leukemia age 19       Social History     Tobacco Use   • Smoking status: Never   • Smokeless tobacco: Never   Vaping Use   • Vaping status: Never Used   Substance Use Topics   • Alcohol use: Not Currently   • Drug use: Never       Objective:    Vitals:    03/18/24 0856   BP: 134/70   Pulse: 80   Resp: 16   Temp: 98.3 °F (36.8 °C)   SpO2: 97%   Weight: 93.3 kg (205 lb 9.6 oz)   Height: 5' 8\" (1.727 m)        Physical Exam  Vitals and nursing note reviewed.   Constitutional:       Appearance: Normal appearance. She is well-developed. She is not ill-appearing.   HENT:      Head: Normocephalic and atraumatic.   Eyes:      Conjunctiva/sclera: Conjunctivae normal.   Neck:      Vascular: No carotid bruit.   Cardiovascular:      Rate and Rhythm: Normal rate and regular rhythm.      Heart sounds: Normal heart sounds. No murmur heard.  Pulmonary:      Effort: Pulmonary effort is normal. No respiratory " distress.      Breath sounds: Normal breath sounds. No wheezing.   Abdominal:      Palpations: Abdomen is soft.      Tenderness: There is no abdominal tenderness. There is no guarding or rebound.   Musculoskeletal:      Cervical back: Neck supple.      Right lower leg: No edema.      Left lower leg: No edema.   Lymphadenopathy:      Cervical: No cervical adenopathy.   Skin:     General: Skin is warm and dry.   Neurological:      Mental Status: She is alert and oriented to person, place, and time.      Comments: Walks with two walking sticks   Psychiatric:         Mood and Affect: Mood normal.         Behavior: Behavior normal.           Preop labs/testing available and reviewed: None required              EKG not required.    Echo not required.    Stress test/cath not required        Functional capacity: Climb stairs                        4 Mets   Pick the highest level patient can comfortably perform   4 mets or greater for surgery    RCRI  High Risk surgery?         0 Point  CAD History:         0 Point  CHF Active:         0 Point     Cerebrovascular Disease (TIA or CVA):     0 Point  DM on Insulin:        0 Point  Serum Creat >2.0 mg/dl:       0 Point          Total Points: 0     Scorin: Class I, Very Low Risk (0.4%)     1: Class II, Low risk (0.9%)     2: Class III Moderate (6.6%)     3: Class IV High (>11%)          Assessment/Plan:    Patient is medically optimized (cleared) for the planned low risk surgical procedure.    Further testing/evaluation is not required.    Postop concerns: no       Preop examination    -  Primary    Age-related cataract of both eyes, unspecified age-related cataract type        For upcoming surgery with Dr Dejah Nolan    Relevant Medications    bromfenac sodium 0.07 % SOLN    prednisoLONE acetate (PRED FORTE) 1 % ophthalmic suspension              Cardiovascular and Mediastinum    Benign essential hypertension     Stable, well controlled            Endocrine    Type 2  diabetes mellitus, without long-term current use of insulin (HCC)     Blood sugars have been well controlled overall.   Her last A1C was 7.6 as noted and BS have been improving further since this lab work was done.   She is aware to hold the Mounjaro dose prior to surgery (3/30 dose and 4/13 dose)    Lab Results   Component Value Date    HGBA1C 7.6 (H) 12/08/2023               Other    Hyperlipidemia, mixed     Continuing rosuvastatin

## 2024-03-20 ENCOUNTER — OFFICE VISIT (OUTPATIENT)
Dept: NEUROLOGY | Facility: CLINIC | Age: 79
End: 2024-03-20
Payer: MEDICARE

## 2024-03-20 ENCOUNTER — OFFICE VISIT (OUTPATIENT)
Dept: DIABETES SERVICES | Facility: CLINIC | Age: 79
End: 2024-03-20
Payer: MEDICARE

## 2024-03-20 VITALS
DIASTOLIC BLOOD PRESSURE: 80 MMHG | HEART RATE: 83 BPM | SYSTOLIC BLOOD PRESSURE: 116 MMHG | BODY MASS INDEX: 30.77 KG/M2 | WEIGHT: 203 LBS | HEIGHT: 68 IN

## 2024-03-20 VITALS — BODY MASS INDEX: 31.02 KG/M2 | WEIGHT: 204 LBS

## 2024-03-20 DIAGNOSIS — R13.10 SWALLOWING DIFFICULTY: ICD-10-CM

## 2024-03-20 DIAGNOSIS — H53.9 VISION DISTURBANCE: ICD-10-CM

## 2024-03-20 DIAGNOSIS — E11.42 TYPE 2 DIABETES MELLITUS WITH DIABETIC POLYNEUROPATHY, WITHOUT LONG-TERM CURRENT USE OF INSULIN (HCC): Primary | ICD-10-CM

## 2024-03-20 DIAGNOSIS — R42 VERTIGO: ICD-10-CM

## 2024-03-20 DIAGNOSIS — R27.8 COORDINATION IMPAIRMENT: ICD-10-CM

## 2024-03-20 DIAGNOSIS — M54.2 CERVICALGIA: Primary | ICD-10-CM

## 2024-03-20 PROCEDURE — 99215 OFFICE O/P EST HI 40 MIN: CPT | Performed by: NURSE PRACTITIONER

## 2024-03-20 PROCEDURE — 95249 CONT GLUC MNTR PT PROV EQP: CPT

## 2024-03-20 NOTE — PROGRESS NOTES
Dexcom G7 Personal Training    Met with Elisabeth Ruzicka Dempsey for Dexcom G7 personal training. Patient comes in today with there own unit to be trained on. Completed all aspects of training, including site selection on rotation, not infusing insulin near the sensor site, proper insertion technique, inserting codes into the , charging, waterproof sensor, range of 20ft, setting high low alarms that can be adjusted based on their preferences. They put on their first sensor by themselves with no issue.  Left my office today with sensor on and in 30 min warm up mode.      Elisabeth Ruzicka Dempsey will be running the dexcom through their Dexcom ..    Discussed creating a Clarity account to be able to link and upload from home or auto upload from their phone. Patient stated she will be using her Dexcom .  Patient understands that  will be downloaded while in office at time of visit and/or cell phone will automatically upload to our clarity for them. They understand that their blood sugars are not monitored by us on a regular basis, but that we can access them as needed or desired by the patient and provider.     Dexcom's phone number is in their paperwork, encouraged Cherrie to reach out to Dexcom if they have any issues after hours, 24/7. Training completed, will call with questions.     Lab Results   Component Value Date    HGBA1C 7.6 (H) 12/08/2023       Lab Results   Component Value Date     09/11/2017    SODIUM 136 12/08/2023    K 4.1 12/08/2023    CL 98 12/08/2023    CO2 30 12/08/2023    ANIONGAP 5 02/25/2015    AGAP 8 12/08/2023    BUN 15 12/08/2023    CREATININE 0.78 12/08/2023    GLUC 258 (H) 07/27/2023    GLUF 144 (H) 12/08/2023    CALCIUM 10.2 12/08/2023    AST 16 12/08/2023    ALT 16 12/08/2023    ALKPHOS 64 12/08/2023    PROT 7.6 02/25/2015    TP 7.8 12/08/2023    BILITOT 0.4 02/25/2015    TBILI 0.59 12/08/2023    EGFR 73 12/08/2023           Patient response to  instruction    Comprehension: good  Motivation: good  Expected Compliance: good  Response to Teachback: 100%, demonstrated understanding    Thank you for referring your patient to [facility], it was a pleasure working with them today. Please feel free to call with any questions or concerns.

## 2024-03-20 NOTE — PATIENT INSTRUCTIONS
Will refer to PT for neck pain and limitations  Will get cervical XR   If pain and limitations continue will consider MRI of the Cervical spine and refer to Spine Center  Will refer to OT for vision therapy related poor coordination with darkness and rapid movement  Will refer to Speech therapy for swallow eval.  Follow up with Neurology office in 4 months or sooner if needed

## 2024-03-20 NOTE — PROGRESS NOTES
Patient ID: Elisabeth Ruzicka Dempsey is a 79 y.o. female.    Assessment/Plan:       Diagnoses and all orders for this visit:    Vertigo  -     Ambulatory Referral to Neurology       Will refer to PT for neck pain and limitations  Will get cervical XR   If pain and limitations continue will consider MRI of the Cervical spine and refer to Spine Center  Will refer to OT for vision therapy related poor coordination with darkness and rapid movement  Will refer to Speech therapy for swallow eval.  Follow up with Neurology office in 4 months or sooner if needed    Subjective/HPI:  Elisabeth Ruzicka Dempsey is a 80yo female who has been seen in the outpatient neurology office, last visit was 9/29/2022 for hospital follow-up for BPPV.  Patient experienced episode of vertigo with swelling sensation and onset of nausea vomiting.  She was unable to stand or move.  She had ataxia in the ER was given meclizine and IV fluids.  CTA head no significant LVO or stenosis.  She had mild left external carotid stenosis of less than 50%.  MRI with no acute infarction however noted fluid in the right mastoid cells.  Patient had upper respiratory symptoms for a few days off-and-on prior to this event, noted sinus headache ear discomfort and tinnitus.  She was recommended to go to ENT and outpatient balance center.  Since her hospitalization at that time she had no further bouts of vertigo, she did go to see the ENT and was sent for audiology.  She has some slight hearing loss in the left ear but not enough to warrant hearing aids.  She did go to vestibular rehab for approximately 2 weeks.  States that she gained benefit from the therapy however had difficulties tolerating it due to her knees.  Does try to do some of the home exercises they gave her to continue home therapy.  Reviewed patient's vascular risks at that time, her LDL was 140, A1c was 9.5and she was looking for a new PCP at that time.    Patient did return to neurology office  today as continued follow-up.  Patient did see a new PCP and has been working with endocrinology regarding her vascular risk factors.  Her A1c has continued to decrease over time to 7.6.  She has been seen by her PCP and has been started on vitamin repletion's for deficiencies.  She is going to ophthalmology who notes retinopathy in the right eye, left eye is normal.  She also reports having bilateral cataracts for which she is initiating surgical intervention, right eye first and early April, left eye is the middle of April.    Patient reports that she has multiple concerns prompting her return to the neurology office today.  Initial concern is regarding neck pain.  Patient states that the end of last year she had onset of neck pain.  She had contacted the spine center and was initiated in physical therapy.  She did not have medical evaluation, they provided her with a physical therapy opportunity prior to meeting with the physicians.  Patient reported she did 2 sessions of physical therapy, she had great improvement with them and was discharged home with home exercise program.  Reviewed the notations from PT, they noted decreased range of motion with postural dysfunction which patient reports was due to staring at her computer for prolonged periods of time.  Patient reports that she does continue to sit at her computer for prolonged periods of time, she was doing her HEP however his since drifted off and no longer is doing these exercises.  She denies having had any trauma to initiate this pain.  She notes clicking noises in the base of her cervical spine with pain that wraps up over her shoulders and limited range of motion.  On examination patient has no paraspinal tenderness, she does have significant decreased range of motion with both rotation, extension and flexion and lateral flexion to the right and the left.   Patient reported that she had much improvement with the 2 sessions of physical therapy she had  "last year.  Patient is unable to take NSAIDs at this time due to upcoming  surgical procedures with cataract removals.  She was encouraged to take Tylenol only.  Patient has not had any diagnostic studies regarding her cervical spine, suspect related to degenerative disc disease and arthritis however will get cervical x-rays initially while initiating physical therapy.  Patient is aware if she continues to have pain after sessions of physical therapy, we can consider cervical MRI for further evaluation of disc herniation.  She would also then be recommended to follow with the spinal center through pain management.    Patient also reports having \"dizziness\".  She states it is not the room spinning dizziness that she had when she was hospitalized, she states is more of an imbalance that she gets mostly in the dark.  States that she does have nightlights throughout the house at nighttime but has uncertainties and imbalance when walking through the darkness.  She also notes that when she takes off her sweater at night, it is dark and she will feel off balance and have to hold on. She also gets a similar sensation when she is walking and quickly turns to the side.  Patient denies any wooziness, lightheadedness or room spinning type sensations.  Suspect this may be related to patient's visual disturbances.  She is legally blind in the right eye, has multiple cataracts and retinopathy, she is awaiting surgical intervention.  This seems to happen mostly in the dark with limited visibility.  Patient has a uncertainty with regards to her step and ambulation causing her to feel off balance.  Recommend patient start with occupational therapy for some visual therapies.  She is having surgical intervention in the near future.  Advised patient if OT initiated and surgical interventions do not help to alleviate the symptoms, we will consider MRI of the brain regarding orbits and optic nerves.    Patient is also indicated some " "intermittent swallowing issues.  She states not all the time but every once in a while she has to \"get her up\" to swallow.  She  notes that she does have some thyroid nodules which she is having an ultrasound done in August and feels that these may be related however her mechanically seems to be having some difficulties at times.  Patient wasreferred for speech therapy evaluation of mechanical swallowing, she is aware she may need barium swallow testing to evaluate this further.  On neurological examination, patient has no issues with speech, no current issues with swallowing and no focal deficits to indicate need for diagnostic testing at this time.  I      Patient will initiate her therapies, have her cataract surgeries and follow-up with the outpatient neurology office in 4 months or sooner if needed.  She is to contact neurology office if she has any worsening of her symptoms or changes.      The following portions of the patient's history were reviewed and updated as appropriate: allergies, current medications, past family history, past medical history, past social history, past surgical history, and problem list.      Past Medical History:   Diagnosis Date    Cancer (HCC)     Diabetes mellitus (HCC)     Dizziness     Endometrial carcinoma (HCC)     Hypertension     Obesity     Rectal bleeding 09/10/2019    Sciatic radiculitis 01/28/2021    Tonsillitis     Varicella        Past Surgical History:   Procedure Laterality Date    HYSTERECTOMY  2006    TONSILLECTOMY  1962    TUBAL LIGATION  1992    US GUIDED THYROID BIOPSY  12/12/2019       Social History     Socioeconomic History    Marital status: /Civil Union     Spouse name: None    Number of children: 3    Years of education: None    Highest education level: None   Occupational History    Occupation: Retired    Tobacco Use    Smoking status: Never    Smokeless tobacco: Never   Vaping Use    Vaping status: Never Used   Substance and Sexual Activity    " Alcohol use: Not Currently    Drug use: Never    Sexual activity: Not Currently     Partners: Male     Birth control/protection: Post-menopausal   Other Topics Concern    None   Social History Narrative    Denied: History of alcohol use - As per [employer]    Daily caffeine consumption, 2-3 servings a day    Denied: History of drug use - As per [employer]    Retired from employment: [employer]       Social Determinants of Health     Financial Resource Strain: Low Risk  (6/27/2023)    Overall Financial Resource Strain (CARDIA)     Difficulty of Paying Living Expenses: Not hard at all   Food Insecurity: No Food Insecurity (5/25/2022)    Hunger Vital Sign     Worried About Running Out of Food in the Last Year: Never true     Ran Out of Food in the Last Year: Never true   Transportation Needs: No Transportation Needs (6/27/2023)    PRAPARE - Transportation     Lack of Transportation (Medical): No     Lack of Transportation (Non-Medical): No   Physical Activity: Not on file   Stress: Not on file   Social Connections: Not on file   Intimate Partner Violence: Not on file   Housing Stability: Low Risk  (5/25/2022)    Housing Stability Vital Sign     Unable to Pay for Housing in the Last Year: No     Number of Places Lived in the Last Year: 1     Unstable Housing in the Last Year: No       Family History   Problem Relation Age of Onset    Stroke Mother     Diabetes Mother     Cancer Mother     Diabetes unspecified Mother     Lung cancer Father     Breast cancer Sister 78        Half-sister    COPD Brother     Coronary artery disease Brother     Heart failure Brother     HIV Brother     Cancer Family     Leukemia Son     Cancer Son         Leukemia age 19         Current Outpatient Medications:     Blood Glucose Monitoring Suppl (ONE TOUCH ULTRA 2) w/Device KIT, , Disp: , Rfl:     bromfenac sodium 0.07 % SOLN, , Disp: , Rfl:     Continuous Blood Gluc  (Dexcom G7 ) NAHID, Use 1 each  "continuous, Disp: 1 each, Rfl: 0    Continuous Blood Gluc Sensor (Dexcom G7 Sensor), Use 1 Device every 10 days, Disp: 3 each, Rfl: 2    Cyanocobalamin (B-12) 500 MCG SUBL, Place 1 tablet (500 mcg total) under the tongue in the morning, Disp: 90 tablet, Rfl: 2    EPINEPHrine (EPIPEN) 0.3 mg/0.3 mL SOAJ, Inject 0.3 mL (0.3 mg total) into a muscle once for 1 dose, Disp: 0.6 mL, Rfl: 0    ergocalciferol (VITAMIN D2) 50,000 units, TAKE 1 CAPSULE BY MOUTH ONE TIME PER WEEK, Disp: 4 capsule, Rfl: 5    glucose blood (OneTouch Ultra) test strip, Use to check sugars three times a day Dx uncontrolled DM2 E 11.65, Disp: 200 strip, Rfl: 5    losartan-hydrochlorothiazide (HYZAAR) 50-12.5 mg per tablet, Take 1 tablet by mouth daily, Disp: 90 tablet, Rfl: 2    prednisoLONE acetate (PRED FORTE) 1 % ophthalmic suspension, , Disp: , Rfl:     rosuvastatin (CRESTOR) 20 MG tablet, Take 1 tablet (20 mg total) by mouth daily, Disp: 90 tablet, Rfl: 3    tirzepatide 15 MG/0.5ML, Inject 0.5 mL (15 mg total) under the skin every 7 days, Disp: 6 mL, Rfl: 3    aspirin 81 mg chewable tablet, Chew 1 tablet (81 mg total) daily (Patient not taking: Reported on 3/20/2024), Disp: 30 tablet, Rfl: 0    Allergies   Allergen Reactions    Bee Venom Hives and Swelling     Yellow jackets and wasps    Gabapentin Hives        Blood pressure 116/80, pulse 83, height 5' 8\" (1.727 m), weight 92.1 kg (203 lb).               Objective:    Blood pressure 116/80, pulse 83, height 5' 8\" (1.727 m), weight 92.1 kg (203 lb).    Physical Exam  Vitals reviewed.   Constitutional:       Appearance: Normal appearance. She is well-developed.   HENT:      Head: Normocephalic.      Nose: Nose normal.      Mouth/Throat:      Mouth: Mucous membranes are moist.   Eyes:      General: Lids are normal.      Extraocular Movements: Extraocular movements intact.      Pupils: Pupils are equal, round, and reactive to light.   Cardiovascular:      Rate and Rhythm: Normal rate.   Pulmonary: "      Effort: Pulmonary effort is normal.   Abdominal:      Palpations: Abdomen is soft.   Musculoskeletal:      Cervical back: Normal range of motion.   Skin:     General: Skin is warm and dry.   Neurological:      Mental Status: She is alert.      Motor: Motor strength is normal.     Coordination: Romberg sign negative.      Deep Tendon Reflexes: Reflexes are normal and symmetric.   Psychiatric:         Attention and Perception: Attention and perception normal.         Mood and Affect: Mood and affect normal.         Speech: Speech normal.         Behavior: Behavior normal. Behavior is cooperative.         Thought Content: Thought content normal.         Cognition and Memory: Cognition and memory normal.         Judgment: Judgment normal.       Neurological Exam  Mental Status  Alert. Oriented to person, place, time and situation. Memory is normal. Recent and remote memory are intact. Speech is normal. Language is fluent with no aphasia. Attention and concentration are normal. Fund of knowledge is appropriate for level of education.    Cranial Nerves  CN II: Visual fields full to confrontation. Patient is legally blind in the right eye with retinopathy and cataracts, she has cataracts as well to the left eye per ophthalmology..  CN III, IV, VI: Extraocular movements intact bilaterally. Normal lids and orbits bilaterally. Pupils equal round and reactive to light bilaterally.  CN V: Facial sensation is normal.  CN VII: Full and symmetric facial movement.  CN VIII: Hearing is normal.  CN IX, X: Palate elevates symmetrically. Normal gag reflex.  CN XI: Shoulder shrug strength is normal.  CN XII: Tongue midline without atrophy or fasciculations.    Motor  Normal muscle bulk throughout. Normal muscle tone. No abnormal involuntary movements. Strength is 5/5 throughout all four extremities.    Sensory  Light touch is normal in upper and lower extremities. Temperature is normal in upper and lower extremities. Vibration is  normal in upper and lower extremities. Proprioception is normal in upper and lower extremities.     Reflexes  Deep tendon reflexes are 2+ and symmetric in all four extremities.    Right pathological reflexes: Chrissy's absent.  Left pathological reflexes: Chrissy's absent.    Coordination  Right: Finger-to-nose normal. Rapid alternating movement normal. Heel-to-shin normal.Left: Finger-to-nose normal. Rapid alternating movement normal. Heel-to-shin normal.    Gait  Casual gait is normal including stance, stride, and arm swing.Normal toe walking. Normal heel walking. Normal tandem gait. Romberg is absent. Able to rise from chair without using arms.        ROS:    Review of Systems   Constitutional:  Negative for appetite change, fatigue and fever.   HENT:  Positive for trouble swallowing. Negative for hearing loss, tinnitus and voice change.    Eyes:  Positive for photophobia. Negative for pain and visual disturbance.   Respiratory: Negative.  Negative for shortness of breath.    Cardiovascular: Negative.  Negative for palpitations.   Gastrointestinal: Negative.  Negative for nausea and vomiting.   Endocrine: Negative.  Negative for cold intolerance.   Genitourinary: Negative.  Negative for dysuria, frequency and urgency.   Musculoskeletal:  Positive for gait problem (unsteady) and neck pain. Negative for back pain, myalgias and neck stiffness.   Skin: Negative.  Negative for rash.   Allergic/Immunologic: Negative.    Neurological:  Positive for dizziness. Negative for tremors, seizures, syncope, facial asymmetry, speech difficulty, weakness, light-headedness, numbness and headaches.   Hematological: Negative.  Does not bruise/bleed easily.   Psychiatric/Behavioral: Negative.  Negative for confusion, hallucinations and sleep disturbance.        ROS reviewed and discussed with the pt.

## 2024-03-22 NOTE — PROGRESS NOTES
Progress Note - Cardiology Office  [facility]    Elisabeth Ruzicka Dempsey 79 y.o. female MRN: [id number]  : 1945  Encounter: [id number]      ASSESSMENT:  Perioperative cardiac risk assessment for cataract surgery    History of crushing chest pain  Was evaluated in the ED on 2023  Had been having intermittent substernal chest pain for 10 days, usually associated with anxiety/stress or activity     Exercise stress test, 2023:  Negative for ischemia     TTE, 2023:  EF 55%, trace MR     Mixed hyperlipidemia  On Crestor 20 mg     Essential hypertension  BP today is well-controlled at 120/64 with heart rate of 88/min     Type 2 diabetes mellitus     Obesity, BMI 34.21-> 31.02     Elevated ASCVD risk score of 46.3%           RECOMMENDATIONS:  Patient's perioperative cardiac risk with cataract surgery is low  Continue current antihypertensive medications      Please call [phone] if any questions.    HPI :     Elisabeth Ruzicka Dempsey is a 79 y.o. year old female who came for follow up and perioperative cardiac risk assessment prior to undergoing cataract surgery.  Patient was seen previously with chest pain and she underwent an exercise stress test which was negative for ischemia.  Her echocardiogram also showed normal LV function and ejection fraction.  Her blood pressure which was previously mildly elevated is now well-controlled and within normal range.  She has no new or acute cardiac symptoms and no acute ischemic changes on her EKG    REVIEW OF SYSTEMS:  Denies any new or acute cardiac symptoms  Decreased visual acuity      Historical Information   Past Medical History:   Diagnosis Date    Cancer (HCC)     Diabetes mellitus (HCC)     Dizziness     Endometrial carcinoma (HCC)     Hypertension     Obesity     Rectal bleeding 09/10/2019    Sciatic radiculitis 2021    Tonsillitis     Varicella      Past Surgical History:   Procedure Laterality Date     HYSTERECTOMY  2006    TONSILLECTOMY  1962    TUBAL LIGATION  1992    US GUIDED THYROID BIOPSY  12/12/2019     Social History     Substance and Sexual Activity   Alcohol Use Not Currently     Social History     Substance and Sexual Activity   Drug Use Never     Social History     Tobacco Use   Smoking Status Never   Smokeless Tobacco Never     Family History:   Family History   Problem Relation Age of Onset    Stroke Mother     Diabetes Mother     Cancer Mother     Diabetes unspecified Mother     Lung cancer Father     Breast cancer Sister 78        Half-sister    COPD Brother     Coronary artery disease Brother     Heart failure Brother     HIV Brother     Cancer Family     Leukemia Son     Cancer Son         Leukemia age 19       Meds/Allergies     Allergies   Allergen Reactions    Bee Venom Hives and Swelling     Yellow jackets and wasps    Gabapentin Hives       Current Outpatient Medications:     Blood Glucose Monitoring Suppl (ONE TOUCH ULTRA 2) w/Device KIT, , Disp: , Rfl:     bromfenac sodium 0.07 % SOLN, , Disp: , Rfl:     Continuous Blood Gluc  (Dexcom G7 ) NAHID, Use 1 each continuous, Disp: 1 each, Rfl: 0    Continuous Blood Gluc Sensor (Dexcom G7 Sensor), Use 1 Device every 10 days, Disp: 3 each, Rfl: 2    Cyanocobalamin (B-12) 500 MCG SUBL, Place 1 tablet (500 mcg total) under the tongue in the morning, Disp: 90 tablet, Rfl: 2    EPINEPHrine (EPIPEN) 0.3 mg/0.3 mL SOAJ, Inject 0.3 mL (0.3 mg total) into a muscle once for 1 dose, Disp: 0.6 mL, Rfl: 0    ergocalciferol (VITAMIN D2) 50,000 units, TAKE 1 CAPSULE BY MOUTH ONE TIME PER WEEK, Disp: 4 capsule, Rfl: 5    glucose blood (OneTouch Ultra) test strip, Use to check sugars three times a day Dx uncontrolled DM2 E 11.65, Disp: 200 strip, Rfl: 5    losartan-hydrochlorothiazide (HYZAAR) 50-12.5 mg per tablet, Take 1 tablet by mouth daily, Disp: 90 tablet, Rfl: 2    prednisoLONE acetate (PRED FORTE) 1 % ophthalmic suspension, , Disp: , Rfl:  "    rosuvastatin (CRESTOR) 20 MG tablet, Take 1 tablet (20 mg total) by mouth daily, Disp: 90 tablet, Rfl: 3    tirzepatide 15 MG/0.5ML, Inject 0.5 mL (15 mg total) under the skin every 7 days, Disp: 6 mL, Rfl: 3    aspirin 81 mg chewable tablet, Chew 1 tablet (81 mg total) daily (Patient not taking: Reported on 3/20/2024), Disp: 30 tablet, Rfl: 0    Vitals: Blood pressure 120/64, pulse 88, height 5' 8\" (1.727 m), weight 92.5 kg (204 lb), SpO2 98%.    Body mass index is 31.02 kg/m².  Vitals:    03/25/24 1117   Weight: 92.5 kg (204 lb)     BP Readings from Last 3 Encounters:   03/25/24 120/64   03/20/24 116/80   03/18/24 134/70       Physical Exam:  Physical Exam    Neurologic:  Alert & oriented x 3, no new focal deficits, Not in any acute distress,  Constitutional:  Well developed, well nourished, non-toxic appearance   Eyes:  conjunctiva normal,   HENT:  Atraumatic, oropharynx moist, Neck- normal range of motion, no tenderness,  Neck supple, No JVP, No LNP   Respiratory:  Bilateral air entry, mostly clear to auscultation  Cardiovascular: S1-S2 regular with a I/VI systolic murmur   GI:  Soft, nondistended, normal bowel sounds, nontender, no hepatosplenomegaly appreciated.  Musculoskeletal: no tenderness, no deformities.   Skin:  Well hydrated, no rash   Lymphatic:  No lymphadenopathy noted   Extremities:  No edema         Diagnostic Studies Review Cardio:      EKG: Normal sinus rhythm, heart rate 88/min nonspecific ST changes    Cardiac testing:       Results for orders placed during the hospital encounter of 08/17/23    Echo complete w/ contrast if indicated    Interpretation Summary    Left Ventricle: Left ventricular cavity size is normal. Wall thickness is normal. The left ventricular ejection fraction is 55% by visual estimation.. Systolic function is normal. Wall motion is normal. Diastolic function is normal for age.    Tricuspid Valve: The right ventricular systolic pressure is normal. The estimated right " "ventricular systolic pressure is 16.00 mmHg.        Imaging:  Chest X-Ray:   No Chest XR results available for this patient.    CT-scan of the chest:     No CTA results available for this patient.  Lab Review   Lab Results   Component Value Date    WBC 7.65 12/08/2023    HGB 15.2 12/08/2023    HCT 45.9 12/08/2023    MCV 85 12/08/2023    RDW 12.2 12/08/2023     12/08/2023     BMP:  Lab Results   Component Value Date    SODIUM 136 12/08/2023    K 4.1 12/08/2023    CL 98 12/08/2023    CO2 30 12/08/2023    ANIONGAP 5 02/25/2015    BUN 15 12/08/2023    CREATININE 0.78 12/08/2023    GLUC 258 (H) 07/27/2023    GLUF 144 (H) 12/08/2023    CALCIUM 10.2 12/08/2023    CORRECTEDCA 9.5 05/25/2022    EGFR 73 12/08/2023    MG 1.9 05/24/2022     LFT:  Lab Results   Component Value Date    AST 16 12/08/2023    ALT 16 12/08/2023    ALKPHOS 64 12/08/2023    TP 7.8 12/08/2023    ALB 4.4 12/08/2023      No components found for: \"TSH3\"  Lab Results   Component Value Date    CAN5GHEUEXDA 2.241 09/07/2023     Lab Results   Component Value Date    HGBA1C 7.6 (H) 12/08/2023     Lipid Profile:   Lab Results   Component Value Date    CHOLESTEROL 126 12/08/2023    HDL 45 (L) 12/08/2023    LDLCALC 63 12/08/2023    TRIG 92 12/08/2023     Lab Results   Component Value Date    CHOLESTEROL 126 12/08/2023    CHOLESTEROL 217 (H) 05/25/2022     No results found for: \"CKTOTAL\", \"CKMB\", \"CKMBINDEX\", \"TROPONINI\"  No results found for: \"NTBNP\"   No results found for this or any previous visit (from the past 672 hour(s)).          Dr. Toni Robles MD, FACC      \"This note has been constructed using a voice recognition system.Therefore there may be syntax, spelling, and/or grammatical errors. Please call if you have any questions. \"  "

## 2024-03-25 ENCOUNTER — OFFICE VISIT (OUTPATIENT)
Dept: CARDIOLOGY CLINIC | Facility: CLINIC | Age: 79
End: 2024-03-25
Payer: MEDICARE

## 2024-03-25 ENCOUNTER — HOSPITAL ENCOUNTER (OUTPATIENT)
Dept: RADIOLOGY | Facility: HOSPITAL | Age: 79
Discharge: HOME | End: 2024-03-25
Payer: MEDICARE

## 2024-03-25 VITALS
SYSTOLIC BLOOD PRESSURE: 120 MMHG | WEIGHT: 204 LBS | HEIGHT: 68 IN | BODY MASS INDEX: 30.92 KG/M2 | DIASTOLIC BLOOD PRESSURE: 64 MMHG | OXYGEN SATURATION: 98 % | HEART RATE: 88 BPM

## 2024-03-25 DIAGNOSIS — I10 BENIGN ESSENTIAL HYPERTENSION: Primary | ICD-10-CM

## 2024-03-25 DIAGNOSIS — M54.2 CERVICALGIA: ICD-10-CM

## 2024-03-25 PROCEDURE — 99214 OFFICE O/P EST MOD 30 MIN: CPT | Performed by: INTERNAL MEDICINE

## 2024-03-25 PROCEDURE — 72050 X-RAY EXAM NECK SPINE 4/5VWS: CPT

## 2024-03-25 PROCEDURE — 93000 ELECTROCARDIOGRAM COMPLETE: CPT | Performed by: INTERNAL MEDICINE

## 2024-03-28 ENCOUNTER — TELEPHONE (OUTPATIENT)
Dept: NEUROLOGY | Facility: CLINIC | Age: 79
End: 2024-03-28

## 2024-03-28 NOTE — TELEPHONE ENCOUNTER
----- Message from RAJAN Vyas sent at 3/27/2024  6:31 PM EDT -----  Please call the patient regarding her abnormal result.    Can let her know that there is no evidence of fracture, noted multilevel degenerative disease without any other abnormalities.   Please remind her to initiate PT and OT for her vision.    Elmer

## 2024-05-09 DIAGNOSIS — E11.42 TYPE 2 DIABETES MELLITUS WITH DIABETIC POLYNEUROPATHY, WITHOUT LONG-TERM CURRENT USE OF INSULIN (HCC): ICD-10-CM

## 2024-05-09 RX ORDER — ACYCLOVIR 400 MG/1
1 TABLET ORAL
Qty: 3 EACH | Refills: 5 | Status: SHIPPED | OUTPATIENT
Start: 2024-05-09

## 2024-05-10 ENCOUNTER — TELEPHONE (OUTPATIENT)
Age: 79
End: 2024-05-10

## 2024-05-11 DIAGNOSIS — E53.8 VITAMIN B12 DEFICIENCY: ICD-10-CM

## 2024-05-13 ENCOUNTER — OFFICE VISIT (OUTPATIENT)
Dept: ENDOCRINOLOGY | Facility: CLINIC | Age: 79
End: 2024-05-13
Payer: MEDICARE

## 2024-05-13 VITALS
WEIGHT: 202 LBS | SYSTOLIC BLOOD PRESSURE: 145 MMHG | DIASTOLIC BLOOD PRESSURE: 80 MMHG | HEART RATE: 78 BPM | OXYGEN SATURATION: 98 % | BODY MASS INDEX: 30.62 KG/M2 | HEIGHT: 68 IN

## 2024-05-13 DIAGNOSIS — I10 BENIGN ESSENTIAL HYPERTENSION: ICD-10-CM

## 2024-05-13 DIAGNOSIS — E11.42 DIABETIC POLYNEUROPATHY ASSOCIATED WITH TYPE 2 DIABETES MELLITUS (HCC): Primary | ICD-10-CM

## 2024-05-13 DIAGNOSIS — E04.2 MULTIPLE THYROID NODULES: ICD-10-CM

## 2024-05-13 DIAGNOSIS — E78.2 HYPERLIPIDEMIA, MIXED: ICD-10-CM

## 2024-05-13 DIAGNOSIS — E11.42 TYPE 2 DIABETES MELLITUS WITH DIABETIC POLYNEUROPATHY, WITHOUT LONG-TERM CURRENT USE OF INSULIN (HCC): ICD-10-CM

## 2024-05-13 PROBLEM — E66.01 SEVERE OBESITY (BMI 35.0-39.9) WITH COMORBIDITY (HCC): Status: ACTIVE | Noted: 2024-05-13

## 2024-05-13 PROCEDURE — 99214 OFFICE O/P EST MOD 30 MIN: CPT | Performed by: NURSE PRACTITIONER

## 2024-05-13 RX ORDER — GREEN TEA/HOODIA GORDONII 315-12.5MG
500 CAPSULE ORAL DAILY
Qty: 90 TABLET | Refills: 1 | Status: SHIPPED | OUTPATIENT
Start: 2024-05-13 | End: 2024-05-14

## 2024-05-13 NOTE — PROGRESS NOTES
Established Patient Progress Note    Chief Complaint:  Diabetes follow up visit    Impression & Plan:    Problem List Items Addressed This Visit          Cardiovascular and Mediastinum    Benign essential hypertension     BP slightly higher than normal on examination.   Continues on losartan-hydrochlorothiazide.             Endocrine    Type 2 diabetes mellitus, without long-term current use of insulin (HCC)       Lab Results   Component Value Date    HGBA1C 7.6 (H) 12/08/2023     HGA1C was declined in office today.     BGL Reviewed: Continues on CGM, did not have device for download today    Treatment regimen: Mounjaro 12.5 mg weekly dose    Discussed risks/complications associated with uncontrolled diabetes including organ involvement, heart attack, stroke, death.    Advised lifestyle modifications including attention to diet including the amount and types of carbohydrates consumed and regular activity.     Call for blood sugars less than 70 mg/dl or patterns over 250 mg/dl.     Recommendation for medical identification either bracelet, necklace.    Routine follow up for diabetic eye and foot exams.     Ordered blood work to complete prior to next visit.    Send glucose logs/CGM download in 1-2 weeks for review    Follow up in 3 months.            Multiple thyroid nodules     History of thyroid nodules FNA biopsy in 2014, 2019 were benign.   Denies neck compressive symptoms.   Scheduled for thyroid ultrasound by ENT.         Diabetic polyneuropathy associated with type 2 diabetes mellitus (HCC) - Primary     Will reach out to neurology.           Relevant Orders    Ambulatory Referral to Physical Therapy       Other    Hyperlipidemia, mixed     LDL at goal rosuvastatin.            History of Present Illness:   Elisabeth Ruzicka Dempsey is a 79 y.o. female with a history of type 2 diabetes without long term use of insulin for the past 20 years. Reports complications of neuropathy and  retinopathy.  Other  "significant past medical history includes hypertension, hyperlipidemia, hearing loss, BPPV, and obesity. Has been experiencing progressive decline in sensation in bilateral feet. She was last seen by Neurology in March 2024 and at that time had been experiencing trouble swallowing, dizziness, and neck pain which have all persisted. She has good strength to her lower extremities and does not have the sensation that lower extremities are \"weak\" rather she cannot feel the bottoms of her feet which is most concerning when she has been driving. Denies any (is not exhibiting) slurred speech, confusion, facial asymmetry. She is experiencing imbalance and is walking with bilateral walking sticks for balance.    She did not bring glucose levels with her and declines hemoglobin A1c at this time as she would like to have it completed at the lab as an outpatient. Denies recent severe hypoglycemic or severe hyperglycemic episodes. Denies symptomatic hypoglycemia. Denies any side effects of current regimen.      Current regimen:  Mounjaro 15 mg weekly     Has hypertension: Taking losartan, compliant  Has hyperlipidemia: Taking rosuvastatin, tolerating   Thyroid disorders: History of thyroid nodules FNA biopsy in 2014, 2019 were benign. Last thyroid ultrasound from September 2022 with stability for 5 years. Denies neck compressive symptoms. No history of abnormal thyroid function tests. Repeat thyroid ultrasound ordered by ENT, following.     Patient Active Problem List   Diagnosis    Benign essential hypertension    Carpal tunnel syndrome    Decreased dorsalis pedis pulse    Type 2 diabetes mellitus, without long-term current use of insulin (HCC)    Hyperlipidemia, mixed    Multiple thyroid nodules    Class 1 obesity due to excess calories with serious comorbidity and body mass index (BMI) of 32.0 to 32.9 in adult    Synovial cyst    Vitamin D deficiency    Fatigue    Chronic sacroiliac strain    Diabetic polyneuropathy " associated with type 2 diabetes mellitus (HCC)    Dizziness    Seasonal allergies    Lung nodule    Sensorineural hearing loss (SNHL), bilateral    BPPV (benign paroxysmal positional vertigo)    Counseling regarding advanced directives    Change in bowel habits    History of colon polyps    Osteopenia after menopause    Vitamin B12 deficiency    Severe obesity (BMI 35.0-39.9) with comorbidity (HCC)      Past Medical History:   Diagnosis Date    Cancer (HCC)     Diabetes mellitus (HCC)     Dizziness     Endometrial carcinoma (HCC)     Hypertension     Obesity     Rectal bleeding 09/10/2019    Sciatic radiculitis 01/28/2021    Tonsillitis     Varicella       Past Surgical History:   Procedure Laterality Date    HYSTERECTOMY  2006    TONSILLECTOMY  1962    TUBAL LIGATION  1992    US GUIDED THYROID BIOPSY  12/12/2019      Family History   Problem Relation Age of Onset    Stroke Mother     Diabetes Mother     Cancer Mother     Diabetes unspecified Mother     Lung cancer Father     Breast cancer Sister 78        Half-sister    Dementia Sister     No Known Problems Sister     COPD Brother     Coronary artery disease Brother     Heart failure Brother     HIV Brother     Cancer Family     No Known Problems Daughter     Leukemia Son     Cancer Son         Leukemia age 19    No Known Problems Son      Social History     Tobacco Use    Smoking status: Never    Smokeless tobacco: Never   Substance Use Topics    Alcohol use: Not Currently     Allergies   Allergen Reactions    Bee Venom Hives and Swelling     Yellow jackets and wasps    Gabapentin Hives         Current Outpatient Medications:     Blood Glucose Monitoring Suppl (ONE TOUCH ULTRA 2) w/Device KIT, , Disp: , Rfl:     bromfenac sodium 0.07 % SOLN, , Disp: , Rfl:     Continuous Blood Gluc  (Dexcom G7 ) NAHID, Use 1 each continuous, Disp: 1 each, Rfl: 0    Continuous Glucose Sensor (Dexcom G7 Sensor), Use 1 Device every 10 days, Disp: 3 each, Rfl: 5     "ergocalciferol (VITAMIN D2) 50,000 units, TAKE 1 CAPSULE BY MOUTH ONE TIME PER WEEK, Disp: 4 capsule, Rfl: 5    glucose blood (OneTouch Ultra) test strip, Use to check sugars three times a day Dx uncontrolled DM2 E 11.65, Disp: 200 strip, Rfl: 5    losartan-hydrochlorothiazide (HYZAAR) 50-12.5 mg per tablet, Take 1 tablet by mouth daily, Disp: 90 tablet, Rfl: 2    prednisoLONE acetate (PRED FORTE) 1 % ophthalmic suspension, , Disp: , Rfl:     rosuvastatin (CRESTOR) 20 MG tablet, Take 1 tablet (20 mg total) by mouth daily, Disp: 90 tablet, Rfl: 3    tirzepatide 15 MG/0.5ML, Inject 0.5 mL (15 mg total) under the skin every 7 days, Disp: 6 mL, Rfl: 1    aspirin 81 mg chewable tablet, Chew 1 tablet (81 mg total) daily (Patient not taking: Reported on 3/20/2024), Disp: 30 tablet, Rfl: 0    EPINEPHrine (EPIPEN) 0.3 mg/0.3 mL SOAJ, Inject 0.3 mL (0.3 mg total) into a muscle once for 1 dose, Disp: 0.6 mL, Rfl: 0    vitamin B-12 (VITAMIN B-12) 500 mcg tablet, Take 1 tablet (500 mcg total) by mouth daily Over the counter, Disp: 90 tablet, Rfl: 1    Review of Systems  See HPI.   All other systems reviewed and are negative.      Physical Exam:  Body mass index is 30.71 kg/m².  /80 (BP Location: Left arm, Patient Position: Sitting, Cuff Size: Large)   Pulse 78   Ht 5' 8\" (1.727 m)   Wt 91.6 kg (202 lb)   SpO2 98%   BMI 30.71 kg/m²    Wt Readings from Last 3 Encounters:   05/13/24 91.6 kg (202 lb)   03/25/24 92.5 kg (204 lb)   03/20/24 92.1 kg (203 lb)        Physical Exam  Vitals reviewed.   Constitutional:       Appearance: Normal appearance.   Cardiovascular:      Rate and Rhythm: Normal rate and regular rhythm.      Pulses: Normal pulses.      Heart sounds: Normal heart sounds.   Pulmonary:      Effort: Pulmonary effort is normal.      Breath sounds: Normal breath sounds.   Skin:     General: Skin is warm and dry.      Capillary Refill: Capillary refill takes less than 2 seconds.   Neurological:      General: No " focal deficit present.      Mental Status: She is alert and oriented to person, place, and time.   Psychiatric:         Mood and Affect: Mood normal.         Behavior: Behavior normal.     Diabetic Foot Exam    Patient's shoes and socks removed.    Right Foot/Ankle   Right Foot Inspection  Skin Exam: skin normal. Skin not intact, no dry skin, no warmth, no callus, no erythema, no maceration, no abnormal color, no pre-ulcer, no ulcer and no callus.     Toe Exam: ROM and strength within normal limits.     Sensory   Vibration: diminished  Monofilament testing: diminished    Vascular  Capillary refills: < 3 seconds  The right DP pulse is 1+.     Left Foot/Ankle  Left Foot Inspection  Skin Exam: skin normal. Skin not intact, no dry skin, no warmth, no erythema, no maceration, normal color, no pre-ulcer, no ulcer and no callus.     Toe Exam: ROM and strength within normal limits.     Sensory   Vibration: diminished  Monofilament testing: diminished    Vascular  Capillary refills: < 3 seconds  The left DP pulse is 1+.     Assign Risk Category  Deformity present  Loss of protective sensation  No weak pulses  Risk: 2        Labs:   Lab Results   Component Value Date    HGBA1C 7.6 (H) 12/08/2023    HGBA1C 9.0 (H) 09/07/2023    HGBA1C 12.2 (A) 06/27/2023     Lab Results   Component Value Date    CREATININE 0.78 12/08/2023    CREATININE 0.66 09/07/2023    CREATININE 0.76 07/27/2023    BUN 15 12/08/2023     09/11/2017    K 4.1 12/08/2023    CL 98 12/08/2023    CO2 30 12/08/2023     eGFR   Date Value Ref Range Status   12/08/2023 73 ml/min/1.73sq m Final     Lab Results   Component Value Date    CHOL 224 02/25/2015    HDL 45 (L) 12/08/2023    TRIG 92 12/08/2023     Lab Results   Component Value Date    ALT 16 12/08/2023    AST 16 12/08/2023    ALKPHOS 64 12/08/2023    BILITOT 0.4 02/25/2015     Lab Results   Component Value Date    GLD8URSTFBJB 2.241 09/07/2023    KPM4RLCPORVU 2.535 06/28/2023    FSP9YFYDMSTZ 1.890  04/26/2017     Lab Results   Component Value Date    FREET4 1.06 04/26/2017       Discussed with the patient and all questioned fully answered. She will call me if any problems arise.    Follow-up appointment in 3 months.     Counseled patient on diagnostic results, prognosis, risk and benefit of treatment options, instruction for management, importance of treatment compliance, Risk  factor reduction and impressions    RAJAN Alonso

## 2024-05-14 ENCOUNTER — TELEPHONE (OUTPATIENT)
Age: 79
End: 2024-05-14

## 2024-05-14 DIAGNOSIS — E53.8 VITAMIN B12 DEFICIENCY: Primary | ICD-10-CM

## 2024-05-14 RX ORDER — CHOLECALCIFEROL (VITAMIN D3) 125 MCG
500 CAPSULE ORAL DAILY
Qty: 90 TABLET | Refills: 1 | Status: SHIPPED | OUTPATIENT
Start: 2024-05-14

## 2024-05-14 NOTE — TELEPHONE ENCOUNTER
Please advise if normal tablets can be sent in instead of sublingual tablets as they're unavailable.

## 2024-05-15 NOTE — ASSESSMENT & PLAN NOTE
History of thyroid nodules FNA biopsy in 2014, 2019 were benign.   Denies neck compressive symptoms.   Scheduled for thyroid ultrasound by ENT.

## 2024-05-15 NOTE — ASSESSMENT & PLAN NOTE
Lab Results   Component Value Date    HGBA1C 7.6 (H) 12/08/2023     HGA1C was declined in office today.     BGL Reviewed: Continues on CGM, did not have device for download today    Treatment regimen: Mounjaro 12.5 mg weekly dose    Discussed risks/complications associated with uncontrolled diabetes including organ involvement, heart attack, stroke, death.    Advised lifestyle modifications including attention to diet including the amount and types of carbohydrates consumed and regular activity.     Call for blood sugars less than 70 mg/dl or patterns over 250 mg/dl.     Recommendation for medical identification either bracelet, necklace.    Routine follow up for diabetic eye and foot exams.     Ordered blood work to complete prior to next visit.    Send glucose logs/CGM download in 1-2 weeks for review    Follow up in 3 months.

## 2024-05-16 NOTE — TELEPHONE ENCOUNTER
PA for Tirzepatide 15 MG/0.5ML    Submitted via    []CMM-KEY   [x][employer]-Case ID # [id number]  []Faxed to plan   []Other website   []Phone call Case ID #     Office notes sent, clinical questions answered. Awaiting determination    Turnaround time for your insurance to make a decision on your Prior Authorization can take 7-21 business days.

## 2024-05-21 NOTE — TELEPHONE ENCOUNTER
PA for Tirzepatide 15 MG/0.5ML not required     Reason:  Close reason: Prior Authorization not required for patient/medication   Note from payer: Your PA has been resolved, no additional PA is required.  For further inquiries please contact the number on the back of the member prescription card     Message sent to provider pool yes

## 2024-06-10 ENCOUNTER — APPOINTMENT (OUTPATIENT)
Dept: LAB | Facility: HOSPITAL | Age: 79
End: 2024-06-10
Payer: MEDICARE

## 2024-06-10 DIAGNOSIS — E53.8 VITAMIN B12 DEFICIENCY: ICD-10-CM

## 2024-06-10 DIAGNOSIS — I10 BENIGN ESSENTIAL HYPERTENSION: ICD-10-CM

## 2024-06-10 DIAGNOSIS — E11.42 TYPE 2 DIABETES MELLITUS WITH DIABETIC POLYNEUROPATHY, WITHOUT LONG-TERM CURRENT USE OF INSULIN (HCC): ICD-10-CM

## 2024-06-10 DIAGNOSIS — E55.9 VITAMIN D DEFICIENCY: ICD-10-CM

## 2024-06-10 LAB
25(OH)D3 SERPL-MCNC: 51.2 NG/ML (ref 30–100)
ALBUMIN SERPL BCP-MCNC: 4.2 G/DL (ref 3.5–5)
ALP SERPL-CCNC: 57 U/L (ref 34–104)
ALT SERPL W P-5'-P-CCNC: 15 U/L (ref 7–52)
ANION GAP SERPL CALCULATED.3IONS-SCNC: 8 MMOL/L (ref 4–13)
AST SERPL W P-5'-P-CCNC: 17 U/L (ref 13–39)
BILIRUB SERPL-MCNC: 0.57 MG/DL (ref 0.2–1)
BUN SERPL-MCNC: 16 MG/DL (ref 5–25)
CALCIUM SERPL-MCNC: 10 MG/DL (ref 8.4–10.2)
CHLORIDE SERPL-SCNC: 101 MMOL/L (ref 96–108)
CHOLEST SERPL-MCNC: 126 MG/DL
CO2 SERPL-SCNC: 29 MMOL/L (ref 21–32)
CREAT SERPL-MCNC: 0.66 MG/DL (ref 0.6–1.3)
CREAT UR-MCNC: 66.8 MG/DL
EST. AVERAGE GLUCOSE BLD GHB EST-MCNC: 137 MG/DL
GFR SERPL CREATININE-BSD FRML MDRD: 84 ML/MIN/1.73SQ M
GLUCOSE P FAST SERPL-MCNC: 110 MG/DL (ref 65–99)
HBA1C MFR BLD: 6.4 %
HDLC SERPL-MCNC: 46 MG/DL
LDLC SERPL CALC-MCNC: 66 MG/DL (ref 0–100)
MICROALBUMIN UR-MCNC: 10.1 MG/L
MICROALBUMIN/CREAT 24H UR: 15 MG/G CREATININE (ref 0–30)
NONHDLC SERPL-MCNC: 80 MG/DL
POTASSIUM SERPL-SCNC: 4.4 MMOL/L (ref 3.5–5.3)
PROT SERPL-MCNC: 7.4 G/DL (ref 6.4–8.4)
SODIUM SERPL-SCNC: 138 MMOL/L (ref 135–147)
TRIGL SERPL-MCNC: 72 MG/DL
VIT B12 SERPL-MCNC: 551 PG/ML (ref 180–914)

## 2024-06-10 PROCEDURE — 80053 COMPREHEN METABOLIC PANEL: CPT

## 2024-06-10 PROCEDURE — 82043 UR ALBUMIN QUANTITATIVE: CPT

## 2024-06-10 PROCEDURE — 82570 ASSAY OF URINE CREATININE: CPT

## 2024-06-10 PROCEDURE — 83036 HEMOGLOBIN GLYCOSYLATED A1C: CPT

## 2024-06-10 PROCEDURE — 80061 LIPID PANEL: CPT

## 2024-06-10 PROCEDURE — 82306 VITAMIN D 25 HYDROXY: CPT

## 2024-06-10 PROCEDURE — 36415 COLL VENOUS BLD VENIPUNCTURE: CPT

## 2024-06-10 PROCEDURE — 82607 VITAMIN B-12: CPT

## 2024-06-29 DIAGNOSIS — E55.9 VITAMIN D DEFICIENCY: ICD-10-CM

## 2024-07-01 DIAGNOSIS — E78.2 HYPERLIPIDEMIA, MIXED: ICD-10-CM

## 2024-07-01 DIAGNOSIS — E11.42 TYPE 2 DIABETES MELLITUS WITH DIABETIC POLYNEUROPATHY, WITHOUT LONG-TERM CURRENT USE OF INSULIN (HCC): ICD-10-CM

## 2024-07-01 RX ORDER — ERGOCALCIFEROL 1.25 MG/1
CAPSULE ORAL
Qty: 4 CAPSULE | Refills: 5 | Status: SHIPPED | OUTPATIENT
Start: 2024-07-01

## 2024-07-01 RX ORDER — ROSUVASTATIN CALCIUM 20 MG/1
20 TABLET, COATED ORAL DAILY
Qty: 30 TABLET | Refills: 5 | Status: SHIPPED | OUTPATIENT
Start: 2024-07-01

## 2024-07-10 ENCOUNTER — OFFICE VISIT (OUTPATIENT)
Dept: ENDOCRINOLOGY | Facility: CLINIC | Age: 79
End: 2024-07-10
Payer: MEDICARE

## 2024-07-10 ENCOUNTER — TELEPHONE (OUTPATIENT)
Dept: ENDOCRINOLOGY | Facility: CLINIC | Age: 79
End: 2024-07-10

## 2024-07-10 VITALS
OXYGEN SATURATION: 98 % | DIASTOLIC BLOOD PRESSURE: 80 MMHG | BODY MASS INDEX: 30.31 KG/M2 | HEART RATE: 80 BPM | SYSTOLIC BLOOD PRESSURE: 145 MMHG | HEIGHT: 68 IN | WEIGHT: 200 LBS

## 2024-07-10 DIAGNOSIS — E78.2 HYPERLIPIDEMIA, MIXED: ICD-10-CM

## 2024-07-10 DIAGNOSIS — E04.2 MULTIPLE THYROID NODULES: ICD-10-CM

## 2024-07-10 DIAGNOSIS — E11.42 DIABETIC POLYNEUROPATHY ASSOCIATED WITH TYPE 2 DIABETES MELLITUS (HCC): ICD-10-CM

## 2024-07-10 DIAGNOSIS — E11.42 TYPE 2 DIABETES MELLITUS WITH DIABETIC POLYNEUROPATHY, WITHOUT LONG-TERM CURRENT USE OF INSULIN (HCC): Primary | ICD-10-CM

## 2024-07-10 DIAGNOSIS — R20.0 NUMBNESS AND TINGLING: ICD-10-CM

## 2024-07-10 DIAGNOSIS — E55.9 VITAMIN D DEFICIENCY: ICD-10-CM

## 2024-07-10 DIAGNOSIS — E66.9 CLASS 1 OBESITY WITH SERIOUS COMORBIDITY AND BODY MASS INDEX (BMI) OF 30.0 TO 30.9 IN ADULT, UNSPECIFIED OBESITY TYPE: ICD-10-CM

## 2024-07-10 DIAGNOSIS — I10 BENIGN ESSENTIAL HYPERTENSION: ICD-10-CM

## 2024-07-10 DIAGNOSIS — R20.2 NUMBNESS AND TINGLING: ICD-10-CM

## 2024-07-10 PROCEDURE — 95251 CONT GLUC MNTR ANALYSIS I&R: CPT | Performed by: INTERNAL MEDICINE

## 2024-07-10 PROCEDURE — 99214 OFFICE O/P EST MOD 30 MIN: CPT | Performed by: INTERNAL MEDICINE

## 2024-07-10 NOTE — PATIENT INSTRUCTIONS
Continue current medications   Recommend ensuring adequate protein intake in the diet   Being referred to podiatry  Please follow-up with your cardiologist regarding the chest pain  Keep well-hydrated  Exercise as tolerated  Follow-up in 3 months  Thyroid Ultrasound as scheduled

## 2024-07-10 NOTE — PROGRESS NOTES
Elisabeth Ruzicka Dempsey 79 y.o. female MRN: [id number]    Encounter: [id number]      Assessment & Plan     Type 2 diabetes mellitus not on long term insulin therapy   Diabetic retinopathy, macular edema  Neuropathy  Obesity, lost weight BMI 30   Last A1c 6.4%, well controlled improved     Recommend the following at this time  Continue current medications   Recommend ensuring adequate protein intake in the diet; Exercise as tolerated  - referred to podiatry  - Continue follow up with ophthalmology   Follow-up in 3 months with labs     5. Hyperlipidemia  - continue statin therapy    6. Hypertension  Blood pressure close to goal   - continue current medications including ACE-I/ ARB    7. Intermittent chest pain   - follow-up with cardiology    8.  Nodules  Scheduled for repeat thyroid ultrasound  -Check TSH, free T4  CC: Diabetes    History of Present Illness     HPI:  Elisabeth Ruzicka Dempsey is a 79 y.o. female presents for a follow-up visit regarding diabetes management.     Last seen in 5/2024  Last Eye exam: cataract surgery on April 1st and 15th, then noted to have worsening of diabetic macular edema and severe non proliferative retinopathy s/p Anti VEGF injections X 2 , is following up every 6 weeks     Current regimen:   Mounjaro 15 mg weekly  - tolerating well. No side effects; has constipation previously, has been having more fiber with improvement     Also with worsening diabetic neuropathy takes a few seconds to find the breaks, foot slips off, has been advised not to drive herself. Follows up with podiatry at [place],. Last 8 months ago   Some throbbing sensation at night, altered sensations, no pain   Feels unsteady on her feet   Eats 2-4 small meals per day   Lost few additional lbs ( has been on mounjaro for approx 1 years 233 lbs --> 195 lbs at home)    Exercise: no formal exercise; Housework  Had some CP crushing type, NO SOB  - following up with cardiology   Still gets  lightheaded/dizzy    Statin:  rosuvastatin  ACE-I/ARB:  losartan     Home glucose monitoring: CGM interpretation   Dexcom   Time percentage CGM worn  93%   Time in range 94% (goal >65-70%)  High 6%  Very high 0%  Low 0%  Very low 0%     SD 23 (goal <50)     Average glucose 135 mg/dL  GMI 6.5 %     Symptoms of hypoglycemia :  no lows     All other systems were reviewed and are negative.    Review of Systems      Historical Information   Past Medical History:   Diagnosis Date    Cancer (HCC)     Diabetes mellitus (HCC)     Dizziness     Endometrial carcinoma (HCC)     Hypertension     Obesity     Rectal bleeding 09/10/2019    Sciatic radiculitis 01/28/2021    Tonsillitis     Varicella      Past Surgical History:   Procedure Laterality Date    HYSTERECTOMY  2006    TONSILLECTOMY  1962    TUBAL LIGATION  1992    US GUIDED THYROID BIOPSY  12/12/2019     Social History   Social History     Substance and Sexual Activity   Alcohol Use Not Currently     Social History     Substance and Sexual Activity   Drug Use Never     Social History     Tobacco Use   Smoking Status Never   Smokeless Tobacco Never     Family History:   Family History   Problem Relation Age of Onset    Stroke Mother     Diabetes Mother     Cancer Mother     Diabetes unspecified Mother     Lung cancer Father     Breast cancer Sister 78        Half-sister    Dementia Sister     No Known Problems Sister     COPD Brother     Coronary artery disease Brother     Heart failure Brother     HIV Brother     Cancer Family     No Known Problems Daughter     Leukemia Son     Cancer Son         Leukemia age 19    No Known Problems Son        Meds/Allergies   Current Outpatient Medications   Medication Sig Dispense Refill    Blood Glucose Monitoring Suppl (ONE TOUCH ULTRA 2) w/Device KIT       bromfenac sodium 0.07 % SOLN       Continuous Blood Gluc  (Dexcom G7 ) NAHID Use 1 each continuous 1 each 0    Continuous Glucose Sensor (Dexcom G7 Sensor) Use 1  "Device every 10 days 3 each 5    EPINEPHrine (EPIPEN) 0.3 mg/0.3 mL SOAJ Inject 0.3 mL (0.3 mg total) into a muscle once for 1 dose 0.6 mL 0    ergocalciferol (VITAMIN D2) 50,000 units TAKE 1 CAPSULE BY MOUTH ONE TIME PER WEEK 4 capsule 5    glucose blood (OneTouch Ultra) test strip Use to check sugars three times a day Dx uncontrolled DM2 E 11.65 200 strip 5    losartan-hydrochlorothiazide (HYZAAR) 50-12.5 mg per tablet Take 1 tablet by mouth daily 90 tablet 2    prednisoLONE acetate (PRED FORTE) 1 % ophthalmic suspension       rosuvastatin (CRESTOR) 20 MG tablet TAKE 1 TABLET BY MOUTH EVERY DAY 30 tablet 5    tirzepatide 15 MG/0.5ML Inject 0.5 mL (15 mg total) under the skin every 7 days 6 mL 1    vitamin B-12 (VITAMIN B-12) 500 mcg tablet Take 1 tablet (500 mcg total) by mouth daily Over the counter 90 tablet 1    aspirin 81 mg chewable tablet Chew 1 tablet (81 mg total) daily (Patient not taking: Reported on 3/20/2024) 30 tablet 0     No current facility-administered medications for this visit.     Allergies   Allergen Reactions    Bee Venom Hives and Swelling     Yellow jackets and wasps    Gabapentin Hives       Objective   Vitals: Blood pressure 145/80, pulse 80, height 5' 8\" (1.727 m), weight 90.7 kg (200 lb), SpO2 98%.    Physical Exam  Constitutional:       Appearance: She is well-developed.   HENT:      Head: Normocephalic and atraumatic.   Eyes:      Conjunctiva/sclera: Conjunctivae normal.   Neck:      Thyroid: No thyromegaly.   Cardiovascular:      Rate and Rhythm: Normal rate and regular rhythm.      Heart sounds: Normal heart sounds. No murmur heard.  Pulmonary:      Effort: Pulmonary effort is normal.      Breath sounds: Normal breath sounds. No wheezing.   Abdominal:      General: There is no distension.      Palpations: Abdomen is soft.      Tenderness: There is no abdominal tenderness.   Musculoskeletal:         General: Normal range of motion.      Cervical back: Normal range of motion and neck " supple.   Skin:     General: Skin is warm and dry.   Neurological:      Mental Status: She is alert and oriented to person, place, and time.   Psychiatric:         Behavior: Behavior normal.       The history was obtained from the review of the chart, patient.    Lab Results:   Lab Results   Component Value Date/Time    Hemoglobin A1C 6.4 (H) 06/10/2024 08:21 AM    Hemoglobin A1C 7.6 (H) 12/08/2023 07:53 AM    Hemoglobin A1C 9.0 (H) 09/07/2023 07:52 AM    WBC 7.65 12/08/2023 07:53 AM    WBC 7.84 09/07/2023 07:52 AM    WBC 9.30 07/27/2023 05:12 PM    Hemoglobin 15.2 12/08/2023 07:53 AM    Hemoglobin 15.6 (H) 09/07/2023 07:52 AM    Hemoglobin 15.2 07/27/2023 05:12 PM    Hematocrit 45.9 12/08/2023 07:53 AM    Hematocrit 45.5 09/07/2023 07:52 AM    Hematocrit 45.0 07/27/2023 05:12 PM    MCV 85 12/08/2023 07:53 AM    MCV 84 09/07/2023 07:52 AM    MCV 82 07/27/2023 05:12 PM    Platelets 293 12/08/2023 07:53 AM    Platelets 292 09/07/2023 07:52 AM    Platelets 307 07/27/2023 05:12 PM    BUN 16 06/10/2024 08:21 AM    BUN 15 12/08/2023 07:53 AM    BUN 16 09/07/2023 07:52 AM    Potassium 4.4 06/10/2024 08:21 AM    Potassium 4.1 12/08/2023 07:53 AM    Potassium 4.3 09/07/2023 07:52 AM    Chloride 101 06/10/2024 08:21 AM    Chloride 98 12/08/2023 07:53 AM    Chloride 101 09/07/2023 07:52 AM    CO2 29 06/10/2024 08:21 AM    CO2 30 12/08/2023 07:53 AM    CO2 26 09/07/2023 07:52 AM    Creatinine 0.66 06/10/2024 08:21 AM    Creatinine 0.78 12/08/2023 07:53 AM    Creatinine 0.66 09/07/2023 07:52 AM    AST 17 06/10/2024 08:21 AM    AST 16 12/08/2023 07:53 AM    AST 14 09/07/2023 07:52 AM    ALT 15 06/10/2024 08:21 AM    ALT 16 12/08/2023 07:53 AM    ALT 14 09/07/2023 07:52 AM    Total Protein 7.4 06/10/2024 08:21 AM    Total Protein 7.8 12/08/2023 07:53 AM    Total Protein 7.3 09/07/2023 07:52 AM    Albumin 4.2 06/10/2024 08:21 AM    Albumin 4.4 12/08/2023 07:53 AM    Albumin 4.3 09/07/2023 07:52 AM    HDL, Direct 46 (L) 06/10/2024  "08:21 AM    HDL, Direct 45 (L) 12/08/2023 07:53 AM    Triglycerides 72 06/10/2024 08:21 AM    Triglycerides 92 12/08/2023 07:53 AM         Imaging Studies: I have personally reviewed pertinent reports.      Portions of the record may have been created with voice recognition software. Occasional wrong word or \"sound a like\" substitutions may have occurred due to the inherent limitations of voice recognition software. Read the chart carefully and recognize, using context, where substitutions have occurred.     "

## 2024-07-15 ENCOUNTER — HOSPITAL ENCOUNTER (OUTPATIENT)
Dept: RADIOLOGY | Facility: HOSPITAL | Age: 79
Discharge: HOME | End: 2024-07-15
Payer: MEDICARE

## 2024-07-15 DIAGNOSIS — R91.1 LUNG NODULE: ICD-10-CM

## 2024-07-15 PROCEDURE — 71250 CT THORAX DX C-: CPT

## 2024-07-15 PROCEDURE — G1004 CDSM NDSC: HCPCS

## 2024-07-16 ENCOUNTER — TELEPHONE (OUTPATIENT)
Dept: NEUROLOGY | Facility: CLINIC | Age: 79
End: 2024-07-16

## 2024-07-22 ENCOUNTER — TELEPHONE (OUTPATIENT)
Age: 79
End: 2024-07-22

## 2024-07-22 NOTE — TELEPHONE ENCOUNTER
The pt called as she stated Dr. Casey mentioned to her about lyft to help her get to and from appts. She will need to be set up for a ride to and from her appt on 10-28-24. I did tell her someone from the office would reach out but not until much closer to the appt date. The pt verbalized understanding.

## 2024-08-05 ENCOUNTER — RA CDI HCC (OUTPATIENT)
Dept: OTHER | Facility: HOSPITAL | Age: 79
End: 2024-08-05

## 2024-08-05 NOTE — PROGRESS NOTES
E11.3413  HCC coding opportunities          Chart Reviewed number of suggestions sent to Provider: 1     Patients Insurance     Medicare Insurance: Medicare

## 2024-08-16 ENCOUNTER — OFFICE VISIT (OUTPATIENT)
Dept: FAMILY MEDICINE CLINIC | Facility: CLINIC | Age: 79
End: 2024-08-16
Payer: MEDICARE

## 2024-08-16 VITALS
RESPIRATION RATE: 16 BRPM | OXYGEN SATURATION: 96 % | SYSTOLIC BLOOD PRESSURE: 124 MMHG | HEART RATE: 84 BPM | BODY MASS INDEX: 29.83 KG/M2 | WEIGHT: 196.8 LBS | DIASTOLIC BLOOD PRESSURE: 74 MMHG | TEMPERATURE: 98.6 F | HEIGHT: 68 IN

## 2024-08-16 DIAGNOSIS — E11.42 TYPE 2 DIABETES MELLITUS WITH DIABETIC POLYNEUROPATHY, WITHOUT LONG-TERM CURRENT USE OF INSULIN (HCC): ICD-10-CM

## 2024-08-16 DIAGNOSIS — E78.2 HYPERLIPIDEMIA, MIXED: ICD-10-CM

## 2024-08-16 DIAGNOSIS — Z78.0 OSTEOPENIA AFTER MENOPAUSE: ICD-10-CM

## 2024-08-16 DIAGNOSIS — R07.9 CHEST PAIN, UNSPECIFIED TYPE: ICD-10-CM

## 2024-08-16 DIAGNOSIS — Z23 ENCOUNTER FOR IMMUNIZATION: ICD-10-CM

## 2024-08-16 DIAGNOSIS — Z71.89 COUNSELING REGARDING ADVANCED DIRECTIVES: ICD-10-CM

## 2024-08-16 DIAGNOSIS — I10 BENIGN ESSENTIAL HYPERTENSION: ICD-10-CM

## 2024-08-16 DIAGNOSIS — M85.80 OSTEOPENIA AFTER MENOPAUSE: ICD-10-CM

## 2024-08-16 DIAGNOSIS — E55.9 VITAMIN D DEFICIENCY: ICD-10-CM

## 2024-08-16 DIAGNOSIS — Z86.39 HISTORY OF OBESITY: ICD-10-CM

## 2024-08-16 DIAGNOSIS — E04.2 MULTIPLE THYROID NODULES: ICD-10-CM

## 2024-08-16 DIAGNOSIS — Z00.00 ENCOUNTER FOR MEDICARE ANNUAL WELLNESS EXAM: Primary | ICD-10-CM

## 2024-08-16 DIAGNOSIS — Z12.31 ENCOUNTER FOR SCREENING MAMMOGRAM FOR BREAST CANCER: ICD-10-CM

## 2024-08-16 PROCEDURE — G0439 PPPS, SUBSEQ VISIT: HCPCS | Performed by: FAMILY MEDICINE

## 2024-08-16 PROCEDURE — 93000 ELECTROCARDIOGRAM COMPLETE: CPT | Performed by: FAMILY MEDICINE

## 2024-08-16 PROCEDURE — 99214 OFFICE O/P EST MOD 30 MIN: CPT | Performed by: FAMILY MEDICINE

## 2024-08-16 NOTE — ASSESSMENT & PLAN NOTE
Patient doing an amazing job with weight loss and improving labs with healthy diet, exercise and Mounjaro. Tolerating medication well.

## 2024-08-16 NOTE — PROGRESS NOTES
Ambulatory Visit  Name: Elisabeth Ruzicka Dempsey      : 1945      MRN: [id number]  Encounter Provider: Ashli Gill DO  Encounter Date: 2024   Encounter department: [facility]    Assessment & Plan   1. Encounter for Medicare annual wellness exam  2. Chest pain, unspecified type  -     POCT ECG  3. Counseling regarding advanced directives  Assessment & Plan:  Copy of advance directive added to the chart today.  Patient confirms she wishes to be a DNR and her  is medical power of  followed by her daughter.       4. History of obesity  Assessment & Plan:  Patient doing an amazing job with weight loss and improving labs with healthy diet, exercise and Mounjaro. Tolerating medication well.       5. Type 2 diabetes mellitus with diabetic polyneuropathy, without long-term current use of insulin (HCC)  Assessment & Plan:  Hemoglobin A1c now at goal.  Lab Results   Component Value Date    HGBA1C 6.4 (H) 06/10/2024            6. Benign essential hypertension  Assessment & Plan:  Blood pressure well-controlled.       7. Hyperlipidemia, mixed  Assessment & Plan:  LDL at goal of less than 70.       8. Vitamin D deficiency  Assessment & Plan:  Well-controlled.       9. Osteopenia after menopause  Assessment & Plan:  DEXA due 2025         10. Multiple thyroid nodules  Assessment & Plan:  Thyroid ultrasound ordered.       11. Encounter for screening mammogram for breast cancer  12. Encounter for immunization       Preventive health issues were discussed with patient, and age appropriate screening tests were ordered as noted in patient's After Visit Summary. Personalized health advice and appropriate referrals for health education or preventive services given if needed, as noted in patient's After Visit Summary.    History of Present Illness     Chest Pain        Patient Care Team:  Ashli Gill DO as PCP - General (Family Medicine)  MD Booker Quinones  Preethi Heller (Optometry)  Rashida Wahl as Diabetes Educator (Diabetes Services)  RAJAN Dawkins (Nurse Practitioner)  Kathia Casey MD (Endocrinology)  Licha Esquivel DO (Cardiology)    Review of Systems   Cardiovascular:  Positive for chest pain.     Medical History Reviewed by provider this encounter:       Annual Wellness Visit Questionnaire   Cherrie is here for her Subsequent Wellness visit.     Health Risk Assessment:   Patient rates overall health as good. Patient feels that their physical health rating is slightly better. Patient is satisfied with their life. Eyesight was rated as slightly worse. Hearing was rated as same. Patient feels that their emotional and mental health rating is same. Patients states they are sometimes angry. Patient states they are sometimes unusually tired/fatigued. Pain experienced in the last 7 days has been some. Patient's pain rating has been 1/10. Patient states that she has experienced no weight loss or gain in last 6 months.     Depression Screening:   PHQ-2 Score: 0      Fall Risk Screening:   In the past year, patient has experienced: no history of falling in past year      Urinary Incontinence Screening:   Patient has leaked urine accidently in the last six months.     Home Safety:  Patient does not have trouble with stairs inside or outside of their home. Patient has working smoke alarms and has working carbon monoxide detector. Home safety hazards include: loose rugs on the floor.     Nutrition:   Current diet is Diabetic.     Medications:   Patient is not currently taking any over-the-counter supplements. Patient is able to manage medications.     Activities of Daily Living (ADLs)/Instrumental Activities of Daily Living (IADLs):   Walk and transfer into and out of bed and chair?: Yes  Dress and groom yourself?: Yes    Bathe or shower yourself?: Yes    Feed yourself? Yes  Do your laundry/housekeeping?: Yes  Manage your money, pay your bills and track your expenses?:  Yes  Make your own meals?: Yes    Do your own shopping?: Yes    Durable Medical Equipment Suppliers  none    Previous Hospitalizations:   Any hospitalizations or ED visits within the last 12 months?: No      Advance Care Planning:   Living will: Yes    Durable POA for healthcare: Yes    Advanced directive: Yes    Advanced directive counseling given: Yes    End of Life Decisions reviewed with patient: Yes      Comments: Paperwork scanned into chart today.  Confirmed patient is a DNR,  is medical power of  followed by her daughter.    Cognitive Screening:   Provider or family/friend/caregiver concerned regarding cognition?: No    PREVENTIVE SCREENINGS      Cardiovascular Screening:    General: Screening Not Indicated and History Lipid Disorder      Diabetes Screening:     General: Screening Not Indicated and History Diabetes      Colorectal Cancer Screening:     General: Screening Current      Breast Cancer Screening:     General: Screening Current      Cervical Cancer Screening:    General: Screening Not Indicated      Lung Cancer Screening:     General: Screening Not Indicated      Hepatitis C Screening:    General: Screening Current    Screening, Brief Intervention, and Referral to Treatment (SBIRT)    Screening  Typical number of drinks in a day: 0  Typical number of drinks in a week: 0  Interpretation: Low risk drinking behavior.    AUDIT-C Screenin) How often did you have a drink containing alcohol in the past year? never  2) How many drinks did you have on a typical day when you were drinking in the past year? 0  3) How often did you have 6 or more drinks on one occasion in the past year? never    AUDIT-C Score: 0  Interpretation: Score 0-2 (female): Negative screen for alcohol misuse    Single Item Drug Screening:  How often have you used an illegal drug (including marijuana) or a prescription medication for non-medical reasons in the past year? never    Single Item Drug Screen Score:  "0  Interpretation: Negative screen for possible drug use disorder    Social Determinants of Health     Financial Resource Strain: Low Risk  (6/27/2023)    Overall Financial Resource Strain (CARDIA)     Difficulty of Paying Living Expenses: Not hard at all   Food Insecurity: No Food Insecurity (8/10/2024)    Hunger Vital Sign     Worried About Running Out of Food in the Last Year: Never true     Ran Out of Food in the Last Year: Never true   Transportation Needs: No Transportation Needs (8/10/2024)    PRAPARE - Transportation     Lack of Transportation (Medical): No     Lack of Transportation (Non-Medical): No   Housing Stability: Low Risk  (8/10/2024)    Housing Stability Vital Sign     Unable to Pay for Housing in the Last Year: No     Number of Times Moved in the Last Year: 0     Homeless in the Last Year: No   Utilities: Not At Risk (8/10/2024)    [facility] Utilities     Threatened with loss of utilities: No     No results found.    Objective     /74 (BP Location: Left arm, Patient Position: Sitting, Cuff Size: Standard)   Pulse 84   Temp 98.6 °F (37 °C) (Temporal)   Resp 16   Ht 5' 8\" (1.727 m)   Wt 89.3 kg (196 lb 12.8 oz)   SpO2 96%   BMI 29.92 kg/m²     Physical Exam      "

## 2024-08-16 NOTE — PROGRESS NOTES
Assessment & Plan  Encounter for Medicare annual wellness exam  See other note       Chest pain, unspecified type  EKG today with no acute ST changes.  Patient able to endure a lot of physical activity without chest pain.  Suspect this is due to to anxiety.  Recommend she follow-up with her cardiologist.  Copy of today's EKG sent to them.  Orders:    POCT ECG    Counseling regarding advanced directives  Copy of advance directive added to the chart today.  Patient confirms she wishes to be a DNR and her  is medical power of  followed by her daughter.       History of obesity  Patient doing an amazing job with weight loss and improving labs with healthy diet, exercise and Mounjaro. Tolerating medication well.       Type 2 diabetes mellitus with diabetic polyneuropathy, without long-term current use of insulin (HCC)  Hemoglobin A1c now at goal.  Lab Results   Component Value Date    HGBA1C 6.4 (H) 06/10/2024            Benign essential hypertension  Blood pressure well-controlled.       Hyperlipidemia, mixed  LDL at goal of less than 70.       Vitamin D deficiency  Well-controlled.       Osteopenia after menopause  DEXA due November 2025         Multiple thyroid nodules  Thyroid ultrasound ordered.       Encounter for screening mammogram for breast cancer  Patient declines further mammograms.       Encounter for immunization  Up to date.            Return in about 6 months (around 2/16/2025) for CC.    Subjective:   Cherrie is a 79 y.o. female here today for a follow-up on her current medical conditions:    Patient Active Problem List   Diagnosis    Benign essential hypertension    Carpal tunnel syndrome    Decreased dorsalis pedis pulse    Type 2 diabetes mellitus, without long-term current use of insulin (HCC)    Hyperlipidemia, mixed    Multiple thyroid nodules    Synovial cyst    Vitamin D deficiency    Fatigue    Chronic sacroiliac strain    Diabetic polyneuropathy associated with type 2  diabetes mellitus (HCC)    Dizziness    Seasonal allergies    Lung nodule    Sensorineural hearing loss (SNHL), bilateral    BPPV (benign paroxysmal positional vertigo)    Counseling regarding advanced directives    Change in bowel habits    History of colon polyps    Osteopenia after menopause    Vitamin B12 deficiency    History of obesity    Numbness and tingling         Patient Care Team:  Ashli Gill DO as PCP - General (Family Medicine)  MD Booker Quinones Od (Optometry)  Rashida Wahl as Diabetes Educator (Diabetes Services)  RAJAN Dawkins (Nurse Practitioner)  Kathia Casey MD (Endocrinology)  Licha Esquivel DO (Cardiology)    Current Medications:  Current Outpatient Medications   Medication Sig Dispense Refill    aspirin 81 mg chewable tablet Chew 1 tablet (81 mg total) daily 30 tablet 0    Blood Glucose Monitoring Suppl (ONE TOUCH ULTRA 2) w/Device KIT       bromfenac sodium 0.07 % SOLN       Continuous Blood Gluc  (Dexcom G7 ) NAHID Use 1 each continuous 1 each 0    Continuous Glucose Sensor (Dexcom G7 Sensor) Use 1 Device every 10 days 3 each 5    EPINEPHrine (EPIPEN) 0.3 mg/0.3 mL SOAJ Inject 0.3 mL (0.3 mg total) into a muscle once for 1 dose 0.6 mL 0    ergocalciferol (VITAMIN D2) 50,000 units TAKE 1 CAPSULE BY MOUTH ONE TIME PER WEEK 4 capsule 5    glucose blood (OneTouch Ultra) test strip Use to check sugars three times a day Dx uncontrolled DM2 E 11.65 200 strip 5    losartan-hydrochlorothiazide (HYZAAR) 50-12.5 mg per tablet Take 1 tablet by mouth daily 90 tablet 2    prednisoLONE acetate (PRED FORTE) 1 % ophthalmic suspension       rosuvastatin (CRESTOR) 20 MG tablet TAKE 1 TABLET BY MOUTH EVERY DAY 30 tablet 5    tirzepatide 15 MG/0.5ML Inject 0.5 mL (15 mg total) under the skin every 7 days 6 mL 1    vitamin B-12 (VITAMIN B-12) 500 mcg tablet Take 1 tablet (500 mcg total) by mouth daily Over the counter 90 tablet 1     No current facility-administered  medications for this visit.       HPI:  Chief Complaint   Patient presents with    Medicare Wellness Visit    Chest Pain     Ongoing since 07/2023. Patient states that she didn't have any problems with her heart and completed a stress test. Patient states that was fine. Patient sees Cardiologist regularly and is willing to go see the specialist if need be. Patient states it is on and off, patient states that is could be related to stress. Patient states that she has recently lost her independence and unable to drive. Has been having some health concerns.      -- Above per clinical staff and reviewed. --    PHQ-2/9 Depression Screening    Little interest or pleasure in doing things: 0 - not at all  Feeling down, depressed, or hopeless: 0 - not at all  PHQ-2 Score: 0  PHQ-2 Interpretation: Negative depression screen       LIZZETH-7 Flowsheet Screening      Flowsheet Row Most Recent Value   Over the last two weeks, how often have you been bothered by the following problems?     Feeling nervous, anxious, or on edge 0    Not being able to stop or control worrying 0    Worrying too much about different things 0    Trouble relaxing  0    Being so restless that it's hard to sit still 0    Becoming easily annoyed or irritable  0    Feeling afraid as if something awful might happen 0    How difficult have these problems made it for you to do your work, take care of things at home, or get along with other people?  Not difficult at all    LIZZETH Score  0              CT scan July 2024 lung nodule stable, thyroid nodule, coronary calcifications   Needs depression screening, MMSE   12/7 HbA1C 9.0 to 7.6, sugar 144, cmp GFR 73, vit D 20.5 to 31.1, b12 271,  urine micro: cr improved 63 to 36, chol 217 to 126,  to 9  2,   ER 7/27/23 with CP when dtr was visiting. f/u with cardio. ordered stress test.   order labs for 10/23 (ordered cmp, lipids, A1C)   cmp HbA1C, urine micro, TSH PTH cbc vit D   last bw a year ago. HbA1C > 10. ASCVD  "risk > 43   struggling vertigo per    - used to do exercises with her head   resistent to meds. consider Januvia. woudl not take statin?. no more meds for bp. declined dexa. lung nodule - due for lung CT   declines multiple meds for DM2 due to fears of .   saw neuro - referred aki  k to ENT Emilie Zhang/ Dr. eBgum BPPV   concerned - Faheemjose, Frankie  BP, diabetes and not eating well   probes [facility] vision - only can see light   anger   Today:  Sees Dr. Robles   Last month saw endo   Still getting pressure in her chest   Does not happen with exertion   Happens with cooking or at the desk   States stress test had to be stopped \"she freaked out\" on treadmill and felt like she would fall backwards   Pain occur 3 times a month   Takes 2 asa and yoga   Chest pain, diaphoresis, shortness of breath or palpitations  Gets anxious once it happens, but not anxiety before that   Mows front yard push mower up the hill does not happen     Dexcom and checks her sugars often   Sugars run on average   Woke up twice but she was sleeping on meter   Average 125 or so   Mounjaro working well   Spends 2 hours cleaning her house daily     Takign b12 and vit D     Thyroid US     The following portions of the patient's history were reviewed and updated as appropriate: allergies, current medications, past family history, past medical history, past social history, past surgical history and problem list.    Objective:  Vitals:  /74 (BP Location: Left arm, Patient Position: Sitting, Cuff Size: Standard)   Pulse 84   Temp 98.6 °F (37 °C) (Temporal)   Resp 16   Ht 5' 8\" (1.727 m)   Wt 89.3 kg (196 lb 12.8 oz)   SpO2 96%   BMI 29.92 kg/m²    Wt Readings from Last 3 Encounters:   08/16/24 89.3 kg (196 lb 12.8 oz)   07/10/24 90.7 kg (200 lb)   05/13/24 91.6 kg (202 lb)      BP Readings from Last 3 Encounters:   08/16/24 124/74   07/10/24 145/80   05/13/24 145/80        Review of Systems   She has no other concerns. No " unexpected weight changes. +  chest pain, no SOB, or palpitations. No GERD. No changes in bowels or bladder. Sleeping well. No mood changes.     Physical Exam   Constitutional:  she appears well-developed and well-nourished.  HENT: Head: Normocephalic.   Neck: Neck supple.   Cardiovascular: Normal rate, regular rhythm and normal heart sounds.   Pulmonary/Chest: Effort normal and breath sounds normal. No wheezes, rales, or rhonchi.   Abdominal: Soft. Bowel sounds are normal. There is no tenderness. No hepatosplenomegaly.   Musculoskeletal: she exhibits no edema.   Lymphadenopathy: she has no cervical adenopathy.   Neurological: she is alert and oriented to person, place, and time.   Skin: Skin is warm and dry.   Psychiatric: she has a normal mood and affect. her behavior is normal. Thought content normal.

## 2024-08-16 NOTE — ASSESSMENT & PLAN NOTE
Copy of advance directive added to the chart today.  Patient confirms she wishes to be a DNR and her  is medical power of  followed by her daughter.

## 2024-08-16 NOTE — PATIENT INSTRUCTIONS
Medicare Preventive Visit Patient Instructions  Thank you for completing your Welcome to Medicare Visit or Medicare Annual Wellness Visit today. Your next wellness visit will be due in one year (8/17/2025).  The screening/preventive services that you may require over the next 5-10 years are detailed below. Some tests may not apply to you based off risk factors and/or age. Screening tests ordered at today's visit but not completed yet may show as past due. Also, please note that scanned in results may not display below.  Preventive Screenings:  Service Recommendations Previous Testing/Comments   Colorectal Cancer Screening  * Colonoscopy    * Fecal Occult Blood Test (FOBT)/Fecal Immunochemical Test (FIT)  * Fecal DNA/Cologuard Test  * Flexible Sigmoidoscopy Age: 45-75 years old   Colonoscopy: every 10 years (may be performed more frequently if at higher risk)  OR  FOBT/FIT: every 1 year  OR  Cologuard: every 3 years  OR  Sigmoidoscopy: every 5 years  Screening may be recommended earlier than age 45 if at higher risk for colorectal cancer. Also, an individualized decision between you and your healthcare provider will decide whether screening between the ages of 76-85 would be appropriate. Colonoscopy: 11/10/2023  FOBT/FIT: Not on file  Cologuard: Not on file  Sigmoidoscopy: Not on file    Screening Current     Breast Cancer Screening Age: 40+ years old  Frequency: every 1-2 years  Not required if history of left and right mastectomy Mammogram: 10/06/2023    Screening Current   Cervical Cancer Screening Between the ages of 21-29, pap smear recommended once every 3 years.   Between the ages of 30-65, can perform pap smear with HPV co-testing every 5 years.   Recommendations may differ for women with a history of total hysterectomy, cervical cancer, or abnormal pap smears in past. Pap Smear: 09/27/2023    Screening Not Indicated   Hepatitis C Screening Once for adults born between 1945 and 1965  More frequently in  patients at high risk for Hepatitis C Hep C Antibody: 05/24/2019    Screening Current   Diabetes Screening 1-2 times per year if you're at risk for diabetes or have pre-diabetes Fasting glucose: 110 mg/dL (6/10/2024)  A1C: 6.4 % (6/10/2024)  Screening Not Indicated  History Diabetes   Cholesterol Screening Once every 5 years if you don't have a lipid disorder. May order more often based on risk factors. Lipid panel: 06/10/2024    Screening Not Indicated  History Lipid Disorder     Other Preventive Screenings Covered by Medicare:  Abdominal Aortic Aneurysm (AAA) Screening: covered once if your at risk. You're considered to be at risk if you have a family history of AAA.  Lung Cancer Screening: covers low dose CT scan once per year if you meet all of the following conditions: (1) Age 55-77; (2) No signs or symptoms of lung cancer; (3) Current smoker or have quit smoking within the last 15 years; (4) You have a tobacco smoking history of at least 20 pack years (packs per day multiplied by number of years you smoked); (5) You get a written order from a healthcare provider.  Glaucoma Screening: covered annually if you're considered high risk: (1) You have diabetes OR (2) Family history of glaucoma OR (3)  aged 50 and older OR (4)  American aged 65 and older  Osteoporosis Screening: covered every 2 years if you meet one of the following conditions: (1) You're estrogen deficient and at risk for osteoporosis based off medical history and other findings; (2) Have a vertebral abnormality; (3) On glucocorticoid therapy for more than 3 months; (4) Have primary hyperparathyroidism; (5) On osteoporosis medications and need to assess response to drug therapy.   Last bone density test (DXA Scan): 11/20/2023.  HIV Screening: covered annually if you're between the age of 15-65. Also covered annually if you are younger than 15 and older than 65 with risk factors for HIV infection. For pregnant patients, it is  covered up to 3 times per pregnancy.    Immunizations:  Immunization Recommendations   Influenza Vaccine Annual influenza vaccination during flu season is recommended for all persons aged >= 6 months who do not have contraindications   Pneumococcal Vaccine   * Pneumococcal conjugate vaccine = PCV13 (Prevnar 13), PCV15 (Vaxneuvance), PCV20 (Prevnar 20)  * Pneumococcal polysaccharide vaccine = PPSV23 (Pneumovax) Adults 19-65 yo with certain risk factors or if 65+ yo  If never received any pneumonia vaccine: recommend Prevnar 20 (PCV20)  Give PCV20 if previously received 1 dose of PCV13 or PPSV23   Hepatitis B Vaccine 3 dose series if at intermediate or high risk (ex: diabetes, end stage renal disease, liver disease)   Respiratory syncytial virus (RSV) Vaccine - COVERED BY MEDICARE PART D  * RSVPreF3 (Arexvy) CDC recommends that adults 60 years of age and older may receive a single dose of RSV vaccine using shared clinical decision-making (SCDM)   Tetanus (Td) Vaccine - COST NOT COVERED BY MEDICARE PART B Following completion of primary series, a booster dose should be given every 10 years to maintain immunity against tetanus. Td may also be given as tetanus wound prophylaxis.   Tdap Vaccine - COST NOT COVERED BY MEDICARE PART B Recommended at least once for all adults. For pregnant patients, recommended with each pregnancy.   Shingles Vaccine (Shingrix) - COST NOT COVERED BY MEDICARE PART B  2 shot series recommended in those 19 years and older who have or will have weakened immune systems or those 50 years and older     Health Maintenance Due:      Topic Date Due   • Breast Cancer Screening: Mammogram  10/06/2024   • Hepatitis C Screening  Completed   • Colorectal Cancer Screening  Discontinued     Immunizations Due:      Topic Date Due   • COVID-19 Vaccine (9 - 2023-24 season) 11/29/2023   • Influenza Vaccine (1) 09/01/2024     Advance Directives   What are advance directives?  Advance directives are legal documents  that state your wishes and plans for medical care. These plans are made ahead of time in case you lose your ability to make decisions for yourself. Advance directives can apply to any medical decision, such as the treatments you want, and if you want to donate organs.   What are the types of advance directives?  There are many types of advance directives, and each state has rules about how to use them. You may choose a combination of any of the following:  Living will:  This is a written record of the treatment you want. You can also choose which treatments you do not want, which to limit, and which to stop at a certain time. This includes surgery, medicine, IV fluid, and tube feedings.   Durable power of  for healthcare (DPAHC):  This is a written record that states who you want to make healthcare choices for you when you are unable to make them for yourself. This person, called a proxy, is usually a family member or a friend. You may choose more than 1 proxy.  Do not resuscitate (DNR) order:  A DNR order is used in case your heart stops beating or you stop breathing. It is a request not to have certain forms of treatment, such as CPR. A DNR order may be included in other types of advance directives.  Medical directive:  This covers the care that you want if you are in a coma, near death, or unable to make decisions for yourself. You can list the treatments you want for each condition. Treatment may include pain medicine, surgery, blood transfusions, dialysis, IV or tube feedings, and a ventilator (breathing machine).  Values history:  This document has questions about your views, beliefs, and how you feel and think about life. This information can help others choose the care that you would choose.  Why are advance directives important?  An advance directive helps you control your care. Although spoken wishes may be used, it is better to have your wishes written down. Spoken wishes can be misunderstood, or  not followed. Treatments may be given even if you do not want them. An advance directive may make it easier for your family to make difficult choices about your care.   Urinary Incontinence   Urinary incontinence (UI)  is when you lose control of your bladder. UI develops because your bladder cannot store or empty urine properly. The 3 most common types of UI are stress incontinence, urge incontinence, or both.  Medicines:   May be given to help strengthen your bladder control. Report any side effects of medication to your healthcare provider.  Do pelvic muscle exercises often:  Your pelvic muscles help you stop urinating. Squeeze these muscles tight for 5 seconds, then relax for 5 seconds. Gradually work up to squeezing for 10 seconds. Do 3 sets of 15 repetitions a day, or as directed. This will help strengthen your pelvic muscles and improve bladder control.  Train your bladder:  Go to the bathroom at set times, such as every 2 hours, even if you do not feel the urge to go. You can also try to hold your urine when you feel the urge to go. For example, hold your urine for 5 minutes when you feel the urge to go. As that becomes easier, hold your urine for 10 minutes.   Self-care:   Keep a UI record.  Write down how often you leak urine and how much you leak. Make a note of what you were doing when you leaked urine.  Drink liquids as directed. You may need to limit the amount of liquid you drink to help control your urine leakage. Do not drink any liquid right before you go to bed. Limit or do not have drinks that contain caffeine or alcohol.   Prevent constipation.  Eat a variety of high-fiber foods. Good examples are high-fiber cereals, beans, vegetables, and whole-grain breads. Walking is the best way to trigger your intestines to have a bowel movement.  Exercise regularly and maintain a healthy weight.  Weight loss and exercise will decrease pressure on your bladder and help you control your leakage.   Use a  catheter as directed  to help empty your bladder. A catheter is a tiny, plastic tube that is put into your bladder to drain your urine.   Go to behavior therapy as directed.  Behavior therapy may be used to help you learn to control your urge to urinate.    Weight Management   Why it is important to manage your weight:  Being overweight increases your risk of health conditions such as heart disease, high blood pressure, type 2 diabetes, and certain types of cancer. It can also increase your risk for osteoarthritis, sleep apnea, and other respiratory problems. Aim for a slow, steady weight loss. Even a small amount of weight loss can lower your risk of health problems.  How to lose weight safely:  A safe and healthy way to lose weight is to eat fewer calories and get regular exercise. You can lose up about 1 pound a week by decreasing the number of calories you eat by 500 calories each day.   Healthy meal plan for weight management:  A healthy meal plan includes a variety of foods, contains fewer calories, and helps you stay healthy. A healthy meal plan includes the following:  Eat whole-grain foods more often.  A healthy meal plan should contain fiber. Fiber is the part of grains, fruits, and vegetables that is not broken down by your body. Whole-grain foods are healthy and provide extra fiber in your diet. Some examples of whole-grain foods are whole-wheat breads and pastas, oatmeal, brown rice, and bulgur.  Eat a variety of vegetables every day.  Include dark, leafy greens such as spinach, kale, rosey greens, and mustard greens. Eat yellow and orange vegetables such as carrots, sweet potatoes, and winter squash.   Eat a variety of fruits every day.  Choose fresh or canned fruit (canned in its own juice or light syrup) instead of juice. Fruit juice has very little or no fiber.  Eat low-fat dairy foods.  Drink fat-free (skim) milk or 1% milk. Eat fat-free yogurt and low-fat cottage cheese. Try low-fat cheeses such  as mozzarella and other reduced-fat cheeses.  Choose meat and other protein foods that are low in fat.  Choose beans or other legumes such as split peas or lentils. Choose fish, skinless poultry (chicken or turkey), or lean cuts of red meat (beef or pork). Before you cook meat or poultry, cut off any visible fat.   Use less fat and oil.  Try baking foods instead of frying them. Add less fat, such as margarine, sour cream, regular salad dressing and mayonnaise to foods. Eat fewer high-fat foods. Some examples of high-fat foods include french fries, doughnuts, ice cream, and cakes.  Eat fewer sweets.  Limit foods and drinks that are high in sugar. This includes candy, cookies, regular soda, and sweetened drinks.  Exercise:  Exercise at least 30 minutes per day on most days of the week. Some examples of exercise include walking, biking, dancing, and swimming. You can also fit in more physical activity by taking the stairs instead of the elevator or parking farther away from stores. Ask your healthcare provider about the best exercise plan for you.      © Copyright [employer] 2018 Information is for End User's use only and may not be sold, redistributed or otherwise used for commercial purposes. All illustrations and images included in CareNotes® are the copyrighted property of [employer] or [employer]

## 2024-08-19 ENCOUNTER — TELEPHONE (OUTPATIENT)
Dept: CARDIOLOGY CLINIC | Facility: CLINIC | Age: 79
End: 2024-08-19

## 2024-08-19 NOTE — TELEPHONE ENCOUNTER
RECEIVED EKG VIA FAX TODAY FROM [facility]:  CALLED PATIENT TO MAKE AN APPT: NO VM NO ANSWER

## 2024-08-27 ENCOUNTER — OFFICE VISIT (OUTPATIENT)
Age: 79
End: 2024-08-27
Payer: MEDICARE

## 2024-08-27 VITALS
BODY MASS INDEX: 29.7 KG/M2 | DIASTOLIC BLOOD PRESSURE: 78 MMHG | HEART RATE: 94 BPM | WEIGHT: 196 LBS | SYSTOLIC BLOOD PRESSURE: 121 MMHG | RESPIRATION RATE: 16 BRPM | HEIGHT: 68 IN

## 2024-08-27 DIAGNOSIS — E11.42 DIABETIC POLYNEUROPATHY ASSOCIATED WITH TYPE 2 DIABETES MELLITUS (HCC): ICD-10-CM

## 2024-08-27 DIAGNOSIS — I10 BENIGN ESSENTIAL HYPERTENSION: ICD-10-CM

## 2024-08-27 DIAGNOSIS — R20.0 NUMBNESS AND TINGLING: ICD-10-CM

## 2024-08-27 DIAGNOSIS — M21.962 ACQUIRED DEFORMITY OF BOTH FEET: ICD-10-CM

## 2024-08-27 DIAGNOSIS — M21.961 ACQUIRED DEFORMITY OF BOTH FEET: ICD-10-CM

## 2024-08-27 DIAGNOSIS — M77.42 METATARSALGIA OF BOTH FEET: Primary | ICD-10-CM

## 2024-08-27 DIAGNOSIS — E11.42 TYPE 2 DIABETES MELLITUS WITH DIABETIC POLYNEUROPATHY, WITHOUT LONG-TERM CURRENT USE OF INSULIN (HCC): ICD-10-CM

## 2024-08-27 DIAGNOSIS — E78.2 HYPERLIPIDEMIA, MIXED: ICD-10-CM

## 2024-08-27 DIAGNOSIS — M77.41 METATARSALGIA OF BOTH FEET: Primary | ICD-10-CM

## 2024-08-27 DIAGNOSIS — I70.209 PERIPHERAL ARTERIOSCLEROSIS (HCC): ICD-10-CM

## 2024-08-27 DIAGNOSIS — E55.9 VITAMIN D DEFICIENCY: ICD-10-CM

## 2024-08-27 DIAGNOSIS — R20.2 NUMBNESS AND TINGLING: ICD-10-CM

## 2024-08-27 DIAGNOSIS — M54.16 RADICULOPATHY OF LUMBAR REGION: ICD-10-CM

## 2024-08-27 DIAGNOSIS — R26.81 UNSTEADY GAIT: ICD-10-CM

## 2024-08-27 PROCEDURE — 99203 OFFICE O/P NEW LOW 30 MIN: CPT | Performed by: PODIATRIST

## 2024-08-27 RX ORDER — LOSARTAN POTASSIUM AND HYDROCHLOROTHIAZIDE 12.5; 5 MG/1; MG/1
1 TABLET ORAL DAILY
Qty: 90 TABLET | Refills: 1 | Status: SHIPPED | OUTPATIENT
Start: 2024-08-27

## 2024-08-27 NOTE — PROGRESS NOTES
Assessment/Plan:  Metatarsalgia.  Rule out radiculopathy versus diabetic neuropathy.  Possibly combination therein.  Unsteady gait.  Acquire deformity foot bilateral.  Mycosis of nail.    Plan.  Chart reviewed.  PCP notes reviewed.  Lab work reviewed.  Diabetic foot exam performed.  Patient does demonstrate some neuropathic change of her feet.  Rule out diabetic neuropathy and/or radiculopathy.  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  Patient advised on proper shoe gear selection.  No cutting instruction given.  Arterial Doppler ordered to evaluate for peripheral artery disease.  Patient be started on alpha lipoic acid.  She had gabapentin but could not tolerate it she may be allergic.  In addition spine x-rays ordered.  Return for follow-up.       Diagnoses and all orders for this visit:    Metatarsalgia of both feet    Type 2 diabetes mellitus with diabetic polyneuropathy, without long-term current use of insulin (HCC)  -     Ambulatory Referral to Podiatry    Benign essential hypertension  -     Ambulatory Referral to Podiatry    Diabetic polyneuropathy associated with type 2 diabetes mellitus (HCC)  -     Ambulatory Referral to Podiatry    Vitamin D deficiency  -     Ambulatory Referral to Podiatry    Hyperlipidemia, mixed  -     Ambulatory Referral to Podiatry    Numbness and tingling  -     Ambulatory Referral to Podiatry    Radiculopathy of lumbar region  -     XR spine lumbar minimum 4 views non injury; Future    Acquired deformity of both feet    Unsteady gait    Peripheral arteriosclerosis (HCC)  -     VAS ARTERIAL DUPLEX- LOWER LIMB BILATERAL; Future          Subjective: Patient is seen in referral.  Patient is diabetic.  She has numb feet.  She is unsteady in gait.  She has to use 2 canes.  Patient has history of polio.  She has low back pain.            Allergies   Allergen Reactions    Bee Venom Hives and Swelling     Yellow jackets and wasps    Gabapentin Hives         Current Outpatient Medications:      aspirin 81 mg chewable tablet, Chew 1 tablet (81 mg total) daily, Disp: 30 tablet, Rfl: 0    Blood Glucose Monitoring Suppl (ONE TOUCH ULTRA 2) w/Device KIT, , Disp: , Rfl:     bromfenac sodium 0.07 % SOLN, , Disp: , Rfl:     Continuous Blood Gluc  (Dexcom G7 ) NAHID, Use 1 each continuous, Disp: 1 each, Rfl: 0    Continuous Glucose Sensor (Dexcom G7 Sensor), Use 1 Device every 10 days, Disp: 3 each, Rfl: 5    EPINEPHrine (EPIPEN) 0.3 mg/0.3 mL SOAJ, Inject 0.3 mL (0.3 mg total) into a muscle once for 1 dose, Disp: 0.6 mL, Rfl: 0    ergocalciferol (VITAMIN D2) 50,000 units, TAKE 1 CAPSULE BY MOUTH ONE TIME PER WEEK, Disp: 4 capsule, Rfl: 5    glucose blood (OneTouch Ultra) test strip, Use to check sugars three times a day Dx uncontrolled DM2 E 11.65, Disp: 200 strip, Rfl: 5    losartan-hydrochlorothiazide (HYZAAR) 50-12.5 mg per tablet, Take 1 tablet by mouth daily, Disp: 90 tablet, Rfl: 2    prednisoLONE acetate (PRED FORTE) 1 % ophthalmic suspension, , Disp: , Rfl:     rosuvastatin (CRESTOR) 20 MG tablet, TAKE 1 TABLET BY MOUTH EVERY DAY, Disp: 30 tablet, Rfl: 5    tirzepatide 15 MG/0.5ML, Inject 0.5 mL (15 mg total) under the skin every 7 days, Disp: 6 mL, Rfl: 1    vitamin B-12 (VITAMIN B-12) 500 mcg tablet, Take 1 tablet (500 mcg total) by mouth daily Over the counter, Disp: 90 tablet, Rfl: 1    Patient Active Problem List   Diagnosis    Benign essential hypertension    Carpal tunnel syndrome    Decreased dorsalis pedis pulse    Type 2 diabetes mellitus, without long-term current use of insulin (HCC)    Hyperlipidemia, mixed    Multiple thyroid nodules    Synovial cyst    Vitamin D deficiency    Fatigue    Chronic sacroiliac strain    Diabetic polyneuropathy associated with type 2 diabetes mellitus (HCC)    Dizziness    Seasonal allergies    Lung nodule    Sensorineural hearing loss (SNHL), bilateral    BPPV (benign paroxysmal positional vertigo)    Counseling regarding advanced directives     Change in bowel habits    History of colon polyps    Osteopenia after menopause    Vitamin B12 deficiency    History of obesity    Numbness and tingling          Patient ID: Elisabeth Ruzicka Dempsey is a 79 y.o. female.    HPI    The following portions of the patient's history were reviewed and updated as appropriate: She  has a past medical history of Cancer (HCC), Diabetes mellitus (HCC), Dizziness, Endometrial carcinoma (HCC), Hypertension, Obesity, Rectal bleeding (09/10/2019), Sciatic radiculitis (01/28/2021), Tonsillitis, and Varicella.  She   Patient Active Problem List    Diagnosis Date Noted    Numbness and tingling 07/10/2024    Vitamin B12 deficiency 12/15/2023    Osteopenia after menopause 11/22/2023    Change in bowel habits 10/17/2023    History of colon polyps 10/17/2023    Counseling regarding advanced directives 06/29/2023    BPPV (benign paroxysmal positional vertigo) 09/29/2022    Sensorineural hearing loss (SNHL), bilateral 08/17/2022    Lung nodule 05/27/2022    Seasonal allergies 05/25/2022    Dizziness 05/24/2022    Chronic sacroiliac strain 03/05/2021    Diabetic polyneuropathy associated with type 2 diabetes mellitus (HCC) 03/05/2021    Fatigue 05/24/2019    History of obesity 09/11/2017    Benign essential hypertension 04/24/2017    Decreased dorsalis pedis pulse 04/24/2017    Hyperlipidemia, mixed 04/24/2017    Vitamin D deficiency 04/24/2017    Type 2 diabetes mellitus, without long-term current use of insulin (HCC) 01/04/2017    Multiple thyroid nodules 01/04/2017    Carpal tunnel syndrome 05/14/2014    Synovial cyst 04/23/2013     She  has a past surgical history that includes Hysterectomy (2006); Tonsillectomy (1962); Tubal ligation (1992); US guided thyroid biopsy (12/12/2019); and Eye surgery (Bilateral).  Her family history includes Breast cancer (age of onset: 78) in her sister; COPD in her brother; Cancer in her family, mother, and son; Coronary artery disease in her brother;  Dementia in her sister; Diabetes in her mother; Diabetes unspecified in her mother; HIV in her brother; Heart failure in her brother; Leukemia in her son; Lung cancer in her father; No Known Problems in her daughter, sister, and son; Stroke in her mother.  She  reports that she has never smoked. She has never used smokeless tobacco. She reports that she does not currently use alcohol. She reports that she does not use drugs.  Current Outpatient Medications   Medication Sig Dispense Refill    aspirin 81 mg chewable tablet Chew 1 tablet (81 mg total) daily 30 tablet 0    Blood Glucose Monitoring Suppl (ONE TOUCH ULTRA 2) w/Device KIT       bromfenac sodium 0.07 % SOLN       Continuous Blood Gluc  (Dexcom G7 ) NAHID Use 1 each continuous 1 each 0    Continuous Glucose Sensor (Dexcom G7 Sensor) Use 1 Device every 10 days 3 each 5    EPINEPHrine (EPIPEN) 0.3 mg/0.3 mL SOAJ Inject 0.3 mL (0.3 mg total) into a muscle once for 1 dose 0.6 mL 0    ergocalciferol (VITAMIN D2) 50,000 units TAKE 1 CAPSULE BY MOUTH ONE TIME PER WEEK 4 capsule 5    glucose blood (OneTouch Ultra) test strip Use to check sugars three times a day Dx uncontrolled DM2 E 11.65 200 strip 5    losartan-hydrochlorothiazide (HYZAAR) 50-12.5 mg per tablet Take 1 tablet by mouth daily 90 tablet 2    prednisoLONE acetate (PRED FORTE) 1 % ophthalmic suspension       rosuvastatin (CRESTOR) 20 MG tablet TAKE 1 TABLET BY MOUTH EVERY DAY 30 tablet 5    tirzepatide 15 MG/0.5ML Inject 0.5 mL (15 mg total) under the skin every 7 days 6 mL 1    vitamin B-12 (VITAMIN B-12) 500 mcg tablet Take 1 tablet (500 mcg total) by mouth daily Over the counter 90 tablet 1     No current facility-administered medications for this visit.     Current Outpatient Medications on File Prior to Visit   Medication Sig    aspirin 81 mg chewable tablet Chew 1 tablet (81 mg total) daily    Blood Glucose Monitoring Suppl (ONE TOUCH ULTRA 2) w/Device KIT     bromfenac sodium 0.07  % SOLN     Continuous Blood Gluc  (Dexcom G7 ) NAHID Use 1 each continuous    Continuous Glucose Sensor (Dexcom G7 Sensor) Use 1 Device every 10 days    EPINEPHrine (EPIPEN) 0.3 mg/0.3 mL SOAJ Inject 0.3 mL (0.3 mg total) into a muscle once for 1 dose    ergocalciferol (VITAMIN D2) 50,000 units TAKE 1 CAPSULE BY MOUTH ONE TIME PER WEEK    glucose blood (OneTouch Ultra) test strip Use to check sugars three times a day Dx uncontrolled DM2 E 11.65    losartan-hydrochlorothiazide (HYZAAR) 50-12.5 mg per tablet Take 1 tablet by mouth daily    prednisoLONE acetate (PRED FORTE) 1 % ophthalmic suspension     rosuvastatin (CRESTOR) 20 MG tablet TAKE 1 TABLET BY MOUTH EVERY DAY    tirzepatide 15 MG/0.5ML Inject 0.5 mL (15 mg total) under the skin every 7 days    vitamin B-12 (VITAMIN B-12) 500 mcg tablet Take 1 tablet (500 mcg total) by mouth daily Over the counter     No current facility-administered medications on file prior to visit.     She is allergic to bee venom and gabapentin..    Vitals:    08/27/24 1423   BP: 121/78   Pulse: 94   Resp: 16       Review of Systems      Objective:  Patient's shoes and socks removed.   Foot Exam    General  General Appearance: appears stated age and healthy   Orientation: alert and oriented to person, place, and time   Affect: appropriate   Gait: antalgic   Assistance: cane use       Right Foot/Ankle     Inspection and Palpation  Tenderness: metatarsals   Swelling: dorsum   Arch: pes planus  Hammertoes: fifth toe  Hallux limitus: yes  Skin Exam: dry skin;     Neurovascular  Dorsalis pedis: 1+  Posterior tibial: 1+  Saphenous nerve sensation: diminished  Tibial nerve sensation: diminished  Superficial peroneal nerve sensation: diminished  Deep peroneal nerve sensation: diminished  Sural nerve sensation: diminished      Left Foot/Ankle      Inspection and Palpation  Tenderness: metatarsals   Swelling: dorsum   Arch: pes planus  Hammertoes: fifth toe  Hallux limitus:  yes  Skin Exam: dry skin;     Neurovascular  Dorsalis pedis: 1+  Posterior tibial: 1+  Saphenous nerve sensation: diminished  Tibial nerve sensation: diminished  Superficial peroneal nerve sensation: diminished  Deep peroneal nerve sensation: diminished  Sural nerve sensation: diminished      Physical Exam  Vitals and nursing note reviewed.   Constitutional:       Appearance: Normal appearance.   Cardiovascular:      Rate and Rhythm: Normal rate and regular rhythm.      Pulses: no weak pulses.           Dorsalis pedis pulses are 1+ on the right side and 1+ on the left side.        Posterior tibial pulses are 1+ on the right side and 1+ on the left side.   Musculoskeletal:      Right lower le+ Edema present.      Left lower le+ Edema present.   Feet:      Right foot:      Skin integrity: Dry skin present.      Left foot:      Skin integrity: Dry skin present.      Comments: Patient demonstrates pes planus  Skin:     Capillary Refill: Capillary refill takes less than 2 seconds.      Comments: All nails are dystrophic.   Neurological:      Mental Status: She is alert.      Motor: Weakness present.      Comments: Patient has decrease in vibratory sensation bilateral.  She test 4/5 L5 testing bilateral.   Psychiatric:         Mood and Affect: Mood normal.         Behavior: Behavior normal.         Thought Content: Thought content normal.         Judgment: Judgment normal.     Patient's shoes and socks removed.    Right Foot/Ankle   Right Foot Inspection  Skin Exam: dry skin.     Toe Exam: right toe deformity.     Sensory   Vibration: diminished  Proprioception: diminished  Monofilament testing: diminished    Vascular  Capillary refills: < 3 seconds  The right DP pulse is 1+. The right PT pulse is 1+.     Right Toe  - Comprehensive Exam  Arch: pes planus  Hammertoes: fifth toe  Hallux limitus: yes  Swelling: dorsum   Tenderness: metatarsals       Left Foot/Ankle  Left Foot Inspection  Skin Exam: dry skin.     Toe  Exam: left toe deformity.     Sensory   Vibration: diminished  Proprioception: diminished  Monofilament testing: diminished    Vascular  The left DP pulse is 1+. The left PT pulse is 1+.     Left Toe  - Comprehensive Exam  Arch: pes planus  Hammertoes: fifth toe  Hallux limitus: yes  Swelling: dorsum   Tenderness: metatarsals       Assign Risk Category  Deformity present  Loss of protective sensation  No weak pulses  Risk: 2

## 2024-09-03 ENCOUNTER — HOSPITAL ENCOUNTER (OUTPATIENT)
Dept: RADIOLOGY | Facility: HOSPITAL | Age: 79
Discharge: HOME | End: 2024-09-03
Payer: MEDICARE

## 2024-09-03 DIAGNOSIS — E04.2 MULTIPLE THYROID NODULES: ICD-10-CM

## 2024-09-03 DIAGNOSIS — M54.16 RADICULOPATHY OF LUMBAR REGION: ICD-10-CM

## 2024-09-03 PROCEDURE — 72110 X-RAY EXAM L-2 SPINE 4/>VWS: CPT

## 2024-09-03 PROCEDURE — 76536 US EXAM OF HEAD AND NECK: CPT

## 2024-09-05 ENCOUNTER — TELEPHONE (OUTPATIENT)
Age: 79
End: 2024-09-05

## 2024-09-05 NOTE — TELEPHONE ENCOUNTER
Spoke with patient and she is aware of results.----- Message from Chad Rosas DPM sent at 9/5/2024  8:45 AM EDT -----  Please advise patient x-ray of spine demonstrates arthritis/degeneration.  We recommend local chiropractor.  ----- Message -----  From: Interface, Radiology Results In  Sent: 9/3/2024   9:58 AM EDT  To: Chad Rosas DPM

## 2024-09-12 ENCOUNTER — TELEPHONE (OUTPATIENT)
Dept: NEUROLOGY | Facility: CLINIC | Age: 79
End: 2024-09-12

## 2024-09-17 DIAGNOSIS — T63.464A YELLOW JACKET STING, UNDETERMINED INTENT, INITIAL ENCOUNTER: ICD-10-CM

## 2024-09-18 RX ORDER — EPINEPHRINE 0.3 MG/.3ML
0.3 INJECTION SUBCUTANEOUS ONCE
Qty: 0.6 ML | Refills: 0 | Status: SHIPPED | OUTPATIENT
Start: 2024-09-18 | End: 2024-09-18

## 2024-10-14 ENCOUNTER — OFFICE VISIT (OUTPATIENT)
Dept: NEUROLOGY | Facility: CLINIC | Age: 79
End: 2024-10-14
Payer: MEDICARE

## 2024-10-14 VITALS
WEIGHT: 192 LBS | BODY MASS INDEX: 29.1 KG/M2 | HEIGHT: 68 IN | SYSTOLIC BLOOD PRESSURE: 122 MMHG | DIASTOLIC BLOOD PRESSURE: 72 MMHG | HEART RATE: 80 BPM

## 2024-10-14 DIAGNOSIS — M54.2 CERVICALGIA: ICD-10-CM

## 2024-10-14 DIAGNOSIS — R42 VERTIGO: Primary | ICD-10-CM

## 2024-10-14 DIAGNOSIS — H53.9 VISION DISTURBANCE: ICD-10-CM

## 2024-10-14 PROCEDURE — 99214 OFFICE O/P EST MOD 30 MIN: CPT | Performed by: NURSE PRACTITIONER

## 2024-10-14 NOTE — PATIENT INSTRUCTIONS
Can go to PT for balance and OT for vision if needed.  Follow up with Spine and Pain Management for issues re: your neck/back pain  Follow up with Neurology as needed

## 2024-10-14 NOTE — PROGRESS NOTES
Patient ID: Elisabeth Ruzicka Dempsey is a 79 y.o. female.    Assessment/Plan:     Diagnoses and all orders for this visit:    Vertigo  Comments:  Can go to PT for balance and OT for vision if needed.    Vision disturbance  Comments:  OT for vision if needed.    Cervicalgia  Comments:  Follow up with Spine and Pain Management for issues re: your neck/back pain         Can go to PT for balance and OT for vision if needed.  Follow up with Spine and Pain Management for issues re: your neck/back pain  Follow up with Neurology as needed    Subjective/HPI:  Elisabeth Ruzicka Dempsey is a 80yo female who has been followed in the outpatient neurology office for BPPV.  She had episode of vertigo with swelling sensation and onset of nausea vomiting.  She is unable to stand or move and noted ataxia in the ER.  She was given meclizine and IV fluids at that time.  CTA head no significant LVO or stenosis.  She had mild left external carotid stenosis of less than 50%.  MRI with no acute infarction however noted fluid in the right mastoid cells.  Patient had upper respiratory symptoms on and off for a couple of days, sinus headache with ear discomfort and tinnitus.  She was recommended to go to ENT and outpatient balance center.  Patient presented in hospital follow-up, denied any further episodes of vertigo, she saw an ENT and was sent for audiology.  She has some slight hearing loss in the left ear but not enough to warrant hearing aids.  She went to [place] for approximately 2 weeks.  She gained benefit from her therapies but had difficulties tolerating it due to her knees.  She tried to do some home exercise they gave her to continue at home.  Patient's LDL was 140, her A1c was 9.5.  Encouraged to follow-up with PCP in the community.  Last office appointment she did find a new PCP, was working towards managing her vascular risk factors, her A1c had decreased to 7.6.  She also was found to have some vitamin depletion, she was given  repletion schedule.  She was going to see ophthalmology with noted retinopathy in the right eye, left eye was normal.  She also had bilateral cataracts and was scheduled and had cataract surgery.  Last office appointment she had some concerns with regards to neck pain, noted an onset of approximately a year.  She had contacted the spine center initiated PT.  She did not have medical evaluation, they provided her with physical therapy opportunity prior to meeting the physician.  She did have 2 sessions of PT and had great improvement with this.  However she noted some decrease in range of motion with postural dysfunction which she felt was due to staring at her computer for prolonged periods of time.  She was doing her home exercise program however drifted off and no longer was doing them.  She notes clicking noise at the base of her cervical spine with a pain that wraps around into her shoulders with limiting range of motion.  She had some decrease in range of motion with rotation, extension and flexion as well as bilateral lateral flexion.  Recommended cervical x-rays, initiating physical therapy and following up with spine and pain management she also reported episodes of dizziness that were not room spinning like the 1 she was hospitalized for.  She has difficulties with ambulating in the dark, she is put at night lights throughout the house but does have an uncertainty and imbalance when walking through in the darkness.  She notes when she takes off her sweater at night it is dark will feel off balance, she gets similar sensation when turning quickly.  Suspected this may be related to patient's visual disturbances, she continues to follow with ophthalmology with plans for surgical intervention.  She is also recommended to have PT for ambulation, recommended OT to initiate visual therapies.  Patient then also reported she was having intermittent swallowing issues.  She stated not all the time but every once in a  while she has to stand up in order to swallow.  She does have thyroid nodules and had ultrasound to be ordered in August.  Felt these may be related to mechanically causing her difficulties.  She was referred to speech therapy to evaluate for mechanical swallowing.    Patient reports back to neurology office today, states she has had cataract surgery and had issues postoperatively with her redness, noted some edema around her redness and is getting injections currently.  She states she continues to follow-up with the retinal specialist and has had slow improvements in her vision over time however she is not 100%.  Patient stated she stopped driving primarily due to neuropathy in her feet as she was unable to identify the brake pedals while driving.  Patient currently treats for neuropathy of the bilateral lower extremities through her podiatrist as well as her chiropractor. She is able to feel the floor when she ambulates however feels as though she is walking on marshmallows.  She takes her time and furniture specifically at nighttime.  She uses nightlights for her vision however in increased darkness she will hold onto the furniture for stability.  Patient stated that she is no longer walking with walking sticks, notes that she is doing better walking independently and continues to practice her ambulation without assistive devices.  Patient currently denies any headache, vertigo, speech or swallowing changes, weaknesses or falls.  She is continuing to work on her balance and ambulation.  She has paresthesia to the lower extremities due to her neuropathies.  She reports that her A1c is improved, most recently 6.4.  Patient is currently working with other providers in order to maintain and gain more benefits.  She is not having any issues with vertiginous type dizziness or neurological events.  At this point in time patient to follow-up with outpatient neurology office on an as-needed basis.      The following  portions of the patient's history were reviewed and updated as appropriate: allergies, current medications, past family history, past medical history, past social history, past surgical history, and problem list.        Past Medical History:   Diagnosis Date    Cancer (HCC)     Diabetes mellitus (HCC)     Dizziness     Endometrial carcinoma (HCC)     Hypertension     Obesity     Rectal bleeding 09/10/2019    Sciatic radiculitis 01/28/2021    Tonsillitis     Varicella        Past Surgical History:   Procedure Laterality Date    EYE SURGERY Bilateral     2024    HYSTERECTOMY  2006    TONSILLECTOMY  1962    TUBAL LIGATION  1992    US GUIDED THYROID BIOPSY  12/12/2019       Social History     Socioeconomic History    Marital status: /Civil Union     Spouse name: None    Number of children: 3    Years of education: None    Highest education level: None   Occupational History    Occupation: Retired    Tobacco Use    Smoking status: Never    Smokeless tobacco: Never   Vaping Use    Vaping status: Never Used   Substance and Sexual Activity    Alcohol use: Not Currently    Drug use: Never    Sexual activity: Not Currently     Partners: Male     Birth control/protection: Post-menopausal   Other Topics Concern    None   Social History Narrative    Denied: History of alcohol use - As per [employer]    Daily caffeine consumption, 2-3 servings a day    Denied: History of drug use - As per [employer]    Retired from employment: [employer]       Social Determinants of Health     Financial Resource Strain: Low Risk  (6/27/2023)    Overall Financial Resource Strain (CARDIA)     Difficulty of Paying Living Expenses: Not hard at all   Food Insecurity: No Food Insecurity (8/10/2024)    Hunger Vital Sign     Worried About Running Out of Food in the Last Year: Never true     Ran Out of Food in the Last Year: Never true   Transportation Needs: No Transportation Needs (8/10/2024)    PRAPARE - Transportation      Lack of Transportation (Medical): No     Lack of Transportation (Non-Medical): No   Physical Activity: Not on file   Stress: Not on file   Social Connections: Not on file   Intimate Partner Violence: Not on file   Housing Stability: Low Risk  (8/10/2024)    Housing Stability Vital Sign     Unable to Pay for Housing in the Last Year: No     Number of Times Moved in the Last Year: 0     Homeless in the Last Year: No       Family History   Problem Relation Age of Onset    Stroke Mother     Diabetes Mother     Cancer Mother     Diabetes unspecified Mother     Lung cancer Father     Breast cancer Sister 78        Half-sister    Dementia Sister     No Known Problems Sister     COPD Brother     Coronary artery disease Brother     Heart failure Brother     HIV Brother     Cancer Family     No Known Problems Daughter     Leukemia Son     Cancer Son         Leukemia age 19    No Known Problems Son          Current Outpatient Medications:     aspirin 81 mg chewable tablet, Chew 1 tablet (81 mg total) daily, Disp: 30 tablet, Rfl: 0    Blood Glucose Monitoring Suppl (ONE TOUCH ULTRA 2) w/Device KIT, , Disp: , Rfl:     bromfenac sodium 0.07 % SOLN, , Disp: , Rfl:     Continuous Blood Gluc  (Dexcom G7 ) NAHID, Use 1 each continuous, Disp: 1 each, Rfl: 0    Continuous Glucose Sensor (Dexcom G7 Sensor), Use 1 Device every 10 days, Disp: 3 each, Rfl: 5    EPINEPHrine (EPIPEN) 0.3 mg/0.3 mL SOAJ, Inject 0.3 mL (0.3 mg total) into a muscle once for 1 dose, Disp: 0.6 mL, Rfl: 0    glucose blood (OneTouch Ultra) test strip, Use to check sugars three times a day Dx uncontrolled DM2 E 11.65, Disp: 200 strip, Rfl: 5    losartan-hydrochlorothiazide (HYZAAR) 50-12.5 mg per tablet, TAKE 1 TABLET BY MOUTH EVERY DAY, Disp: 90 tablet, Rfl: 1    prednisoLONE acetate (PRED FORTE) 1 % ophthalmic suspension, , Disp: , Rfl:     rosuvastatin (CRESTOR) 20 MG tablet, TAKE 1 TABLET BY MOUTH EVERY DAY, Disp: 30 tablet, Rfl: 5    tirzepatide  "15 MG/0.5ML, Inject 0.5 mL (15 mg total) under the skin every 7 days, Disp: 6 mL, Rfl: 1    vitamin B-12 (VITAMIN B-12) 500 mcg tablet, Take 1 tablet (500 mcg total) by mouth daily Over the counter, Disp: 90 tablet, Rfl: 1    ergocalciferol (VITAMIN D2) 50,000 units, TAKE 1 CAPSULE BY MOUTH ONE TIME PER WEEK (Patient not taking: Reported on 10/14/2024), Disp: 4 capsule, Rfl: 5    Allergies   Allergen Reactions    Bee Venom Hives and Swelling     Yellow jackets and wasps    Gabapentin Hives        Blood pressure 122/72, pulse 80, height 5' 8\" (1.727 m), weight 87.1 kg (192 lb).       Objective:    Blood pressure 122/72, pulse 80, height 5' 8\" (1.727 m), weight 87.1 kg (192 lb).    Physical Exam  Vitals reviewed.   Constitutional:       Appearance: Normal appearance. She is well-developed.   HENT:      Head: Normocephalic.      Nose: Nose normal.      Mouth/Throat:      Mouth: Mucous membranes are moist.   Eyes:      General: Lids are normal.      Extraocular Movements: Extraocular movements intact.      Pupils: Pupils are equal, round, and reactive to light.   Pulmonary:      Effort: Pulmonary effort is normal.   Abdominal:      Palpations: Abdomen is soft.   Musculoskeletal:      Cervical back: Normal range of motion.   Skin:     General: Skin is warm and dry.   Neurological:      Mental Status: She is alert.      Motor: Motor strength is normal.     Coordination: Romberg sign negative.      Deep Tendon Reflexes: Reflexes are normal and symmetric.   Psychiatric:         Attention and Perception: Attention and perception normal.         Mood and Affect: Mood and affect normal.         Speech: Speech normal.         Behavior: Behavior normal. Behavior is cooperative.         Thought Content: Thought content normal.         Cognition and Memory: Cognition and memory normal.         Judgment: Judgment normal.         Neurological Exam  Mental Status  Alert. Oriented to person, place, time and situation. Memory is " normal. Recent and remote memory are intact. Speech is normal. Language is fluent with no aphasia. Attention and concentration are normal. Fund of knowledge is appropriate for level of education.    Cranial Nerves  CN II: Visual acuity is normal. Visual fields full to confrontation.  CN III, IV, VI: Extraocular movements intact bilaterally. Normal lids and orbits bilaterally. Pupils equal round and reactive to light bilaterally.  CN V: Facial sensation is normal.  CN VII: Full and symmetric facial movement.  CN VIII: Hearing is normal.  CN IX, X: Palate elevates symmetrically. Normal gag reflex.  CN XI: Shoulder shrug strength is normal.  CN XII: Tongue midline without atrophy or fasciculations.    Motor  Normal muscle bulk throughout. Normal muscle tone. No abnormal involuntary movements. Strength is 5/5 throughout all four extremities.    Sensory  Light touch is normal in upper and lower extremities. Temperature is normal in upper and lower extremities. Vibration is normal in upper and lower extremities. Proprioception is normal in upper and lower extremities.     Reflexes  Deep tendon reflexes are 2+ and symmetric in all four extremities.    Right pathological reflexes: Chrissy's absent.  Left pathological reflexes: Chrissy's absent.    Coordination  Right: Finger-to-nose normal. Rapid alternating movement normal. Heel-to-shin normal.Left: Finger-to-nose normal. Rapid alternating movement normal. Heel-to-shin normal.    Gait  Casual gait: Normal stance. Reduced stride length. Reduced right arm swing. Reduced left arm swing. Romberg is absent. Unable to rise from chair without using arms.  Patient walking unassisted, has a stiff leg ambulation pattern.        ROS:    Review of Systems   Constitutional:  Positive for appetite change. Negative for chills and fever.   HENT:  Negative for ear pain and sore throat.    Eyes:  Negative for pain and visual disturbance.   Respiratory:  Negative for cough and shortness of  breath.    Cardiovascular:  Negative for chest pain and palpitations.   Gastrointestinal:  Negative for abdominal pain and vomiting.   Genitourinary:  Negative for dysuria and hematuria.   Musculoskeletal:  Positive for gait problem. Negative for arthralgias and back pain.   Skin:  Negative for color change and rash.   Neurological:  Positive for light-headedness. Negative for seizures and syncope.   All other systems reviewed and are negative.      ROS reviewed and discussed with the patient.    I have spent a total time of 35 minutes in caring for this patient on the day of the visit/encounter including Instructions for management, Patient and family education, Counseling / Coordination of care, Documenting in the medical record, Reviewing / ordering tests, medicine, procedures  , and Obtaining or reviewing history  .

## 2024-10-17 ENCOUNTER — APPOINTMENT (OUTPATIENT)
Dept: LAB | Facility: HOSPITAL | Age: 79
End: 2024-10-17
Attending: INTERNAL MEDICINE
Payer: MEDICARE

## 2024-10-17 ENCOUNTER — TELEPHONE (OUTPATIENT)
Age: 79
End: 2024-10-17

## 2024-10-17 ENCOUNTER — HOSPITAL ENCOUNTER (OUTPATIENT)
Dept: RADIOLOGY | Facility: HOSPITAL | Age: 79
Discharge: HOME | End: 2024-10-17
Attending: PODIATRIST
Payer: MEDICARE

## 2024-10-17 DIAGNOSIS — R20.0 NUMBNESS AND TINGLING: ICD-10-CM

## 2024-10-17 DIAGNOSIS — E11.42 TYPE 2 DIABETES MELLITUS WITH DIABETIC POLYNEUROPATHY, WITHOUT LONG-TERM CURRENT USE OF INSULIN (HCC): ICD-10-CM

## 2024-10-17 DIAGNOSIS — E55.9 VITAMIN D DEFICIENCY: ICD-10-CM

## 2024-10-17 DIAGNOSIS — R20.2 NUMBNESS AND TINGLING: ICD-10-CM

## 2024-10-17 DIAGNOSIS — E11.42 DIABETIC POLYNEUROPATHY ASSOCIATED WITH TYPE 2 DIABETES MELLITUS (HCC): ICD-10-CM

## 2024-10-17 DIAGNOSIS — I10 BENIGN ESSENTIAL HYPERTENSION: ICD-10-CM

## 2024-10-17 DIAGNOSIS — I70.209 PERIPHERAL ARTERIOSCLEROSIS (HCC): ICD-10-CM

## 2024-10-17 DIAGNOSIS — E04.2 MULTIPLE THYROID NODULES: ICD-10-CM

## 2024-10-17 DIAGNOSIS — E78.2 HYPERLIPIDEMIA, MIXED: ICD-10-CM

## 2024-10-17 PROBLEM — I73.9 PVD (PERIPHERAL VASCULAR DISEASE) (HCC): Status: ACTIVE | Noted: 2024-10-17

## 2024-10-17 LAB
ALBUMIN SERPL BCG-MCNC: 4.2 G/DL (ref 3.5–5)
ALP SERPL-CCNC: 56 U/L (ref 34–104)
ALT SERPL W P-5'-P-CCNC: 19 U/L (ref 7–52)
ANION GAP SERPL CALCULATED.3IONS-SCNC: 9 MMOL/L (ref 4–13)
AST SERPL W P-5'-P-CCNC: 18 U/L (ref 13–39)
BILIRUB SERPL-MCNC: 0.52 MG/DL (ref 0.2–1)
BUN SERPL-MCNC: 12 MG/DL (ref 5–25)
CALCIUM SERPL-MCNC: 9.8 MG/DL (ref 8.4–10.2)
CHLORIDE SERPL-SCNC: 102 MMOL/L (ref 96–108)
CHOLEST SERPL-MCNC: 136 MG/DL
CO2 SERPL-SCNC: 27 MMOL/L (ref 21–32)
CREAT SERPL-MCNC: 0.57 MG/DL (ref 0.6–1.3)
EST. AVERAGE GLUCOSE BLD GHB EST-MCNC: 128 MG/DL
GFR SERPL CREATININE-BSD FRML MDRD: 89 ML/MIN/1.73SQ M
GLUCOSE P FAST SERPL-MCNC: 107 MG/DL (ref 65–99)
HBA1C MFR BLD: 6.1 %
HDLC SERPL-MCNC: 49 MG/DL
LDLC SERPL CALC-MCNC: 71 MG/DL (ref 0–100)
NONHDLC SERPL-MCNC: 87 MG/DL
POTASSIUM SERPL-SCNC: 4 MMOL/L (ref 3.5–5.3)
PROT SERPL-MCNC: 7.4 G/DL (ref 6.4–8.4)
SODIUM SERPL-SCNC: 138 MMOL/L (ref 135–147)
T4 FREE SERPL-MCNC: 0.84 NG/DL (ref 0.61–1.12)
TRIGL SERPL-MCNC: 78 MG/DL
TSH SERPL DL<=0.05 MIU/L-ACNC: 1.68 UIU/ML (ref 0.45–4.5)

## 2024-10-17 PROCEDURE — 80061 LIPID PANEL: CPT

## 2024-10-17 PROCEDURE — 84443 ASSAY THYROID STIM HORMONE: CPT

## 2024-10-17 PROCEDURE — 80053 COMPREHEN METABOLIC PANEL: CPT

## 2024-10-17 PROCEDURE — 83036 HEMOGLOBIN GLYCOSYLATED A1C: CPT

## 2024-10-17 PROCEDURE — 93922 UPR/L XTREMITY ART 2 LEVELS: CPT | Performed by: SURGERY

## 2024-10-17 PROCEDURE — 93925 LOWER EXTREMITY STUDY: CPT

## 2024-10-17 PROCEDURE — 93925 LOWER EXTREMITY STUDY: CPT | Performed by: SURGERY

## 2024-10-17 PROCEDURE — 93923 UPR/LXTR ART STDY 3+ LVLS: CPT

## 2024-10-17 PROCEDURE — 36415 COLL VENOUS BLD VENIPUNCTURE: CPT

## 2024-10-17 PROCEDURE — 84439 ASSAY OF FREE THYROXINE: CPT

## 2024-10-17 NOTE — TELEPHONE ENCOUNTER
Lmom for patient to call back to go over test results      ----- Message from Chad Rosas DPM sent at 10/17/2024 10:24 AM EDT -----  Please advise patient that circulation in her legs is fine.  The symptoms she is having is consistent with nerve damage as we discussed.  Present for office visit  ----- Message -----  From: Interface, Radiology Results In  Sent: 10/17/2024   9:30 AM EDT  To: Chad Rosas DPM

## 2024-10-17 NOTE — TELEPHONE ENCOUNTER
Caller: Elisabeth RuzickaDempsey    [facility] and[facility]    #: [phone]    Escalation: Care/calling back to get test results from [facility]-please return call. Thanks

## 2024-10-18 ENCOUNTER — TELEPHONE (OUTPATIENT)
Dept: ENDOCRINOLOGY | Facility: CLINIC | Age: 79
End: 2024-10-18

## 2024-10-18 NOTE — TELEPHONE ENCOUNTER
----- Message from Aiden BAILON sent at 10/18/2024  9:24 AM EDT -----  Regarding: RE: Lyft Transportation  Mon, Oct 28  7:22am  Notice: as of 2024 EPIC requests for ride share transport will be terminated.  ALL offices will be responsible to enter these requests themselves as per the [employer]. Contact your manager for office specific workflow direction.  ----- Message -----  From: Helen Lares  Sent: 10/18/2024   8:36 AM EDT  To: Patient [employer]  Subject: Lyft Transportation                              Patient Name: Elizabeth Dempsey Ruzicka  : 1945  Phone Number: [phone]  Appointment Date: 10/28/2024  Appointment Time: 8AM   Address: [address] [place] [id number]   Drop Off Facility: [facility]  Drop Off Address: [address] [place] [address], [address] (Back of Building)  [place] [address]  Cost Center: [phone]

## 2024-10-24 ENCOUNTER — OFFICE VISIT (OUTPATIENT)
Dept: FAMILY MEDICINE CLINIC | Facility: CLINIC | Age: 79
End: 2024-10-24
Payer: MEDICARE

## 2024-10-24 VITALS
HEIGHT: 68 IN | WEIGHT: 192.8 LBS | RESPIRATION RATE: 16 BRPM | OXYGEN SATURATION: 98 % | SYSTOLIC BLOOD PRESSURE: 150 MMHG | TEMPERATURE: 98.2 F | DIASTOLIC BLOOD PRESSURE: 66 MMHG | HEART RATE: 88 BPM | BODY MASS INDEX: 29.22 KG/M2

## 2024-10-24 DIAGNOSIS — F43.21 GRIEF: ICD-10-CM

## 2024-10-24 DIAGNOSIS — Z86.39 HISTORY OF OBESITY: ICD-10-CM

## 2024-10-24 DIAGNOSIS — I99.9 VASCULAR DISEASE: ICD-10-CM

## 2024-10-24 DIAGNOSIS — F41.9 ANXIETY: Primary | ICD-10-CM

## 2024-10-24 PROCEDURE — 99214 OFFICE O/P EST MOD 30 MIN: CPT | Performed by: FAMILY MEDICINE

## 2024-10-24 PROCEDURE — G2211 COMPLEX E/M VISIT ADD ON: HCPCS | Performed by: FAMILY MEDICINE

## 2024-10-24 RX ORDER — SERTRALINE HYDROCHLORIDE 25 MG/1
TABLET, FILM COATED ORAL
Qty: 60 TABLET | Refills: 1 | Status: SHIPPED | OUTPATIENT
Start: 2024-10-24 | End: 2027-08-03

## 2024-10-24 NOTE — PATIENT INSTRUCTIONS
Biotin   Lemon drops   Suck on crushed ice     Send me a MyChart in about 6 weeks to let me know how you are doing.

## 2024-10-24 NOTE — PROGRESS NOTES
Assessment & Plan  Anxiety  Longstanding symptoms but worsened recently.  She agrees to trial of medication.  Start Zoloft 25 mg for 2 weeks and then 50 mg daily.  Risks benefits and side effects reviewed.  She wishes to keep her follow-up appointment in February, but will check in via [employer] in about 6 weeks.  Orders:    sertraline (ZOLOFT) 25 mg tablet; Take 1 tablet (25 mg total) by mouth daily for 14 days, THEN 2 tablets (50 mg total) daily.    Grief  Patient with some mild symptoms of depression that she is noticing worsening around the anniversaries of her son who .  She is also finding it difficult with the loss of her vision and the loss of driving.  Longstanding symptoms but worsened recently.  She agrees to trial of medication.  Start Zoloft 25 mg for 2 weeks and then 50 mg daily.  Risks benefits and side effects reviewed.  She wishes to keep her follow-up appointment in February, but will check in via [employer] in about 6 weeks.       History of obesity  Maximum weight 242.  Patient is doing a great job with healthy diet, exercise and weight loss with the assistance of [employer].       Vascular disease    Orders:    Ambulatory Referral to Vascular Surgery; Future         Return for Keep scheduled appointment.    Subjective:   Cherrie is a 79 y.o. female here today for a follow-up on her current medical conditions:    Patient Active Problem List   Diagnosis    Benign essential hypertension    Carpal tunnel syndrome    Decreased dorsalis pedis pulse    Type 2 diabetes mellitus, without long-term current use of insulin (HCC)    Hyperlipidemia, mixed    Multiple thyroid nodules    Synovial cyst    Vitamin D deficiency    Fatigue    Chronic sacroiliac strain    Diabetic polyneuropathy associated with type 2 diabetes mellitus (HCC)    Dizziness    Seasonal allergies    Lung nodule    Sensorineural hearing loss (SNHL), bilateral    BPPV (benign paroxysmal positional vertigo)    Counseling regarding  advanced directives    Change in bowel habits    History of colon polyps    Osteopenia after menopause    Vitamin B12 deficiency    History of obesity    Numbness and tingling    PVD (peripheral vascular disease) ([facility])         Patient Care Team:  Ashli Gill DO as PCP - General (Family Medicine)  MD Booker Quinones Od (Optometry)  Rashida Wahl RD as Diabetes Educator (Diabetes Services)  RAJAN Dawkins (Nurse Practitioner)  Kathia Casey MD (Endocrinology)  Licha Esquivel DO (Cardiology)    Current Medications:  Current Outpatient Medications   Medication Sig Dispense Refill    Blood Glucose Monitoring Suppl (ONE TOUCH ULTRA 2) w/Device KIT       Continuous Blood Gluc  (Dexcom G7 ) NAHID Use 1 each continuous 1 each 0    Continuous Glucose Sensor (Dexcom G7 Sensor) Use 1 Device every 10 days 3 each 5    EPINEPHrine (EPIPEN) 0.3 mg/0.3 mL SOAJ Inject 0.3 mL (0.3 mg total) into a muscle once for 1 dose 0.6 mL 0    ergocalciferol (VITAMIN D2) 50,000 units TAKE 1 CAPSULE BY MOUTH ONE TIME PER WEEK 4 capsule 5    glucose blood (OneTouch Ultra) test strip Use to check sugars three times a day Dx uncontrolled DM2 E 11.65 200 strip 5    losartan-hydrochlorothiazide (HYZAAR) 50-12.5 mg per tablet TAKE 1 TABLET BY MOUTH EVERY DAY 90 tablet 1    rosuvastatin (CRESTOR) 20 MG tablet TAKE 1 TABLET BY MOUTH EVERY DAY 30 tablet 5    sertraline (ZOLOFT) 25 mg tablet Take 1 tablet (25 mg total) by mouth daily for 14 days, THEN 2 tablets (50 mg total) daily. 60 tablet 1    tirzepatide 15 MG/0.5ML Inject 0.5 mL (15 mg total) under the skin every 7 days 6 mL 1    vitamin B-12 (VITAMIN B-12) 500 mcg tablet Take 1 tablet (500 mcg total) by mouth daily Over the counter 90 tablet 1    aspirin 81 mg chewable tablet Chew 1 tablet (81 mg total) daily 30 tablet 0     No current facility-administered medications for this visit.       HPI:  Chief Complaint   Patient presents with    Depression     Has  episodes of depression exacerbated by memory of loss of child 37 years ago.      -- Above per clinical staff and reviewed. --    PHQ-2/9 Depression Screening    Little interest or pleasure in doing things: 1 - several days  Feeling down, depressed, or hopeless: 1 - several days  Trouble falling or staying asleep, or sleeping too much: 1 - several days  Feeling tired or having little energy: 0 - not at all  Poor appetite or overeatin - not at all  Feeling bad about yourself - or that you are a failure or have let yourself or your family down: 0 - not at all  Trouble concentrating on things, such as reading the newspaper or watching television: 1 - several days  Moving or speaking so slowly that other people could have noticed. Or the opposite - being so fidgety or restless that you have been moving around a lot more than usual: 0 - not at all  Thoughts that you would be better off dead, or of hurting yourself in some way: 0 - not at all  PHQ-2 Score: 2  PHQ-2 Interpretation: Negative depression screen  PHQ-9 Score: 4  PHQ-9 Interpretation: No or Minimal depression       LIZZETH-7 Flowsheet Screening      Flowsheet Row Most Recent Value   Over the last two weeks, how often have you been bothered by the following problems?     Feeling nervous, anxious, or on edge 1   Not being able to stop or control worrying 0   Worrying too much about different things 1   Trouble relaxing  0   Being so restless that it's hard to sit still 0   Becoming easily annoyed or irritable  1   Feeling afraid as if something awful might happen 1   How difficult have these problems made it for you to do your work, take care of things at home, or get along with other people?  Somewhat difficult   LIZZETH Score  4             - Requesting medicine for saddness around anniversary for her son who  at age 19.   - Refer to vascular - see arterial duplex   CT scan 2024 lung nodule stable, thyroid nodule, coronary calcifications   Oct 2024 HbA1C  "6.4 to 6.1lipids well contr  olled with LDL 71 , glu 017, Cr 0.57, GFR 89     struggling vertigo per  - used to do exercises with her head   resistent to meds. consider Januvia. woudl not take statin?. no more meds for bp. declined dexa. lung nodule - due for lung CT   declin  es multiple meds for DM2 due to fears of .   saw neuro - referred back to ENT Emilie Zhang/ Dr. Begum BPPV   concerned - Frankie Ventura  BP, diabetes and not eating well   probes [facility] vision - only can see light   anger   Today:  Having   Birthday [birth year]   This year seems worse   Had to give up driving   That stresses her that  has to drive her everywhere   Neuropathy in her feet   Using [employer] service for some of her appointment   More tearful, more irritable  Feels isolated   Comes more often and lasting longer   Wakes often to urinate     Send m    The following portions of the patient's history were reviewed and updated as appropriate: allergies, current medications, past family history, past medical history, past social history, past surgical history and problem list.    Objective:  Vitals:  /66   Pulse 88   Temp 98.2 °F (36.8 °C)   Resp 16   Ht 5' 8\" (1.727 m)   Wt 87.5 kg (192 lb 12.8 oz)   SpO2 98%   BMI 29.32 kg/m²    Wt Readings from Last 3 Encounters:   10/24/24 87.5 kg (192 lb 12.8 oz)   10/14/24 87.1 kg (192 lb)   08/27/24 88.9 kg (196 lb)      BP Readings from Last 3 Encounters:   10/24/24 150/66   10/14/24 122/72   08/27/24 121/78        Review of Systems   She has no other concerns. No unexpected weight changes. No chest pain, SOB, or palpitations. No GERD. No changes in bowels or bladder. Sleeping well. + mood changes as above.    Physical Exam   Constitutional:  she appears well-developed and well-nourished.  HENT: Head: Normocephalic.   Neck: Neck supple.   Cardiovascular: Normal rate, regular rhythm and normal heart sounds.   Pulmonary/Chest: Effort normal and breath sounds normal. No " wheezes, rales, or rhonchi.   Abdominal: Soft. Bowel sounds are normal. There is no tenderness. No hepatosplenomegaly.   Musculoskeletal: she exhibits no edema.   Lymphadenopathy: she has no cervical adenopathy.   Neurological: she is alert and oriented to person, place, and time.   Skin: Skin is warm and dry.   Psychiatric: she has a normal mood and affect. her behavior is normal. Thought content normal.

## 2024-10-24 NOTE — ASSESSMENT & PLAN NOTE
Maximum weight 242.  Patient is doing a great job with healthy diet, exercise and weight loss with the assistance of [employer].

## 2024-10-25 ENCOUNTER — TELEPHONE (OUTPATIENT)
Age: 79
End: 2024-10-25

## 2024-10-25 DIAGNOSIS — E55.9 VITAMIN D DEFICIENCY: ICD-10-CM

## 2024-10-25 NOTE — TELEPHONE ENCOUNTER
Caller: Elisabeth Ruzicka Dempsey    Doctor: Dr Robles    Reason for call: Patient is requesting lyft service for her 11/4/24 appointment with Dr Robles at 4:40pm.     Call back#: [phone]

## 2024-10-28 ENCOUNTER — TELEPHONE (OUTPATIENT)
Dept: CARDIOLOGY CLINIC | Facility: CLINIC | Age: 79
End: 2024-10-28

## 2024-10-28 ENCOUNTER — OFFICE VISIT (OUTPATIENT)
Dept: ENDOCRINOLOGY | Facility: CLINIC | Age: 79
End: 2024-10-28
Payer: MEDICARE

## 2024-10-28 VITALS
OXYGEN SATURATION: 98 % | HEART RATE: 84 BPM | WEIGHT: 192.6 LBS | DIASTOLIC BLOOD PRESSURE: 82 MMHG | BODY MASS INDEX: 29.19 KG/M2 | HEIGHT: 68 IN | SYSTOLIC BLOOD PRESSURE: 130 MMHG

## 2024-10-28 DIAGNOSIS — E04.2 MULTIPLE THYROID NODULES: ICD-10-CM

## 2024-10-28 DIAGNOSIS — E11.42 TYPE 2 DIABETES MELLITUS WITH DIABETIC POLYNEUROPATHY, WITHOUT LONG-TERM CURRENT USE OF INSULIN (HCC): Primary | ICD-10-CM

## 2024-10-28 DIAGNOSIS — E78.2 HYPERLIPIDEMIA, MIXED: ICD-10-CM

## 2024-10-28 DIAGNOSIS — I10 BENIGN ESSENTIAL HYPERTENSION: ICD-10-CM

## 2024-10-28 PROCEDURE — 99214 OFFICE O/P EST MOD 30 MIN: CPT | Performed by: NURSE PRACTITIONER

## 2024-10-28 PROCEDURE — 99204 OFFICE O/P NEW MOD 45 MIN: CPT | Performed by: NURSE PRACTITIONER

## 2024-10-28 RX ORDER — ERGOCALCIFEROL 1.25 MG/1
CAPSULE, LIQUID FILLED ORAL
Qty: 12 CAPSULE | Refills: 1 | OUTPATIENT
Start: 2024-10-28

## 2024-10-28 NOTE — TELEPHONE ENCOUNTER
Please advise.    Patient seen 10/24/24, Vit D not mentioned in encounter. 8/16/24 OV note says well controlled. Last Vit d level 51.2 6/10/24. Last sent to pharmacy 7/1/24 with 5 refills.     If refill is not appropriate please deny.

## 2024-10-28 NOTE — PROGRESS NOTES
Ambulatory Visit  Name: Elisabeth Ruzicka Dempsey      : 1945      MRN: [id number]  Encounter Provider: RAJAN Alonso  Encounter Date: 10/28/2024   Encounter department: [facility]    Assessment & Plan  Type 2 diabetes mellitus with diabetic polyneuropathy, without long-term current use of insulin ([facility])    Lab Results   Component Value Date    HGBA1C 6.1 (H) 10/17/2024     HGA1C tightly controlled. Recommend sending in blood glucose levels for review. Discussed reduction in dose to Mounjaro 12.5 mg weekly if hemoglobin A1c/ blood sugars remain so tightly controlled. No evidence or symptoms of hypoglycemia noted.     Discussed risks/complications associated with uncontrolled diabetes including organ involvement, heart attack, stroke, death.    Advised lifestyle modifications including attention to diet including the amount and types of carbohydrates consumed and regular activity.     Call for blood sugars less than 70 mg/dl or patterns over 250 mg/dl.     Monitor blood glucose levels at least 1 time per day at alternating times.     Recommendation for medical identification either bracelet, necklace.    Routine follow up for diabetic eye and foot exams.     Ordered blood work to complete prior to next visit.    Follow up in 3 months.       Orders:    Hemoglobin A1C; Future    Comprehensive metabolic panel; Future    Albumin / creatinine urine ratio; Future    Benign essential hypertension  BP reasonable.  Continues on ARB.          Hyperlipidemia, mixed  Continues on statin.  LDL very close to goal at 71 for primary prevention.         Multiple thyroid nodules  Followed by ENT. Denies neck compressive symptoms.  Clinically and biochemically euthyroid.            History of Present Illness     Elisabeth Ruzicka Dempsey is a 79 y.o. female  with a history of type 2 diabetes without long term use of insulin with known complications of neuropathy and  retinopathy. Follows regularly for diabetic eye and foot exams.      She did not bring glucose levels with her  today for review.      Current regimen:  Mounjaro 15 mg weekly     Denies side effects of Mounjaro    Continues on ARB and Statin.     For history of thyroid nodules, FNA biopsies in 2014 and 2019 were benign. Last thyroid ultrasound from September 2024 with stability, followed by ENT. Denies neck compressive symptoms. No history of abnormal thyroid function tests.        History obtained from : patient    Review of Systems  Constitutional: Negative   HENT: Negative  Eyes: Baseline.  Respiratory: Negative for cough and shortness of breath.    Cardiovascular: Negative for chest pain and palpitations.   Gastrointestinal: Negative for abdominal distention, abdominal pain, constipation, diarrhea and vomiting.   Endocrine: Negative for cold intolerance, heat intolerance, polydipsia and polyuria.   Musculoskeletal: Baseline  Skin: Negative   Neurological: Baseline  All other systems reviewed and are negative.    Medical History Reviewed by provider this encounter:       Current Outpatient Medications on File Prior to Visit   Medication Sig Dispense Refill    Blood Glucose Monitoring Suppl (ONE TOUCH ULTRA 2) w/Device KIT       Continuous Blood Gluc  (Dexcom G7 ) NAHID Use 1 each continuous 1 each 0    Continuous Glucose Sensor (Dexcom G7 Sensor) Use 1 Device every 10 days 3 each 5    EPINEPHrine (EPIPEN) 0.3 mg/0.3 mL SOAJ Inject 0.3 mL (0.3 mg total) into a muscle once for 1 dose 0.6 mL 0    ergocalciferol (VITAMIN D2) 50,000 units TAKE 1 CAPSULE BY MOUTH ONE TIME PER WEEK 4 capsule 5    glucose blood (OneTouch Ultra) test strip Use to check sugars three times a day Dx uncontrolled DM2 E 11.65 200 strip 5    losartan-hydrochlorothiazide (HYZAAR) 50-12.5 mg per tablet TAKE 1 TABLET BY MOUTH EVERY DAY 90 tablet 1    rosuvastatin (CRESTOR) 20 MG tablet TAKE 1 TABLET BY MOUTH EVERY DAY 30 tablet 5     "sertraline (ZOLOFT) 25 mg tablet Take 1 tablet (25 mg total) by mouth daily for 14 days, THEN 2 tablets (50 mg total) daily. 60 tablet 1    tirzepatide 15 MG/0.5ML Inject 0.5 mL (15 mg total) under the skin every 7 days 6 mL 1    vitamin B-12 (VITAMIN B-12) 500 mcg tablet Take 1 tablet (500 mcg total) by mouth daily Over the counter 90 tablet 1    aspirin 81 mg chewable tablet Chew 1 tablet (81 mg total) daily 30 tablet 0     No current facility-administered medications on file prior to visit.          Objective     /82 (BP Location: Right arm, Patient Position: Sitting, Cuff Size: Large)   Pulse 84   Ht 5' 8\" (1.727 m)   Wt 87.4 kg (192 lb 9.6 oz)   SpO2 98%   BMI 29.28 kg/m²        Physical Exam  Vitals reviewed.   Constitutional:       Appearance: Normal appearance.   Cardiovascular:      Rate and Rhythm: Normal rate and regular rhythm.      Pulses: Normal pulses.      Heart sounds: Normal heart sounds.   Pulmonary:      Effort: Pulmonary effort is normal.      Breath sounds: Normal breath sounds.   Skin:     General: Skin is warm and dry.      Capillary Refill: Capillary refill takes less than 2 seconds.   Neurological:      General: No focal deficit present.      Mental Status: She is alert and oriented to person, place, and time.   Psychiatric:         Mood and Affect: Mood normal.         Behavior: Behavior normal.     Component      Latest Ref Rng 10/17/2024   Sodium      135 - 147 mmol/L 138    Potassium      3.5 - 5.3 mmol/L 4.0    Chloride      96 - 108 mmol/L 102    Carbon Dioxide      21 - 32 mmol/L 27    ANION GAP      4 - 13 mmol/L 9    BUN      5 - 25 mg/dL 12    Creatinine      0.60 - 1.30 mg/dL 0.57 (L)    GLUCOSE, FASTING      65 - 99 mg/dL 107 (H)    Calcium      8.4 - 10.2 mg/dL 9.8    AST      13 - 39 U/L 18    ALT      7 - 52 U/L 19    ALK PHOS      34 - 104 U/L 56    Total Protein      6.4 - 8.4 g/dL 7.4    Albumin      3.5 - 5.0 g/dL 4.2    Total Bilirubin      0.20 - 1.00 mg/dL " 0.52    GFR, Calculated      ml/min/1.73sq m 89    Cholesterol      See Comment mg/dL 136    Triglycerides      See Comment mg/dL 78    HDL      >=50 mg/dL 49 (L)    LDL Calculated      0 - 100 mg/dL 71    Non-HDL Cholesterol      mg/dl 87    Hemoglobin A1C      Normal 4.0-5.6%; PreDiabetic 5.7-6.4%; Diabetic >=6.5%; Glycemic control for adults with diabetes <7.0% % 6.1 (H)    eAG, EST AVG Glucose      mg/dl 128    TSH 3RD GENERATON      0.450 - 4.500 uIU/mL 1.682    FREE T4      0.61 - 1.12 ng/dL 0.84         Administrative Statements

## 2024-10-29 ENCOUNTER — TELEPHONE (OUTPATIENT)
Age: 79
End: 2024-10-29

## 2024-10-29 ENCOUNTER — TELEPHONE (OUTPATIENT)
Dept: CARDIOLOGY CLINIC | Facility: CLINIC | Age: 79
End: 2024-10-29

## 2024-10-29 NOTE — TELEPHONE ENCOUNTER
Caller: Elisabeth Ruzicka Dempsey    [facility] and[facility]    #: [phone]    [employer]: Appointment/CX and RS 11/7 appt-please cancel the Ly[employer] ride and she will not need that service for her next appt. In Dec. Thanks

## 2024-10-29 NOTE — ASSESSMENT & PLAN NOTE
Lab Results   Component Value Date    HGBA1C 6.1 (H) 10/17/2024     HGA1C tightly controlled. Recommend sending in blood glucose levels for review. Discussed reduction in dose to Mounjaro 12.5 mg weekly if hemoglobin A1c/ blood sugars remain so tightly controlled. No evidence or symptoms of hypoglycemia noted.     Discussed risks/complications associated with uncontrolled diabetes including organ involvement, heart attack, stroke, death.    Advised lifestyle modifications including attention to diet including the amount and types of carbohydrates consumed and regular activity.     Call for blood sugars less than 70 mg/dl or patterns over 250 mg/dl.     Monitor blood glucose levels at least 1 time per day at alternating times.     Recommendation for medical identification either bracelet, necklace.    Routine follow up for diabetic eye and foot exams.     Ordered blood work to complete prior to next visit.    Follow up in 3 months.       Orders:    Hemoglobin A1C; Future    Comprehensive metabolic panel; Future    Albumin / creatinine urine ratio; Future

## 2024-10-29 NOTE — ASSESSMENT & PLAN NOTE
Followed by ENT. Denies neck compressive symptoms.  Clinically and biochemically euthyroid.

## 2024-11-01 NOTE — PROGRESS NOTES
Hi  Progress Note - Cardiology Office  [facility]    Elisabeth Ruzicka Dempsey 79 y.o. female MRN: [id number]  : 1945  Encounter: [id number]      ASSESSMENT:  S/p cataract surgery  Following which she developed macular edema and has to get regular injections     History of crushing chest pain  Was evaluated in the ED on 2023  Had been having intermittent substernal chest pain for 10 days, usually associated with anxiety/stress or activity.  Has had recurrences of chest pain which are concerning to the patient.  Therefore we will schedule her for a coronary CTA     Exercise stress test, 2023:  Negative for ischemia     TTE, 2023:  EF 55%, trace MR     Mixed hyperlipidemia  On Crestor 20 mg  10/17/2024: LDL 71, TG 78, HDL 49, normal AST and ALT     Essential hypertension  BP today is controlled at 124/82 with heart rate of 79/min     Type 2 diabetes mellitus     Obesity, BMI 34.21-> 31.02> 29.19     Elevated ASCVD risk score of 46.3%           RECOMMENDATIONS:  Continue current medications including aspirin, losartan hydrochlorothiazide, and Crestor.  Coronary CTA in view of patient's very high ASCVD 10-year risk score, hyperlipidemia, hypertension and recurrent chest pain.  Knee-high compression stockings to be worn during the day        Please call [phone] if any questions.    HPI :     Elisabeth Ruzicka Dempsey is a 79 y.o. year old female who came for follow up.  She continues to have off-and-on chest pain.  She has hypertension, hyperlipidemia, diabetes mellitus, and very high ASCVD 10-year risk score.  Although her last exercise stress test was unremarkable, I am going to recommend coronary CTA to make sure that she does not have any significant underlying coronary artery disease    REVIEW OF SYSTEMS:  Review of Systems   Eyes:  Positive for visual disturbance.   Cardiovascular:  Positive for chest pain.   All other systems reviewed and are  negative.        Historical Information   Past Medical History:   Diagnosis Date    Cancer (HCC)     Diabetes mellitus (HCC)     Dizziness     Endometrial carcinoma (HCC)     Hypertension     Obesity     Rectal bleeding 09/10/2019    Sciatic radiculitis 01/28/2021    Tonsillitis     Varicella      Past Surgical History:   Procedure Laterality Date    EYE SURGERY Bilateral     2024    HYSTERECTOMY  2006    TONSILLECTOMY  1962    TUBAL LIGATION  1992    US GUIDED THYROID BIOPSY  12/12/2019     Social History     Substance and Sexual Activity   Alcohol Use Not Currently    Alcohol/week: 4.0 standard drinks of alcohol    Types: 4 Glasses of wine per week     Social History     Substance and Sexual Activity   Drug Use Never     Social History     Tobacco Use   Smoking Status Never    Passive exposure: Past   Smokeless Tobacco Never     Family History:   Family History   Problem Relation Age of Onset    Stroke Mother     Diabetes Mother     Cancer Mother     Diabetes unspecified Mother     Lung cancer Father     Breast cancer Sister 78        Half-sister    Dementia Sister     No Known Problems Sister     COPD Brother     Coronary artery disease Brother     Heart failure Brother     HIV Brother     Cancer Family     No Known Problems Daughter     Leukemia Son     Cancer Son         Leukemia age 19    No Known Problems Son        Meds/Allergies     Allergies   Allergen Reactions    Bee Venom Hives and Swelling     Yellow jackets and wasps    Gabapentin Hives       Current Outpatient Medications:     Blood Glucose Monitoring Suppl (ONE TOUCH ULTRA 2) w/Device KIT, , Disp: , Rfl:     Continuous Blood Gluc  (Dexcom G7 ) NAHID, Use 1 each continuous, Disp: 1 each, Rfl: 0    Continuous Glucose Sensor (Dexcom G7 Sensor), Use 1 Device every 10 days, Disp: 3 each, Rfl: 5    EPINEPHrine (EPIPEN) 0.3 mg/0.3 mL SOAJ, Inject 0.3 mL (0.3 mg total) into a muscle once for 1 dose, Disp: 0.6 mL, Rfl: 0    ergocalciferol  "(VITAMIN D2) 50,000 units, TAKE 1 CAPSULE BY MOUTH ONE TIME PER WEEK, Disp: 4 capsule, Rfl: 5    glucose blood (OneTouch Ultra) test strip, Use to check sugars three times a day Dx uncontrolled DM2 E 11.65, Disp: 200 strip, Rfl: 5    losartan-hydrochlorothiazide (HYZAAR) 50-12.5 mg per tablet, TAKE 1 TABLET BY MOUTH EVERY DAY, Disp: 90 tablet, Rfl: 1    metoprolol tartrate (LOPRESSOR) 100 mg tablet, Take 1 tablet (100 mg total) by mouth every 12 (twelve) hours Take 100 mg, 1-1/2 to 2 hours before the CAT scan and repeat if needed, Disp: 2 tablet, Rfl: 0    rosuvastatin (CRESTOR) 20 MG tablet, TAKE 1 TABLET BY MOUTH EVERY DAY, Disp: 30 tablet, Rfl: 5    sertraline (ZOLOFT) 25 mg tablet, Take 1 tablet (25 mg total) by mouth daily for 14 days, THEN 2 tablets (50 mg total) daily., Disp: 60 tablet, Rfl: 1    tirzepatide 15 MG/0.5ML, Inject 0.5 mL (15 mg total) under the skin every 7 days, Disp: 6 mL, Rfl: 1    vitamin B-12 (VITAMIN B-12) 500 mcg tablet, Take 1 tablet (500 mcg total) by mouth daily Over the counter, Disp: 90 tablet, Rfl: 1    aspirin 81 mg chewable tablet, Chew 1 tablet (81 mg total) daily, Disp: 30 tablet, Rfl: 0    Vitals: Blood pressure 124/82, pulse 79, height 5' 8\" (1.727 m), weight 87.1 kg (192 lb), SpO2 99%.    Body mass index is 29.19 kg/m².  Vitals:    11/04/24 1020   Weight: 87.1 kg (192 lb)     BP Readings from Last 3 Encounters:   11/04/24 124/82   10/28/24 130/82   10/24/24 150/66       Physical Exam:  Physical Exam    Neurologic:  Alert & oriented x 3, no new focal deficits, Not in any acute distress,  Constitutional: Obese  Eyes:  Pupil equal and reacting to light, conjunctiva normal,   HENT:  Atraumatic, oropharynx moist, Neck- normal range of motion, no tenderness,  Neck supple, No JVP, No LNP   Respiratory:  Bilateral air entry, mostly clear to auscultation  Cardiovascular: S1-S2 regular with a I/VI systolic murmur   GI:  Soft, nondistended, normal bowel sounds, nontender, no " "hepatosplenomegaly appreciated.  Musculoskeletal:  No tenderness, no deformities.   Skin:  Well hydrated, no rash   Lymphatic:  No lymphadenopathy noted   Extremities: Trace edema        Diagnostic Studies Review Cardio:      EKG: Sinus rhythm with first-degree AV block, heart rate 79/min    Cardiac testing:       Results for orders placed during the hospital encounter of 08/17/23    Echo complete w/ contrast if indicated    Interpretation Summary    Left Ventricle: Left ventricular cavity size is normal. Wall thickness is normal. The left ventricular ejection fraction is 55% by visual estimation.. Systolic function is normal. Wall motion is normal. Diastolic function is normal for age.    Tricuspid Valve: The right ventricular systolic pressure is normal. The estimated right ventricular systolic pressure is 16.00 mmHg.        Imaging:  Chest X-Ray:   No Chest XR results available for this patient.    CT-scan of the chest:     No CTA results available for this patient.  Lab Review   Lab Results   Component Value Date    WBC 7.65 12/08/2023    HGB 15.2 12/08/2023    HCT 45.9 12/08/2023    MCV 85 12/08/2023    RDW 12.2 12/08/2023     12/08/2023     BMP:  Lab Results   Component Value Date    SODIUM 138 10/17/2024    K 4.0 10/17/2024     10/17/2024    CO2 27 10/17/2024    ANIONGAP 5 02/25/2015    BUN 12 10/17/2024    CREATININE 0.57 (L) 10/17/2024    GLUC 258 (H) 07/27/2023    GLUF 107 (H) 10/17/2024    CALCIUM 9.8 10/17/2024    CORRECTEDCA 9.5 05/25/2022    EGFR 89 10/17/2024    MG 1.9 05/24/2022     LFT:  Lab Results   Component Value Date    AST 18 10/17/2024    ALT 19 10/17/2024    ALKPHOS 56 10/17/2024    TP 7.4 10/17/2024    ALB 4.2 10/17/2024      No components found for: \"TSH3\"  Lab Results   Component Value Date    [id number] 1.682 10/17/2024     Lab Results   Component Value Date    HGBA1C 6.1 (H) 10/17/2024     Lipid Profile:   Lab Results   Component Value Date    CHOLESTEROL 136 10/17/2024 " "   HDL 49 (L) 10/17/2024    LDLCALC 71 10/17/2024    TRIG 78 10/17/2024     Lab Results   Component Value Date    CHOLESTEROL 136 10/17/2024    CHOLESTEROL 126 06/10/2024     No results found for: \"CKTOTAL\", \"CKMB\", \"CKMBINDEX\", \"TROPONINI\"  No results found for: \"NTBNP\"   Recent Results (from the past 672 hour(s))   Lipid panel    Collection Time: 10/17/24  9:30 AM   Result Value Ref Range    Cholesterol 136 See Comment mg/dL    Triglycerides 78 See Comment mg/dL    HDL, Direct 49 (L) >=50 mg/dL    LDL Calculated 71 0 - 100 mg/dL    Non-HDL-Chol (CHOL-HDL) 87 mg/dl   Comprehensive metabolic panel    Collection Time: 10/17/24  9:30 AM   Result Value Ref Range    Sodium 138 135 - 147 mmol/L    Potassium 4.0 3.5 - 5.3 mmol/L    Chloride 102 96 - 108 mmol/L    CO2 27 21 - 32 mmol/L    ANION GAP 9 4 - 13 mmol/L    BUN 12 5 - 25 mg/dL    Creatinine 0.57 (L) 0.60 - 1.30 mg/dL    Glucose, Fasting 107 (H) 65 - 99 mg/dL    Calcium 9.8 8.4 - 10.2 mg/dL    AST 18 13 - 39 U/L    ALT 19 7 - 52 U/L    Alkaline Phosphatase 56 34 - 104 U/L    Total Protein 7.4 6.4 - 8.4 g/dL    Albumin 4.2 3.5 - 5.0 g/dL    Total Bilirubin 0.52 0.20 - 1.00 mg/dL    eGFR 89 ml/min/1.73sq m   TSH, 3rd generation    Collection Time: 10/17/24  9:30 AM   Result Value Ref Range    TSH 3RD GENERATON 1.682 0.450 - 4.500 uIU/mL   T4, free    Collection Time: 10/17/24  9:30 AM   Result Value Ref Range    Free T4 0.84 0.61 - 1.12 ng/dL   Hemoglobin A1C    Collection Time: 10/17/24  9:30 AM   Result Value Ref Range    Hemoglobin A1C 6.1 (H) Normal 4.0-5.6%; PreDiabetic 5.7-6.4%; Diabetic >=6.5%; Glycemic control for adults with diabetes <7.0% %     mg/dl             Dr. Toni Robles MD, [facility]      \"This note has been constructed using a voice recognition system.Therefore there may be syntax, spelling, and/or grammatical errors. Please call if you have any questions. \"  "

## 2024-11-01 NOTE — PROGRESS NOTES
Patient presents for nurse visit to check BP and calibrate machine. Patient's BP with home cuff was 116/64. Patient's manual BP was 124/74. Patient given BP log sheets to record BP readings at home. While patient was here for her nurse visit, patient states she has been having chest pain and shortness of breath that started last week. Patient states the chest pain is located in the middle of her chest and it does not radiate to any side. Patient has not been exerting herself more than normal, has not been exercising as much due to the heat. Patient has a history of vertigo, which was resolved but has been getting worse again for the past month. Patient feels dizzy and has a lack of balance when she first wakes up in the AM. This resolves after approximately 30 minutes. Patient's pain scale is 4/10 when this happens, and describes it as more pressure than pain. Patient states that, when she had this happen often in the 80's, she would take nitroglycerin, which helped, but does not do this at this time. Patient states she now takes 2 aspirin 81mg tablets, which helps her pain. Patient does not take her BP when this happens. Patient denies lightheadedness, nausea, vomiting, syncope, other cold like symptoms. Patient also noted that she [place] [facility] [place]. yes

## 2024-11-04 ENCOUNTER — OFFICE VISIT (OUTPATIENT)
Dept: CARDIOLOGY CLINIC | Facility: CLINIC | Age: 79
End: 2024-11-04
Payer: MEDICARE

## 2024-11-04 VITALS
SYSTOLIC BLOOD PRESSURE: 124 MMHG | WEIGHT: 192 LBS | DIASTOLIC BLOOD PRESSURE: 82 MMHG | BODY MASS INDEX: 29.1 KG/M2 | HEART RATE: 79 BPM | OXYGEN SATURATION: 99 % | HEIGHT: 68 IN

## 2024-11-04 DIAGNOSIS — I73.9 PVD (PERIPHERAL VASCULAR DISEASE) (HCC): ICD-10-CM

## 2024-11-04 DIAGNOSIS — I10 BENIGN ESSENTIAL HYPERTENSION: Primary | ICD-10-CM

## 2024-11-04 DIAGNOSIS — Z01.818 PREOP TESTING: ICD-10-CM

## 2024-11-04 DIAGNOSIS — R07.89 OTHER CHEST PAIN: ICD-10-CM

## 2024-11-04 DIAGNOSIS — R07.2 PRECORDIAL PAIN: ICD-10-CM

## 2024-11-04 DIAGNOSIS — E78.2 HYPERLIPIDEMIA, MIXED: ICD-10-CM

## 2024-11-04 PROCEDURE — 93000 ELECTROCARDIOGRAM COMPLETE: CPT | Performed by: INTERNAL MEDICINE

## 2024-11-04 PROCEDURE — 99214 OFFICE O/P EST MOD 30 MIN: CPT | Performed by: INTERNAL MEDICINE

## 2024-11-04 RX ORDER — METOPROLOL TARTRATE 100 MG/1
100 TABLET ORAL EVERY 12 HOURS SCHEDULED
Qty: 2 TABLET | Refills: 0 | Status: SHIPPED | OUTPATIENT
Start: 2024-11-04

## 2024-11-16 DIAGNOSIS — F41.9 ANXIETY: ICD-10-CM

## 2024-11-27 ENCOUNTER — HOSPITAL ENCOUNTER (OUTPATIENT)
Dept: CT IMAGING | Facility: HOSPITAL | Age: 79
Discharge: HOME | End: 2024-11-27
Payer: MEDICARE

## 2024-11-27 VITALS — DIASTOLIC BLOOD PRESSURE: 56 MMHG | HEART RATE: 74 BPM | SYSTOLIC BLOOD PRESSURE: 113 MMHG

## 2024-11-27 DIAGNOSIS — R07.89 OTHER CHEST PAIN: ICD-10-CM

## 2024-11-27 PROCEDURE — 75574 CT ANGIO HRT W/3D IMAGE: CPT

## 2024-11-27 PROCEDURE — 75580 N-INVAS EST C FFR SW ALY CTA: CPT

## 2024-11-27 RX ORDER — NITROGLYCERIN 0.4 MG/1
0.8 TABLET SUBLINGUAL ONCE
Status: COMPLETED | OUTPATIENT
Start: 2024-11-27 | End: 2024-11-27

## 2024-11-27 RX ORDER — METOPROLOL TARTRATE 1 MG/ML
5 INJECTION, SOLUTION INTRAVENOUS
Status: DISCONTINUED | OUTPATIENT
Start: 2024-11-27 | End: 2024-12-01 | Stop reason: HOSPADM

## 2024-11-27 RX ADMIN — METOPROLOL TARTRATE 5 MG: 5 INJECTION INTRAVENOUS at 13:14

## 2024-11-27 RX ADMIN — NITROGLYCERIN 0.8 MG: 0.4 TABLET SUBLINGUAL at 13:31

## 2024-11-27 RX ADMIN — METOPROLOL TARTRATE 5 MG: 5 INJECTION INTRAVENOUS at 13:10

## 2024-11-27 RX ADMIN — IOHEXOL 94 ML: 350 INJECTION, SOLUTION INTRAVENOUS at 13:45

## 2024-11-27 NOTE — PROGRESS NOTES
Patient arrived for coronary CT angiography. Test education reviewed with patient. Medications and allergies verified. Patient took lopressor  as instructed by cardiology. Patient given  metoprolol 15mg iv to reach target HR. SL nitro given per protocol. See vitals flowsheet. Tolerated test well. Instructed to increase fluid intake remainder of day. Vitals stable, patient asymptomatic upon departure with spouse.

## 2024-12-01 ENCOUNTER — RESULTS FOLLOW-UP (OUTPATIENT)
Dept: CARDIOLOGY CLINIC | Facility: CLINIC | Age: 79
End: 2024-12-01

## 2024-12-02 ENCOUNTER — PATIENT MESSAGE (OUTPATIENT)
Dept: FAMILY MEDICINE CLINIC | Facility: CLINIC | Age: 79
End: 2024-12-02

## 2024-12-02 DIAGNOSIS — F41.9 ANXIETY: ICD-10-CM

## 2024-12-02 NOTE — TELEPHONE ENCOUNTER
----- Message from Toni Robles MD sent at 12/1/2024  3:22 PM EST -----  Please call and inform patient that her coronary CTA was abnormal suggesting blockage in one of the blood vessels.  Please call the office to make appointment to discuss the findings and further testing and treatment plans.

## 2024-12-02 NOTE — TELEPHONE ENCOUNTER
Pt.made aware to schedule an appt.to discuss results of CTA in office. Call transferred to our scheduling staff in office.

## 2024-12-03 ENCOUNTER — OFFICE VISIT (OUTPATIENT)
Dept: CARDIOLOGY CLINIC | Facility: CLINIC | Age: 79
End: 2024-12-03
Payer: MEDICARE

## 2024-12-03 ENCOUNTER — APPOINTMENT (OUTPATIENT)
Dept: LAB | Facility: CLINIC | Age: 79
End: 2024-12-03
Payer: MEDICARE

## 2024-12-03 VITALS
BODY MASS INDEX: 28.49 KG/M2 | OXYGEN SATURATION: 98 % | DIASTOLIC BLOOD PRESSURE: 74 MMHG | WEIGHT: 188 LBS | SYSTOLIC BLOOD PRESSURE: 122 MMHG | HEART RATE: 78 BPM | HEIGHT: 68 IN

## 2024-12-03 DIAGNOSIS — I10 PRIMARY HYPERTENSION: ICD-10-CM

## 2024-12-03 DIAGNOSIS — R42 DIZZINESS: ICD-10-CM

## 2024-12-03 DIAGNOSIS — E78.2 MIXED HYPERLIPIDEMIA: ICD-10-CM

## 2024-12-03 DIAGNOSIS — I25.118 CORONARY ARTERY DISEASE OF NATIVE ARTERY OF NATIVE HEART WITH STABLE ANGINA PECTORIS (HCC): ICD-10-CM

## 2024-12-03 DIAGNOSIS — R07.2 PRECORDIAL PAIN: Primary | ICD-10-CM

## 2024-12-03 LAB
ANION GAP SERPL CALCULATED.3IONS-SCNC: 8 MMOL/L (ref 4–13)
BUN SERPL-MCNC: 15 MG/DL (ref 5–25)
CALCIUM SERPL-MCNC: 10.2 MG/DL (ref 8.4–10.2)
CHLORIDE SERPL-SCNC: 97 MMOL/L (ref 96–108)
CO2 SERPL-SCNC: 32 MMOL/L (ref 21–32)
CREAT SERPL-MCNC: 0.8 MG/DL (ref 0.6–1.3)
ERYTHROCYTE [DISTWIDTH] IN BLOOD BY AUTOMATED COUNT: 12.4 % (ref 11.6–15.1)
GFR SERPL CREATININE-BSD FRML MDRD: 70 ML/MIN/1.73SQ M
GLUCOSE P FAST SERPL-MCNC: 108 MG/DL (ref 65–99)
HCT VFR BLD AUTO: 42.8 % (ref 34.8–46.1)
HGB BLD-MCNC: 14.4 G/DL (ref 11.5–15.4)
MCH RBC QN AUTO: 28.7 PG (ref 26.8–34.3)
MCHC RBC AUTO-ENTMCNC: 33.6 G/DL (ref 31.4–37.4)
MCV RBC AUTO: 85 FL (ref 82–98)
PLATELET # BLD AUTO: 283 THOUSANDS/UL (ref 149–390)
PMV BLD AUTO: 9.5 FL (ref 8.9–12.7)
POTASSIUM SERPL-SCNC: 4.4 MMOL/L (ref 3.5–5.3)
RBC # BLD AUTO: 5.02 MILLION/UL (ref 3.81–5.12)
SODIUM SERPL-SCNC: 137 MMOL/L (ref 135–147)
WBC # BLD AUTO: 7.1 THOUSAND/UL (ref 4.31–10.16)

## 2024-12-03 PROCEDURE — 36415 COLL VENOUS BLD VENIPUNCTURE: CPT

## 2024-12-03 PROCEDURE — 80048 BASIC METABOLIC PNL TOTAL CA: CPT

## 2024-12-03 PROCEDURE — 85027 COMPLETE CBC AUTOMATED: CPT

## 2024-12-03 PROCEDURE — 99214 OFFICE O/P EST MOD 30 MIN: CPT | Performed by: INTERNAL MEDICINE

## 2024-12-03 RX ORDER — ASPIRIN 81 MG/1
81 TABLET, CHEWABLE ORAL DAILY
Qty: 100 TABLET | Refills: 2 | Status: SHIPPED | OUTPATIENT
Start: 2024-12-03 | End: 2025-01-02

## 2024-12-03 NOTE — PROGRESS NOTES
Progress Note - Cardiology Office  [facility]    Elisabeth Ruzicka Dempsey 79 y.o. female MRN: [id number]  : 1945  Encounter: [id number]      ASSESSMENT:   Assessment & Plan  Precordial pain  History of crushing chest pain  Was evaluated in the ED on 2023  Had been having intermittent substernal chest pain for 10 days, usually associated with anxiety/stress or activity.  Has had recurrences of chest pain which are concerning to the patient.    Coronary CTA  Coronary calcium score 102  Severe stenosis at the left circumflex artery origin with suggestion of associated lesion specific ischemia on FFR CT. Moderate atherosclerotic disease in the proximal LAD with no evidence of hemodynamically significant stenosis.     Management recommendations:  - Consider invasive coronary angiography.  - Aggressive risk factor modification and preventative pharmacotherapy.  - Other treatments (including anti-anginal therapy) should be considered per guideline directed care    Exercise stress test, 2023:  Negative for ischemia     TTE, 2023:  EF 55%, trace MR     Mixed hyperlipidemia  On Crestor 20 mg  10/17/2024: LDL 71, TG 78, HDL 49, normal AST and ALT  Primary hypertension    Dizziness     Type 2 diabetes mellitus     Obesity, BMI 34.21-> 31.02> 29.19     Elevated ASCVD risk score of 46.3%       RECOMMENDATIONS:  Discussed findings of coronary CTA with the patient.  Scheduled for cardiac catheterization at [facility]  Continue metoprolol, aspirin and Crestor  Sublingual nitroglycerin 0.4 mg as needed      Please call [phone] if any questions.    HPI :     Elisabeth Ruzicka Dempsey is a 79 y.o. year old female who came for follow up and to discuss results of coronary CTA which revealed severe stenosis of left circumflex coronary artery.  She also has moderate disease of the LAD.  After discussing with patient and her , I am scheduling her for a cardiac  catheterization at [facility] as she would prefer to have PCI done at the same time if needed.  She will resume aspirin and continue statin and beta-blocker.  I am also prescribing her sublingual nitroglycerin to use in case she has any anginal symptoms.  I have also advised her to go to the ED in case she has chest pain which does not resolve with sublingual nitroglycerin    REVIEW OF SYSTEMS:  Review of Systems   Cardiovascular:  Positive for chest pain.   All other systems reviewed and are negative.        Historical Information   Past Medical History:   Diagnosis Date    Cancer (HCC)     Diabetes mellitus (HCC)     Dizziness     Endometrial carcinoma (HCC)     Hypertension     Obesity     Rectal bleeding 09/10/2019    Sciatic radiculitis 01/28/2021    Tonsillitis     Varicella      Past Surgical History:   Procedure Laterality Date    EYE SURGERY Bilateral     2024    HYSTERECTOMY  2006    TONSILLECTOMY  1962    TUBAL LIGATION  1992    US GUIDED THYROID BIOPSY  12/12/2019     Social History     Substance and Sexual Activity   Alcohol Use Not Currently    Alcohol/week: 4.0 standard drinks of alcohol    Types: 4 Glasses of wine per week     Social History     Substance and Sexual Activity   Drug Use Never     Social History     Tobacco Use   Smoking Status Never    Passive exposure: Past   Smokeless Tobacco Never     Family History:   Family History   Problem Relation Age of Onset    Stroke Mother     Diabetes Mother     Cancer Mother     Diabetes unspecified Mother     Lung cancer Father     Breast cancer Sister 78        Half-sister    Dementia Sister     No Known Problems Sister     COPD Brother     Coronary artery disease Brother     Heart failure Brother     HIV Brother     Cancer Family     No Known Problems Daughter     Leukemia Son     Cancer Son         Leukemia age 19    No Known Problems Son        Meds/Allergies     Allergies   Allergen Reactions    Bee Venom Hives and Swelling     Yellow jackets  "and wasps    Gabapentin Hives       Current Outpatient Medications:     aspirin 81 mg chewable tablet, Chew 1 tablet (81 mg total) daily, Disp: 100 tablet, Rfl: 2    Blood Glucose Monitoring Suppl (ONE TOUCH ULTRA 2) w/Device KIT, , Disp: , Rfl:     Continuous Blood Gluc  (Dexcom G7 ) NAHID, Use 1 each continuous, Disp: 1 each, Rfl: 0    Continuous Glucose Sensor (Dexcom G7 Sensor), Use 1 Device every 10 days, Disp: 3 each, Rfl: 5    EPINEPHrine (EPIPEN) 0.3 mg/0.3 mL SOAJ, Inject 0.3 mL (0.3 mg total) into a muscle once for 1 dose, Disp: 0.6 mL, Rfl: 0    ergocalciferol (VITAMIN D2) 50,000 units, TAKE 1 CAPSULE BY MOUTH ONE TIME PER WEEK, Disp: 4 capsule, Rfl: 5    glucose blood (OneTouch Ultra) test strip, Use to check sugars three times a day Dx uncontrolled DM2 E 11.65, Disp: 200 strip, Rfl: 5    losartan-hydrochlorothiazide (HYZAAR) 50-12.5 mg per tablet, TAKE 1 TABLET BY MOUTH EVERY DAY, Disp: 90 tablet, Rfl: 1    metoprolol tartrate (LOPRESSOR) 100 mg tablet, Take 1 tablet (100 mg total) by mouth every 12 (twelve) hours Take 100 mg, 1-1/2 to 2 hours before the CAT scan and repeat if needed, Disp: 2 tablet, Rfl: 0    nitroglycerin (NITROSTAT) 0.4 mg SL tablet, Place 1 tablet (0.4 mg total) under the tongue every 5 (five) minutes as needed for chest pain, Disp: 50 tablet, Rfl: 2    rosuvastatin (CRESTOR) 20 MG tablet, TAKE 1 TABLET BY MOUTH EVERY DAY, Disp: 30 tablet, Rfl: 5    sertraline (ZOLOFT) 25 mg tablet, Take 1 tablet (25 mg total) by mouth daily for 14 days, THEN 2 tablets (50 mg total) daily., Disp: 60 tablet, Rfl: 1    tirzepatide 15 MG/0.5ML, Inject 0.5 mL (15 mg total) under the skin every 7 days, Disp: 6 mL, Rfl: 1    vitamin B-12 (VITAMIN B-12) 500 mcg tablet, Take 1 tablet (500 mcg total) by mouth daily Over the counter, Disp: 90 tablet, Rfl: 1    Vitals: Blood pressure 122/74, pulse 78, height 5' 8\" (1.727 m), weight 85.3 kg (188 lb), SpO2 98%.    Body mass index is 28.59 " kg/m².  Vitals:    12/03/24 0753   Weight: 85.3 kg (188 lb)     BP Readings from Last 3 Encounters:   12/03/24 122/74   11/27/24 113/56   11/04/24 124/82       Physical Exam:  Physical Exam    Neurologic:  Alert & oriented x 3, no new focal deficits, Not in any acute distress,  Constitutional: Overweight  Eyes:  Pupil equal and reacting to light, conjunctiva normal,   HENT:  Atraumatic, oropharynx moist, Neck- normal range of motion, no tenderness,  Neck supple, No JVP, No LNP   Respiratory:  Bilateral air entry, mostly clear to auscultation  Cardiovascular: S1-S2 regular with a I/VI systolic murmur   GI:  Soft, nondistended, normal bowel sounds, nontender, no hepatosplenomegaly appreciated.  Musculoskeletal:  No tenderness, no deformities.   Skin:  Well hydrated, no rash   Lymphatic:  No lymphadenopathy noted   Extremities: trace edema        Diagnostic Studies Review Cardio:      Cardiac testing:       Results for orders placed during the hospital encounter of 08/17/23    Echo complete w/ contrast if indicated    Interpretation Summary    Left Ventricle: Left ventricular cavity size is normal. Wall thickness is normal. The left ventricular ejection fraction is 55% by visual estimation.. Systolic function is normal. Wall motion is normal. Diastolic function is normal for age.    Tricuspid Valve: The right ventricular systolic pressure is normal. The estimated right ventricular systolic pressure is 16.00 mmHg.        Imaging:  Chest X-Ray:   No Chest XR results available for this patient.    CT-scan of the chest:     No CTA results available for this patient.  Lab Review   Lab Results   Component Value Date    WBC 7.65 12/08/2023    HGB 15.2 12/08/2023    HCT 45.9 12/08/2023    MCV 85 12/08/2023    RDW 12.2 12/08/2023     12/08/2023     BMP:  Lab Results   Component Value Date    SODIUM 138 10/17/2024    K 4.0 10/17/2024     10/17/2024    CO2 27 10/17/2024    ANIONGAP 5 02/25/2015    BUN 12 10/17/2024  "   CREATININE 0.57 (L) 10/17/2024    GLUC 258 (H) 07/27/2023    GLUF 107 (H) 10/17/2024    CALCIUM 9.8 10/17/2024    CORRECTEDCA 9.5 05/25/2022    EGFR 89 10/17/2024    MG 1.9 05/24/2022     LFT:  Lab Results   Component Value Date    AST 18 10/17/2024    ALT 19 10/17/2024    ALKPHOS 56 10/17/2024    TP 7.4 10/17/2024    ALB 4.2 10/17/2024      No components found for: \"TSH3\"  Lab Results   Component Value Date    [id number] 1.682 10/17/2024     Lab Results   Component Value Date    HGBA1C 6.1 (H) 10/17/2024     Lipid Profile:   Lab Results   Component Value Date    CHOLESTEROL 136 10/17/2024    HDL 49 (L) 10/17/2024    LDLCALC 71 10/17/2024    TRIG 78 10/17/2024     Lab Results   Component Value Date    CHOLESTEROL 136 10/17/2024    CHOLESTEROL 126 06/10/2024     No results found for: \"CKTOTAL\", \"CKMB\", \"CKMBINDEX\", \"TROPONINI\"  No results found for: \"NTBNP\"   No results found for this or any previous visit (from the past 4 weeks).          Dr. Toni Robles MD, [facility]      \"This note has been constructed using a voice recognition system.Therefore there may be syntax, spelling, and/or grammatical errors. Please call if you have any questions. \"  "

## 2024-12-03 NOTE — H&P (VIEW-ONLY)
Progress Note - Cardiology Office  [facility]    Elisabeth Ruzicka Dempsey 79 y.o. female MRN: [id number]  : 1945  Encounter: [id number]      ASSESSMENT:   Assessment & Plan  Precordial pain  History of crushing chest pain  Was evaluated in the ED on 2023  Had been having intermittent substernal chest pain for 10 days, usually associated with anxiety/stress or activity.  Has had recurrences of chest pain which are concerning to the patient.    Coronary CTA  Coronary calcium score 102  Severe stenosis at the left circumflex artery origin with suggestion of associated lesion specific ischemia on FFR CT. Moderate atherosclerotic disease in the proximal LAD with no evidence of hemodynamically significant stenosis.     Management recommendations:  - Consider invasive coronary angiography.  - Aggressive risk factor modification and preventative pharmacotherapy.  - Other treatments (including anti-anginal therapy) should be considered per guideline directed care    Exercise stress test, 2023:  Negative for ischemia     TTE, 2023:  EF 55%, trace MR     Mixed hyperlipidemia  On Crestor 20 mg  10/17/2024: LDL 71, TG 78, HDL 49, normal AST and ALT  Primary hypertension    Dizziness     Type 2 diabetes mellitus     Obesity, BMI 34.21-> 31.02> 29.19     Elevated ASCVD risk score of 46.3%       RECOMMENDATIONS:  Discussed findings of coronary CTA with the patient.  Scheduled for cardiac catheterization at [facility]  Continue metoprolol, aspirin and Crestor  Sublingual nitroglycerin 0.4 mg as needed      Please call [phone] if any questions.    HPI :     Elisabeth Ruzicka Dempsey is a 79 y.o. year old female who came for follow up and to discuss results of coronary CTA which revealed severe stenosis of left circumflex coronary artery.  She also has moderate disease of the LAD.  After discussing with patient and her , I am scheduling her for a cardiac  catheterization at [facility] as she would prefer to have PCI done at the same time if needed.  She will resume aspirin and continue statin and beta-blocker.  I am also prescribing her sublingual nitroglycerin to use in case she has any anginal symptoms.  I have also advised her to go to the ED in case she has chest pain which does not resolve with sublingual nitroglycerin    REVIEW OF SYSTEMS:  Review of Systems   Cardiovascular:  Positive for chest pain.   All other systems reviewed and are negative.        Historical Information   Past Medical History:   Diagnosis Date    Cancer (HCC)     Diabetes mellitus (HCC)     Dizziness     Endometrial carcinoma (HCC)     Hypertension     Obesity     Rectal bleeding 09/10/2019    Sciatic radiculitis 01/28/2021    Tonsillitis     Varicella      Past Surgical History:   Procedure Laterality Date    EYE SURGERY Bilateral     2024    HYSTERECTOMY  2006    TONSILLECTOMY  1962    TUBAL LIGATION  1992    US GUIDED THYROID BIOPSY  12/12/2019     Social History     Substance and Sexual Activity   Alcohol Use Not Currently    Alcohol/week: 4.0 standard drinks of alcohol    Types: 4 Glasses of wine per week     Social History     Substance and Sexual Activity   Drug Use Never     Social History     Tobacco Use   Smoking Status Never    Passive exposure: Past   Smokeless Tobacco Never     Family History:   Family History   Problem Relation Age of Onset    Stroke Mother     Diabetes Mother     Cancer Mother     Diabetes unspecified Mother     Lung cancer Father     Breast cancer Sister 78        Half-sister    Dementia Sister     No Known Problems Sister     COPD Brother     Coronary artery disease Brother     Heart failure Brother     HIV Brother     Cancer Family     No Known Problems Daughter     Leukemia Son     Cancer Son         Leukemia age 19    No Known Problems Son        Meds/Allergies     Allergies   Allergen Reactions    Bee Venom Hives and Swelling     Yellow jackets  "and wasps    Gabapentin Hives       Current Outpatient Medications:     aspirin 81 mg chewable tablet, Chew 1 tablet (81 mg total) daily, Disp: 100 tablet, Rfl: 2    Blood Glucose Monitoring Suppl (ONE TOUCH ULTRA 2) w/Device KIT, , Disp: , Rfl:     Continuous Blood Gluc  (Dexcom G7 ) NAHID, Use 1 each continuous, Disp: 1 each, Rfl: 0    Continuous Glucose Sensor (Dexcom G7 Sensor), Use 1 Device every 10 days, Disp: 3 each, Rfl: 5    EPINEPHrine (EPIPEN) 0.3 mg/0.3 mL SOAJ, Inject 0.3 mL (0.3 mg total) into a muscle once for 1 dose, Disp: 0.6 mL, Rfl: 0    ergocalciferol (VITAMIN D2) 50,000 units, TAKE 1 CAPSULE BY MOUTH ONE TIME PER WEEK, Disp: 4 capsule, Rfl: 5    glucose blood (OneTouch Ultra) test strip, Use to check sugars three times a day Dx uncontrolled DM2 E 11.65, Disp: 200 strip, Rfl: 5    losartan-hydrochlorothiazide (HYZAAR) 50-12.5 mg per tablet, TAKE 1 TABLET BY MOUTH EVERY DAY, Disp: 90 tablet, Rfl: 1    metoprolol tartrate (LOPRESSOR) 100 mg tablet, Take 1 tablet (100 mg total) by mouth every 12 (twelve) hours Take 100 mg, 1-1/2 to 2 hours before the CAT scan and repeat if needed, Disp: 2 tablet, Rfl: 0    nitroglycerin (NITROSTAT) 0.4 mg SL tablet, Place 1 tablet (0.4 mg total) under the tongue every 5 (five) minutes as needed for chest pain, Disp: 50 tablet, Rfl: 2    rosuvastatin (CRESTOR) 20 MG tablet, TAKE 1 TABLET BY MOUTH EVERY DAY, Disp: 30 tablet, Rfl: 5    sertraline (ZOLOFT) 25 mg tablet, Take 1 tablet (25 mg total) by mouth daily for 14 days, THEN 2 tablets (50 mg total) daily., Disp: 60 tablet, Rfl: 1    tirzepatide 15 MG/0.5ML, Inject 0.5 mL (15 mg total) under the skin every 7 days, Disp: 6 mL, Rfl: 1    vitamin B-12 (VITAMIN B-12) 500 mcg tablet, Take 1 tablet (500 mcg total) by mouth daily Over the counter, Disp: 90 tablet, Rfl: 1    Vitals: Blood pressure 122/74, pulse 78, height 5' 8\" (1.727 m), weight 85.3 kg (188 lb), SpO2 98%.    Body mass index is 28.59 " kg/m².  Vitals:    12/03/24 0753   Weight: 85.3 kg (188 lb)     BP Readings from Last 3 Encounters:   12/03/24 122/74   11/27/24 113/56   11/04/24 124/82       Physical Exam:  Physical Exam    Neurologic:  Alert & oriented x 3, no new focal deficits, Not in any acute distress,  Constitutional: Overweight  Eyes:  Pupil equal and reacting to light, conjunctiva normal,   HENT:  Atraumatic, oropharynx moist, Neck- normal range of motion, no tenderness,  Neck supple, No JVP, No LNP   Respiratory:  Bilateral air entry, mostly clear to auscultation  Cardiovascular: S1-S2 regular with a I/VI systolic murmur   GI:  Soft, nondistended, normal bowel sounds, nontender, no hepatosplenomegaly appreciated.  Musculoskeletal:  No tenderness, no deformities.   Skin:  Well hydrated, no rash   Lymphatic:  No lymphadenopathy noted   Extremities: trace edema        Diagnostic Studies Review Cardio:      Cardiac testing:       Results for orders placed during the hospital encounter of 08/17/23    Echo complete w/ contrast if indicated    Interpretation Summary    Left Ventricle: Left ventricular cavity size is normal. Wall thickness is normal. The left ventricular ejection fraction is 55% by visual estimation.. Systolic function is normal. Wall motion is normal. Diastolic function is normal for age.    Tricuspid Valve: The right ventricular systolic pressure is normal. The estimated right ventricular systolic pressure is 16.00 mmHg.        Imaging:  Chest X-Ray:   No Chest XR results available for this patient.    CT-scan of the chest:     No CTA results available for this patient.  Lab Review   Lab Results   Component Value Date    WBC 7.65 12/08/2023    HGB 15.2 12/08/2023    HCT 45.9 12/08/2023    MCV 85 12/08/2023    RDW 12.2 12/08/2023     12/08/2023     BMP:  Lab Results   Component Value Date    SODIUM 138 10/17/2024    K 4.0 10/17/2024     10/17/2024    CO2 27 10/17/2024    ANIONGAP 5 02/25/2015    BUN 12 10/17/2024  "   CREATININE 0.57 (L) 10/17/2024    GLUC 258 (H) 07/27/2023    GLUF 107 (H) 10/17/2024    CALCIUM 9.8 10/17/2024    CORRECTEDCA 9.5 05/25/2022    EGFR 89 10/17/2024    MG 1.9 05/24/2022     LFT:  Lab Results   Component Value Date    AST 18 10/17/2024    ALT 19 10/17/2024    ALKPHOS 56 10/17/2024    TP 7.4 10/17/2024    ALB 4.2 10/17/2024      No components found for: \"TSH3\"  Lab Results   Component Value Date    IUK6IJXMSIGY 1.682 10/17/2024     Lab Results   Component Value Date    HGBA1C 6.1 (H) 10/17/2024     Lipid Profile:   Lab Results   Component Value Date    CHOLESTEROL 136 10/17/2024    HDL 49 (L) 10/17/2024    LDLCALC 71 10/17/2024    TRIG 78 10/17/2024     Lab Results   Component Value Date    CHOLESTEROL 136 10/17/2024    CHOLESTEROL 126 06/10/2024     No results found for: \"CKTOTAL\", \"CKMB\", \"CKMBINDEX\", \"TROPONINI\"  No results found for: \"NTBNP\"   No results found for this or any previous visit (from the past 4 weeks).          Dr. Toni Robles MD, FACC      \"This note has been constructed using a voice recognition system.Therefore there may be syntax, spelling, and/or grammatical errors. Please call if you have any questions. \"  "

## 2024-12-04 DIAGNOSIS — E11.42 TYPE 2 DIABETES MELLITUS WITH DIABETIC POLYNEUROPATHY, WITHOUT LONG-TERM CURRENT USE OF INSULIN (HCC): ICD-10-CM

## 2024-12-04 RX ORDER — ACYCLOVIR 400 MG/1
1 TABLET ORAL
Qty: 3 EACH | Refills: 5 | Status: SHIPPED | OUTPATIENT
Start: 2024-12-04

## 2024-12-06 ENCOUNTER — HOSPITAL ENCOUNTER (OUTPATIENT)
Facility: HOSPITAL | Age: 79
Setting detail: OUTPATIENT SURGERY
Discharge: HOME | End: 2024-12-06
Attending: INTERNAL MEDICINE | Admitting: INTERNAL MEDICINE
Payer: MEDICARE

## 2024-12-06 VITALS
BODY MASS INDEX: 28.28 KG/M2 | HEART RATE: 65 BPM | TEMPERATURE: 97.7 F | WEIGHT: 186.6 LBS | DIASTOLIC BLOOD PRESSURE: 60 MMHG | HEIGHT: 68 IN | OXYGEN SATURATION: 99 % | RESPIRATION RATE: 18 BRPM | SYSTOLIC BLOOD PRESSURE: 114 MMHG

## 2024-12-06 DIAGNOSIS — I25.10 CORONARY ARTERY DISEASE INVOLVING NATIVE CORONARY ARTERY: Primary | ICD-10-CM

## 2024-12-06 DIAGNOSIS — I25.118 CORONARY ARTERY DISEASE OF NATIVE ARTERY OF NATIVE HEART WITH STABLE ANGINA PECTORIS (HCC): ICD-10-CM

## 2024-12-06 LAB
ATRIAL RATE: 73 BPM
ERYTHROCYTE [DISTWIDTH] IN BLOOD BY AUTOMATED COUNT: 12.6 % (ref 11.6–15.1)
GLUCOSE SERPL-MCNC: 86 MG/DL (ref 65–140)
GLUCOSE SERPL-MCNC: 86 MG/DL (ref 65–140)
HCT VFR BLD AUTO: 42.1 % (ref 34.8–46.1)
HGB BLD-MCNC: 14.3 G/DL (ref 11.5–15.4)
MCH RBC QN AUTO: 29.1 PG (ref 26.8–34.3)
MCHC RBC AUTO-ENTMCNC: 34 G/DL (ref 31.4–37.4)
MCV RBC AUTO: 86 FL (ref 82–98)
PLATELET # BLD AUTO: 267 THOUSANDS/UL (ref 149–390)
PMV BLD AUTO: 9.6 FL (ref 8.9–12.7)
PR INTERVAL: 220 MS
QRS AXIS: 37 DEGREES
QRSD INTERVAL: 88 MS
QT INTERVAL: 376 MS
QTC INTERVAL: 406 MS
RBC # BLD AUTO: 4.91 MILLION/UL (ref 3.81–5.12)
T WAVE AXIS: 1 DEGREES
VENTRICULAR RATE: 70 BPM
WBC # BLD AUTO: 7.49 THOUSAND/UL (ref 4.31–10.16)

## 2024-12-06 PROCEDURE — 99152 MOD SED SAME PHYS/QHP 5/>YRS: CPT | Performed by: INTERNAL MEDICINE

## 2024-12-06 PROCEDURE — 82948 REAGENT STRIP/BLOOD GLUCOSE: CPT

## 2024-12-06 PROCEDURE — C1769 GUIDE WIRE: HCPCS | Performed by: INTERNAL MEDICINE

## 2024-12-06 PROCEDURE — 93458 L HRT ARTERY/VENTRICLE ANGIO: CPT | Performed by: INTERNAL MEDICINE

## 2024-12-06 PROCEDURE — C1894 INTRO/SHEATH, NON-LASER: HCPCS | Performed by: INTERNAL MEDICINE

## 2024-12-06 PROCEDURE — 85027 COMPLETE CBC AUTOMATED: CPT

## 2024-12-06 PROCEDURE — 93005 ELECTROCARDIOGRAM TRACING: CPT

## 2024-12-06 PROCEDURE — 93010 ELECTROCARDIOGRAM REPORT: CPT | Performed by: INTERNAL MEDICINE

## 2024-12-06 PROCEDURE — 99153 MOD SED SAME PHYS/QHP EA: CPT | Performed by: INTERNAL MEDICINE

## 2024-12-06 RX ORDER — HEPARIN SODIUM 1000 [USP'U]/ML
INJECTION, SOLUTION INTRAVENOUS; SUBCUTANEOUS CODE/TRAUMA/SEDATION MEDICATION
Status: DISCONTINUED | OUTPATIENT
Start: 2024-12-06 | End: 2024-12-06 | Stop reason: HOSPADM

## 2024-12-06 RX ORDER — SODIUM CHLORIDE 9 MG/ML
125 INJECTION, SOLUTION INTRAVENOUS CONTINUOUS
Status: DISPENSED | OUTPATIENT
Start: 2024-12-06 | End: 2024-12-06

## 2024-12-06 RX ORDER — NITROGLYCERIN 20 MG/100ML
INJECTION INTRAVENOUS CODE/TRAUMA/SEDATION MEDICATION
Status: DISCONTINUED | OUTPATIENT
Start: 2024-12-06 | End: 2024-12-06 | Stop reason: HOSPADM

## 2024-12-06 RX ORDER — MIDAZOLAM HYDROCHLORIDE 2 MG/2ML
INJECTION, SOLUTION INTRAMUSCULAR; INTRAVENOUS CODE/TRAUMA/SEDATION MEDICATION
Status: DISCONTINUED | OUTPATIENT
Start: 2024-12-06 | End: 2024-12-06 | Stop reason: HOSPADM

## 2024-12-06 RX ORDER — METOPROLOL SUCCINATE 25 MG/1
25 TABLET, EXTENDED RELEASE ORAL DAILY
Qty: 30 TABLET | Refills: 0 | Status: SHIPPED | OUTPATIENT
Start: 2024-12-06

## 2024-12-06 RX ORDER — SODIUM CHLORIDE 9 MG/ML
125 INJECTION, SOLUTION INTRAVENOUS CONTINUOUS
Status: DISCONTINUED | OUTPATIENT
Start: 2024-12-06 | End: 2024-12-06 | Stop reason: HOSPADM

## 2024-12-06 RX ORDER — ASPIRIN 81 MG/1
324 TABLET, CHEWABLE ORAL ONCE
Status: COMPLETED | OUTPATIENT
Start: 2024-12-06 | End: 2024-12-06

## 2024-12-06 RX ORDER — FENTANYL CITRATE 50 UG/ML
INJECTION, SOLUTION INTRAMUSCULAR; INTRAVENOUS CODE/TRAUMA/SEDATION MEDICATION
Status: DISCONTINUED | OUTPATIENT
Start: 2024-12-06 | End: 2024-12-06 | Stop reason: HOSPADM

## 2024-12-06 RX ORDER — VERAPAMIL HYDROCHLORIDE 2.5 MG/ML
INJECTION, SOLUTION INTRAVENOUS CODE/TRAUMA/SEDATION MEDICATION
Status: DISCONTINUED | OUTPATIENT
Start: 2024-12-06 | End: 2024-12-06 | Stop reason: HOSPADM

## 2024-12-06 RX ADMIN — SODIUM CHLORIDE 125 ML/HR: 0.9 INJECTION, SOLUTION INTRAVENOUS at 09:01

## 2024-12-06 RX ADMIN — ASPIRIN 81 MG 324 MG: 81 TABLET ORAL at 09:00

## 2024-12-06 NOTE — INTERVAL H&P NOTE
"Update: (This section must be completed if the H&P was completed greater than 24 hrs to procedure or admission)    H&P reviewed. After examining the patient, I find no changed to the H&P since it had been written.      Cherrie Becker, a 79 year old female, presented to [facility] for evaluation of chest pain and abnormal cardiac CTA showing Severe stenosis at the left circumflex artery origin with suggestion of associated lesion specific ischemia on FFR CT. Moderate atherosclerotic disease in the proximal LAD with no evidence of hemodynamically significant stenosis.       /66   Pulse 72   Temp 97.9 °F (36.6 °C) (Temporal)   Resp 18   Ht 5' 8\" (1.727 m)   Wt 84.6 kg (186 lb 9.6 oz)   SpO2 99%   BMI 28.37 kg/m²     Patient re-evaluated. Accept as history and physical.    RAJAN Story/December 6, 2024/10:21 AM  "

## 2024-12-06 NOTE — QUICK NOTE
Interventional cardiology note.    Patient underwent temporary pacemaker placed via right  femoral vein.  Patient was capturing 100% at 0.6.  Rate was kept at 70 and output was 3 mA.  Patient also procedure well.  Blood pressures were around 110-120/70.  Patient's dopamine was discontinued and sheath was secured.  Small hematoma was noted which was compressed by putting pressure for about 5 to 10 minutes.  No complication.  Please make sure the sheath is not pulled and monitor for right groin hematoma.   Patient verified name and     Order for consent WAS NOT found in EHR and matches case posting; patient verified. Type 1B surgery, PHONE assessment complete. Labs per surgeon: NONE  Labs per anesthesia protocol: SQBS DOS  EKG: ALONG WITH STRESS TEST N CC CARE EVERYWHERE AS NEEDED    MRSA/MSSA swab collected; pharmacy to review and dose antibiotic as appropriate. Hospital approved surgical skin cleanser and instructions given per hospital policy. Patient provided with and instructed on educational handouts including Guide to Surgery, Pain Management, Hand Hygiene, Blood Transfusion Education, and [facility] Brochure. Patient answered medical/surgical history questions at their best of ability. All prior to admission medications documented in [facility]. Original medication prescription bottle WAS NOT visualized during patient appointment. Patient instructed to hold all vitamins 7 days prior to surgery and NSAIDS 5 days prior to surgery, patient verbalized understanding. Patient teach back successful and patient demonstrates knowledge of instructions.

## 2024-12-06 NOTE — DISCHARGE INSTR - AVS FIRST PAGE
1. Please see the post cardiac catheterization dishcarge instructions.   No heavy lifting, greater than 10 lbs. or strenuous  activity for 48 hrs.    2.Remove band aid tomorrow.  Shower and wash area- wrist gently with soap and water- beginning tomorrow. Rinse and pat dry.  Apply new water seal band aid.  Repeat this process for 5 days. No powders, creams lotions or antibiotic ointments  for 5 days.  No tub baths, hot tubs or swimming for 5 days.     3. Please call our office ([phone]) if you have any fever, redness, swelling, discharge from your wrist access site.    4.No driving for 1 day

## 2024-12-09 ENCOUNTER — CONSULT (OUTPATIENT)
Dept: VASCULAR SURGERY | Facility: CLINIC | Age: 79
End: 2024-12-09
Payer: MEDICARE

## 2024-12-09 VITALS
HEART RATE: 72 BPM | WEIGHT: 187 LBS | BODY MASS INDEX: 28.34 KG/M2 | HEIGHT: 68 IN | RESPIRATION RATE: 16 BRPM | SYSTOLIC BLOOD PRESSURE: 138 MMHG | DIASTOLIC BLOOD PRESSURE: 72 MMHG | OXYGEN SATURATION: 97 %

## 2024-12-09 DIAGNOSIS — I73.9 PERIPHERAL ARTERIAL DISEASE (HCC): Primary | ICD-10-CM

## 2024-12-09 DIAGNOSIS — E78.2 HYPERLIPIDEMIA, MIXED: ICD-10-CM

## 2024-12-09 DIAGNOSIS — I99.9 VASCULAR DISEASE: ICD-10-CM

## 2024-12-09 DIAGNOSIS — E11.42 TYPE 2 DIABETES MELLITUS WITH DIABETIC POLYNEUROPATHY, WITHOUT LONG-TERM CURRENT USE OF INSULIN (HCC): ICD-10-CM

## 2024-12-09 DIAGNOSIS — R20.0 NUMBNESS AND TINGLING: ICD-10-CM

## 2024-12-09 DIAGNOSIS — R20.2 NUMBNESS AND TINGLING: ICD-10-CM

## 2024-12-09 DIAGNOSIS — I10 BENIGN ESSENTIAL HYPERTENSION: ICD-10-CM

## 2024-12-09 PROCEDURE — 99204 OFFICE O/P NEW MOD 45 MIN: CPT | Performed by: PHYSICIAN ASSISTANT

## 2024-12-09 NOTE — PROGRESS NOTES
"Name: Elisabeth Ruzicka Dempsey      : 1945      MRN: [id number]  Encounter Provider: Hannah Mart PA-C  Encounter Date: 2024   [facility] [place]  :  Assessment & Plan        History of Present Illness {?Quick Links Encounters * My Last Note * Last Note in Specialty * Snapshot * Since Last Visit * History :99620}  HPI  Elisabeth Ruzicka Dempsey is a 79 y.o. female who presents ***    Pt here for f/u to CV on 24.   {History obtained from(Optional):59489}    Review of Systems  {Select to insert medical history sections (Optional):18353}     Objective {?Quick Links Trend Vitals * Enter New Vitals * Results Review * Timeline (Adult) * Labs * Imaging * Cardiology * Procedures * Lung Cancer Screening * Surgical eConsent :71336}  /72 (BP Location: Right arm, Patient Position: Sitting)   Pulse 72   Resp 16   Ht 5' 8\" (1.727 m)   Wt 84.8 kg (187 lb)   SpO2 97%   BMI 28.43 kg/m²      Physical Exam    {Administrative / Billing Section (Optional):49886}  "

## 2024-12-09 NOTE — PROGRESS NOTES
"Name: Elisabeth Ruzicka Dempsey      : 1945      MRN: [id number]  Encounter Provider: Hannah Mart PA-C  Encounter Date: 2024   Encounter department: [facility] [place]  :  Assessment & Plan  Peripheral arterial disease (HCC)    Orders:    VAS ARTERIAL DUPLEX- LOWER LIMB BILATERAL; Future    Numbness and tingling    Orders:    VAS ARTERIAL DUPLEX- LOWER LIMB BILATERAL; Future    Type 2 diabetes mellitus with diabetic polyneuropathy, without long-term current use of insulin (HCC)    Lab Results   Component Value Date    HGBA1C 6.1 (H) 10/17/2024            Hyperlipidemia, mixed         Benign essential hypertension         Vascular disease    Orders:    Ambulatory Referral to Vascular Surgery        History of Present Illness {?Quick Links Encounters * My Last Note * Last Note in Specialty * Snapshot * Since Last Visit * History :85798}  HPI  Elisabeth Ruzicka Dempsey is a 79 y.o. female who presents ***    Pt here for f/u to CV on 24.   {History obtained from(Optional):77502}    Review of Systems  {Select to insert medical history sections (Optional):37539}     Objective {?Quick Links Trend Vitals * Enter New Vitals * Results Review * Timeline (Adult) * Labs * Imaging * Cardiology * Procedures * Lung Cancer Screening * Surgical eConsent :69663}  /72 (BP Location: Right arm, Patient Position: Sitting)   Pulse 72   Resp 16   Ht 5' 8\" (1.727 m)   Wt 84.8 kg (187 lb)   SpO2 97%   BMI 28.43 kg/m²      Physical Exam    {Administrative / Billing Section (Optional):28944}  "

## 2024-12-09 NOTE — PROGRESS NOTES
Name: Elisabeth Ruzicka Dempsey      : 1945      MRN: [id number]  Encounter Provider: Hannah Mart PA-C  Encounter Date: 2024   Encounter department: [facility] [place]  :  Assessment & Plan  Peripheral arterial disease (HCC)  -Chronic neuropathy in both feet for couple of years with underlying diabetes  -No claudication foot pain; no worsening neuropathy with activity    Imaging:  -SHREYA 10/17/24    R 1.1/207/118: tibioperoneal disease with occlusion of the PT artery    L 1.1/160/87: tibioperoneal disease with occlusion of the PT artery    Plan:  -Atherosclerosis of the lower extremities with bilateral PT artery occlusions, but preserved ASHLEY's  -SHREYA shows tibioperoneal disease with occlusions of the posterior tibial arteries bilaterally.  PVR/ PPG tracings are normal with triphasic -biphasic waveforms at the ankles.  ABIs are normal with right ASHLEY 1.1 and great toe pressure 118 and left ASHLEY 1.1 with great toe pressure of 87.  -On examination, she has 1+ DP pulses   -Discussed pathophysiology and treatment of atherosclerotic disease lower extremities, including indications for surgical/procedural interventions  -Although she does have PAD, unfortunately no vascular intervention is expected to improve her neuropathy, thus recommend optimal medical therapy, regular walking, avoidance of foot wounds and continued monitoring  -She can discuss possibly Lyrica if neuropathic pain worsens  -Maintain good blood pressure, cholesterol and glucose control.    -Continue with aspirin 81 and rosuvastatin 20  -Discussed the importance of good diabetic footcare, avoidance of foot wounds and checking feet daily for any wounds or changes  -Follow up SHREYA in 1 year with office visit    Orders:    VAS ARTERIAL DUPLEX- LOWER LIMB BILATERAL; Future    Numbness and tingling  -Management as above under PAD  Orders:    VAS ARTERIAL DUPLEX- LOWER LIMB BILATERAL; Future    Type 2 diabetes mellitus with diabetic  polyneuropathy, without long-term current use of insulin (HCC)  -Maintain good blood pressure, cholesterol glucose control as per PCP  Lab Results   Component Value Date    HGBA1C 6.1 (H) 10/17/2024            Hyperlipidemia, mixed  -Maintain good blood pressure, cholesterol glucose control as per PCP       Benign essential hypertension  -Maintain good blood pressure, cholesterol glucose control as per PCP           History of Present Illness   Pt is new to our practice and was ref by Dr Gill. Pt had SHREYA done 10/17/24. Pt denies claudication, rest pain and non healing wounds. Pt c/o of unsteadiness on her feet but it has been going on for years.    HPI   Elisabeth Ruzicka Dempsey is a 79 y.o. female Htn, HLD, DM2, polyneuropathy, vertigo, CAD, peripheral arterial disease who is referred for evaluation of PAD.    12/9/24: Patient presents as a new patient for evaluation of peripheral arterial disease.  She complains bitterly of severe loss of sensation in the feet, with chronic numbness and tingling in the feet particularly during the night.  This feet are significantly numb to the point where she can no longer drive.  Her  does the driving.    She has no leg/calf or foot pain.  No leg swelling.  She generally walks is much as she wants to walk without any leg or foot pain.  She is not walking a lot so I encouraged to do more walking.  She has no history of blood clots.  No back injury or degenerative disc disease. Given underlying neuropathy, we discussed the importance of avoidance of foot wounds, good protective footwear and checking feet daily for any wounds or changes.    Symptoms are ongoing for couple of years and not necessarily worsening.  She is a lifelong non-smoker.  She used to enjoy running and skiing.  Interestingly, she works for the [employer].    We did detailed discussion regarding pathophysiology and treatment of atherosclerotic disease.  We also discussed her symptoms of neuropathy which  "unfortunately is limited treatment options and may worsen over time.      Review of Systems   Constitutional: Negative.    HENT: Negative.     Eyes: Negative.    Respiratory:  Positive for chest tightness.    Cardiovascular: Negative.    Gastrointestinal: Negative.    Endocrine: Negative.    Genitourinary: Negative.    Musculoskeletal: Negative.    Skin: Negative.    Allergic/Immunologic: Negative.    Neurological: Negative.    Hematological: Negative.    Psychiatric/Behavioral: Negative.          Objective   /72 (BP Location: Right arm, Patient Position: Sitting)   Pulse 72   Resp 16   Ht 5' 8\" (1.727 m)   Wt 84.8 kg (187 lb)   SpO2 97%   BMI 28.43 kg/m²            1+ AT/DP  No DP    Dependent discoloration  Cap refill <2 seconds    Physical Exam  Vitals and nursing note reviewed.   Constitutional:       Appearance: She is well-developed.   HENT:      Head: Normocephalic and atraumatic.   Eyes:      Pupils: Pupils are equal, round, and reactive to light.   Neck:      Vascular: No carotid bruit or JVD.   Cardiovascular:      Rate and Rhythm: Normal rate and regular rhythm.      Pulses:           Carotid pulses are 2+ on the right side and 2+ on the left side.       Radial pulses are 2+ on the right side and 2+ on the left side.        Femoral pulses are 2+ on the left side.       Dorsalis pedis pulses are 1+ on the right side and 1+ on the left side.        Posterior tibial pulses are 0 on the right side and 0 on the left side.      Heart sounds: Normal heart sounds, S1 normal and S2 normal. No murmur heard.     No friction rub. No gallop.   Pulmonary:      Effort: Pulmonary effort is normal. No accessory muscle usage or respiratory distress.      Breath sounds: Normal breath sounds. No wheezing or rales.   Abdominal:      General: Bowel sounds are normal. There is no distension.      Palpations: Abdomen is soft.      Tenderness: There is no abdominal tenderness.   Musculoskeletal:         General: No " deformity. Normal range of motion.      Cervical back: Neck supple.      Right lower leg: No edema.      Left lower leg: No edema.   Skin:     General: Skin is warm and dry.      Findings: No lesion or rash.      Nails: There is no clubbing.   Neurological:      Mental Status: She is alert and oriented to person, place, and time.      Comments: Grossly normal    Psychiatric:         Behavior: Behavior is cooperative.                 SHREYA 10/17/24  [facility] REPORT  CLINICAL:  Indications:  Patient denies claudication however can not feel her feet when driving. Denies  any open wounds or ulcers at this time.  Operative History:  No past cardiovascular surgeries.  Risk Factors  The patient has history of HTN, Diabetes (NIDDM (oral meds)) and  Hyperlipidemia.     FINDINGS:     Right                  Impression  Waveform        PSV (cm/s)    Post. Tibial                       Not Identified                Ant. Tibial                        Triphasic                     Peroneal                           Biphasic                      Common Femoral Artery                                      86    Prox Profunda                                              61    Prox SFA                                                   70    Mid SFA                                                    67    Dist SFA                                                   59    Proximal Pop                                               54    Distal Pop                                                 66    Prox Post Tibial                                           40    Dist Post Tibial       Occluded                                  Dist. Ant. Tibial                                          70    Dist Peroneal                                              35       Left                   Impression  Waveform        PSV (cm/s)    Post. Tibial                       Not Identified                Ant. Tibial                        Triphasic     "                 Peroneal                           Biphasic                      Common Femoral Artery                                      97    Prox Profunda                                              48    Prox SFA                                                   86    Mid SFA                                                    70    Dist SFA                                                   39    Proximal Pop                                               67    Distal Pop                                                 86    Prox Post Tibial       Occluded                                  Dist Post Tibial       Occluded                                  Dist. Ant. Tibial                                         106    Dist Peroneal                                              51             CONCLUSION:  Impression:  RIGHT LOWER LIMB:  Evaluation shows evidence of tibio-peroneal arterial occlusive disease with an  occlusion vs high grade stenosis if the distal posterior tibial artery.  Ankle/Brachial index: 1.1 which is in the normal category.  PVR/ PPG tracings are normal.  Metatarsal pressure of 207 mmHg  Great toe pressure of 118 mmHg, within the healing range     LEFT LOWER LIMB:  Evaluation shows evidence of tibio-peroneal arterial occlusive disease with an  occlusion vs high grade stenosis if the proximal to distal posterior tibial  artery.  Ankle/Brachial index: 1.1 which is in the normal category.  PVR/ PPG tracings are normal.  Metatarsal pressure of 160 mmHg  Great toe pressure of 87 mmHg, within the healing range      I have reviewed and made appropriate changes to the review of systems input by the medical assistant.    Vitals:    12/09/24 0857   BP: 138/72   BP Location: Right arm   Patient Position: Sitting   Pulse: 72   Resp: 16   SpO2: 97%   Weight: 84.8 kg (187 lb)   Height: 5' 8\" (1.727 m)       Patient Active Problem List   Diagnosis    Benign essential hypertension    Carpal tunnel syndrome    " Decreased dorsalis pedis pulse    Type 2 diabetes mellitus, without long-term current use of insulin (HCC)    Hyperlipidemia, mixed    Multiple thyroid nodules    Synovial cyst    Vitamin D deficiency    Fatigue    Chronic sacroiliac strain    Diabetic polyneuropathy associated with type 2 diabetes mellitus (HCC)    Dizziness    Seasonal allergies    Lung nodule    Sensorineural hearing loss (SNHL), bilateral    BPPV (benign paroxysmal positional vertigo)    Counseling regarding advanced directives    Change in bowel habits    History of colon polyps    Osteopenia after menopause    Vitamin B12 deficiency    History of obesity    Numbness and tingling    Peripheral arterial disease (HCC)    Coronary artery disease involving native coronary artery       Past Surgical History:   Procedure Laterality Date    EYE SURGERY Bilateral     2024    HYSTERECTOMY  2006    TONSILLECTOMY  1962    TUBAL LIGATION  1992    US GUIDED THYROID BIOPSY  12/12/2019       Family History   Problem Relation Age of Onset    Stroke Mother     Diabetes Mother     Cancer Mother     Diabetes unspecified Mother     Lung cancer Father     Breast cancer Sister 78        Half-sister    Dementia Sister     No Known Problems Sister     COPD Brother     Coronary artery disease Brother     Heart failure Brother     HIV Brother     Cancer Family     No Known Problems Daughter     Leukemia Son     Cancer Son         Leukemia age 19    No Known Problems Son        Social History     Socioeconomic History    Marital status: /Civil Union     Spouse name: Not on file    Number of children: 3    Years of education: Not on file    Highest education level: Not on file   Occupational History    Occupation: Retired    Tobacco Use    Smoking status: Never     Passive exposure: Past    Smokeless tobacco: Never   Vaping Use    Vaping status: Never Used   Substance and Sexual Activity    Alcohol use: Not Currently     Alcohol/week: 4.0 standard drinks of alcohol      Types: 4 Glasses of wine per week    Drug use: Never    Sexual activity: Not Currently     Partners: Male     Birth control/protection: Post-menopausal   Other Topics Concern    Not on file   Social History Narrative    Denied: History of alcohol use - As per [employer]    Daily caffeine consumption, 2-3 servings a day    Denied: History of drug use - As per [employer]    Retired from employment: [employer]       Social Drivers of Health     Financial Resource Strain: Low Risk  (6/27/2023)    Overall Financial Resource Strain (CARDIA)     Difficulty of Paying Living Expenses: Not hard at all   Food Insecurity: No Food Insecurity (8/10/2024)    Nursing - Inadequate Food Risk Classification     Worried About Running Out of Food in the Last Year: Never true     Ran Out of Food in the Last Year: Never true     Ran Out of Food in the Last Year: Not on file   Transportation Needs: No Transportation Needs (8/10/2024)    PRAPARE - Transportation     Lack of Transportation (Medical): No     Lack of Transportation (Non-Medical): No   Physical Activity: Not on file   Stress: Not on file   Social Connections: Not on file   Intimate Partner Violence: Not on file   Housing Stability: Low Risk  (8/10/2024)    Housing Stability Vital Sign     Unable to Pay for Housing in the Last Year: No     Number of Times Moved in the Last Year: 0     Homeless in the Last Year: No       Allergies   Allergen Reactions    Bee Venom Hives and Swelling     Yellow jackets and wasps    Gabapentin Hives         Current Outpatient Medications:     aspirin 81 mg chewable tablet, Chew 1 tablet (81 mg total) daily, Disp: 100 tablet, Rfl: 2    EPINEPHrine (EPIPEN) 0.3 mg/0.3 mL SOAJ, Inject 0.3 mL (0.3 mg total) into a muscle once for 1 dose, Disp: 0.6 mL, Rfl: 0    ergocalciferol (VITAMIN D2) 50,000 units, TAKE 1 CAPSULE BY MOUTH ONE TIME PER WEEK, Disp: 4 capsule, Rfl: 5    losartan-hydrochlorothiazide (HYZAAR) 50-12.5 mg per  tablet, TAKE 1 TABLET BY MOUTH EVERY DAY, Disp: 90 tablet, Rfl: 1    metoprolol succinate (TOPROL-XL) 25 mg 24 hr tablet, Take 1 tablet (25 mg total) by mouth daily, Disp: 30 tablet, Rfl: 0    nitroglycerin (NITROSTAT) 0.4 mg SL tablet, Place 1 tablet (0.4 mg total) under the tongue every 5 (five) minutes as needed for chest pain, Disp: 50 tablet, Rfl: 2    rosuvastatin (CRESTOR) 20 MG tablet, TAKE 1 TABLET BY MOUTH EVERY DAY, Disp: 30 tablet, Rfl: 5    sertraline (ZOLOFT) 50 mg tablet, Take 1 tablet (50 mg total) by mouth daily, Disp: 90 tablet, Rfl: 1    tirzepatide 15 MG/0.5ML, Inject 0.5 mL (15 mg total) under the skin every 7 days, Disp: 6 mL, Rfl: 1    vitamin B-12 (VITAMIN B-12) 500 mcg tablet, Take 1 tablet (500 mcg total) by mouth daily Over the counter, Disp: 90 tablet, Rfl: 1    Blood Glucose Monitoring Suppl (ONE TOUCH ULTRA 2) w/Device KIT, , Disp: , Rfl:     Continuous Blood Gluc  (Dexcom G7 ) NAHID, Use 1 each continuous, Disp: 1 each, Rfl: 0    Continuous Glucose Sensor (Dexcom G7 Sensor), USE 1 DEVICE EVERY 10 DAYS, Disp: 3 each, Rfl: 5    glucose blood (OneTouch Ultra) test strip, Use to check sugars three times a day Dx uncontrolled DM2 E 11.65, Disp: 200 strip, Rfl: 5

## 2024-12-09 NOTE — ASSESSMENT & PLAN NOTE
-Maintain good blood pressure, cholesterol glucose control as per PCP  Lab Results   Component Value Date    HGBA1C 6.1 (H) 10/17/2024

## 2024-12-09 NOTE — ASSESSMENT & PLAN NOTE
-Chronic neuropathy in both feet for couple of years with underlying diabetes  -No claudication foot pain; no worsening neuropathy with activity    Imaging:  -SHREYA 10/17/24    R 1.1/207/118: tibioperoneal disease with occlusion of the PT artery    L 1.1/160/87: tibioperoneal disease with occlusion of the PT artery    Plan:  -Atherosclerosis of the lower extremities with bilateral PT artery occlusions, but preserved ASHLEY's  -SHREYA shows tibioperoneal disease with occlusions of the posterior tibial arteries bilaterally.  PVR/ PPG tracings are normal with triphasic -biphasic waveforms at the ankles.  ABIs are normal with right ASHLEY 1.1 and great toe pressure 118 and left ASHLEY 1.1 with great toe pressure of 87.  -On examination, she has 1+ DP pulses   -Discussed pathophysiology and treatment of atherosclerotic disease lower extremities, including indications for surgical/procedural interventions  -Although she does have PAD, unfortunately no vascular intervention is expected to improve her neuropathy, thus recommend optimal medical therapy, regular walking, avoidance of foot wounds and continued monitoring  -She can discuss possibly Lyrica if neuropathic pain worsens  -Maintain good blood pressure, cholesterol and glucose control.    -Continue with aspirin 81 and rosuvastatin 20  -Discussed the importance of good diabetic footcare, avoidance of foot wounds and checking feet daily for any wounds or changes  -Follow up SHREYA in 1 year with office visit    Orders:    VAS ARTERIAL DUPLEX- LOWER LIMB BILATERAL; Future

## 2024-12-09 NOTE — PATIENT INSTRUCTIONS
PAD    -Maintain good blood pressure, cholesterol and glucose control.  -Continue with aspirin 81 and rosuvastatin 20  -Discussed the importance of good diabetic footcare, avoidance of foot wounds and checking feet daily for any wounds or changes  -Follow up SHREYA in 1 year with office visit

## 2024-12-10 ENCOUNTER — PATIENT MESSAGE (OUTPATIENT)
Dept: FAMILY MEDICINE CLINIC | Facility: CLINIC | Age: 79
End: 2024-12-10

## 2024-12-10 DIAGNOSIS — E11.42 TYPE 2 DIABETES MELLITUS WITH DIABETIC POLYNEUROPATHY, WITHOUT LONG-TERM CURRENT USE OF INSULIN (HCC): ICD-10-CM

## 2024-12-11 NOTE — PATIENT COMMUNICATION
Please advise refill request. When I attempted to pend the request, it was requesting an alternative. Mounjaro 15 mg [facility] located [facility] [employer] in [facility].

## 2024-12-12 ENCOUNTER — TELEPHONE (OUTPATIENT)
Dept: GASTROENTEROLOGY | Facility: CLINIC | Age: 79
End: 2024-12-12

## 2024-12-12 NOTE — TELEPHONE ENCOUNTER
Pt needs to be scheduled for an Office Visit due to age to discuss a colonoscopy with Dr Castrejon for hx of colonic polyps. I lmom for pt to please call back to schedule  Recall letter mailed.

## 2024-12-12 NOTE — TELEPHONE ENCOUNTER
Patient returned office call she was under the impression she did not need any further Colonoscopy's due to her age per last Colon report  2023 with Dr Castrejon RECOMMENDATION:No further screening colonoscopies necessary Age greater than 65

## 2024-12-12 NOTE — TELEPHONE ENCOUNTER
Reviewed chart.  Pt is correct, no further colonoscopies needed.  Recall was entered in 2020. I lmom informing pt to please disregard.

## 2024-12-17 ENCOUNTER — OFFICE VISIT (OUTPATIENT)
Age: 79
End: 2024-12-17
Payer: MEDICARE

## 2024-12-17 VITALS
DIASTOLIC BLOOD PRESSURE: 71 MMHG | BODY MASS INDEX: 28.34 KG/M2 | SYSTOLIC BLOOD PRESSURE: 130 MMHG | WEIGHT: 187 LBS | HEIGHT: 68 IN | HEART RATE: 71 BPM

## 2024-12-17 DIAGNOSIS — M77.41 METATARSALGIA OF BOTH FEET: Primary | ICD-10-CM

## 2024-12-17 DIAGNOSIS — M54.16 RADICULOPATHY OF LUMBAR REGION: ICD-10-CM

## 2024-12-17 DIAGNOSIS — M21.961 ACQUIRED DEFORMITY OF BOTH FEET: ICD-10-CM

## 2024-12-17 DIAGNOSIS — I70.209 PERIPHERAL ARTERIOSCLEROSIS (HCC): ICD-10-CM

## 2024-12-17 DIAGNOSIS — E11.42 DIABETIC POLYNEUROPATHY ASSOCIATED WITH TYPE 2 DIABETES MELLITUS (HCC): ICD-10-CM

## 2024-12-17 DIAGNOSIS — M77.42 METATARSALGIA OF BOTH FEET: Primary | ICD-10-CM

## 2024-12-17 DIAGNOSIS — R26.81 UNSTEADY GAIT: ICD-10-CM

## 2024-12-17 DIAGNOSIS — M21.962 ACQUIRED DEFORMITY OF BOTH FEET: ICD-10-CM

## 2024-12-17 PROCEDURE — 99213 OFFICE O/P EST LOW 20 MIN: CPT | Performed by: PODIATRIST

## 2024-12-17 NOTE — PROGRESS NOTES
Assessment/Plan: Metatarsalgia secondary to diabetic neuropathy.  Pain upon ambulation.  Acquired deformity of foot bilateral/hallux valgus deformity.  Mild peripheral artery disease without intermittent claudication.  Mycosis of nail.    Plan.  Chart reviewed.  PCP notes reviewed.  Arterial Doppler reviewed.  Patient advised on condition.  At this time we recommend patient take alpha lipoic acid, 4 mg daily.  In addition we recommend vitamin B complex.  Patient advised on care of the diabetic foot.  Watch for signs of infection or ulceration.  Spray shoes with Lysol.  Aftercare instruction given.  Return for follow-up.         Diagnoses and all orders for this visit:    Metatarsalgia of both feet    Diabetic polyneuropathy associated with type 2 diabetes mellitus (HCC)    Radiculopathy of lumbar region    Unsteady gait    Acquired deformity of both feet    Peripheral arteriosclerosis (HCC)          Subjective: Patient presents for evaluation.  Patient is diabetic.  She had arterial Doppler.  Patient is following cardiology.  She is taking 200 mg of alpha lipoic acid daily    Allergies   Allergen Reactions    Bee Venom Hives and Swelling     Yellow jackets and wasps    Gabapentin Hives         Current Outpatient Medications:     aspirin 81 mg chewable tablet, Chew 1 tablet (81 mg total) daily, Disp: 100 tablet, Rfl: 2    Blood Glucose Monitoring Suppl (ONE TOUCH ULTRA 2) w/Device KIT, , Disp: , Rfl:     Continuous Blood Gluc  (Dexcom G7 ) NAHID, Use 1 each continuous, Disp: 1 each, Rfl: 0    Continuous Glucose Sensor (Dexcom G7 Sensor), USE 1 DEVICE EVERY 10 DAYS, Disp: 3 each, Rfl: 5    EPINEPHrine (EPIPEN) 0.3 mg/0.3 mL SOAJ, Inject 0.3 mL (0.3 mg total) into a muscle once for 1 dose, Disp: 0.6 mL, Rfl: 0    ergocalciferol (VITAMIN D2) 50,000 units, TAKE 1 CAPSULE BY MOUTH ONE TIME PER WEEK, Disp: 4 capsule, Rfl: 5    glucose blood (OneTouch Ultra) test strip, Use to check sugars three times a day  Dx uncontrolled DM2 E 11.65, Disp: 200 strip, Rfl: 5    losartan-hydrochlorothiazide (HYZAAR) 50-12.5 mg per tablet, TAKE 1 TABLET BY MOUTH EVERY DAY, Disp: 90 tablet, Rfl: 1    metoprolol succinate (TOPROL-XL) 25 mg 24 hr tablet, Take 1 tablet (25 mg total) by mouth daily, Disp: 30 tablet, Rfl: 0    nitroglycerin (NITROSTAT) 0.4 mg SL tablet, Place 1 tablet (0.4 mg total) under the tongue every 5 (five) minutes as needed for chest pain, Disp: 50 tablet, Rfl: 2    rosuvastatin (CRESTOR) 20 MG tablet, TAKE 1 TABLET BY MOUTH EVERY DAY, Disp: 30 tablet, Rfl: 5    sertraline (ZOLOFT) 50 mg tablet, Take 1 tablet (50 mg total) by mouth daily, Disp: 90 tablet, Rfl: 1    Tirzepatide 15 MG/0.5ML SOAJ, Inject 15 mg under the skin once a week, Disp: 6 mL, Rfl: 2    vitamin B-12 (VITAMIN B-12) 500 mcg tablet, Take 1 tablet (500 mcg total) by mouth daily Over the counter, Disp: 90 tablet, Rfl: 1    Patient Active Problem List   Diagnosis    Benign essential hypertension    Carpal tunnel syndrome    Decreased dorsalis pedis pulse    Type 2 diabetes mellitus, without long-term current use of insulin (HCC)    Hyperlipidemia, mixed    Multiple thyroid nodules    Synovial cyst    Vitamin D deficiency    Fatigue    Chronic sacroiliac strain    Diabetic polyneuropathy associated with type 2 diabetes mellitus (HCC)    Dizziness    Seasonal allergies    Lung nodule    Sensorineural hearing loss (SNHL), bilateral    BPPV (benign paroxysmal positional vertigo)    Counseling regarding advanced directives    Change in bowel habits    History of colon polyps    Osteopenia after menopause    Vitamin B12 deficiency    History of obesity    Numbness and tingling    Peripheral arterial disease (HCC)    Coronary artery disease involving native coronary artery          Patient ID: Elisabeth Ruzicka Dempsey is a 79 y.o. female.    HPI    The following portions of the patient's history were reviewed and updated as appropriate:     family history  includes Breast cancer (age of onset: 78) in her sister; COPD in her brother; Cancer in her family, mother, and son; Coronary artery disease in her brother; Dementia in her sister; Diabetes in her mother; Diabetes unspecified in her mother; HIV in her brother; Heart failure in her brother; Leukemia in her son; Lung cancer in her father; No Known Problems in her daughter, sister, and son; Stroke in her mother.      reports that she has never smoked. She has been exposed to tobacco smoke. She has never used smokeless tobacco. She reports that she does not currently use alcohol after a past usage of about 4.0 standard drinks of alcohol per week. She reports that she does not use drugs.    Vitals:    12/17/24 0859   BP: 130/71   Pulse: 71       Review of Systems      Objective:  Patient's shoes and socks removed.   Foot ExamPhysical Exam      General  General Appearance: appears stated age and healthy   Orientation: alert and oriented to person, place, and time   Affect: appropriate   Gait: antalgic   Assistance: cane use         Right Foot/Ankle      Inspection and Palpation  Tenderness: metatarsals   Swelling: dorsum   Arch: pes planus  Hammertoes: fifth toe  Hallux limitus: yes  Skin Exam: dry skin;      Neurovascular  Dorsalis pedis: 1+  Posterior tibial: 1+  Saphenous nerve sensation: diminished  Tibial nerve sensation: diminished  Superficial peroneal nerve sensation: diminished  Deep peroneal nerve sensation: diminished  Sural nerve sensation: diminished        Left Foot/Ankle       Inspection and Palpation  Tenderness: metatarsals   Swelling: dorsum   Arch: pes planus  Hammertoes: fifth toe  Hallux limitus: yes  Skin Exam: dry skin;      Neurovascular  Dorsalis pedis: 1+  Posterior tibial: 1+  Saphenous nerve sensation: diminished  Tibial nerve sensation: diminished  Superficial peroneal nerve sensation: diminished  Deep peroneal nerve sensation: diminished  Sural nerve sensation: diminished        Physical  Exam  Vitals and nursing note reviewed.   Constitutional:       Appearance: Normal appearance.   Cardiovascular:      Rate and Rhythm: Normal rate and regular rhythm.      Pulses: no weak pulses.           Dorsalis pedis pulses are 1+ on the right side and 1+ on the left side.        Posterior tibial pulses are 1+ on the right side and 1+ on the left side.   Musculoskeletal:      Right lower le+ Edema present.      Left lower le+ Edema present.   Feet:      Right foot:      Skin integrity: Dry skin present.      Left foot:      Skin integrity: Dry skin present.      Comments: Patient demonstrates pes planus  Skin:     Capillary Refill: Capillary refill takes less than 2 seconds.      Comments: All nails are dystrophic.   Neurological:      Mental Status: She is alert.      Motor: Weakness present.      Comments: Patient has decrease in vibratory sensation bilateral.  She test 4/5 L5 testing bilateral.   Psychiatric:         Mood and Affect: Mood normal.         Behavior: Behavior normal.         Thought Content: Thought content normal.         Judgment: Judgment normal.      Patient's shoes and socks removed.     Right Foot/Ankle   Right Foot Inspection  Skin Exam: dry skin.      Toe Exam: right toe deformity.      Sensory   Vibration: diminished  Proprioception: diminished  Monofilament testing: diminished     Vascular  Capillary refills: < 3 seconds  The right DP pulse is 1+. The right PT pulse is 1+.      Right Toe  - Comprehensive Exam  Arch: pes planus  Hammertoes: fifth toe  Hallux limitus: yes  Swelling: dorsum   Tenderness: metatarsals         Left Foot/Ankle  Left Foot Inspection  Skin Exam: dry skin.      Toe Exam: left toe deformity.      Sensory   Vibration: diminished  Proprioception: diminished  Monofilament testing: diminished     Vascular  The left DP pulse is 1+. The left PT pulse is 1+.      Left Toe  - Comprehensive Exam  Arch: pes planus  Hammertoes: fifth toe  Hallux limitus:  yes  Swelling: dorsum   Tenderness: metatarsals         Assign Risk Category  Deformity present  Loss of protective sensation  No weak pulses  Risk: 2

## 2024-12-23 NOTE — PROGRESS NOTES
[facility]  General Cardiology Note  Elisabeth Ruzicka Dempsey  1945  [id number]      Referring Provider - No ref. provider found  No chief complaint on file.      Assessment & Plan  Coronary artery disease involving native coronary artery of native heart without angina pectoris  -12/6/2024 cardiac cath as below.  She has very tortuous coronary arteries with no evidence of any significant focal stenosis  - right wrist cardiac cath site well healed, no significant hematoma. No bruising. Pulses are palpable, extremity with good color, warm  - Patient is recommended aggressive medical therapy with LDL goal less than 60 and good blood pressure control.   - Patient reports occasional chest tightness at rest but no other associated symptoms  - Continue on aspirin, BB and statin  Benign essential hypertension  Stable . /74 HR 73  Continue on losartan-hydrochlorothiazide (50-12.5 mg) daily, Toprol-XL 25 mg daily,  Hyperlipidemia, mixed  - 10/17/2024: LDL 71, TG 78, HDL 49, normal AST and ALT  LDL goal less than 60.  Increase Crestor from 20 mg daily to 40 mg daily  Reinforced heart healthy diet low in saturated fat and fast sugars  Repeat lipid panel in 3 months  If LDL not at goal, may consider adding another agent  Type 2 diabetes mellitus with diabetic polyneuropathy, without long-term current use of insulin (HCC)  Care per PCP and endocrinology  Lab Results   Component Value Date    HGBA1C 6.1 (H) 10/17/2024   Patient has been on Mounjaro for over a year and has lost over 50 lbs and her A1c improved  Reinforced healthy diet and increasing physical activity  Peripheral arterial disease (HCC)  - 10/17/24 vascular lower limb study shows evidence of tibio-peroneal arterial occlusive disease with an occlusion vs high grade stenosis if the right distal posterior tibial artery.  And evidence of tibio-peroneal arterial occlusive disease with an occlusion vs high grade stenosis if the left proximal  to distal posterior tibial artery   - Patient is following with vascular surgery  -Maintain good blood pressure, cholesterol and glucose control.    -Continue with aspirin 81 and statin       Procedure/testing if ordered:  Risks Vs benefits discussed   Medication side effect reviewed with patient in detail and all their questions answered to their satisfaction. She will call if not tolerating dose change.    Will RTO in 6 months or sooner if necessary  Patient / Caretaker was advised and educated to call our office  immediately if  patient has any new symptoms of chest pain/shortness of breath, near-syncope, syncope, light headedness sustained palpitations  or any other cardiovascular symptoms before their scheduled follow-up appointment.  ED precautions reviewed    Interval History: 79 y.o.  female  with PMH as below is here for cath follow up  Patient of Dr. Robles  Patient has history of crushing chest pain and was evaluated in the ED on 7/27/2023.  She has had recurrence of chest pain since.  A cardiac CTA was ordered for evaluation of her chest pain  - 11/27/2024 coronary CTA: Coronary calcium score 102. Severe stenosis at the left circumflex artery origin with suggestion of associated lesion specific ischemia on FFR CT. Moderate atherosclerotic disease in the proximal LAD with no evidence of hemodynamically significant stenosis.  - Patient had a cardiac cath on 12/6/2024    Ost Cx to Prox Cx lesion is 40% stenosed.    Prox LAD lesion is 50% stenosed.    The left ventricular systolic function is normal.  EF is 55 to 60% there is no gradient across aortic valve.    Patient has very tortuous coronary arteries with no evidence of any significant focal stenosis.  Nonobstructive disease noted at the ostium of the nondominant small circumflex.  Ramus is a large size artery.  Patient is recommended aggressive medical therapy with LDL goal less than 60.  Blood pressure control    - Exercise stress test, 08/17/2023:  Negative for ischemia  - TTE, 08/17/2023:  EF 55%, trace MR    - Today, patient present with significant other.  She reports feeling well overall.  She continues to have chest tightness at rest, has been happening for about couple years.  Chest tightness goes away on its own after she lays down for about 5 min.  She reports that she was vegan prior for years, was very active and used to ski. Since being retired, she went back to eating meat. Patient has not been exercising regularly but has a plan to go back to walking 180 min a week and reincorporating a vegan diet    Patient Active Problem List    Diagnosis Date Noted    Coronary artery disease involving native coronary artery 12/06/2024    Peripheral arterial disease (HCC) 10/17/2024    Numbness and tingling 07/10/2024    Vitamin B12 deficiency 12/15/2023    Osteopenia after menopause 11/22/2023    Change in bowel habits 10/17/2023    History of colon polyps 10/17/2023    Counseling regarding advanced directives 06/29/2023    BPPV (benign paroxysmal positional vertigo) 09/29/2022    Sensorineural hearing loss (SNHL), bilateral 08/17/2022    Lung nodule 05/27/2022    Seasonal allergies 05/25/2022    Dizziness 05/24/2022    Chronic sacroiliac strain 03/05/2021    Diabetic polyneuropathy associated with type 2 diabetes mellitus (HCC) 03/05/2021    Fatigue 05/24/2019    History of obesity 09/11/2017    Benign essential hypertension 04/24/2017    Decreased dorsalis pedis pulse 04/24/2017    Hyperlipidemia, mixed 04/24/2017    Vitamin D deficiency 04/24/2017    Type 2 diabetes mellitus, without long-term current use of insulin (HCC) 01/04/2017    Multiple thyroid nodules 01/04/2017    Carpal tunnel syndrome 05/14/2014    Synovial cyst 04/23/2013     Past Medical History:   Diagnosis Date    Cancer (HCC)     Diabetes mellitus (HCC)     Dizziness     Endometrial carcinoma (HCC)     Hypertension     Obesity     Rectal bleeding 09/10/2019    Sciatic radiculitis 01/28/2021     Tonsillitis     Varicella      Social History     Tobacco Use    Smoking status: Never     Passive exposure: Past    Smokeless tobacco: Never   Vaping Use    Vaping status: Never Used   Substance Use Topics    Alcohol use: Not Currently     Alcohol/week: 4.0 standard drinks of alcohol     Types: 4 Glasses of wine per week    Drug use: Never      Family History   Problem Relation Age of Onset    Stroke Mother     Diabetes Mother     Cancer Mother     Diabetes unspecified Mother     Lung cancer Father     Breast cancer Sister 78        Half-sister    Dementia Sister     No Known Problems Sister     COPD Brother     Coronary artery disease Brother     Heart failure Brother     HIV Brother     Cancer Family     No Known Problems Daughter     Leukemia Son     Cancer Son         Leukemia age 19    No Known Problems Son      Past Surgical History:   Procedure Laterality Date    CARDIAC CATHETERIZATION Left 12/6/2024    Procedure: Cardiac Left Heart Cath;  Surgeon: Jyothi Casey MD;  [facility];  Service: Cardiology    EYE SURGERY Bilateral     2024    HYSTERECTOMY  2006    TONSILLECTOMY  1962    TUBAL LIGATION  1992    US GUIDED THYROID BIOPSY  12/12/2019       Current Outpatient Medications:     aspirin 81 mg chewable tablet, Chew 1 tablet (81 mg total) daily, Disp: 100 tablet, Rfl: 2    Blood Glucose Monitoring Suppl (ONE TOUCH ULTRA 2) w/Device KIT, , Disp: , Rfl:     Continuous Blood Gluc  (Dexcom G7 ) NAHID, Use 1 each continuous, Disp: 1 each, Rfl: 0    Continuous Glucose Sensor (Dexcom G7 Sensor), USE 1 DEVICE EVERY 10 DAYS, Disp: 3 each, Rfl: 5    EPINEPHrine (EPIPEN) 0.3 mg/0.3 mL SOAJ, Inject 0.3 mL (0.3 mg total) into a muscle once for 1 dose, Disp: 0.6 mL, Rfl: 0    ergocalciferol (VITAMIN D2) 50,000 units, TAKE 1 CAPSULE BY MOUTH ONE TIME PER WEEK, Disp: 4 capsule, Rfl: 5    glucose blood (OneTouch Ultra) test strip, Use to check sugars three times a day Dx uncontrolled  DM2 E 11.65, Disp: 200 strip, Rfl: 5    losartan-hydrochlorothiazide (HYZAAR) 50-12.5 mg per tablet, TAKE 1 TABLET BY MOUTH EVERY DAY, Disp: 90 tablet, Rfl: 1    metoprolol succinate (TOPROL-XL) 25 mg 24 hr tablet, Take 1 tablet (25 mg total) by mouth daily, Disp: 30 tablet, Rfl: 0    nitroglycerin (NITROSTAT) 0.4 mg SL tablet, Place 1 tablet (0.4 mg total) under the tongue every 5 (five) minutes as needed for chest pain, Disp: 50 tablet, Rfl: 2    rosuvastatin (CRESTOR) 20 MG tablet, TAKE 1 TABLET BY MOUTH EVERY DAY, Disp: 30 tablet, Rfl: 5    sertraline (ZOLOFT) 50 mg tablet, Take 1 tablet (50 mg total) by mouth daily, Disp: 90 tablet, Rfl: 1    Tirzepatide 15 MG/0.5ML SOAJ, Inject 15 mg under the skin once a week, Disp: 6 mL, Rfl: 2    vitamin B-12 (VITAMIN B-12) 500 mcg tablet, Take 1 tablet (500 mcg total) by mouth daily Over the counter, Disp: 90 tablet, Rfl: 1    Allergies   Allergen Reactions    Bee Venom Hives and Swelling     Yellow jackets and wasps    Gabapentin Hives       Vitals:    12/24/24 0829   BP: 132/74   BP Location: Left arm   Patient Position: Sitting   Cuff Size: Standard   Pulse: 73   SpO2: 97%   Weight: 84.8 kg (187 lb)        Vitals:    12/24/24 0829   Weight: 84.8 kg (187 lb)          Body mass index is 28.43 kg/m².    Labs:   Lab Results   Component Value Date/Time    CHOLESTEROL 136 10/17/2024 09:30 AM    CHOLESTEROL 240 (H) 03/16/2022 09:39 AM    TRIG 78 10/17/2024 09:30 AM    TRIG 143 03/16/2022 09:39 AM    HDL 49 (L) 10/17/2024 09:30 AM    HDL 52 03/16/2022 09:39 AM    LDLCALC 71 10/17/2024 09:30 AM    LDLCALC 161 (H) 03/16/2022 09:39 AM      Lab Results   Component Value Date    SODIUM 137 12/03/2024    K 4.4 12/03/2024    CL 97 12/03/2024    CREATININE 0.80 12/03/2024    EGFR 70 12/03/2024    BUN 15 12/03/2024    CO2 32 12/03/2024    ALT 19 10/17/2024    AST 18 10/17/2024    TSH 1.59 07/22/2021    INR 0.96 07/27/2023    GLUF 108 (H) 12/03/2024    HGBA1C 6.1 (H) 10/17/2024    WBC  7.49 12/06/2024    HGB 14.3 12/06/2024    HCT 42.1 12/06/2024     12/06/2024         Imaging: Cardiac catheterization  Result Date: 12/6/2024  Narrative:   Ost Cx to Prox Cx lesion is 40% stenosed.   Prox LAD lesion is 50% stenosed.   The left ventricular systolic function is normal.  EF is 55 to 60% there is no gradient across aortic valve.   Patient has very tortuous coronary arteries with no evidence of any significant focal stenosis.  Nonobstructive disease noted at the ostium of the nondominant small circumflex.  Ramus is a large size artery.     CTA CORONARY ARTERY SCAN w FFR if needed  Result Date: 11/27/2024  Narrative: CORONARY CT ANGIOGRAM WITH FFRCT ANALYSIS - WITHOUT AND WITH IV CONTRAST INDICATION: R07.89: Other chest pain. Patient Age: 79 years Patient Gender: Female. COMPARISON: None. CT ANGIOGRAM TECHNIQUE: Noncontrast CT examination of the heart was performed according to a coronary calcium score protocol. Thin section postcontrast images of the heart were obtained according to gated coronary CT angiographic protocol.  2D and 3D image reconstruction was performed on an independent workstation in order to perform coronary vessel analysis. Premedication was administered according to institutional protocol, as detailed in the electronic health record. Prospective ECG triggering was  used. This examination, like all CT scans performed in the [facility], was performed utilizing techniques to minimize radiation dose exposure, including the use of iterative reconstruction and automated exposure control. Radiation dose length product (DLP) for this visit: 517.15 mGy-cm . SCANNER/LOCATION: [facility]/Outpatient IV CONTRAST: 94 mL of iohexol (OMNIPAQUE) IMAGE QUALITY: Excellent image quality with no significant artifact present. FINDINGS: CORONARY CALCIUM SCORE: 102 Calcium score PERCENTILE of age, race, and gender matched database participants in the Multi-Ethnic Study of  Atherosclerosis (EASTMAN) trial:  53 CORONARY ORIGINS AND COURSE: Normal. There is a ramus intermedius branch. DOMINANCE:  Right coronary artery dominance. CORONARY ARTERY ASSESSMENT: LEFT MAIN: No stenosis. LAD: Calcified plaque at the origin of the proximal LAD with moderate stenosis (50 - 70% narrowing).  Myocardial bridging of an 18 mm segment of the mid LAD, with no evidence of atherosclerotic disease or stenosis in this region. LCX: Focal noncalcified stenosis at the origin of the circumflex artery. Severe stenosis (>70% narrowing). RCA: No stenosis. CARDIAC STRUCTURES: Valves: Normal CT appearance of cardiac valves. Pericardium: Normal pericardial contour without pericardial effusion, thickening, or calcifications. Visualized Aorta: No aortic aneurysm or dissection. Left atrial appendage: No intraluminal thrombus seen. EXTRACARDIAC FINDINGS: 7 mm nodule in the anterior medial left upper lobe remain stable since chest CT of 6/3/2022. No other significant findings identified on limited small field of view low radiation dose noncontrast images of the chest at the level of  the heart. FFRCT INDICATION/TECHNIQUE: Because initial review of the CTA data revealed findings suggesting a potentially flow limiting stenosis CT Fractional Flow Reserve analysis was requested. CT data images prepared and securely transmitted to [employer]. FFRCT values were derived from image-based modeling from the CCTA data set after applying computational fluid dynamic principles simulating adenosine induced maximal coronary hyperemia. Diagrammatic representation of the FFRCT analysis is returned and provided  in a separate PDF document in PACS. Normal FFR range is >0.80. Gray zone values are 0.76-0.80. FFRCT ASSESSMENT: LEFT MAIN: Normal FFRCT analysis with no evidence for hemodynamically significant lesion. LAD: Normal FFRCT analysis with no evidence for hemodynamically significant lesion. LCX: Based on FFRCT analysis and suggestion of a  hemodynamically significant stenosis at the origin of the circumflex artery, with FFR CT = 0.76, an delta FFR of 0.20. RCA: Normal FFRCT analysis with no evidence for hemodynamically significant lesion. CAD-RADS 2.0 ANALYSIS: CAD-RADS 4A - Severe stenosis of one or two vessels (maximal coronary stenosis = 70 - 99%). P2 - Moderate amount of plaque I plus-minus - Borderline for lesion specific ischemia     Impression: Severe stenosis at the left circumflex artery origin with suggestion of associated lesion specific ischemia on FFR CT. Moderate atherosclerotic disease in the proximal LAD with no evidence of hemodynamically significant stenosis. Management recommendations: - Consider invasive coronary angiography. - Aggressive risk factor modification and preventative pharmacotherapy. - Other treatments (including anti-anginal therapy) should be considered per guideline directed care. The study was marked in EPIC for significant notification. Workstation performed: [id number]       Review of Systems   Cardiovascular:  Positive for chest pain (tightness  mostly at rest).   Skin:  Positive for color change (BLE).   All other systems reviewed and are negative.    Except as noted in HPI, is otherwise reviewed in detail and a 12 point review of systems is negative.    Physical Exam  Vitals reviewed.   Constitutional:       General: She is not in acute distress.     Appearance: Normal appearance. She is not diaphoretic.   HENT:      Head: Normocephalic and atraumatic.   Eyes:      General: No scleral icterus.  Cardiovascular:      Rate and Rhythm: Normal rate and regular rhythm.      Pulses: Normal pulses.           Radial pulses are 2+ on the right side and 2+ on the left side.      Heart sounds: Normal heart sounds, S1 normal and S2 normal.   Pulmonary:      Effort: Pulmonary effort is normal. No tachypnea or respiratory distress.      Breath sounds: Normal breath sounds. No wheezing or rales.   Abdominal:      General:  Bowel sounds are normal. There is no distension.      Palpations: Abdomen is soft.   Musculoskeletal:      Right lower leg: Edema (trace) present.      Left lower leg: Edema (Trace) present.   Skin:     General: Skin is warm and dry.      Capillary Refill: Capillary refill takes less than 2 seconds.      Comments: Discoloration In BLE   Neurological:      Mental Status: She is alert and oriented to person, place, and time.      Gait: Gait normal.   Psychiatric:         Mood and Affect: Mood normal.         Behavior: Behavior normal.          **Please Note: This note may have been constructed using a voice recognition system**     Thank you for the opportunity to participate in the care of this patient.   RAJAN Crenshaw

## 2024-12-23 NOTE — ASSESSMENT & PLAN NOTE
Care per PCP and endocrinology  Lab Results   Component Value Date    HGBA1C 6.1 (H) 10/17/2024   Patient has been on Mounjaro for over a year and has lost over 50 lbs and her A1c improved  Reinforced healthy diet and increasing physical activity

## 2024-12-23 NOTE — ASSESSMENT & PLAN NOTE
- 10/17/2024: LDL 71, TG 78, HDL 49, normal AST and ALT  LDL goal less than 60.  Increase Crestor from 20 mg daily to 40 mg daily  Reinforced heart healthy diet low in saturated fat and fast sugars  Repeat lipid panel in 3 months  If LDL not at goal, may consider adding another agent

## 2024-12-23 NOTE — ASSESSMENT & PLAN NOTE
- 10/17/24 vascular lower limb study shows evidence of tibio-peroneal arterial occlusive disease with an occlusion vs high grade stenosis if the right distal posterior tibial artery.  And evidence of tibio-peroneal arterial occlusive disease with an occlusion vs high grade stenosis if the left proximal to distal posterior tibial artery   - Patient is following with vascular surgery  -Maintain good blood pressure, cholesterol and glucose control.    -Continue with aspirin 81 and statin

## 2024-12-23 NOTE — ASSESSMENT & PLAN NOTE
Stable . /74 HR 73  Continue on losartan-hydrochlorothiazide (50-12.5 mg) daily, Toprol-XL 25 mg daily,

## 2024-12-23 NOTE — ASSESSMENT & PLAN NOTE
-12/6/2024 cardiac cath as below.  She has very tortuous coronary arteries with no evidence of any significant focal stenosis  - right wrist cardiac cath site well healed, no significant hematoma. No bruising. Pulses are palpable, extremity with good color, warm  - Patient is recommended aggressive medical therapy with LDL goal less than 60 and good blood pressure control.   - Patient reports occasional chest tightness at rest but no other associated symptoms  - Continue on aspirin, BB and statin

## 2024-12-24 ENCOUNTER — OFFICE VISIT (OUTPATIENT)
Dept: CARDIOLOGY CLINIC | Facility: CLINIC | Age: 79
End: 2024-12-24
Payer: MEDICARE

## 2024-12-24 VITALS
SYSTOLIC BLOOD PRESSURE: 132 MMHG | BODY MASS INDEX: 28.43 KG/M2 | HEART RATE: 73 BPM | DIASTOLIC BLOOD PRESSURE: 74 MMHG | OXYGEN SATURATION: 97 % | WEIGHT: 187 LBS

## 2024-12-24 DIAGNOSIS — E78.2 HYPERLIPIDEMIA, MIXED: ICD-10-CM

## 2024-12-24 DIAGNOSIS — I10 BENIGN ESSENTIAL HYPERTENSION: ICD-10-CM

## 2024-12-24 DIAGNOSIS — E11.42 TYPE 2 DIABETES MELLITUS WITH DIABETIC POLYNEUROPATHY, WITHOUT LONG-TERM CURRENT USE OF INSULIN (HCC): ICD-10-CM

## 2024-12-24 DIAGNOSIS — I73.9 PERIPHERAL ARTERIAL DISEASE (HCC): ICD-10-CM

## 2024-12-24 DIAGNOSIS — I25.10 CORONARY ARTERY DISEASE INVOLVING NATIVE CORONARY ARTERY OF NATIVE HEART WITHOUT ANGINA PECTORIS: Primary | ICD-10-CM

## 2024-12-24 PROCEDURE — 99214 OFFICE O/P EST MOD 30 MIN: CPT

## 2024-12-24 RX ORDER — ROSUVASTATIN CALCIUM 40 MG/1
40 TABLET, COATED ORAL DAILY
Qty: 30 TABLET | Refills: 5 | Status: SHIPPED | OUTPATIENT
Start: 2024-12-24

## 2024-12-24 NOTE — PATIENT INSTRUCTIONS
Please complete your lipid panel in 3 months  Monitor your blood pressure and heart rate and keep a diary of your readings   Continue heart healthy diet by limiting sodium, saturated fat and added sugars.  Stay active and hydrated  Bring complete list of medications to your follow-up appointment.   Please call the office if you have any questions

## 2025-01-01 DIAGNOSIS — I25.10 CORONARY ARTERY DISEASE INVOLVING NATIVE CORONARY ARTERY: ICD-10-CM

## 2025-01-01 RX ORDER — METOPROLOL SUCCINATE 25 MG/1
25 TABLET, EXTENDED RELEASE ORAL DAILY
Qty: 30 TABLET | Refills: 0 | Status: SHIPPED | OUTPATIENT
Start: 2025-01-01

## 2025-01-23 DIAGNOSIS — E78.2 HYPERLIPIDEMIA, MIXED: ICD-10-CM

## 2025-01-23 DIAGNOSIS — I25.10 CORONARY ARTERY DISEASE INVOLVING NATIVE CORONARY ARTERY: ICD-10-CM

## 2025-01-23 DIAGNOSIS — E11.42 TYPE 2 DIABETES MELLITUS WITH DIABETIC POLYNEUROPATHY, WITHOUT LONG-TERM CURRENT USE OF INSULIN (HCC): ICD-10-CM

## 2025-01-23 DIAGNOSIS — I25.118 CORONARY ARTERY DISEASE OF NATIVE ARTERY OF NATIVE HEART WITH STABLE ANGINA PECTORIS (HCC): ICD-10-CM

## 2025-01-23 DIAGNOSIS — I10 BENIGN ESSENTIAL HYPERTENSION: ICD-10-CM

## 2025-01-23 RX ORDER — ACYCLOVIR 400 MG/1
1 TABLET ORAL
Qty: 3 EACH | Refills: 0 | Status: SHIPPED | OUTPATIENT
Start: 2025-01-23

## 2025-01-24 ENCOUNTER — TELEPHONE (OUTPATIENT)
Age: 80
End: 2025-01-24

## 2025-01-24 RX ORDER — LOSARTAN POTASSIUM AND HYDROCHLOROTHIAZIDE 12.5; 5 MG/1; MG/1
1 TABLET ORAL DAILY
Qty: 90 TABLET | Refills: 1 | Status: SHIPPED | OUTPATIENT
Start: 2025-01-24

## 2025-01-24 RX ORDER — NITROGLYCERIN 0.4 MG/1
0.4 TABLET SUBLINGUAL
Qty: 50 TABLET | Refills: 1 | Status: SHIPPED | OUTPATIENT
Start: 2025-01-24

## 2025-01-24 RX ORDER — ROSUVASTATIN CALCIUM 40 MG/1
40 TABLET, COATED ORAL DAILY
Qty: 90 TABLET | Refills: 1 | Status: SHIPPED | OUTPATIENT
Start: 2025-01-24

## 2025-01-24 RX ORDER — METOPROLOL SUCCINATE 25 MG/1
25 TABLET, EXTENDED RELEASE ORAL DAILY
Qty: 30 TABLET | Refills: 5 | Status: SHIPPED | OUTPATIENT
Start: 2025-01-24

## 2025-01-24 NOTE — TELEPHONE ENCOUNTER
Pt calling for NP appt in [place] for bites she is not sure the source of, that are extremely itchy and break open    Pt has tried topical options, but her pharmacist suggested to stop as she takes a lot of medications    Offered next avail, she wants to be seen sooner than that. Recommended she see her PCP in the meantime and call us back if she still needs an appt

## 2025-01-24 NOTE — TELEPHONE ENCOUNTER
Pt called in and wanted to make sure her ride was cancelled for her appointment she initially had scheduled for 2/20/25. Pt is currently rescheduled for 4/4/25, and will call closer to the date if she needs a ride scheduled.

## 2025-01-24 NOTE — TELEPHONE ENCOUNTER
Ride was not scheduled. Patient notified to contact office if ride is needed for future appointments.

## 2025-01-27 ENCOUNTER — OFFICE VISIT (OUTPATIENT)
Dept: FAMILY MEDICINE CLINIC | Facility: CLINIC | Age: 80
End: 2025-01-27
Payer: MEDICARE

## 2025-01-27 VITALS
WEIGHT: 184.8 LBS | HEART RATE: 74 BPM | OXYGEN SATURATION: 97 % | RESPIRATION RATE: 16 BRPM | SYSTOLIC BLOOD PRESSURE: 122 MMHG | BODY MASS INDEX: 28.01 KG/M2 | HEIGHT: 68 IN | DIASTOLIC BLOOD PRESSURE: 60 MMHG | TEMPERATURE: 98.1 F

## 2025-01-27 DIAGNOSIS — E53.8 VITAMIN B12 DEFICIENCY: ICD-10-CM

## 2025-01-27 DIAGNOSIS — L30.9 DERMATITIS: Primary | ICD-10-CM

## 2025-01-27 DIAGNOSIS — I10 BENIGN ESSENTIAL HYPERTENSION: ICD-10-CM

## 2025-01-27 DIAGNOSIS — E11.42 TYPE 2 DIABETES MELLITUS WITH DIABETIC POLYNEUROPATHY, WITHOUT LONG-TERM CURRENT USE OF INSULIN (HCC): ICD-10-CM

## 2025-01-27 PROCEDURE — 99214 OFFICE O/P EST MOD 30 MIN: CPT | Performed by: FAMILY MEDICINE

## 2025-01-27 PROCEDURE — G2211 COMPLEX E/M VISIT ADD ON: HCPCS | Performed by: FAMILY MEDICINE

## 2025-01-27 RX ORDER — CETIRIZINE HYDROCHLORIDE 10 MG/1
10 TABLET ORAL 2 TIMES DAILY
Qty: 20 TABLET | Refills: 0 | Status: SHIPPED | OUTPATIENT
Start: 2025-01-27 | End: 2025-02-06

## 2025-01-27 RX ORDER — FAMOTIDINE 20 MG/1
20 TABLET, FILM COATED ORAL 2 TIMES DAILY PRN
Qty: 20 TABLET | Refills: 0 | Status: SHIPPED | OUTPATIENT
Start: 2025-01-27 | End: 2025-02-06

## 2025-01-27 RX ORDER — PREDNISONE 5 MG/1
TABLET ORAL
Qty: 24 TABLET | Refills: 0 | Status: SHIPPED | OUTPATIENT
Start: 2025-01-27

## 2025-01-27 NOTE — PROGRESS NOTES
Assessment/Plan:  Assessment & Plan  Dermatitis  Etiology?  Start Prednisone 5mg taper, Pepcid 20mg BID PRN itching, Zyrtec 10mg BID PRN itching - side effects discussed.  Advise fragrance-free topicals.      Orders:  •  predniSONE 5 mg tablet; # pills by mouth daily:  5/5/4/4/3/2/1  •  famotidine (PEPCID) 20 mg tablet; Take 1 tablet (20 mg total) by mouth 2 (two) times a day as needed (Itching) for up to 10 days  •  cetirizine (ZyrTEC) 10 mg tablet; Take 1 tablet (10 mg total) by mouth 2 (two) times a day for 10 days    Type 2 diabetes mellitus with diabetic polyneuropathy, without long-term current use of insulin (HCC)  Stable.  Management per Endo.  Recommend lifestyle modifications.    Lab Results   Component Value Date    HGBA1C 6.1 (H) 10/17/2024          Benign essential hypertension  Stable.  Check blood pressure outside of office.  Recommend lifestyle modifications.         Vitamin B12 deficiency  Patient D/C Vitamin B12 500mcg daily.            Return if symptoms worsen or fail to improve.      Future Appointments   Date Time Provider Department Center   3/18/2025  9:45 AM Chad Rosas DPM [facility]   4/4/2025  8:00 AM Ashli Gill DO  [facility]   4/28/2025  8:00 AM RAJAN Dawkins [facility]   7/15/2025  8:20 AM Toni Robles MD [facility]   10/27/2025 10:30 AM [facility] 1 [facility]        Subjective:     Cherrie is a 79 y.o. female who presents today for a follow-up on her acute medical conditions.        HPI:  Chief Complaint   Patient presents with   • Rash     Pt c/o itchy rash that started 4-5 days ago. Varies in intensity. Neck, upper back/shoulders, axilla, torso, and tops of legs. Tried benadryl, which she was then told not to use by her pharmacist. Calamine lotion and hydrocortisone cream did not help.      -- Above per clinical staff and reviewed. --    HPI      Today:      PTO c  Frankie    Dermatitis - Symptoms x 4 days.   Started on B/L arms, and then to B/L shoulder, chest, arm.  She sleeps with her cat, but cat does not have fleas.   does not have rash.  +Itching.  Using oral Benadryl 1 pill 4 days ago - was told not to take per pharmacist due to possible Metoprolol reaction, then used Benadryl gel madelaine torres.  Calamine cream and hydrocortisone cream were not helpful.  She denies new foods or topicals.  Denies pain.      Checks BS.   FBS:  96.  HgA1C 6.1 on 10/17/24.      PHQ-2/9 Depression Screening    Little interest or pleasure in doing things: 0 - not at all  Feeling down, depressed, or hopeless: 0 - not at all               The following portions of the patient's history were reviewed and updated as appropriate: allergies, current medications, past family history, past medical history, past social history, past surgical history and problem list.      Review of Systems   Constitutional:  Positive for diaphoresis. Negative for appetite change, chills, fatigue and fever.   Respiratory:  Negative for chest tightness and shortness of breath.    Cardiovascular:  Positive for chest pain (Chronic, takes NTG 1-2 times per week, Cardio aware).   Gastrointestinal:  Negative for abdominal pain, blood in stool, diarrhea, nausea and vomiting.   Genitourinary:  Negative for dysuria.   Skin:  Positive for rash.        Current Outpatient Medications   Medication Sig Dispense Refill   • aspirin 81 mg chewable tablet Chew 1 tablet (81 mg total) daily 100 tablet 2   • Blood Glucose Monitoring Suppl (ONE TOUCH ULTRA 2) w/Device KIT      • cetirizine (ZyrTEC) 10 mg tablet Take 1 tablet (10 mg total) by mouth 2 (two) times a day for 10 days 20 tablet 0   • Continuous Blood Gluc  (Dexcom G7 ) NAHID Use 1 each continuous 1 each 0   • Continuous Glucose Sensor (Dexcom G7 Sensor) Use 1 Device every 10 days 3 each 0   • EPINEPHrine (EPIPEN) 0.3 mg/0.3 mL SOAJ Inject 0.3 mL (0.3 mg total) into a muscle once for 1 dose 0.6 mL 0   •  "ergocalciferol (VITAMIN D2) 50,000 units TAKE 1 CAPSULE BY MOUTH ONE TIME PER WEEK 4 capsule 5   • famotidine (PEPCID) 20 mg tablet Take 1 tablet (20 mg total) by mouth 2 (two) times a day as needed (Itching) for up to 10 days 20 tablet 0   • glucose blood (OneTouch Ultra) test strip Use to check sugars three times a day Dx uncontrolled DM2 E 11.65 200 strip 5   • losartan-hydrochlorothiazide (HYZAAR) 50-12.5 mg per tablet Take 1 tablet by mouth daily 90 tablet 1   • metoprolol succinate (TOPROL-XL) 25 mg 24 hr tablet Take 1 tablet (25 mg total) by mouth daily 30 tablet 5   • nitroglycerin (NITROSTAT) 0.4 mg SL tablet Place 1 tablet (0.4 mg total) under the tongue every 5 (five) minutes as needed for chest pain 50 tablet 1   • predniSONE 5 mg tablet # pills by mouth daily:  5/5/4/4/3/2/1 24 tablet 0   • rosuvastatin (CRESTOR) 40 MG tablet Take 1 tablet (40 mg total) by mouth daily 90 tablet 1   • sertraline (ZOLOFT) 50 mg tablet Take 1 tablet (50 mg total) by mouth daily 90 tablet 1   • Tirzepatide 15 MG/0.5ML SOAJ Inject 15 mg under the skin once a week 6 mL 0   • vitamin B-12 (VITAMIN B-12) 500 mcg tablet Take 1 tablet (500 mcg total) by mouth daily Over the counter (Patient not taking: Reported on 1/27/2025) 90 tablet 1     No current facility-administered medications for this visit.       Objective:  /60   Pulse 74   Temp 98.1 °F (36.7 °C) (Temporal)   Resp 16   Ht 5' 8\" (1.727 m)   Wt 83.8 kg (184 lb 12.8 oz)   SpO2 97%   BMI 28.10 kg/m²    Wt Readings from Last 3 Encounters:   01/27/25 83.8 kg (184 lb 12.8 oz)   12/24/24 84.8 kg (187 lb)   12/17/24 84.8 kg (187 lb)      BP Readings from Last 3 Encounters:   01/27/25 122/60   12/24/24 132/74   12/17/24 130/71          Physical Exam  Vitals and nursing note reviewed.   Constitutional:       General: She is not in acute distress.     Appearance: Normal appearance. She is well-developed and normal weight. She is not ill-appearing or toxic-appearing. " "  HENT:      Head: Normocephalic and atraumatic.      Mouth/Throat:      Mouth: Mucous membranes are moist.   Eyes:      Conjunctiva/sclera: Conjunctivae normal.   Neck:      Thyroid: No thyromegaly.   Cardiovascular:      Rate and Rhythm: Normal rate and regular rhythm.      Pulses: Normal pulses.      Heart sounds: Normal heart sounds.   Pulmonary:      Effort: Pulmonary effort is normal.      Breath sounds: Normal breath sounds.   Musculoskeletal:         General: No swelling or tenderness.      Cervical back: Neck supple.      Right lower leg: No edema.      Left lower leg: No edema.   Lymphadenopathy:      Cervical: No cervical adenopathy.   Skin:     Findings: Rash (Excoriated papules on anterior chest, B/L UE, B/L LE, B/L Thoracic Thoraic Back; Right groin c 2cm x 2cm pedunculated skin tag) present.   Neurological:      General: No focal deficit present.      Mental Status: She is alert and oriented to person, place, and time.   Psychiatric:         Mood and Affect: Mood normal.         Lab Results:      Lab Results   Component Value Date    WBC 7.49 12/06/2024    HGB 14.3 12/06/2024    HCT 42.1 12/06/2024     12/06/2024    CHOL 224 02/25/2015    TRIG 78 10/17/2024    HDL 49 (L) 10/17/2024    ALT 19 10/17/2024    AST 18 10/17/2024     09/11/2017    K 4.4 12/03/2024    CL 97 12/03/2024    CREATININE 0.80 12/03/2024    BUN 15 12/03/2024    CO2 32 12/03/2024    TSH 1.59 07/22/2021    INR 0.96 07/27/2023    GLUF 108 (H) 12/03/2024    HGBA1C 6.1 (H) 10/17/2024     No results found for: \"URICACID\"  Invalid input(s): \"BASENAME\" Vitamin D    No results found.     POCT Labs                       "

## 2025-01-27 NOTE — ASSESSMENT & PLAN NOTE
Stable.  Management per Endo.  Recommend lifestyle modifications.    Lab Results   Component Value Date    HGBA1C 6.1 (H) 10/17/2024

## 2025-01-27 NOTE — PATIENT INSTRUCTIONS
Use Fragrance-free topicals.          Patient Education     Contact dermatitis   The Basics   Written by the doctors and editors at [facility]   What is dermatitis? -- Dermatitis is a type of skin rash that can happen after your skin touches something that irritates it or something you are allergic to.  Things that irritate the skin can be found in products that you use every day, such as soaps or cleansers. Some of the things that can cause skin allergies include:   Certain medicines, perfumes, or cosmetics   The metal in some kinds of jewelry   Plants, such as poison ivy and poison oak  Sometimes, you can develop a rash the first time you touch something. But it is also possible to get a rash from something that you have used before without any problems.  What other symptoms should I watch for? -- If you have a rash, your skin might be dry, itchy, or cracked (picture 1). In people with light skin, the rash is often red. In people with darker skin, it might appear purple, brown, gray, or black (picture 2). If your rash is caused by an allergy, you might also have some swelling or blisters where you have the rash.  Severe symptoms include:   Pain   Widespread swelling   Blisters, oozing, or crusting of the skin  Is there anything I can do on my own? -- Yes. You can:   Avoid using or touching whatever might have caused your rash.   Protect your skin from anything that might irritate it or cause an allergy. For example, wear gloves if you need to work with harsh soaps.   Use cool or warm water, not hot, for baths and showers. You can also try a special kind of bath called an oatmeal bath.   Try using soothing skin products to help with the itching and discomfort. Examples include thick moisturizing cream or petroleum jelly. Put this on your skin right after you get out of the bath or shower and after washing your hands.   Avoid scratching your skin. It might help to:   Wear cotton gloves at night.   Keep your nails  short and clean.   Cover the parts of your skin that itch.  How are skin rashes treated? -- Your doctor might prescribe different treatments or medicines to help your rash heal. These can include:   Steroid creams and ointments - These go on the skin, and they relieve itching and redness.   Steroid pills - You might need to take these for a short time if your rash is severe. But your doctor or nurse will want to take you off of the steroid pills as soon as possible. Even though these medicines help, they can also cause problems of their own.   Wet or damp dressings - These can be helpful for skin that is crusting or oozing. To use a wet or damp dressing, you need to wear 2 layers of clothing. First, put on a layer of damp cotton clothes over your rash. Then, put on a layer of dry clothes on top of the damp ones. People who need these dressings often wear them at night when they sleep.  When should I call the doctor? -- Call your doctor or nurse for advice if:   You have a rash that does not go away within 2 weeks.   Your rash gets worse or spreads over large parts of your body.   You have signs of infection like swelling, warmth, pain, or fever.  All topics are updated as new evidence becomes available and our peer review process is complete.  This topic retrieved from [employer] on: Feb 26, 2024.  Topic 55139 Version 12.0  Release: 32.2.4 - C32.56  © 2024 UpToDate, Inc. and/or its affiliates. All rights reserved.  picture 1: Chronic irritant contact dermatitis     If you have dermatitis, your skin might be red, dry, itchy, or cracked.  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 They have dermatitis where the button of their jeans touched their skin.  Graphic 419084 Version 1.0  Consumer Information Use and Disclaimer   Disclaimer: This generalized information is a limited summary of diagnosis, treatment, and/or medication  information. It is not meant to be comprehensive and should be used as a tool to help the user understand and/or assess potential diagnostic and treatment options. It does NOT include all information about conditions, treatments, medications, side effects, or risks that may apply to a specific patient. It is not intended to be medical advice or a substitute for the medical advice, diagnosis, or treatment of a health care provider based on the health care provider's examination and assessment of a patient's specific and unique circumstances. Patients must speak with a health care provider for complete information about their health, medical questions, and treatment options, including any risks or benefits regarding use of medications. This information does not endorse any treatments or medications as safe, effective, or approved for treating a specific patient. Up[employer]Date, Inc. and its affiliates disclaim any warranty or liability relating to this information or the use thereof.The use of this information is governed by the Terms of Use, available at https://www.[employer].com/en/know/clinical-effectiveness-terms. 2024© UpToDate, Inc. and its affiliates and/or licensors. All rights reserved.  Copyright   © 2024 Up[employer]Date, Inc. and/or its affiliates. All rights reserved.

## 2025-02-13 DIAGNOSIS — L30.9 DERMATITIS: ICD-10-CM

## 2025-02-13 DIAGNOSIS — E53.8 VITAMIN B12 DEFICIENCY: ICD-10-CM

## 2025-02-13 RX ORDER — FAMOTIDINE 20 MG/1
20 TABLET, FILM COATED ORAL 2 TIMES DAILY PRN
Qty: 20 TABLET | Refills: 0 | OUTPATIENT
Start: 2025-02-13 | End: 2025-02-23

## 2025-02-13 NOTE — TELEPHONE ENCOUNTER
She should have run out of this in November. Chart says she is not taking it. Please investigate.

## 2025-02-17 ENCOUNTER — APPOINTMENT (EMERGENCY)
Dept: RADIOLOGY | Facility: HOSPITAL | Age: 80
End: 2025-02-17
Payer: MEDICARE

## 2025-02-17 ENCOUNTER — HOSPITAL ENCOUNTER (EMERGENCY)
Facility: HOSPITAL | Age: 80
Discharge: HOME | End: 2025-02-17
Attending: EMERGENCY MEDICINE
Payer: MEDICARE

## 2025-02-17 VITALS
WEIGHT: 182 LBS | OXYGEN SATURATION: 97 % | RESPIRATION RATE: 18 BRPM | HEIGHT: 68 IN | TEMPERATURE: 98.1 F | BODY MASS INDEX: 27.58 KG/M2 | DIASTOLIC BLOOD PRESSURE: 68 MMHG | SYSTOLIC BLOOD PRESSURE: 150 MMHG | HEART RATE: 73 BPM

## 2025-02-17 DIAGNOSIS — H11.30 SUBCONJUNCTIVAL HEMORRHAGE: ICD-10-CM

## 2025-02-17 DIAGNOSIS — S72.109A: ICD-10-CM
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
## 2025-02-17 LAB
ALBUMIN SERPL BCG-MCNC: 4.1 G/DL (ref 3.5–5)
ALP SERPL-CCNC: 54 U/L (ref 34–104)
ALT SERPL W P-5'-P-CCNC: 21 U/L (ref 7–52)
ANION GAP SERPL CALCULATED.3IONS-SCNC: 10 MMOL/L (ref 4–13)
APTT PPP: 33 SECONDS (ref 23–34)
AST SERPL W P-5'-P-CCNC: 18 U/L (ref 13–39)
BASOPHILS # BLD AUTO: 0.04 THOUSANDS/ΜL (ref 0–0.1)
BASOPHILS NFR BLD AUTO: 1 % (ref 0–1)
BILIRUB SERPL-MCNC: 0.42 MG/DL (ref 0.2–1)
BUN SERPL-MCNC: 17 MG/DL (ref 5–25)
CALCIUM SERPL-MCNC: 9.6 MG/DL (ref 8.4–10.2)
CHLORIDE SERPL-SCNC: 102 MMOL/L (ref 96–108)
CO2 SERPL-SCNC: 23 MMOL/L (ref 21–32)
CREAT SERPL-MCNC: 0.61 MG/DL (ref 0.6–1.3)
EOSINOPHIL # BLD AUTO: 0.15 THOUSAND/ΜL (ref 0–0.61)
EOSINOPHIL NFR BLD AUTO: 2 % (ref 0–6)
ERYTHROCYTE [DISTWIDTH] IN BLOOD BY AUTOMATED COUNT: 12.6 % (ref 11.6–15.1)
GFR SERPL CREATININE-BSD FRML MDRD: 87 ML/MIN/1.73SQ M
GLUCOSE SERPL-MCNC: 115 MG/DL (ref 65–140)
GLUCOSE SERPL-MCNC: 131 MG/DL (ref 65–140)
HCT VFR BLD AUTO: 39.5 % (ref 34.8–46.1)
HGB BLD-MCNC: 13.3 G/DL (ref 11.5–15.4)
IMM GRANULOCYTES # BLD AUTO: 0.05 THOUSAND/UL (ref 0–0.2)
IMM GRANULOCYTES NFR BLD AUTO: 1 % (ref 0–2)
INR PPP: 0.99 (ref 0.85–1.19)
LYMPHOCYTES # BLD AUTO: 1.42 THOUSANDS/ΜL (ref 0.6–4.47)
LYMPHOCYTES NFR BLD AUTO: 17 % (ref 14–44)
MCH RBC QN AUTO: 28.7 PG (ref 26.8–34.3)
MCHC RBC AUTO-ENTMCNC: 33.7 G/DL (ref 31.4–37.4)
MCV RBC AUTO: 85 FL (ref 82–98)
MONOCYTES # BLD AUTO: 0.58 THOUSAND/ΜL (ref 0.17–1.22)
MONOCYTES NFR BLD AUTO: 7 % (ref 4–12)
NEUTROPHILS # BLD AUTO: 6.37 THOUSANDS/ΜL (ref 1.85–7.62)
NEUTS SEG NFR BLD AUTO: 72 % (ref 43–75)
NRBC BLD AUTO-RTO: 0 /100 WBCS
PLATELET # BLD AUTO: 239 THOUSANDS/UL (ref 149–390)
PMV BLD AUTO: 9.3 FL (ref 8.9–12.7)
POTASSIUM SERPL-SCNC: 3.6 MMOL/L (ref 3.5–5.3)
PROT SERPL-MCNC: 6.6 G/DL (ref 6.4–8.4)
PROTHROMBIN TIME: 13.6 SECONDS (ref 12.3–15)
RBC # BLD AUTO: 4.64 MILLION/UL (ref 3.81–5.12)
SODIUM SERPL-SCNC: 135 MMOL/L (ref 135–147)
WBC # BLD AUTO: 8.61 THOUSAND/UL (ref 4.31–10.16)

## 2025-02-17 PROCEDURE — 73502 X-RAY EXAM HIP UNI 2-3 VIEWS: CPT

## 2025-02-17 PROCEDURE — 96374 THER/PROPH/DIAG INJ IV PUSH: CPT

## 2025-02-17 PROCEDURE — 73700 CT LOWER EXTREMITY W/O DYE: CPT

## 2025-02-17 PROCEDURE — 36415 COLL VENOUS BLD VENIPUNCTURE: CPT | Performed by: EMERGENCY MEDICINE

## 2025-02-17 PROCEDURE — 70450 CT HEAD/BRAIN W/O DYE: CPT

## 2025-02-17 PROCEDURE — 99284 EMERGENCY DEPT VISIT MOD MDM: CPT

## 2025-02-17 PROCEDURE — 82948 REAGENT STRIP/BLOOD GLUCOSE: CPT

## 2025-02-17 PROCEDURE — 85610 PROTHROMBIN TIME: CPT | Performed by: EMERGENCY MEDICINE

## 2025-02-17 PROCEDURE — 85025 COMPLETE CBC W/AUTO DIFF WBC: CPT | Performed by: EMERGENCY MEDICINE

## 2025-02-17 PROCEDURE — 99285 EMERGENCY DEPT VISIT HI MDM: CPT | Performed by: EMERGENCY MEDICINE

## 2025-02-17 PROCEDURE — 80053 COMPREHEN METABOLIC PANEL: CPT | Performed by: EMERGENCY MEDICINE

## 2025-02-17 PROCEDURE — 85730 THROMBOPLASTIN TIME PARTIAL: CPT | Performed by: EMERGENCY MEDICINE

## 2025-02-17 RX ORDER — ONDANSETRON 4 MG/1
4 TABLET, ORALLY DISINTEGRATING ORAL ONCE
Status: COMPLETED | OUTPATIENT
Start: 2025-02-17 | End: 2025-02-17

## 2025-02-17 RX ORDER — OXYCODONE AND ACETAMINOPHEN 5; 325 MG/1; MG/1
1 TABLET ORAL EVERY 6 HOURS PRN
Qty: 15 TABLET | Refills: 0 | Status: SHIPPED | OUTPATIENT
Start: 2025-02-17

## 2025-02-17 RX ORDER — HYDROMORPHONE HCL/PF 1 MG/ML
0.2 SYRINGE (ML) INJECTION ONCE
Status: COMPLETED | OUTPATIENT
Start: 2025-02-17 | End: 2025-02-17

## 2025-02-17 RX ADMIN — ONDANSETRON 4 MG: 4 TABLET, ORALLY DISINTEGRATING ORAL at 21:40

## 2025-02-17 RX ADMIN — HYDROMORPHONE HYDROCHLORIDE 0.2 MG: 0.5 INJECTION, SOLUTION INTRAMUSCULAR; INTRAVENOUS; SUBCUTANEOUS at 17:18

## 2025-02-19 ENCOUNTER — OFFICE VISIT (OUTPATIENT)
Dept: OBGYN CLINIC | Facility: CLINIC | Age: 80
End: 2025-02-19
Payer: MEDICARE

## 2025-02-19 VITALS — BODY MASS INDEX: 27.13 KG/M2 | WEIGHT: 179 LBS | HEIGHT: 68 IN

## 2025-02-19 DIAGNOSIS — M25.551 RIGHT HIP PAIN: ICD-10-CM

## 2025-02-19 DIAGNOSIS — S72.114A CLOSED NONDISPLACED FRACTURE OF GREATER TROCHANTER OF RIGHT FEMUR, INITIAL ENCOUNTER (HCC): Primary | ICD-10-CM

## 2025-02-19 PROCEDURE — 99204 OFFICE O/P NEW MOD 45 MIN: CPT | Performed by: ORTHOPAEDIC SURGERY

## 2025-02-19 NOTE — ED PROVIDER NOTES
Time reflects when diagnosis was documented in both MDM as applicable and the Disposition within this note       Time User Action Codes Description Comment    2/17/2025  9:16 PM Ant Unger [W19.XXXA] Fall, initial encounter     2/17/2025  9:17 PM Ant Unger [S72.109A] Closed trochanteric fracture of hip (HCC)     2/17/2025  9:17 PM Ant Unger [H11.30] Subconjunctival hemorrhage           ED Disposition       ED Disposition   Discharge    Condition   Stable    Date/Time   Mon Feb 17, 2025  9:16 PM    Comment   Cherrie Ruzicka Dempsey discharge to home/self care.                   Assessment & Plan       Medical Decision Making  Pulse ox 97% on room air indicating adequate oxygenation.  Xray R hip/pelvis: Greater trochanter fracture as read by me      Orthopedic surgery on-call Dr. Preciado contacted and case discussed.  Recommended getting a CT scan if there is no extension of the fracture and is just a isolated greater trochanter fracture can be weightbearing as tolerated with a walker and follow-up in the office.  CT scan was negative for any extension of the fracture.  Patient was able to ambulate with a walker in the ER discharge with outpatient orthopedic follow-up.    Amount and/or Complexity of Data Reviewed  Labs: ordered.  Radiology: ordered and independent interpretation performed.    Risk  Prescription drug management.             Medications   HYDROmorphone (DILAUDID) injection 0.2 mg (0.2 mg Intravenous Given 2/17/25 1718)   ondansetron (ZOFRAN-ODT) dispersible tablet 4 mg (4 mg Oral Given 2/17/25 2140)       ED Risk Strat Scores                                                History of Present Illness       Chief Complaint   Patient presents with    Fall    Hip Pain    Eye Redness     Pt arrives from home via ambulance after pt fell on ice and landed on right side and back.  Pt reports right hip pain with increased pain with slighted movement.  Pt noted with no external rotation.   Pt also noted with eye blood shot, which started with fall.  (Pt receives eye injections for diabetic retinopathy).       Past Medical History:   Diagnosis Date    Cancer (HCC)     Diabetes mellitus (HCC)     Dizziness     Endometrial carcinoma (HCC)     Hypertension     Obesity     Rectal bleeding 09/10/2019    Sciatic radiculitis 01/28/2021    Tonsillitis     Varicella       Past Surgical History:   Procedure Laterality Date    CARDIAC CATHETERIZATION Left 12/6/2024    Procedure: Cardiac Left Heart Cath;  Surgeon: Jyothi Casey MD;  [facility];  Service: Cardiology    EYE SURGERY Bilateral     2024    HYSTERECTOMY  2006    TONSILLECTOMY  1962    TUBAL LIGATION  1992    US GUIDED THYROID BIOPSY  12/12/2019      Family History   Problem Relation Age of Onset    Stroke Mother     Diabetes Mother     Cancer Mother     Diabetes unspecified Mother     Lung cancer Father     Breast cancer Sister 78        Half-sister    Dementia Sister     No Known Problems Sister     COPD Brother     Coronary artery disease Brother     Heart failure Brother     HIV Brother     Cancer Family     No Known Problems Daughter     Leukemia Son     Cancer Son         Leukemia age 19    No Known Problems Son       Social History     Tobacco Use    Smoking status: Never     Passive exposure: Past    Smokeless tobacco: Never   Vaping Use    Vaping status: Never Used   Substance Use Topics    Alcohol use: Not Currently     Alcohol/week: 4.0 standard drinks of alcohol    Drug use: Never      E-Cigarette/Vaping    E-Cigarette Use Never User       E-Cigarette/Vaping Substances    Nicotine No     THC No     CBD No     Flavoring No     Other No     Unknown No       I have reviewed and agree with the history as documented.     Patient brought in by EMS from home for evaluation after fall.  Patient slipped and fell on ice landing on her right side.  Complaining of right hip pain.  No loss of consciousness.  Does not believe she hit  her head but does have a subconjunctival hemorrhage on the left.  Has a history of eye injections and had 1 on Friday.      History provided by:  Patient and EMS personnel   used: No    Fall  Hip Pain      Review of Systems   All other systems reviewed and are negative.          Objective       ED Triage Vitals   Temperature Pulse Blood Pressure Respirations SpO2 Patient Position - Orthostatic VS   02/17/25 1648 02/17/25 1648 02/17/25 1648 02/17/25 1648 02/17/25 1648 02/17/25 1827   97.9 °F (36.6 °C) 80 (!) 179/74 18 98 % Lying      Temp Source Heart Rate Source BP Location FiO2 (%) Pain Score    02/17/25 1942 02/17/25 1648 02/17/25 1827 -- 02/17/25 1651    Oral Monitor Right arm  7      Vitals      Date and Time Temp Pulse SpO2 Resp BP Pain Score FACES Pain Rating User   02/17/25 2100 -- 73 97 % 18 150/68 -- --    02/17/25 2000 -- 71 95 % 18 140/63 -- --    02/17/25 1942 98.1 °F (36.7 °C) 72 97 % 18 169/72 -- --    02/17/25 1827 -- 69 97 % 18 154/75 No Pain -- MD   02/17/25 1718 -- -- -- -- -- 7 -- MD   02/17/25 1651 -- -- -- -- -- 7 -- [facility]   02/17/25 1648 97.9 °F (36.6 °C) 80 98 % 18 179/74 -- -- JUAN            Physical Exam  Vitals and nursing note reviewed.   Eyes:      Extraocular Movements: Extraocular movements intact.      Pupils: Pupils are equal, round, and reactive to light.      Comments: Left subconjunctival hemorrhage   Cardiovascular:      Rate and Rhythm: Normal rate and regular rhythm.      Pulses: Normal pulses.   Pulmonary:      Effort: Pulmonary effort is normal. No respiratory distress.      Breath sounds: Normal breath sounds.   Musculoskeletal:         General: Tenderness present. No deformity.        Legs:    Neurological:      General: No focal deficit present.      Mental Status: She is alert and oriented to person, place, and time.         Results Reviewed       Procedure Component Value Units Date/Time    Fingerstick Glucose (POCT) [[id number]]  (Normal) Collected:  02/17/25 2049    Lab Status: Final result Specimen: Blood Updated: 02/17/25 2051     POC Glucose 115 mg/dl     Comprehensive metabolic panel [[id number]] Collected: 02/17/25 1717    Lab Status: Final result Specimen: Blood from Hand, Left Updated: 02/17/25 1744     Sodium 135 mmol/L      Potassium 3.6 mmol/L      Chloride 102 mmol/L      CO2 23 mmol/L      ANION GAP 10 mmol/L      BUN 17 mg/dL      Creatinine 0.61 mg/dL      Glucose 131 mg/dL      Calcium 9.6 mg/dL      AST 18 U/L      ALT 21 U/L      Alkaline Phosphatase 54 U/L      Total Protein 6.6 g/dL      Albumin 4.1 g/dL      Total Bilirubin 0.42 mg/dL      eGFR 87 ml/min/1.73sq m     Narrative:      [employer] guidelines for Chronic Kidney Disease (CKD):     Stage 1 with normal or high GFR (GFR > 90 mL/min/1.73 square meters)    Stage 2 Mild CKD (GFR = 60-89 mL/min/1.73 square meters)    Stage 3A Moderate CKD (GFR = 45-59 mL/min/1.73 square meters)    Stage 3B Moderate CKD (GFR = 30-44 mL/min/1.73 square meters)    Stage 4 Severe CKD (GFR = 15-29 mL/min/1.73 square meters)    Stage 5 End Stage CKD (GFR <15 mL/min/1.73 square meters)  Note: GFR calculation is accurate only with a steady state creatinine    Protime-INR [[id number]]  (Normal) Collected: 02/17/25 1717    Lab Status: Final result Specimen: Blood from Arm, Left Updated: 02/17/25 1739     Protime 13.6 seconds      INR 0.99    Narrative:      INR Therapeutic Range    Indication                                             INR Range      Atrial Fibrillation                                               2.0-3.0  Hypercoagulable State                                    2.0.2.3  Left Ventricular Asist Device                            2.0-3.0  Mechanical Heart Valve                                  -    Aortic(with afib, MI, embolism, HF, LA enlargement,    and/or coagulopathy)                                     2.0-3.0 (2.5-3.5)     Mitral                                                              2.5-3.5  Prosthetic/Bioprosthetic Heart Valve               2.0-3.0  Venous thromboembolism (VTE: VT, PE        2.0-3.0    APTT [[id number]]  (Normal) Collected: 02/17/25 1717    Lab Status: Final result Specimen: Blood from Arm, Left Updated: 02/17/25 1739     PTT 33 seconds     CBC and differential [[id number]] Collected: 02/17/25 1717    Lab Status: Final result Specimen: Blood from Hand, Left Updated: 02/17/25 1724     WBC 8.61 Thousand/uL      RBC 4.64 Million/uL      Hemoglobin 13.3 g/dL      Hematocrit 39.5 %      MCV 85 fL      MCH 28.7 pg      MCHC 33.7 g/dL      RDW 12.6 %      MPV 9.3 fL      Platelets 239 Thousands/uL      nRBC 0 /100 WBCs      Segmented % 72 %      Immature Grans % 1 %      Lymphocytes % 17 %      Monocytes % 7 %      Eosinophils Relative 2 %      Basophils Relative 1 %      Absolute Neutrophils 6.37 Thousands/µL      Absolute Immature Grans 0.05 Thousand/uL      Absolute Lymphocytes 1.42 Thousands/µL      Absolute Monocytes 0.58 Thousand/µL      Eosinophils Absolute 0.15 Thousand/µL      Basophils Absolute 0.04 Thousands/µL             CT lower extremity wo contrast right   Final Interpretation by Irving Barnes DO (02/17 2056)      Comminuted mildly displaced fracture of the greater trochanter without extension to the intertrochanteric region         Workstation performed: [id number]         CT head without contrast   Final Interpretation by Beni Gibson MD (02/17 1816)      No acute intracranial abnormality.                  Workstation performed: [id number]         XR hip/pelv 2-3 vws right   Final Interpretation by Bandar Samson MD (02/18 1359)      Fracture of the greater trochanter of the right femur         Computerized Assisted Algorithm (CAA) may have been used to analyze all applicable images.            Workstation performed: [id number]             Procedures    ED Medication and Procedure Management   Prior to Admission Medications    Prescriptions Last Dose Informant Patient Reported? Taking?   Blood Glucose Monitoring Suppl (ONE TOUCH ULTRA 2) w/Device KIT  Self Yes No   Continuous Blood Gluc  (Dexcom G7 ) NAHID   No No   Sig: Use 1 each continuous   Continuous Glucose Sensor (Dexcom G7 Sensor)   No No   Sig: Use 1 Device every 10 days   EPINEPHrine (EPIPEN) 0.3 mg/0.3 mL SOAJ   No No   Sig: Inject 0.3 mL (0.3 mg total) into a muscle once for 1 dose   Tirzepatide 15 MG/0.5ML SOAJ   No No   Sig: Inject 15 mg under the skin once a week   aspirin 81 mg chewable tablet   No No   Sig: Chew 1 tablet (81 mg total) daily   cetirizine (ZyrTEC) 10 mg tablet   No No   Sig: Take 1 tablet (10 mg total) by mouth 2 (two) times a day for 10 days   ergocalciferol (VITAMIN D2) 50,000 units   No No   Sig: TAKE 1 CAPSULE BY MOUTH ONE TIME PER WEEK   famotidine (PEPCID) 20 mg tablet   No No   Sig: Take 1 tablet (20 mg total) by mouth 2 (two) times a day as needed (Itching) for up to 10 days   glucose blood (OneTouch Ultra) test strip  Self No No   Sig: Use to check sugars three times a day Dx uncontrolled DM2 E 11.65   losartan-hydrochlorothiazide (HYZAAR) 50-12.5 mg per tablet   No No   Sig: Take 1 tablet by mouth daily   metoprolol succinate (TOPROL-XL) 25 mg 24 hr tablet   No No   Sig: Take 1 tablet (25 mg total) by mouth daily   nitroglycerin (NITROSTAT) 0.4 mg SL tablet   No No   Sig: Place 1 tablet (0.4 mg total) under the tongue every 5 (five) minutes as needed for chest pain   predniSONE 5 mg tablet   No No   Sig: # pills by mouth daily:  5/5/4/4/3/2/1   rosuvastatin (CRESTOR) 40 MG tablet   No No   Sig: Take 1 tablet (40 mg total) by mouth daily   sertraline (ZOLOFT) 50 mg tablet   No No   Sig: Take 1 tablet (50 mg total) by mouth daily   vitamin B-12 (VITAMIN B-12) 500 mcg tablet   No No   Sig: Take 1 tablet (500 mcg total) by mouth daily Over the counter   Patient not taking: Reported on 1/27/2025      Facility-Administered Medications:  None     Discharge Medication List as of 2/17/2025  9:20 PM        START taking these medications    Details   oxyCODONE-acetaminophen (PERCOCET) 5-325 mg per tablet Take 1 tablet by mouth every 6 (six) hours as needed for moderate pain for up to 15 doses Max Daily Amount: 4 tablets, Starting Mon 2/17/2025, Normal           CONTINUE these medications which have NOT CHANGED    Details   aspirin 81 mg chewable tablet Chew 1 tablet (81 mg total) daily, Starting Tue 12/3/2024, Normal      Blood Glucose Monitoring Suppl (ONE TOUCH ULTRA 2) w/Device KIT Historical Med      cetirizine (ZyrTEC) 10 mg tablet Take 1 tablet (10 mg total) by mouth 2 (two) times a day for 10 days, Starting Mon 1/27/2025, Until Thu 2/6/2025, Normal      Continuous Blood Gluc  (Dexcom G7 ) NAHID Use 1 each continuous, Starting Fri 3/8/2024, Normal      Continuous Glucose Sensor (Dexcom G7 Sensor) Use 1 Device every 10 days, Starting Thu 1/23/2025, Normal      EPINEPHrine (EPIPEN) 0.3 mg/0.3 mL SOAJ Inject 0.3 mL (0.3 mg total) into a muscle once for 1 dose, Starting Wed 9/18/2024, Normal      ergocalciferol (VITAMIN D2) 50,000 units TAKE 1 CAPSULE BY MOUTH ONE TIME PER WEEK, Normal      famotidine (PEPCID) 20 mg tablet Take 1 tablet (20 mg total) by mouth 2 (two) times a day as needed (Itching) for up to 10 days, Starting Mon 1/27/2025, Until Thu 2/6/2025 at 2359, Normal      glucose blood (OneTouch Ultra) test strip Use to check sugars three times a day Dx uncontrolled DM2 E 11.65, Normal      losartan-hydrochlorothiazide (HYZAAR) 50-12.5 mg per tablet Take 1 tablet by mouth daily, Starting Fri 1/24/2025, Normal      metoprolol succinate (TOPROL-XL) 25 mg 24 hr tablet Take 1 tablet (25 mg total) by mouth daily, Starting Fri 1/24/2025, Normal      nitroglycerin (NITROSTAT) 0.4 mg SL tablet Place 1 tablet (0.4 mg total) under the tongue every 5 (five) minutes as needed for chest pain, Starting Fri 1/24/2025, Normal      predniSONE 5  mg tablet # pills by mouth daily:  5/5/4/4/3/2/1, Normal      rosuvastatin (CRESTOR) 40 MG tablet Take 1 tablet (40 mg total) by mouth daily, Starting Fri 1/24/2025, Normal      sertraline (ZOLOFT) 50 mg tablet Take 1 tablet (50 mg total) by mouth daily, Starting Tue 12/3/2024, Normal      Tirzepatide 15 MG/0.5ML SOAJ Inject 15 mg under the skin once a week, Starting Fri 1/24/2025, Normal      vitamin B-12 (VITAMIN B-12) 500 mcg tablet Take 1 tablet (500 mcg total) by mouth daily Over the counter, Starting Tue 5/14/2024, Normal             ED SEPSIS DOCUMENTATION   Time reflects when diagnosis was documented in both MDM as applicable and the Disposition within this note       Time User Action Codes Description Comment    2/17/2025  9:16 PM Ant Unger [W19.XXXA] Fall, initial encounter     2/17/2025  9:17 PM Ant Unger [S72.109A] Closed trochanteric fracture of hip (HCC)     2/17/2025  9:17 PM Ant Unger [H11.30] Subconjunctival hemorrhage                  Ant Unger DO  02/19/25 0340

## 2025-02-19 NOTE — PROGRESS NOTES
Assessment/Plan:  1. Closed nondisplaced fracture of greater trochanter of right femur, initial encounter ([facility])  Ambulatory Referral to Orthopedic Surgery      2. Right hip pain          Scribe Attestation      I,:  Booker Bryant PA-C am acting as a scribe while in the presence of the attending physician.:       I,:  Dinh Preciado, DO personally performed the services described in this documentation    as scribed in my presence.:           Maci is a pleasant 79-year-old presenting today for initial evaluation of her right hip pain after a fall.  After reviewing her images, history, and physical exam, we believe that she is symptomatic of her nondisplaced right greater trochanter fracture.  There is no evidence of intertrochanteric extension, so this fracture can be treated nonoperatively.  We encouraged her to weight-bear as tolerated with a walker, but to avoid any active abduction of her hip.  She may take the Percocet from the emergency department or transition to Tylenol for pain control.  We would like to see her back in 2 weeks for reevaluation with an x-ray on arrival of her right hip.  She and her  expressed understanding all of their questions were addressed    Subjective: Initial evaluation right hip pain    Patient ID: Elisabeth Ruzicka Dempsey is a 79 y.o. female presenting today for evaluation of her right hip.  She reports that she was in her usual state of health 2 days ago when she was walking and slipped on a patch of ice, landing directly onto her right hip.  She was seen in the emergency department where x-ray and CT scan showed a greater trochanter fracture.  She has been able to bear weight with a walker and denies any groin pain.  She denies any paresthesias in the right lower extremity beyond her normal neuropathy.  She locates the pain to the lateral hip and has difficulty getting in and out of a car and in and out of bed.  She denies any new falls or prior injury  history to the right hip    Review of Systems   Constitutional:  Positive for activity change.   HENT: Negative.     Eyes: Negative.    Respiratory: Negative.     Cardiovascular: Negative.    Gastrointestinal: Negative.    Endocrine: Negative.    Genitourinary: Negative.    Musculoskeletal:  Positive for arthralgias, gait problem, joint swelling and myalgias.   Skin: Negative.    Allergic/Immunologic: Negative.    Hematological: Negative.    Psychiatric/Behavioral: Negative.       Past Medical History:   Diagnosis Date    Cancer (HCC)     Diabetes mellitus (HCC)     Dizziness     Endometrial carcinoma (HCC)     Hypertension     Obesity     Rectal bleeding 09/10/2019    Sciatic radiculitis 01/28/2021    Tonsillitis     Varicella        Past Surgical History:   Procedure Laterality Date    CARDIAC CATHETERIZATION Left 12/6/2024    Procedure: Cardiac Left Heart Cath;  Surgeon: Jyothi Casey MD;  Location[facility];  Service: Cardiology    EYE SURGERY Bilateral     2024    HYSTERECTOMY  2006    TONSILLECTOMY  1962    TUBAL LIGATION  1992    US GUIDED THYROID BIOPSY  12/12/2019       Family History   Problem Relation Age of Onset    Stroke Mother     Diabetes Mother     Cancer Mother     Diabetes unspecified Mother     Lung cancer Father     Breast cancer Sister 78        Half-sister    Dementia Sister     No Known Problems Sister     COPD Brother     Coronary artery disease Brother     Heart failure Brother     HIV Brother     Cancer Family     No Known Problems Daughter     Leukemia Son     Cancer Son         Leukemia age 19    No Known Problems Son        Social History     Occupational History    Occupation: Retired    Tobacco Use    Smoking status: Never     Passive exposure: Past    Smokeless tobacco: Never   Vaping Use    Vaping status: Never Used   Substance and Sexual Activity    Alcohol use: Not Currently     Alcohol/week: 4.0 standard drinks of alcohol    Drug use: Never    Sexual activity: Not  Currently     Partners: Male     Birth control/protection: Post-menopausal         Current Outpatient Medications:     aspirin 81 mg chewable tablet, Chew 1 tablet (81 mg total) daily, Disp: 100 tablet, Rfl: 2    Blood Glucose Monitoring Suppl (ONE TOUCH ULTRA 2) w/Device KIT, , Disp: , Rfl:     Continuous Blood Gluc  (Dexcom G7 ) NAHID, Use 1 each continuous, Disp: 1 each, Rfl: 0    Continuous Glucose Sensor (Dexcom G7 Sensor), Use 1 Device every 10 days, Disp: 3 each, Rfl: 0    EPINEPHrine (EPIPEN) 0.3 mg/0.3 mL SOAJ, Inject 0.3 mL (0.3 mg total) into a muscle once for 1 dose, Disp: 0.6 mL, Rfl: 0    ergocalciferol (VITAMIN D2) 50,000 units, TAKE 1 CAPSULE BY MOUTH ONE TIME PER WEEK, Disp: 4 capsule, Rfl: 5    glucose blood (OneTouch Ultra) test strip, Use to check sugars three times a day Dx uncontrolled DM2 E 11.65, Disp: 200 strip, Rfl: 5    losartan-hydrochlorothiazide (HYZAAR) 50-12.5 mg per tablet, Take 1 tablet by mouth daily, Disp: 90 tablet, Rfl: 1    metoprolol succinate (TOPROL-XL) 25 mg 24 hr tablet, Take 1 tablet (25 mg total) by mouth daily, Disp: 30 tablet, Rfl: 5    nitroglycerin (NITROSTAT) 0.4 mg SL tablet, Place 1 tablet (0.4 mg total) under the tongue every 5 (five) minutes as needed for chest pain, Disp: 50 tablet, Rfl: 1    oxyCODONE-acetaminophen (PERCOCET) 5-325 mg per tablet, Take 1 tablet by mouth every 6 (six) hours as needed for moderate pain for up to 15 doses Max Daily Amount: 4 tablets, Disp: 15 tablet, Rfl: 0    predniSONE 5 mg tablet, # pills by mouth daily:  5/5/4/4/3/2/1, Disp: 24 tablet, Rfl: 0    rosuvastatin (CRESTOR) 40 MG tablet, Take 1 tablet (40 mg total) by mouth daily, Disp: 90 tablet, Rfl: 1    sertraline (ZOLOFT) 50 mg tablet, Take 1 tablet (50 mg total) by mouth daily, Disp: 90 tablet, Rfl: 1    Tirzepatide 15 MG/0.5ML SOAJ, Inject 15 mg under the skin once a week, Disp: 6 mL, Rfl: 0    cetirizine (ZyrTEC) 10 mg tablet, Take 1 tablet (10 mg total) by  mouth 2 (two) times a day for 10 days, Disp: 20 tablet, Rfl: 0    famotidine (PEPCID) 20 mg tablet, Take 1 tablet (20 mg total) by mouth 2 (two) times a day as needed (Itching) for up to 10 days, Disp: 20 tablet, Rfl: 0    vitamin B-12 (VITAMIN B-12) 500 mcg tablet, Take 1 tablet (500 mcg total) by mouth daily Over the counter (Patient not taking: Reported on 2/19/2025), Disp: 90 tablet, Rfl: 1    Allergies   Allergen Reactions    Bee Venom Hives and Swelling     Yellow jackets and wasps    Gabapentin Hives       Objective:  There were no vitals filed for this visit.    Body mass index is 27.22 kg/m².    Right Hip Exam     Tenderness   The patient is experiencing tenderness in the greater trochanter.    Muscle Strength   Flexion: 5/5     Other   Erythema: absent  Scars: absent  Sensation: normal  Pulse: present    Comments:  Range of motion and strength deferred due to acute fracture  Focal tenderness right greater trochanter  No erythema, ecchymosis, abrasion, laceration, or ulceration of lateral right hip  Able to tolerate gentle passive range of motion without groin pain  Grossly distally neurovascularly intact  Ambulates slowly with a walker            Physical Exam  Vitals and nursing note reviewed.   Constitutional:       Appearance: Normal appearance. She is well-developed.      Comments: Body mass index is 27.22 kg/m².   HENT:      Head: Normocephalic and atraumatic.      Right Ear: External ear normal.      Left Ear: External ear normal.   Eyes:      Extraocular Movements: Extraocular movements intact.      Conjunctiva/sclera: Conjunctivae normal.   Cardiovascular:      Rate and Rhythm: Normal rate.      Pulses: Normal pulses.   Pulmonary:      Effort: Pulmonary effort is normal.   Musculoskeletal:      Cervical back: Normal range of motion.      Comments: See ortho exam   Skin:     General: Skin is warm and dry.   Neurological:      General: No focal deficit present.      Mental Status: She is alert and  oriented to person, place, and time. Mental status is at baseline.   Psychiatric:         Mood and Affect: Mood normal.         Behavior: Behavior normal.         Thought Content: Thought content normal.         Judgment: Judgment normal.         I have personally reviewed pertinent films in PACS of the x-rays and CT scan taken in the emergency department which show a nondisplaced right greater trochanter fracture.  There is no evidence of intertrochanteric extension.    This document was created using speech voice recognition software.   Grammatical errors, random word insertions, pronoun errors, and incomplete sentences are an occasional consequence of this system due to software limitations, ambient noise, and hardware issues.   Any formal questions or concerns about content, text, or information contained within the body of this dictation should be directly addressed to the provider for clarification.

## 2025-02-23 RX ORDER — MULTIVITAMIN WITH IRON
500 TABLET ORAL DAILY
Qty: 90 TABLET | Refills: 0 | OUTPATIENT
Start: 2025-02-23

## 2025-02-27 DIAGNOSIS — E11.65 UNCONTROLLED TYPE 2 DIABETES MELLITUS WITH HYPERGLYCEMIA (HCC): ICD-10-CM

## 2025-02-27 DIAGNOSIS — T63.464A YELLOW JACKET STING, UNDETERMINED INTENT, INITIAL ENCOUNTER: ICD-10-CM

## 2025-02-28 RX ORDER — BLOOD SUGAR DIAGNOSTIC
STRIP MISCELLANEOUS
Qty: 300 STRIP | Refills: 1 | Status: SHIPPED | OUTPATIENT
Start: 2025-02-28

## 2025-02-28 RX ORDER — EPINEPHRINE 0.3 MG/.3ML
0.3 INJECTION SUBCUTANEOUS ONCE
Qty: 2 EACH | Refills: 1 | Status: SHIPPED | OUTPATIENT
Start: 2025-02-28 | End: 2025-02-28

## 2025-03-05 ENCOUNTER — APPOINTMENT (OUTPATIENT)
Dept: RADIOLOGY | Facility: CLINIC | Age: 80
End: 2025-03-05
Payer: MEDICARE

## 2025-03-05 ENCOUNTER — OFFICE VISIT (OUTPATIENT)
Dept: OBGYN CLINIC | Facility: CLINIC | Age: 80
End: 2025-03-05
Payer: MEDICARE

## 2025-03-05 VITALS — HEIGHT: 68 IN | BODY MASS INDEX: 27.13 KG/M2 | WEIGHT: 179 LBS

## 2025-03-05 DIAGNOSIS — S72.114A CLOSED NONDISPLACED FRACTURE OF GREATER TROCHANTER OF RIGHT FEMUR, INITIAL ENCOUNTER (HCC): ICD-10-CM

## 2025-03-05 DIAGNOSIS — S72.114A CLOSED NONDISPLACED FRACTURE OF GREATER TROCHANTER OF RIGHT FEMUR, INITIAL ENCOUNTER (HCC): Primary | ICD-10-CM

## 2025-03-05 DIAGNOSIS — M53.3 SACRAL PAIN: ICD-10-CM

## 2025-03-05 DIAGNOSIS — M25.551 RIGHT HIP PAIN: ICD-10-CM

## 2025-03-05 PROCEDURE — 73502 X-RAY EXAM HIP UNI 2-3 VIEWS: CPT

## 2025-03-05 PROCEDURE — 99214 OFFICE O/P EST MOD 30 MIN: CPT | Performed by: ORTHOPAEDIC SURGERY

## 2025-03-05 NOTE — PROGRESS NOTES
Assessment/Plan:  1. Closed nondisplaced fracture of greater trochanter of right femur, initial encounter (HCC)  XR hip/pelv 2-3 vws right if performed      2. Right hip pain  CT pelvis wo contrast      3. Sacral pain  CT pelvis wo contrast        Scribe Attestation      I,:  Booker Bryant PA-C am acting as a scribe while in the presence of the attending physician.:       I,:  Dinh Preciado, DO personally performed the services described in this documentation    as scribed in my presence.:           Cherrie is a pleasant 79-year-old presenting today for follow-up 2 weeks after a fall.  There is been no change in alignment of her nondisplaced right greater trochanter fracture.  However, she does have tenderness in this area and discomfort with resisted abduction.  Therefore, we would like her to continue weightbearing as tolerated on the right lower extremity with a walker but avoiding active abduction of the right hip.  Additionally, she has significant tenderness over the sacrum and discomfort with sitting and activity.  This was incompletely visualized on x-rays and her previous CT scan from the emergency department.  Therefore, we are sending her for a CT scan of her pelvis without contrast to evaluate the integrity of the sacrum and to rule out any fractures of the sacrum or coccyx.  We will call her with the results of the CT scan to further delineate her plan of care.  We will plan to see her back in 3 weeks with an x-ray on arrival of her right hip.  She and her  expressed understanding all of their questions were addressed today    Subjective: Right hip follow up    Patient ID: Elisabeth Ruzicka Dempsey is a 79 y.o. female presenting today little over 2 weeks after her initial fall in which she was found to have a right nondisplaced greater trochanteric fracture.  She has been compliant with use of a walker weightbearing as tolerating and trying to avoid active abduction of her right  hip.  She notes that her right hip has not been as painful as her tailbone.  She reports discomfort with any movement of the tailbone and sitting.  She denies any new injuries or falls    Review of Systems   Constitutional:  Positive for activity change.   HENT: Negative.     Eyes: Negative.    Respiratory: Negative.     Cardiovascular: Negative.    Gastrointestinal: Negative.    Endocrine: Negative.    Genitourinary: Negative.    Musculoskeletal:  Positive for arthralgias, back pain, gait problem, joint swelling and myalgias.   Skin: Negative.    Allergic/Immunologic: Negative.    Hematological: Negative.    Psychiatric/Behavioral: Negative.           Past Medical History:   Diagnosis Date    Cancer (HCC)     Diabetes mellitus (HCC)     Dizziness     Endometrial carcinoma (HCC)     Hypertension     Obesity     Rectal bleeding 09/10/2019    Sciatic radiculitis 01/28/2021    Tonsillitis     Varicella        Past Surgical History:   Procedure Laterality Date    CARDIAC CATHETERIZATION Left 12/6/2024    Procedure: Cardiac Left Heart Cath;  Surgeon: Jyothi Casey MD;  [facility];  Service: Cardiology    EYE SURGERY Bilateral     2024    HYSTERECTOMY  2006    TONSILLECTOMY  1962    TUBAL LIGATION  1992    US GUIDED THYROID BIOPSY  12/12/2019       Family History   Problem Relation Age of Onset    Stroke Mother     Diabetes Mother     Cancer Mother     Diabetes unspecified Mother     Lung cancer Father     Breast cancer Sister 78        Half-sister    Dementia Sister     No Known Problems Sister     COPD Brother     Coronary artery disease Brother     Heart failure Brother     HIV Brother     Cancer Family     No Known Problems Daughter     Leukemia Son     Cancer Son         Leukemia age 19    No Known Problems Son        Social History     Occupational History    Occupation: Retired    Tobacco Use    Smoking status: Never     Passive exposure: Past    Smokeless tobacco: Never   Vaping Use    Vaping  status: Never Used   Substance and Sexual Activity    Alcohol use: Not Currently     Alcohol/week: 4.0 standard drinks of alcohol    Drug use: Never    Sexual activity: Not Currently     Partners: Male     Birth control/protection: Post-menopausal         Current Outpatient Medications:     aspirin 81 mg chewable tablet, Chew 1 tablet (81 mg total) daily, Disp: 100 tablet, Rfl: 2    Blood Glucose Monitoring Suppl (ONE TOUCH ULTRA 2) w/Device KIT, , Disp: , Rfl:     Continuous Blood Gluc  (Dexcom G7 ) NAHID, Use 1 each continuous, Disp: 1 each, Rfl: 0    Continuous Glucose Sensor (Dexcom G7 Sensor), Use 1 Device every 10 days, Disp: 3 each, Rfl: 0    ergocalciferol (VITAMIN D2) 50,000 units, TAKE 1 CAPSULE BY MOUTH ONE TIME PER WEEK, Disp: 4 capsule, Rfl: 5    glucose blood (OneTouch Ultra) test strip, USE TO CHECK SUGARS THREE TIMES A DAY DX UNCONTROLLED DM2 E 11.65, Disp: 300 strip, Rfl: 1    losartan-hydrochlorothiazide (HYZAAR) 50-12.5 mg per tablet, Take 1 tablet by mouth daily, Disp: 90 tablet, Rfl: 1    metoprolol succinate (TOPROL-XL) 25 mg 24 hr tablet, Take 1 tablet (25 mg total) by mouth daily, Disp: 30 tablet, Rfl: 5    nitroglycerin (NITROSTAT) 0.4 mg SL tablet, Place 1 tablet (0.4 mg total) under the tongue every 5 (five) minutes as needed for chest pain, Disp: 50 tablet, Rfl: 1    oxyCODONE-acetaminophen (PERCOCET) 5-325 mg per tablet, Take 1 tablet by mouth every 6 (six) hours as needed for moderate pain for up to 15 doses Max Daily Amount: 4 tablets, Disp: 15 tablet, Rfl: 0    predniSONE 5 mg tablet, # pills by mouth daily:  5/5/4/4/3/2/1, Disp: 24 tablet, Rfl: 0    rosuvastatin (CRESTOR) 40 MG tablet, Take 1 tablet (40 mg total) by mouth daily, Disp: 90 tablet, Rfl: 1    sertraline (ZOLOFT) 50 mg tablet, Take 1 tablet (50 mg total) by mouth daily, Disp: 90 tablet, Rfl: 1    Tirzepatide 15 MG/0.5ML SOAJ, Inject 15 mg under the skin once a week, Disp: 6 mL, Rfl: 0    cetirizine (ZyrTEC)  10 mg tablet, Take 1 tablet (10 mg total) by mouth 2 (two) times a day for 10 days, Disp: 20 tablet, Rfl: 0    EPINEPHrine (EPIPEN) 0.3 mg/0.3 mL SOAJ, INJECT 0.3 ML (0.3 MG TOTAL) INTO A MUSCLE ONCE FOR 1 DOSE, Disp: 2 each, Rfl: 1    famotidine (PEPCID) 20 mg tablet, Take 1 tablet (20 mg total) by mouth 2 (two) times a day as needed (Itching) for up to 10 days, Disp: 20 tablet, Rfl: 0    vitamin B-12 (VITAMIN B-12) 500 mcg tablet, Take 1 tablet (500 mcg total) by mouth daily Over the counter (Patient not taking: Reported on 1/27/2025), Disp: 90 tablet, Rfl: 1    Allergies   Allergen Reactions    Bee Venom Hives and Swelling     Yellow jackets and wasps    Gabapentin Hives       Objective:  There were no vitals filed for this visit.    Body mass index is 27.22 kg/m².    Right Hip Exam     Tenderness   The patient is experiencing tenderness in the greater trochanter and posterior.    Muscle Strength   Abduction: 5/5   Adduction: 5/5   Flexion: 5/5     Other   Erythema: absent  Scars: absent  Sensation: normal  Pulse: present    Comments:  Tender over sacrum at S3 and right greater trochanteric  Ambulates slowly with a walker  Able to tolerate gentle passive range of motion without groin pain  Mild discomfort with resisted abduction right hip  Grossly distally neurovascularly intact            Physical Exam  Vitals and nursing note reviewed.   Constitutional:       Appearance: Normal appearance. She is well-developed.      Comments: Body mass index is 27.22 kg/m².   HENT:      Head: Normocephalic and atraumatic.      Right Ear: External ear normal.      Left Ear: External ear normal.   Eyes:      Extraocular Movements: Extraocular movements intact.      Conjunctiva/sclera: Conjunctivae normal.   Cardiovascular:      Rate and Rhythm: Normal rate.      Pulses: Normal pulses.   Pulmonary:      Effort: Pulmonary effort is normal.   Musculoskeletal:      Cervical back: Normal range of motion.      Comments: See ortho exam    Skin:     General: Skin is warm and dry.   Neurological:      General: No focal deficit present.      Mental Status: She is alert and oriented to person, place, and time. Mental status is at baseline.   Psychiatric:         Mood and Affect: Mood normal.         Behavior: Behavior normal.         Thought Content: Thought content normal.         Judgment: Judgment normal.         I have personally reviewed pertinent films in PACS of the updated x-rays taken today of her pelvis and right hip which demonstrate no displacement of her nondisplaced right greater trochanter fracture.  No other osseous abnormalities or changes from previous films appreciated.      This document was created using speech voice recognition software.   Grammatical errors, random word insertions, pronoun errors, and incomplete sentences are an occasional consequence of this system due to software limitations, ambient noise, and hardware issues.   Any formal questions or concerns about content, text, or information contained within the body of this dictation should be directly addressed to the provider for clarification.

## 2025-03-06 ENCOUNTER — HOSPITAL ENCOUNTER (OUTPATIENT)
Dept: RADIOLOGY | Facility: HOSPITAL | Age: 80
Discharge: HOME | End: 2025-03-06
Payer: MEDICARE

## 2025-03-06 DIAGNOSIS — M53.3 SACRAL PAIN: ICD-10-CM

## 2025-03-06 DIAGNOSIS — M25.551 RIGHT HIP PAIN: ICD-10-CM

## 2025-03-06 PROCEDURE — 72192 CT PELVIS W/O DYE: CPT

## 2025-03-07 DIAGNOSIS — E11.42 TYPE 2 DIABETES MELLITUS WITH DIABETIC POLYNEUROPATHY, WITHOUT LONG-TERM CURRENT USE OF INSULIN (HCC): ICD-10-CM

## 2025-03-10 RX ORDER — ACYCLOVIR 400 MG/1
1 TABLET ORAL
Qty: 3 EACH | Refills: 5 | Status: SHIPPED | OUTPATIENT
Start: 2025-03-10

## 2025-03-17 ENCOUNTER — RESULTS FOLLOW-UP (OUTPATIENT)
Dept: OTHER | Facility: HOSPITAL | Age: 80
End: 2025-03-17

## 2025-03-17 ENCOUNTER — TELEPHONE (OUTPATIENT)
Age: 80
End: 2025-03-17

## 2025-03-17 NOTE — TELEPHONE ENCOUNTER
Contacted patient and advised of results/recs. Fu scheduled 03/26  ----- Message from Dinh Preciado DO sent at 3/17/2025  4:03 PM EDT -----  Please call the patient regarding her CT scan of the pelvis.  Please notify the patient that there is no evidence of sacral insufficiency fracture.  Inform her that her fall likely exacerbated her underlying lumbosacral degenerative disease and this may take some time to resolve with conservative treatment.  I will see her at her next regular follow-up.  No change in treatment plan at this point.

## 2025-03-17 NOTE — TELEPHONE ENCOUNTER
Caller: Patient     Doctor: Dr. Preciado     Reason for call: Patient is asking if she can go up and down her stairs to her cellar to do laundry. She has a handrail on one side.  Please advise     Call back#: [phone]

## 2025-03-17 NOTE — TELEPHONE ENCOUNTER
Spoke to patient at length. Nondisplaced greater trochanter fracture sustained 1 month ago. She notes she is ambulating well with no real pain. She does go up and down  one step to get in to her house, and denies any difficulty with this. She notes she feels like she would be fine with more steps at this point. She does not carry anything down the steps or up the steps, as her  does that for her. It is about 8 steps to the basement. She also notes there is a handrail and she will make sure her  is next to her the first few times she tries to attempt this. I advised while it is safe from a fracture perspective, I just do not want her to fall. She is going to trial  a few steps first and see if she feels well doing this, and if not she will come back up.

## 2025-03-26 ENCOUNTER — OFFICE VISIT (OUTPATIENT)
Dept: OBGYN CLINIC | Facility: CLINIC | Age: 80
End: 2025-03-26
Payer: MEDICARE

## 2025-03-26 ENCOUNTER — APPOINTMENT (OUTPATIENT)
Dept: RADIOLOGY | Facility: CLINIC | Age: 80
End: 2025-03-26
Payer: MEDICARE

## 2025-03-26 VITALS — WEIGHT: 185 LBS | HEIGHT: 68 IN | BODY MASS INDEX: 28.04 KG/M2

## 2025-03-26 DIAGNOSIS — S72.114A CLOSED NONDISPLACED FRACTURE OF GREATER TROCHANTER OF RIGHT FEMUR, INITIAL ENCOUNTER (HCC): ICD-10-CM

## 2025-03-26 DIAGNOSIS — S72.114D CLOSED NONDISPLACED FRACTURE OF GREATER TROCHANTER OF RIGHT FEMUR WITH ROUTINE HEALING, SUBSEQUENT ENCOUNTER: Primary | ICD-10-CM

## 2025-03-26 PROCEDURE — 73502 X-RAY EXAM HIP UNI 2-3 VIEWS: CPT

## 2025-03-26 PROCEDURE — 99214 OFFICE O/P EST MOD 30 MIN: CPT | Performed by: ORTHOPAEDIC SURGERY

## 2025-03-26 NOTE — PROGRESS NOTES
Name: Elisabeth Ruzicka Dempsey      : 1945      MRN: [id number]  Encounter Provider: Dinh Preciado DO  Encounter Date: 3/26/2025   Encounter department: [facility]  :  Assessment & Plan  Closed nondisplaced fracture of greater trochanter of right femur with routine healing, subsequent encounter  May gradually wean from use of walker  May gradually increase activity level as tolerated  OTC medication as needed for pain  Follow-up in 6 weeks  Orders:    XR hip/pelv 2-3 vws right if performed; Future         Elisabeth Ruzicka Dempsey is a pleasant 80 y.o. female 5 1/2 weeks status post nondisplaced right greater trochanteric fracture. I am very pleased with her clinical and radiographic presentation in the office today.  X-ray obtained today demonstrating no interval change in alignment of her fracture when compared to previous images.   We discussed today how to appropriately gradually wean from use of her walker and transition to a cane, and gradually increase her activity level, using pain as a guide for when to modify or discontinue.  She may continue to use over-the-counter medication as needed for pain relief.  We will plan to see her back in 6 weeks for reevaluation with x-ray of the right hip obtained upon arrival.      I have personally reviewed pertinent imaging in PACS.  X-ray of the right hip obtained today reviewed and demonstrates: No interval change in alignment of her greater trochanteric fracture when compared to previously obtained images    History: Elisabeth Ruzicka Dempsey is a 80 y.o. female presents for follow up evaluation approximately 5 weeks status post fall resulting in right nondisplaced greater trochanteric fracture. She continues to use a walker for ambulatory assistance. She notes pain rated 2-3/10 on average and is well controlled with use of Tylenol every 3-4 days. She has been able to ascend and descend her stairs as needed. She denies  "mechanical symptoms or paraesthesias.     Estimated body mass index is 28.13 kg/m² as calculated from the following:    Height as of this encounter: 5' 8\" (1.727 m).    Weight as of this encounter: 83.9 kg (185 lb).    Lab Results   Component Value Date    HGBA1C 6.1 (H) 10/17/2024       Social History     Occupational History    Occupation: Retired    Tobacco Use    Smoking status: Never     Passive exposure: Past    Smokeless tobacco: Never   Vaping Use    Vaping status: Never Used   Substance and Sexual Activity    Alcohol use: Not Currently     Alcohol/week: 4.0 standard drinks of alcohol    Drug use: Never    Sexual activity: Not Currently     Partners: Male     Birth control/protection: Post-menopausal       Objective:  Right Hip Exam     Tenderness   The patient is experiencing tenderness in the greater trochanter.    Muscle Strength   Abduction: 5/5   Adduction: 5/5   Flexion: 5/5     Other   Erythema: absent  Scars: absent  Sensation: normal  Pulse: present    Comments:  Tender over right greater trochanteric  Ambulates steady with a walker  Able to tolerate gentle passive range of motion without groin pain  Mild discomfort with resisted abduction right hip  Grossly distally neurovascularly intact            There were no vitals filed for this visit.    Subjective:  Past Medical History:   Diagnosis Date    Cancer (HCC)     Diabetes mellitus (HCC)     Dizziness     Endometrial carcinoma (HCC)     Hypertension     Obesity     Rectal bleeding 09/10/2019    Sciatic radiculitis 01/28/2021    Tonsillitis     Varicella        Past Surgical History:   Procedure Laterality Date    CARDIAC CATHETERIZATION Left 12/6/2024    Procedure: Cardiac Left Heart Cath;  Surgeon: Jyothi Casey MD;  [facility];  Service: Cardiology    EYE SURGERY Bilateral     2024    HYSTERECTOMY  2006    TONSILLECTOMY  1962    TUBAL LIGATION  1992    US GUIDED THYROID BIOPSY  12/12/2019       Family History   Problem " Relation Age of Onset    Stroke Mother     Diabetes Mother     Cancer Mother     Diabetes unspecified Mother     Lung cancer Father     Breast cancer Sister 78        Half-sister    Dementia Sister     No Known Problems Sister     COPD Brother     Coronary artery disease Brother     Heart failure Brother     HIV Brother     Cancer Family     No Known Problems Daughter     Leukemia Son     Cancer Son         Leukemia age 19    No Known Problems Son          Current Outpatient Medications:     aspirin 81 mg chewable tablet, Chew 1 tablet (81 mg total) daily, Disp: 100 tablet, Rfl: 2    Blood Glucose Monitoring Suppl (ONE TOUCH ULTRA 2) w/Device KIT, , Disp: , Rfl:     Continuous Blood Gluc  (Dexcom G7 ) NAHID, Use 1 each continuous, Disp: 1 each, Rfl: 0    Continuous Glucose Sensor (Dexcom G7 Sensor), Use 1 Device every 10 days, Disp: 3 each, Rfl: 5    ergocalciferol (VITAMIN D2) 50,000 units, TAKE 1 CAPSULE BY MOUTH ONE TIME PER WEEK, Disp: 4 capsule, Rfl: 5    glucose blood (OneTouch Ultra) test strip, USE TO CHECK SUGARS THREE TIMES A DAY DX UNCONTROLLED DM2 E 11.65, Disp: 300 strip, Rfl: 1    losartan-hydrochlorothiazide (HYZAAR) 50-12.5 mg per tablet, Take 1 tablet by mouth daily, Disp: 90 tablet, Rfl: 1    metoprolol succinate (TOPROL-XL) 25 mg 24 hr tablet, Take 1 tablet (25 mg total) by mouth daily, Disp: 30 tablet, Rfl: 5    nitroglycerin (NITROSTAT) 0.4 mg SL tablet, Place 1 tablet (0.4 mg total) under the tongue every 5 (five) minutes as needed for chest pain, Disp: 50 tablet, Rfl: 1    oxyCODONE-acetaminophen (PERCOCET) 5-325 mg per tablet, Take 1 tablet by mouth every 6 (six) hours as needed for moderate pain for up to 15 doses Max Daily Amount: 4 tablets, Disp: 15 tablet, Rfl: 0    predniSONE 5 mg tablet, # pills by mouth daily:  5/5/4/4/3/2/1, Disp: 24 tablet, Rfl: 0    rosuvastatin (CRESTOR) 40 MG tablet, Take 1 tablet (40 mg total) by mouth daily, Disp: 90 tablet, Rfl: 1    sertraline  (ZOLOFT) 50 mg tablet, Take 1 tablet (50 mg total) by mouth daily, Disp: 90 tablet, Rfl: 1    Tirzepatide 15 MG/0.5ML SOAJ, Inject 15 mg under the skin once a week, Disp: 6 mL, Rfl: 0    cetirizine (ZyrTEC) 10 mg tablet, Take 1 tablet (10 mg total) by mouth 2 (two) times a day for 10 days, Disp: 20 tablet, Rfl: 0    EPINEPHrine (EPIPEN) 0.3 mg/0.3 mL SOAJ, INJECT 0.3 ML (0.3 MG TOTAL) INTO A MUSCLE ONCE FOR 1 DOSE, Disp: 2 each, Rfl: 1    famotidine (PEPCID) 20 mg tablet, Take 1 tablet (20 mg total) by mouth 2 (two) times a day as needed (Itching) for up to 10 days, Disp: 20 tablet, Rfl: 0    vitamin B-12 (VITAMIN B-12) 500 mcg tablet, Take 1 tablet (500 mcg total) by mouth daily Over the counter (Patient not taking: Reported on 3/26/2025), Disp: 90 tablet, Rfl: 1    Allergies   Allergen Reactions    Bee Venom Hives and Swelling     Yellow jackets and wasps    Gabapentin Hives       Review of Systems   Constitutional:  Positive for activity change. Negative for chills, fever and unexpected weight change.   HENT:  Negative for hearing loss, nosebleeds and sore throat.    Eyes:  Negative for pain, redness and visual disturbance.   Respiratory:  Negative for cough, shortness of breath and wheezing.    Cardiovascular:  Negative for chest pain, palpitations and leg swelling.   Gastrointestinal:  Negative for abdominal pain, nausea and vomiting.   Endocrine: Negative for polydipsia and polyuria.   Genitourinary:  Negative for dysuria and hematuria.   Musculoskeletal:  See HPI  Skin:  Negative for rash and wound.   Neurological:  Negative for dizziness, numbness and headaches.   Psychiatric/Behavioral:  Negative for decreased concentration and suicidal ideas. The patient is not nervous/anxious.      Physical Exam  Vitals and nursing note reviewed.   Constitutional:       Appearance: Normal appearance. She is well-developed.   HENT:      Head: Normocephalic and atraumatic.      Right Ear: External ear normal.      Left Ear:  External ear normal.   Eyes:      General: No scleral icterus.     Extraocular Movements: Extraocular movements intact.      Conjunctiva/sclera: Conjunctivae normal.   Cardiovascular:      Rate and Rhythm: Normal rate.   Pulmonary:      Effort: Pulmonary effort is normal. No respiratory distress.   Musculoskeletal:      Cervical back: Normal range of motion and neck supple.      Comments: See Ortho exam   Skin:     General: Skin is warm and dry.   Neurological:      General: No focal deficit present.      Mental Status: She is alert and oriented to person, place, and time.   Psychiatric:         Behavior: Behavior normal.     Scribe Attestation      I,:  Bia Vogel am acting as a scribe while in the presence of the attending physician.:       I,:  Dinh Preciado, DO personally performed the services described in this documentation    as scribed in my presence.:           This document was created using speech voice recognition software.   Grammatical errors, random word insertions, pronoun errors, and incomplete sentences are an occasional consequence of this system due to software limitations, ambient noise, and hardware issues.   Any formal questions or concerns about content, text, or information contained within the body of this dictation should be directly addressed to the provider for clarification.

## 2025-04-02 DIAGNOSIS — I10 BENIGN ESSENTIAL HYPERTENSION: ICD-10-CM

## 2025-04-02 RX ORDER — LOSARTAN POTASSIUM AND HYDROCHLOROTHIAZIDE 12.5; 5 MG/1; MG/1
1 TABLET ORAL DAILY
Qty: 30 TABLET | Refills: 5 | Status: SHIPPED | OUTPATIENT
Start: 2025-04-02

## 2025-04-03 DIAGNOSIS — I25.10 CORONARY ARTERY DISEASE INVOLVING NATIVE CORONARY ARTERY: ICD-10-CM

## 2025-04-04 RX ORDER — METOPROLOL SUCCINATE 25 MG/1
25 TABLET, EXTENDED RELEASE ORAL DAILY
Qty: 30 TABLET | Refills: 0 | OUTPATIENT
Start: 2025-04-04

## 2025-04-10 ENCOUNTER — TELEPHONE (OUTPATIENT)
Age: 80
End: 2025-04-10

## 2025-04-10 NOTE — TELEPHONE ENCOUNTER
Pt called to see if she needs to get labs done for her 5/1/25 appt with Dr Gill. Pt has a lab appt tomorrow morning to get other labs done and would like to do ours at the same time. She uses  lab.

## 2025-04-11 ENCOUNTER — APPOINTMENT (OUTPATIENT)
Dept: LAB | Facility: CLINIC | Age: 80
End: 2025-04-11
Attending: NURSE PRACTITIONER
Payer: MEDICARE

## 2025-04-11 DIAGNOSIS — E78.2 HYPERLIPIDEMIA, MIXED: ICD-10-CM

## 2025-04-11 DIAGNOSIS — E11.42 TYPE 2 DIABETES MELLITUS WITH DIABETIC POLYNEUROPATHY, WITHOUT LONG-TERM CURRENT USE OF INSULIN (HCC): ICD-10-CM

## 2025-04-11 LAB
ALBUMIN SERPL BCG-MCNC: 4.2 G/DL (ref 3.5–5)
ALP SERPL-CCNC: 68 U/L (ref 34–104)
ALT SERPL W P-5'-P-CCNC: 18 U/L (ref 7–52)
ANION GAP SERPL CALCULATED.3IONS-SCNC: 7 MMOL/L (ref 4–13)
AST SERPL W P-5'-P-CCNC: 19 U/L (ref 13–39)
BILIRUB SERPL-MCNC: 0.53 MG/DL (ref 0.2–1)
BUN SERPL-MCNC: 18 MG/DL (ref 5–25)
CALCIUM SERPL-MCNC: 9.6 MG/DL (ref 8.4–10.2)
CHLORIDE SERPL-SCNC: 97 MMOL/L (ref 96–108)
CHOLEST SERPL-MCNC: 124 MG/DL (ref ?–200)
CO2 SERPL-SCNC: 30 MMOL/L (ref 21–32)
CREAT SERPL-MCNC: 0.58 MG/DL (ref 0.6–1.3)
CREAT UR-MCNC: 44.6 MG/DL
EST. AVERAGE GLUCOSE BLD GHB EST-MCNC: 120 MG/DL
GFR SERPL CREATININE-BSD FRML MDRD: 87 ML/MIN/1.73SQ M
GLUCOSE P FAST SERPL-MCNC: 104 MG/DL (ref 65–99)
HBA1C MFR BLD: 5.8 %
HDLC SERPL-MCNC: 47 MG/DL
LDLC SERPL CALC-MCNC: 64 MG/DL (ref 0–100)
LEFT EYE DIABETIC RETINOPATHY: POSITIVE
MICROALBUMIN UR-MCNC: 8.2 MG/L
MICROALBUMIN/CREAT 24H UR: 18 MG/G CREATININE (ref 0–30)
POTASSIUM SERPL-SCNC: 4.5 MMOL/L (ref 3.5–5.3)
PROT SERPL-MCNC: 7.1 G/DL (ref 6.4–8.4)
RIGHT EYE DIABETIC RETINOPATHY: POSITIVE
SODIUM SERPL-SCNC: 134 MMOL/L (ref 135–147)
TRIGL SERPL-MCNC: 65 MG/DL (ref ?–150)

## 2025-04-11 PROCEDURE — 80053 COMPREHEN METABOLIC PANEL: CPT

## 2025-04-11 PROCEDURE — 82043 UR ALBUMIN QUANTITATIVE: CPT

## 2025-04-11 PROCEDURE — 80061 LIPID PANEL: CPT

## 2025-04-11 PROCEDURE — 36415 COLL VENOUS BLD VENIPUNCTURE: CPT

## 2025-04-11 PROCEDURE — 82570 ASSAY OF URINE CREATININE: CPT

## 2025-04-11 PROCEDURE — 83036 HEMOGLOBIN GLYCOSYLATED A1C: CPT

## 2025-04-11 NOTE — TELEPHONE ENCOUNTER
Phone call placed to pt, no answer. LM letting pt know that no additional blood work was needed for  and to call us with any questions.

## 2025-04-12 ENCOUNTER — RESULTS FOLLOW-UP (OUTPATIENT)
Dept: ENDOCRINOLOGY | Facility: CLINIC | Age: 80
End: 2025-04-12

## 2025-04-18 ENCOUNTER — OFFICE VISIT (OUTPATIENT)
Age: 80
End: 2025-04-18
Payer: MEDICARE

## 2025-04-18 VITALS — BODY MASS INDEX: 28.04 KG/M2 | HEIGHT: 68 IN | WEIGHT: 185 LBS | RESPIRATION RATE: 16 BRPM

## 2025-04-18 DIAGNOSIS — R26.81 UNSTEADY GAIT: ICD-10-CM

## 2025-04-18 DIAGNOSIS — I70.209 PERIPHERAL ARTERIOSCLEROSIS (HCC): ICD-10-CM

## 2025-04-18 DIAGNOSIS — M54.16 RADICULOPATHY OF LUMBAR REGION: ICD-10-CM

## 2025-04-18 DIAGNOSIS — M21.962 ACQUIRED DEFORMITY OF BOTH FEET: ICD-10-CM

## 2025-04-18 DIAGNOSIS — M21.961 ACQUIRED DEFORMITY OF BOTH FEET: ICD-10-CM

## 2025-04-18 DIAGNOSIS — E11.42 DIABETIC POLYNEUROPATHY ASSOCIATED WITH TYPE 2 DIABETES MELLITUS (HCC): ICD-10-CM

## 2025-04-18 DIAGNOSIS — M77.42 METATARSALGIA OF BOTH FEET: Primary | ICD-10-CM

## 2025-04-18 DIAGNOSIS — M77.41 METATARSALGIA OF BOTH FEET: Primary | ICD-10-CM

## 2025-04-18 DIAGNOSIS — M76.71 PERONEAL TENDINITIS OF RIGHT LOWER EXTREMITY: ICD-10-CM

## 2025-04-18 PROCEDURE — 20551 NJX 1 TENDON ORIGIN/INSJ: CPT | Performed by: PODIATRIST

## 2025-04-18 PROCEDURE — 99213 OFFICE O/P EST LOW 20 MIN: CPT | Performed by: PODIATRIST

## 2025-04-18 RX ORDER — TRIAMCINOLONE ACETONIDE 40 MG/ML
20 INJECTION, SUSPENSION INTRA-ARTICULAR; INTRAMUSCULAR
Status: SHIPPED | OUTPATIENT
Start: 2025-04-18

## 2025-04-18 RX ADMIN — TRIAMCINOLONE ACETONIDE 20 MG: 40 INJECTION, SUSPENSION INTRA-ARTICULAR; INTRAMUSCULAR at 10:15

## 2025-04-18 NOTE — PROGRESS NOTES
Assessment/Plan: Metatarsalgia secondary to diabetic neuropathy.  Pain upon ambulation.  Acquired deformity of foot bilateral/hallux valgus deformity.  Mild peripheral artery disease without intermittent claudication.  Mycosis of nail.  Peroneal tendinitis right lower extremity.     Plan.  Chart reviewed.  PCP notes reviewed.  Arterial Doppler reviewed.  Patient advised on condition.  At this time we recommend patient take alpha lipoic acid, 4 mg daily.  In addition we recommend vitamin B complex.  Patient advised on care of the diabetic foot.  Watch for signs of infection or ulceration.  Spray shoes with Lysol.  Aftercare instruction given.  Return for follow-up.      In order to treat peroneal tendinitis of the right lower extremity trigger point injection performed.    Foot/lower extremity injection    Performed by: Chad Rosas DPM  Authorized by: Chda Rosas DPM    Procedure:     Other Assisting Provider: No      Verbal consent obtained?: Yes      Written consent obtained?: No      Risks and benefits: Risks, benefits and alternatives were discussed      Consent given by:  Patient    Time out: Immediately prior to the procedure a time out was called      Time out performed at:  4/18/2025 10:30 AM    Patient states understanding of procedure being performed: Yes      Patient identity confirmed:  Verbally with patient    Supporting Documentation:     Indications:  Pain, tendon swelling and therapeutic    Procedure Details:    Prep: patient was prepped and draped in usual sterile fashion                Ethyl Chloride was applied      Needle size: 25 G G    Ultrasound Guidance: no      Approach:  Lateral    Laterality:  Right    Cyst Aspiration/Injection: No      Location: tendon insertion      Tendon Structures: Peroneus Brevis      Injection Information:       Medications:  20 mg triamcinolone acetonide 40 mg/mL    Patient tolerance:  Patient tolerated the procedure well with no immediate complications     Dressing: sterile dressing applied      Comments:      Aftercare instruction given.  Watch for signs of infection.               Assessment  Diagnoses and all orders for this visit:     Metatarsalgia of both feet     Diabetic polyneuropathy associated with type 2 diabetes mellitus (HCC)     Radiculopathy of lumbar region     Unsteady gait     Acquired deformity of both feet     Peripheral arteriosclerosis (HCC)              Subjective: Patient presents for evaluation.  Patient is diabetic.  She had arterial Doppler.  Patient is following cardiology.  She is taking 200 mg of alpha lipoic acid daily.    Patient recently fell.  She fractured her right hip.  It did not require surgery however since that time she has been having some slight pain on the outside of her right foot.           Allergies   Allergen Reactions    Bee Venom Hives and Swelling       Yellow jackets and wasps    Gabapentin Hives           Current Medications      Current Outpatient Medications:     aspirin 81 mg chewable tablet, Chew 1 tablet (81 mg total) daily, Disp: 100 tablet, Rfl: 2    Blood Glucose Monitoring Suppl (ONE TOUCH ULTRA 2) w/Device KIT, , Disp: , Rfl:     Continuous Blood Gluc  (Dexcom G7 ) NAHID, Use 1 each continuous, Disp: 1 each, Rfl: 0    Continuous Glucose Sensor (Dexcom G7 Sensor), USE 1 DEVICE EVERY 10 DAYS, Disp: 3 each, Rfl: 5    EPINEPHrine (EPIPEN) 0.3 mg/0.3 mL SOAJ, Inject 0.3 mL (0.3 mg total) into a muscle once for 1 dose, Disp: 0.6 mL, Rfl: 0    ergocalciferol (VITAMIN D2) 50,000 units, TAKE 1 CAPSULE BY MOUTH ONE TIME PER WEEK, Disp: 4 capsule, Rfl: 5    glucose blood (OneTouch Ultra) test strip, Use to check sugars three times a day Dx uncontrolled DM2 E 11.65, Disp: 200 strip, Rfl: 5    losartan-hydrochlorothiazide (HYZAAR) 50-12.5 mg per tablet, TAKE 1 TABLET BY MOUTH EVERY DAY, Disp: 90 tablet, Rfl: 1    metoprolol succinate (TOPROL-XL) 25 mg 24 hr tablet, Take 1 tablet (25 mg total) by mouth  daily, Disp: 30 tablet, Rfl: 0    nitroglycerin (NITROSTAT) 0.4 mg SL tablet, Place 1 tablet (0.4 mg total) under the tongue every 5 (five) minutes as needed for chest pain, Disp: 50 tablet, Rfl: 2    rosuvastatin (CRESTOR) 20 MG tablet, TAKE 1 TABLET BY MOUTH EVERY DAY, Disp: 30 tablet, Rfl: 5    sertraline (ZOLOFT) 50 mg tablet, Take 1 tablet (50 mg total) by mouth daily, Disp: 90 tablet, Rfl: 1    Tirzepatide 15 MG/0.5ML SOAJ, Inject 15 mg under the skin once a week, Disp: 6 mL, Rfl: 2    vitamin B-12 (VITAMIN B-12) 500 mcg tablet, Take 1 tablet (500 mcg total) by mouth daily Over the counter, Disp: 90 tablet, Rfl: 1        Problem List[]Expand by Default       Patient Active Problem List   Diagnosis    Benign essential hypertension    Carpal tunnel syndrome    Decreased dorsalis pedis pulse    Type 2 diabetes mellitus, without long-term current use of insulin (HCC)    Hyperlipidemia, mixed    Multiple thyroid nodules    Synovial cyst    Vitamin D deficiency    Fatigue    Chronic sacroiliac strain    Diabetic polyneuropathy associated with type 2 diabetes mellitus (HCC)    Dizziness    Seasonal allergies    Lung nodule    Sensorineural hearing loss (SNHL), bilateral    BPPV (benign paroxysmal positional vertigo)    Counseling regarding advanced directives    Change in bowel habits    History of colon polyps    Osteopenia after menopause    Vitamin B12 deficiency    History of obesity    Numbness and tingling    Peripheral arterial disease (HCC)    Coronary artery disease involving native coronary artery               Subjective  Patient ID: Elisabeth Ruzicka Dempsey is a 79 y.o. female.     HPI     The following portions of the patient's history were reviewed and updated as appropriate:      family history includes Breast cancer (age of onset: 78) in her sister; COPD in her brother; Cancer in her family, mother, and son; Coronary artery disease in her brother; Dementia in her sister; Diabetes in her mother;  Diabetes unspecified in her mother; HIV in her brother; Heart failure in her brother; Leukemia in her son; Lung cancer in her father; No Known Problems in her daughter, sister, and son; Stroke in her mother.       reports that she has never smoked. She has been exposed to tobacco smoke. She has never used smokeless tobacco. She reports that she does not currently use alcohol after a past usage of about 4.0 standard drinks of alcohol per week. She reports that she does not use drugs.         Objective:  Patient's shoes and socks removed.    Foot ExamPhysical Exam        General  General Appearance: appears stated age and healthy   Orientation: alert and oriented to person, place, and time   Affect: appropriate   Gait: antalgic   Assistance: cane use         Right Foot/Ankle      Inspection and Palpation.   Pain with palpation peroneus brevis tendon insertion right foot.  Tendon test 5/5.  Tenderness: metatarsals   Swelling: dorsum   Arch: pes planus  Hammertoes: fifth toe  Hallux limitus: yes  Skin Exam: dry skin;      Neurovascular  Dorsalis pedis: 1+  Posterior tibial: 1+  Saphenous nerve sensation: diminished  Tibial nerve sensation: diminished  Superficial peroneal nerve sensation: diminished  Deep peroneal nerve sensation: diminished  Sural nerve sensation: diminished        Left Foot/Ankle       Inspection and Palpation  Tenderness: metatarsals   Swelling: dorsum   Arch: pes planus  Hammertoes: fifth toe  Hallux limitus: yes  Skin Exam: dry skin;      Neurovascular  Dorsalis pedis: 1+  Posterior tibial: 1+  Saphenous nerve sensation: diminished  Tibial nerve sensation: diminished  Superficial peroneal nerve sensation: diminished  Deep peroneal nerve sensation: diminished  Sural nerve sensation: diminished        Physical Exam  Vitals and nursing note reviewed.   Constitutional:       Appearance: Normal appearance.   Cardiovascular:      Rate and Rhythm: Normal rate and regular rhythm.      Pulses: no weak  pulses.           Dorsalis pedis pulses are 1+ on the right side and 1+ on the left side.        Posterior tibial pulses are 1+ on the right side and 1+ on the left side.   Musculoskeletal:      Right lower le+ Edema present.      Left lower le+ Edema present.   Feet:      Right foot:      Skin integrity: Dry skin present.      Left foot:      Skin integrity: Dry skin present.      Comments: Patient demonstrates pes planus  Skin:     Capillary Refill: Capillary refill takes less than 2 seconds.      Comments: All nails are dystrophic.   Neurological:      Mental Status: She is alert.      Motor: Weakness present.      Comments: Patient has decrease in vibratory sensation bilateral.  She test 4/5 L5 testing bilateral.   Psychiatric:         Mood and Affect: Mood normal.         Behavior: Behavior normal.         Thought Content: Thought content normal.         Judgment: Judgment normal.      Patient's shoes and socks removed.     Right Foot/Ankle   Right Foot Inspection  Skin Exam: dry skin.      Toe Exam: right toe deformity.      Sensory   Vibration: diminished  Proprioception: diminished  Monofilament testing: diminished     Vascular  Capillary refills: < 3 seconds  The right DP pulse is 1+. The right PT pulse is 1+.      Right Toe  - Comprehensive Exam  Arch: pes planus  Hammertoes: fifth toe  Hallux limitus: yes  Swelling: dorsum   Tenderness: metatarsals         Left Foot/Ankle  Left Foot Inspection  Skin Exam: dry skin.      Toe Exam: left toe deformity.      Sensory   Vibration: diminished  Proprioception: diminished  Monofilament testing: diminished     Vascular  The left DP pulse is 1+. The left PT pulse is 1+.      Left Toe  - Comprehensive Exam  Arch: pes planus  Hammertoes: fifth toe  Hallux limitus: yes  Swelling: dorsum   Tenderness: metatarsals         Assign Risk Category  Deformity present  Loss of protective sensation  No weak pulses  Risk: 2

## 2025-04-28 ENCOUNTER — OFFICE VISIT (OUTPATIENT)
Dept: ENDOCRINOLOGY | Facility: CLINIC | Age: 80
End: 2025-04-28
Payer: MEDICARE

## 2025-04-28 VITALS
HEART RATE: 68 BPM | WEIGHT: 178 LBS | OXYGEN SATURATION: 99 % | DIASTOLIC BLOOD PRESSURE: 62 MMHG | BODY MASS INDEX: 26.98 KG/M2 | SYSTOLIC BLOOD PRESSURE: 133 MMHG | HEIGHT: 68 IN

## 2025-04-28 DIAGNOSIS — I10 BENIGN ESSENTIAL HYPERTENSION: ICD-10-CM

## 2025-04-28 DIAGNOSIS — E78.2 HYPERLIPIDEMIA, MIXED: ICD-10-CM

## 2025-04-28 DIAGNOSIS — E55.9 VITAMIN D DEFICIENCY: ICD-10-CM

## 2025-04-28 DIAGNOSIS — E04.2 MULTIPLE THYROID NODULES: ICD-10-CM

## 2025-04-28 DIAGNOSIS — Z78.0 POSTMENOPAUSAL ESTROGEN DEFICIENCY: Primary | ICD-10-CM

## 2025-04-28 DIAGNOSIS — E11.42 TYPE 2 DIABETES MELLITUS WITH DIABETIC POLYNEUROPATHY, WITHOUT LONG-TERM CURRENT USE OF INSULIN (HCC): ICD-10-CM

## 2025-04-28 PROCEDURE — 99214 OFFICE O/P EST MOD 30 MIN: CPT | Performed by: NURSE PRACTITIONER

## 2025-04-28 RX ORDER — TIRZEPATIDE 12.5 MG/.5ML
12.5 INJECTION, SOLUTION SUBCUTANEOUS WEEKLY
Qty: 2 ML | Refills: 2 | Status: SHIPPED | OUTPATIENT
Start: 2025-04-28

## 2025-04-28 NOTE — ASSESSMENT & PLAN NOTE
BP close to goal on current regimen. Continues on regimen including ARB. Follows with cardiology.

## 2025-04-28 NOTE — ASSESSMENT & PLAN NOTE
Lab Results   Component Value Date    HGBA1C 5.8 (H) 04/11/2025     HGA1C tightly controlled. Lost 15 lbs since last appointment. Recommend starting to reduce Mounjaro to 12.5 mg weekly.     Discussed risks/complications associated with uncontrolled diabetes including organ involvement, heart attack, stroke, death.    Advised lifestyle modifications including attention to diet including the amount and types of carbohydrates consumed and regular activity.     Call for blood sugars less than 70 mg/dl or patterns over 250 mg/dl.     Discussed symptoms and treatment of hypoglycemia.  Reviewed risks associated with hypoglycemia. Always carry rapid acting carbohydrates and a glucometer (a way to check your blood sugar).    Recommendation for medical identification either bracelet, necklace.    Recommendation for glucagon if on insulin.     Routine follow up for diabetic eye and foot exams.     Ordered blood work to complete prior to next visit.    Send glucose logs/CGM download in 1-2 weeks for review    Follow up in 3 months.     Orders:    Tirzepatide (Mounjaro) 12.5 MG/0.5ML SOAJ; Inject 12.5 mg under the skin once a week    Hemoglobin A1C; Future    Comprehensive metabolic panel; Future

## 2025-04-28 NOTE — PROGRESS NOTES
Name: Elisabeth Ruzicka Dempsey      : 1945      MRN: [id number]  Encounter Provider: RAJAN Alonso  Encounter Date: 2025   Encounter department: [facility]    Assessment & Plan  Postmenopausal estrogen deficiency  Last DXA scan from 2023 demonstrated osteopenia with low FRAX score.   Traumatic fall on ice in 2025 with hip fracture. Did not require surgery or physical therapy.   Will recommend repeat DXA scan.   Orders:    DXA bone density spine hip and pelvis; Future    Type 2 diabetes mellitus with diabetic polyneuropathy, without long-term current use of insulin ([facility])    Lab Results   Component Value Date    HGBA1C 5.8 (H) 2025     HGA1C tightly controlled. Lost 15 lbs since last appointment. Recommend starting to reduce Mounjaro to 12.5 mg weekly.     Discussed risks/complications associated with uncontrolled diabetes including organ involvement, heart attack, stroke, death.    Advised lifestyle modifications including attention to diet including the amount and types of carbohydrates consumed and regular activity.     Call for blood sugars less than 70 mg/dl or patterns over 250 mg/dl.     Discussed symptoms and treatment of hypoglycemia.  Reviewed risks associated with hypoglycemia. Always carry rapid acting carbohydrates and a glucometer (a way to check your blood sugar).    Recommendation for medical identification either bracelet, necklace.    Recommendation for glucagon if on insulin.     Routine follow up for diabetic eye and foot exams.     Ordered blood work to complete prior to next visit.    Send glucose logs/CGM download in 1-2 weeks for review    Follow up in 3 months.     Orders:    Tirzepatide (Mounjaro) 12.5 MG/0.5ML SOAJ; Inject 12.5 mg under the skin once a week    Hemoglobin A1C; Future    Comprehensive metabolic panel; Future    Multiple thyroid nodules  Recommend repeat thyroid ultrasound in September  2025, 12 month follow up.   Denies neck compressive symptoms.   Clinically euthyroid.   Orders:    US thyroid; Future    TSH, 3rd generation; Future    T4, free; Future        History of Present Illness     Elisabeth Ruzicka Dempsey is a 80 y.o. female  with a history of type 2 diabetes without long term use of insulin with known complications of neuropathy and retinopathy. Follows regularly for diabetic eye and foot exams.       Hip fracture from traumatic fall in February 2025. Last DXA from November 2023     Continues carbohydrate conscious diet. Will be resuming regular physical activity, as tolerated.     Blood sugars first morning 101-110 mg/dL, after meals 150-175 mg/dL.      Current regimen:  Mounjaro 15 mg weekly     Denies side effects of Mounjaro     Continues on ARB and Statin.      For history of thyroid nodules, FNA biopsies in 2014 and 2019 were benign. Last thyroid ultrasound from September 2024 with stability, followed by ENT. Denies neck compressive symptoms. No history of abnormal thyroid function tests.       Review of Systems as per HPI  Medical History Reviewed by provider this encounter:     .  Current Outpatient Medications on File Prior to Visit   Medication Sig Dispense Refill    aspirin 81 mg chewable tablet Chew 1 tablet (81 mg total) daily 100 tablet 2    Blood Glucose Monitoring Suppl (ONE TOUCH ULTRA 2) w/Device KIT       Continuous Blood Gluc  (Dexcom G7 ) NAHID Use 1 each continuous 1 each 0    Continuous Glucose Sensor (Dexcom G7 Sensor) Use 1 Device every 10 days 3 each 5    EPINEPHrine (EPIPEN) 0.3 mg/0.3 mL SOAJ INJECT 0.3 ML (0.3 MG TOTAL) INTO A MUSCLE ONCE FOR 1 DOSE 2 each 1    famotidine (PEPCID) 20 mg tablet Take 1 tablet (20 mg total) by mouth 2 (two) times a day as needed (Itching) for up to 10 days 20 tablet 0    glucose blood (OneTouch Ultra) test strip USE TO CHECK SUGARS THREE TIMES A DAY DX UNCONTROLLED DM2 E 11.65 300 strip 1     "losartan-hydrochlorothiazide (HYZAAR) 50-12.5 mg per tablet TAKE 1 TABLET BY MOUTH EVERY DAY 30 tablet 5    metoprolol succinate (TOPROL-XL) 25 mg 24 hr tablet Take 1 tablet (25 mg total) by mouth daily 30 tablet 5    nitroglycerin (NITROSTAT) 0.4 mg SL tablet Place 1 tablet (0.4 mg total) under the tongue every 5 (five) minutes as needed for chest pain 50 tablet 1    rosuvastatin (CRESTOR) 40 MG tablet Take 1 tablet (40 mg total) by mouth daily 90 tablet 1    sertraline (ZOLOFT) 50 mg tablet Take 1 tablet (50 mg total) by mouth daily 90 tablet 1    Tirzepatide 15 MG/0.5ML SOAJ Inject 15 mg under the skin once a week 6 mL 0    cetirizine (ZyrTEC) 10 mg tablet Take 1 tablet (10 mg total) by mouth 2 (two) times a day for 10 days (Patient not taking: Reported on 4/28/2025) 20 tablet 0    ergocalciferol (VITAMIN D2) 50,000 units TAKE 1 CAPSULE BY MOUTH ONE TIME PER WEEK (Patient not taking: Reported on 4/28/2025) 4 capsule 5    oxyCODONE-acetaminophen (PERCOCET) 5-325 mg per tablet Take 1 tablet by mouth every 6 (six) hours as needed for moderate pain for up to 15 doses Max Daily Amount: 4 tablets (Patient not taking: Reported on 4/28/2025) 15 tablet 0    predniSONE 5 mg tablet # pills by mouth daily:  5/5/4/4/3/2/1 (Patient not taking: Reported on 4/28/2025) 24 tablet 0    vitamin B-12 (VITAMIN B-12) 500 mcg tablet Take 1 tablet (500 mcg total) by mouth daily Over the counter (Patient not taking: Reported on 1/27/2025) 90 tablet 1     Current Facility-Administered Medications on File Prior to Visit   Medication Dose Route Frequency Provider Last Rate Last Admin    triamcinolone acetonide (Kenalog-40) 40 mg/mL injection 20 mg  20 mg Infiltration     20 mg at 04/18/25 1015         Medical History Reviewed by provider this encounter:     .    Objective   /62 (BP Location: Right arm, Patient Position: Sitting, Cuff Size: Standard)   Pulse 68   Ht 5' 8\" (1.727 m)   Wt 80.7 kg (178 lb)   SpO2 99%   BMI 27.06 kg/m² "      Body mass index is 27.06 kg/m².  Wt Readings from Last 3 Encounters:   04/28/25 80.7 kg (178 lb)   04/18/25 83.9 kg (185 lb)   03/26/25 83.9 kg (185 lb)        Physical Exam  Vitals reviewed.   Constitutional:       Appearance: Normal appearance.   Cardiovascular:      Rate and Rhythm: Normal rate and regular rhythm.      Pulses: Normal pulses.      Heart sounds: Normal heart sounds.   Pulmonary:      Effort: Pulmonary effort is normal.      Breath sounds: Normal breath sounds.   Skin:     General: Skin is warm and dry.      Capillary Refill: Capillary refill takes less than 2 seconds.   Neurological:      General: No focal deficit present.      Mental Status: She is alert and oriented to person, place, and time.   Psychiatric:         Mood and Affect: Mood normal.         Behavior: Behavior normal.     Labs:   Lab Results   Component Value Date    HGBA1C 5.8 (H) 04/11/2025    HGBA1C 6.1 (H) 10/17/2024    HGBA1C 6.4 (H) 06/10/2024     Lab Results   Component Value Date    CREATININE 0.58 (L) 04/11/2025    CREATININE 0.61 02/17/2025    CREATININE 0.80 12/03/2024    BUN 18 04/11/2025     09/11/2017    K 4.5 04/11/2025    CL 97 04/11/2025    CO2 30 04/11/2025     eGFR   Date Value Ref Range Status   04/11/2025 87 ml/min/1.73sq m Final     Lab Results   Component Value Date    CHOL 224 02/25/2015    HDL 47 (L) 04/11/2025    TRIG 65 04/11/2025     Lab Results   Component Value Date    ALT 18 04/11/2025    AST 19 04/11/2025    ALKPHOS 68 04/11/2025    BILITOT 0.4 02/25/2015     Lab Results   Component Value Date    GBM4IEDKGWIW 1.682 10/17/2024    XHI7ZPCYGTCI 2.241 09/07/2023    ZGS4MTFZAUNM 2.535 06/28/2023     Lab Results   Component Value Date    FREET4 0.84 10/17/2024       Discussed with the patient and all questioned fully answered. She will call me if any problems arise.

## 2025-04-28 NOTE — ASSESSMENT & PLAN NOTE
Recommend repeat thyroid ultrasound in September 2025, 12 month follow up.   Denies neck compressive symptoms.   Clinically euthyroid.   Orders:    US thyroid; Future    TSH, 3rd generation; Future    T4, free; Future
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
## 2025-05-01 ENCOUNTER — TELEPHONE (OUTPATIENT)
Dept: FAMILY MEDICINE CLINIC | Facility: CLINIC | Age: 80
End: 2025-05-01

## 2025-05-01 ENCOUNTER — TELEPHONE (OUTPATIENT)
Dept: ADMINISTRATIVE | Facility: OTHER | Age: 80
End: 2025-05-01

## 2025-05-01 ENCOUNTER — OFFICE VISIT (OUTPATIENT)
Dept: FAMILY MEDICINE CLINIC | Facility: CLINIC | Age: 80
End: 2025-05-01
Payer: MEDICARE

## 2025-05-01 VITALS
HEIGHT: 68 IN | SYSTOLIC BLOOD PRESSURE: 160 MMHG | RESPIRATION RATE: 16 BRPM | DIASTOLIC BLOOD PRESSURE: 82 MMHG | HEART RATE: 75 BPM | WEIGHT: 182.4 LBS | BODY MASS INDEX: 27.65 KG/M2 | TEMPERATURE: 98.1 F | OXYGEN SATURATION: 98 %

## 2025-05-01 DIAGNOSIS — F43.21 GRIEF: ICD-10-CM

## 2025-05-01 DIAGNOSIS — R07.9 CHEST PAIN, UNSPECIFIED TYPE: ICD-10-CM

## 2025-05-01 DIAGNOSIS — R91.1 LUNG NODULE: ICD-10-CM

## 2025-05-01 DIAGNOSIS — F41.9 ANXIETY: Primary | ICD-10-CM

## 2025-05-01 DIAGNOSIS — I10 BENIGN ESSENTIAL HYPERTENSION: ICD-10-CM

## 2025-05-01 PROCEDURE — 99214 OFFICE O/P EST MOD 30 MIN: CPT | Performed by: FAMILY MEDICINE

## 2025-05-01 PROCEDURE — 93000 ELECTROCARDIOGRAM COMPLETE: CPT | Performed by: FAMILY MEDICINE

## 2025-05-01 PROCEDURE — G2211 COMPLEX E/M VISIT ADD ON: HCPCS | Performed by: FAMILY MEDICINE

## 2025-05-01 RX ORDER — SERTRALINE HYDROCHLORIDE 100 MG/1
100 TABLET, FILM COATED ORAL DAILY
Qty: 90 TABLET | Refills: 3 | Status: SHIPPED | OUTPATIENT
Start: 2025-05-01

## 2025-05-01 NOTE — TELEPHONE ENCOUNTER
----- Message from Larisa NINA sent at 5/1/2025  8:29 AM EDT -----  Regarding: care gap request  05/01/25 8:29 AM    Hello, our patient attached above has had Diabetic Eye Exam completed/performed. Please assist in updating the patient chart by pulling the document from the Media Tab. The date of service is 04/11/2025.     Thank you,  Larisa TOUSSAINT

## 2025-05-01 NOTE — TELEPHONE ENCOUNTER
Upon review of the In Basket request we were able to locate, review, and update the patient chart as requested for Diabetic Eye Exam.    Any additional questions or concerns should be emailed to the Practice Liaisons via the appropriate education email address, please do not reply via In Basket.    Thank you  Michela Guerrier MA   [facility]

## 2025-05-01 NOTE — ASSESSMENT & PLAN NOTE
Blood pressure elevated in the office today, but patient was very stressed.  It has been well-controlled at multiple recent visits.

## 2025-05-01 NOTE — PATIENT INSTRUCTIONS
Protein goals: about 80 grams a day     [facility] [phone]  [place] location [address], [address]  [place] [address] [facility] [address]  [address], [address]  [place] [address] [address] [place]    [facility] [phone]  [address], [address]   [place], [place] [address]

## 2025-05-02 ENCOUNTER — TELEPHONE (OUTPATIENT)
Dept: CARDIOLOGY CLINIC | Facility: CLINIC | Age: 80
End: 2025-05-02

## 2025-05-02 NOTE — TELEPHONE ENCOUNTER
Lm to offer her a sooner appt on 5/15/25 with Dr. Robles . Also informed her to go to the ER if she is still experiencing CP.

## 2025-05-02 NOTE — TELEPHONE ENCOUNTER
----- Message from RAJAN Laguna sent at 5/2/2025  7:51 AM EDT -----  Regarding: RE: Coordination of care  If patient is having chest discomfort, can we get her in to see Dr. Robles sooner. Or if her symptoms are very bad tell her to come to the emergency room. Thanks  ----- Message -----  From: Ashli Gill DO  Sent: 5/1/2025   5:46 PM EDT  To: Toni Robles MD  Subject: Coordination of care                             I saw Cherrie today.  She is complaining of chest pain when walking up her steep driveway.  No associated symptoms like nausea, diaphoresis, shortness of breath, palpitations or radiation.  She says nitroglycerin relieves it.  She had it right before our visit.  EKG and did not see any acute changes-it is scanned in.  She has an appointment with you, but not until 7/15/2025.  Thanks,   Ashli

## 2025-05-07 ENCOUNTER — OFFICE VISIT (OUTPATIENT)
Dept: OBGYN CLINIC | Facility: CLINIC | Age: 80
End: 2025-05-07
Payer: MEDICARE

## 2025-05-07 ENCOUNTER — APPOINTMENT (OUTPATIENT)
Dept: RADIOLOGY | Facility: CLINIC | Age: 80
End: 2025-05-07
Attending: ORTHOPAEDIC SURGERY
Payer: MEDICARE

## 2025-05-07 VITALS — HEIGHT: 68 IN | WEIGHT: 176 LBS | BODY MASS INDEX: 26.67 KG/M2

## 2025-05-07 DIAGNOSIS — S72.114D CLOSED NONDISPLACED FRACTURE OF GREATER TROCHANTER OF RIGHT FEMUR WITH ROUTINE HEALING, SUBSEQUENT ENCOUNTER: ICD-10-CM

## 2025-05-07 DIAGNOSIS — S72.114D CLOSED NONDISPLACED FRACTURE OF GREATER TROCHANTER OF RIGHT FEMUR WITH ROUTINE HEALING, SUBSEQUENT ENCOUNTER: Primary | ICD-10-CM

## 2025-05-07 PROCEDURE — 73502 X-RAY EXAM HIP UNI 2-3 VIEWS: CPT

## 2025-05-07 PROCEDURE — 99214 OFFICE O/P EST MOD 30 MIN: CPT | Performed by: ORTHOPAEDIC SURGERY

## 2025-05-07 NOTE — PROGRESS NOTES
"Name: Elisabeth Ruzicka Dempsey      : 1945      MRN: [id number]  Encounter Provider: Dinh Preciado DO  Encounter Date: 2025   Encounter department: [facility]  :  Assessment & Plan  Closed nondisplaced fracture of greater trochanter of right femur with routine healing, subsequent encounter  Continue activity to tolerance  Return as needed  Orders:    XR hip/pelv 2-3 vws right if performed; Future         Elisabeth Ruzicka Dempsey is a pleasant 80 y.o. female who returns today for follow-up evaluation 12 weeks status post closed treatment for nondisplaced right greater trochanter fracture.  I am very pleased with her imaging and her clinical presentation today in the office.  She is asymptomatic on clinical exam.  She may continue with activity to her tolerance.  We will plan to see her back as needed.    Return if symptoms worsen or fail to improve.    I have personally reviewed pertinent imaging in PACS.  X-ray of the right hip obtained on 2025 reviewed demonstrating a stable appearing fracture of the right greater trochanter without interval change.  There is no new fracture, dislocation, lytic or blastic lesion.    History: Elisabeth Ruzicka Dempsey is a 80 y.o. female who returns today for follow-up evaluation 12 weeks status post closed treatment for nondisplaced right greater trochanter fracture.  At today's visit, she reports she has been doing very well.  She has been returning to activity without limitation and denies any pain about her hip.  She denies any new injury or trauma.  She does use walking sticks for ambulatory assistance, mostly due to balance issues.    Estimated body mass index is 26.76 kg/m² as calculated from the following:    Height as of this encounter: 5' 8\" (1.727 m).    Weight as of this encounter: 79.8 kg (176 lb).    Lab Results   Component Value Date    HGBA1C 5.8 (H) 2025       Social History     Occupational History    " Occupation: Retired    Tobacco Use    Smoking status: Never     Passive exposure: Past    Smokeless tobacco: Never   Vaping Use    Vaping status: Never Used   Substance and Sexual Activity    Alcohol use: Not Currently     Alcohol/week: 4.0 standard drinks of alcohol    Drug use: Never    Sexual activity: Not Currently     Partners: Male     Birth control/protection: Post-menopausal       Objective:  Right Hip Exam     Tenderness   The patient is experiencing no tenderness.     Muscle Strength   Abduction: 5/5   Adduction: 5/5   Flexion: 5/5     Other   Erythema: absent  Scars: absent  Sensation: normal  Pulse: present    Comments:  No pain with passive motion              There were no vitals filed for this visit.    Subjective:  Past Medical History:   Diagnosis Date    Cancer (HCC)     Diabetes mellitus (HCC)     Dizziness     Endometrial carcinoma (HCC)     Heart disease     Hip fracture (HCC)     Hypertension     Neuropathy in diabetes (HCC)     Obesity     Rectal bleeding 09/10/2019    Sciatic radiculitis 01/28/2021    Tonsillitis     Varicella        Past Surgical History:   Procedure Laterality Date    CARDIAC CATHETERIZATION Left 12/06/2024    Procedure: Cardiac Left Heart Cath;  Surgeon: Jyothi Casey MD;  [facility];  Service: Cardiology    EYE SURGERY Bilateral     2024    HYSTERECTOMY  2006    OOPHORECTOMY  2006    TONSILLECTOMY  1962    TUBAL LIGATION  1992    US GUIDED THYROID BIOPSY  12/12/2019       Family History   Problem Relation Age of Onset    Stroke Mother     Diabetes Mother     Cancer Mother     Diabetes unspecified Mother     Lung cancer Father     Cancer Father     Breast cancer Sister 78        Half-sister    Dementia Sister     Cancer Sister     Arthritis Sister     No Known Problems Sister     COPD Brother     Coronary artery disease Brother     Heart failure Brother     HIV Brother     Cancer Family     No Known Problems Daughter     Leukemia Son     Cancer Son          Leukemia age 19    No Known Problems Son          Current Outpatient Medications:     aspirin 81 mg chewable tablet, Chew 1 tablet (81 mg total) daily, Disp: 100 tablet, Rfl: 2    Blood Glucose Monitoring Suppl (ONE TOUCH ULTRA 2) w/Device KIT, , Disp: , Rfl:     Continuous Blood Gluc  (Dexcom G7 ) NAHID, Use 1 each continuous, Disp: 1 each, Rfl: 0    Continuous Glucose Sensor (Dexcom G7 Sensor), Use 1 Device every 10 days, Disp: 3 each, Rfl: 5    EPINEPHrine (EPIPEN) 0.3 mg/0.3 mL SOAJ, INJECT 0.3 ML (0.3 MG TOTAL) INTO A MUSCLE ONCE FOR 1 DOSE, Disp: 2 each, Rfl: 1    glucose blood (OneTouch Ultra) test strip, USE TO CHECK SUGARS THREE TIMES A DAY DX UNCONTROLLED DM2 E 11.65, Disp: 300 strip, Rfl: 1    losartan-hydrochlorothiazide (HYZAAR) 50-12.5 mg per tablet, TAKE 1 TABLET BY MOUTH EVERY DAY, Disp: 30 tablet, Rfl: 5    metoprolol succinate (TOPROL-XL) 25 mg 24 hr tablet, Take 1 tablet (25 mg total) by mouth daily, Disp: 30 tablet, Rfl: 5    nitroglycerin (NITROSTAT) 0.4 mg SL tablet, Place 1 tablet (0.4 mg total) under the tongue every 5 (five) minutes as needed for chest pain, Disp: 50 tablet, Rfl: 1    rosuvastatin (CRESTOR) 40 MG tablet, Take 1 tablet (40 mg total) by mouth daily, Disp: 90 tablet, Rfl: 1    sertraline (ZOLOFT) 100 mg tablet, Take 1 tablet (100 mg total) by mouth daily, Disp: 90 tablet, Rfl: 3    Tirzepatide (Mounjaro) 12.5 MG/0.5ML SOAJ, Inject 12.5 mg under the skin once a week, Disp: 2 mL, Rfl: 2    famotidine (PEPCID) 20 mg tablet, Take 1 tablet (20 mg total) by mouth 2 (two) times a day as needed (Itching) for up to 10 days, Disp: 20 tablet, Rfl: 0    Current Facility-Administered Medications:     triamcinolone acetonide (Kenalog-40) 40 mg/mL injection 20 mg, 20 mg, Infiltration, , , 20 mg at 04/18/25 1015    Allergies   Allergen Reactions    Bee Venom Hives and Swelling     Yellow jackets and wasps    Gabapentin Hives       Review of Systems   Constitutional:   Positive for activity change. Negative for chills, fever and unexpected weight change.   HENT:  Negative for hearing loss, nosebleeds and sore throat.    Eyes:  Negative for pain, redness and visual disturbance.   Respiratory:  Negative for cough, shortness of breath and wheezing.    Cardiovascular:  Negative for chest pain, palpitations and leg swelling.   Gastrointestinal:  Negative for abdominal pain, nausea and vomiting.   Endocrine: Negative for polydipsia and polyuria.   Genitourinary:  Negative for dysuria and hematuria.   Musculoskeletal:  See HPI  Skin:  Negative for rash and wound.   Neurological:  Negative for dizziness, numbness and headaches.   Psychiatric/Behavioral:  Negative for decreased concentration and suicidal ideas. The patient is not nervous/anxious.      Physical Exam  Vitals and nursing note reviewed.   Constitutional:       Appearance: Normal appearance. She is well-developed.   HENT:      Head: Normocephalic and atraumatic.      Right Ear: External ear normal.      Left Ear: External ear normal.   Eyes:      General: No scleral icterus.     Extraocular Movements: Extraocular movements intact.      Conjunctiva/sclera: Conjunctivae normal.   Cardiovascular:      Rate and Rhythm: Normal rate.   Pulmonary:      Effort: Pulmonary effort is normal. No respiratory distress.   Musculoskeletal:      Cervical back: Normal range of motion and neck supple.      Comments: See Ortho exam   Skin:     General: Skin is warm and dry.   Neurological:      General: No focal deficit present.      Mental Status: She is alert and oriented to person, place, and time.   Psychiatric:         Behavior: Behavior normal.     Scribe Attestation      I,:  Gian Ruelas am acting as a scribe while in the presence of the attending physician.:       I,:  Dinh Preciado DO personally performed the services described in this documentation    as scribed in my presence.:           This document was created using speech  voice recognition software.   Grammatical errors, random word insertions, pronoun errors, and incomplete sentences are an occasional consequence of this system due to software limitations, ambient noise, and hardware issues.   Any formal questions or concerns about content, text, or information contained within the body of this dictation should be directly addressed to the provider for clarification.

## 2025-05-13 NOTE — PROGRESS NOTES
Progress Note - Cardiology Office  [facility]    Elisabeth Ruzicka Dempsey 80 y.o. female MRN: [id number]  : 1945  Encounter: [id number]      Assessment & Plan  Coronary artery disease of native artery of native heart with stable angina pectoris (HCC)    Benign essential hypertension    Type 2 diabetes mellitus with diabetic polyneuropathy, without long-term current use of insulin (HCC)    Lab Results   Component Value Date    HGBA1C 5.8 (H) 2025     Hyperlipidemia, mixed    Peripheral arterial disease (HCC)    Dyspnea on exertion       ASSESSMENT:   History of dyspnea on exertion and  Precordial pain  History of crushing chest pain  Was evaluated in the ED on 2023  Had been having intermittent substernal chest pain for 10 days, usually associated with anxiety/stress or activity.  Has had recurrences of chest pain which are concerning to the patient.    Left heart cath, 2024: Nonobstructive CAD  Ost Cx to Prox Cx lesion is 40% stenosed.    Prox LAD lesion is 50% stenosed.    The left ventricular systolic function is normal.  EF is 55 to 60% there is no gradient across aortic valve.    Patient has very tortuous coronary arteries with no evidence of any significant focal stenosis.  Nonobstructive disease noted at the ostium of the nondominant small circumflex.  Ramus is a large size artery.     Coronary CTA  Coronary calcium score 102  Severe stenosis at the left circumflex artery origin with suggestion of associated lesion specific ischemia on FFR CT. Moderate atherosclerotic disease in the proximal LAD with no evidence of hemodynamically significant stenosis.     Management recommendations:  - Consider invasive coronary angiography.  - Aggressive risk factor modification and preventative pharmacotherapy.  - Other treatments (including anti-anginal therapy) should be considered per guideline directed care     Exercise stress test, 2023:  Negative for ischemia      TTE, 08/17/2023:  EF 55%, trace MR     Mixed hyperlipidemia  On Crestor 40 mg  10/17/2024: LDL 71, TG 78, HDL 49, normal AST and ALT    Primary hypertension  BP today is 120/60 with heart rate of 69/min    Dizziness     Type 2 diabetes mellitus     Review of obesity, BMI 34.21-> 31.02> 29.19> 27.67     Elevated ASCVD risk score of 46.3%     Nondisplaced fracture of greater trochanter of right femur  Healing    RECOMMENDATIONS:  Continue current cardiac medications including metoprolol, Hyzaar aspirin and Crestor  Repeat labs with PCP near the end of this year  Low-salt and low-cholesterol diet  Regular cardiovascular exercise  Continue weight loss to normal BMI      Please call [phone] if any questions.    HPI :     Elisabeth Ruzicka Dempsey is a 80 y.o. year old female who came for follow up.  She generally feels well and denies any specific cardiac symptoms today.  She continues to lose weight and is on Mounjaro now going down to a maintenance dose.  Her blood pressure is well-controlled and her last lipid panel from October 2024 was in relatively normal range.  She has some gait dysfunction due to neuropathy  She also had nondisplaced fracture of greater trochanter of her right femur which is showing routine healing on recent x-ray    REVIEW OF SYSTEMS:  Review of Systems   Musculoskeletal:  Positive for arthralgias and gait problem.   Neurological:         Neuropathy   All other systems reviewed and are negative.        Historical Information   Past Medical History:   Diagnosis Date    Cancer (HCC)     Diabetes mellitus (HCC)     Dizziness     Endometrial carcinoma (HCC)     Heart disease     Hip fracture (HCC)     Hypertension     Neuropathy in diabetes (HCC)     Obesity     Rectal bleeding 09/10/2019    Sciatic radiculitis 01/28/2021    Tonsillitis     Varicella      Past Surgical History:   Procedure Laterality Date    CARDIAC CATHETERIZATION Left 12/06/2024    Procedure: Cardiac Left Heart Cath;   Surgeon: Jyothi Casey MD;  Location: [facility];  Service: Cardiology    EYE SURGERY Bilateral     2024    HYSTERECTOMY  2006    OOPHORECTOMY  2006    TONSILLECTOMY  1962    TUBAL LIGATION  1992    US GUIDED THYROID BIOPSY  12/12/2019     Social History     Substance and Sexual Activity   Alcohol Use Not Currently    Alcohol/week: 4.0 standard drinks of alcohol     Social History     Substance and Sexual Activity   Drug Use Never     Social History     Tobacco Use   Smoking Status Never    Passive exposure: Past   Smokeless Tobacco Never     Family History:   Family History   Problem Relation Age of Onset    Stroke Mother     Diabetes Mother     Cancer Mother     Diabetes unspecified Mother     Lung cancer Father     Cancer Father     Breast cancer Sister 78        Half-sister    Dementia Sister     Cancer Sister     Arthritis Sister     No Known Problems Sister     COPD Brother     Coronary artery disease Brother     Heart failure Brother     HIV Brother     Cancer Family     No Known Problems Daughter     Leukemia Son     Cancer Son         Leukemia age 19    No Known Problems Son        Meds/Allergies     Allergies   Allergen Reactions    Bee Venom Hives and Swelling     Yellow jackets and wasps    Gabapentin Hives       Current Outpatient Medications:     aspirin 81 mg chewable tablet, Chew 1 tablet (81 mg total) daily, Disp: 100 tablet, Rfl: 2    Blood Glucose Monitoring Suppl (ONE TOUCH ULTRA 2) w/Device KIT, , Disp: , Rfl:     Continuous Blood Gluc  (Dexcom G7 ) NAHID, Use 1 each continuous, Disp: 1 each, Rfl: 0    Continuous Glucose Sensor (Dexcom G7 Sensor), Use 1 Device every 10 days, Disp: 3 each, Rfl: 5    EPINEPHrine (EPIPEN) 0.3 mg/0.3 mL SOAJ, INJECT 0.3 ML (0.3 MG TOTAL) INTO A MUSCLE ONCE FOR 1 DOSE, Disp: 2 each, Rfl: 1    glucose blood (OneTouch Ultra) test strip, USE TO CHECK SUGARS THREE TIMES A DAY DX UNCONTROLLED DM2 E 11.65, Disp: 300 strip, Rfl: 1     losartan-hydrochlorothiazide (HYZAAR) 50-12.5 mg per tablet, TAKE 1 TABLET BY MOUTH EVERY DAY, Disp: 30 tablet, Rfl: 5    metoprolol succinate (TOPROL-XL) 25 mg 24 hr tablet, Take 1 tablet (25 mg total) by mouth daily, Disp: 30 tablet, Rfl: 5    nitroglycerin (NITROSTAT) 0.4 mg SL tablet, Place 1 tablet (0.4 mg total) under the tongue every 5 (five) minutes as needed for chest pain, Disp: 50 tablet, Rfl: 1    rosuvastatin (CRESTOR) 40 MG tablet, Take 1 tablet (40 mg total) by mouth daily, Disp: 90 tablet, Rfl: 1    sertraline (ZOLOFT) 100 mg tablet, Take 1 tablet (100 mg total) by mouth daily, Disp: 90 tablet, Rfl: 3    Tirzepatide (Mounjaro) 12.5 MG/0.5ML SOAJ, Inject 12.5 mg under the skin once a week, Disp: 2 mL, Rfl: 2    famotidine (PEPCID) 20 mg tablet, Take 1 tablet (20 mg total) by mouth 2 (two) times a day as needed (Itching) for up to 10 days, Disp: 20 tablet, Rfl: 0    Current Facility-Administered Medications:     triamcinolone acetonide (Kenalog-40) 40 mg/mL injection 20 mg, 20 mg, Infiltration, , , 20 mg at 04/18/25 1015    Vitals: Blood pressure 120/60, pulse 69, weight 82.6 kg (182 lb), SpO2 97%.    Body mass index is 27.67 kg/m².  Vitals:    05/15/25 0753   Weight: 82.6 kg (182 lb)     BP Readings from Last 3 Encounters:   05/15/25 120/60   05/01/25 160/82   04/28/25 133/62       Physical Exam:  Physical Exam    Neurologic:  Alert & oriented x 3, no new focal deficits, Not in any acute distress,  Constitutional:  Well developed, well nourished, non-toxic appearance   Eyes:  Pupil equal and reacting to light, conjunctiva normal,   HENT:  Atraumatic, oropharynx moist, Neck- normal range of motion, no tenderness,  Neck supple, No JVP, No LNP   Respiratory:  Bilateral air entry, mostly clear to auscultation  Cardiovascular: S1-S2 regular with a I/VI systolic murmur   GI:  Soft, nondistended, normal bowel sounds, nontender, no hepatosplenomegaly appreciated.  Musculoskeletal: Hip tenderness  Skin:  Well  "hydrated, no rash   Lymphatic:  No lymphadenopathy noted   Extremities:  No edema         Diagnostic Studies Review Cardio:      EKG: Sinus rhythm with first-degree AV block, heart rate 69/min    Cardiac testing:       Results for orders placed during the hospital encounter of 08/17/23    Echo complete w/ contrast if indicated    Interpretation Summary    Left Ventricle: Left ventricular cavity size is normal. Wall thickness is normal. The left ventricular ejection fraction is 55% by visual estimation.. Systolic function is normal. Wall motion is normal. Diastolic function is normal for age.    Tricuspid Valve: The right ventricular systolic pressure is normal. The estimated right ventricular systolic pressure is 16.00 mmHg.      Imaging:  Chest X-Ray:   No Chest XR results available for this patient.    CT-scan of the chest:     No CTA results available for this patient.  Lab Review   Lab Results   Component Value Date    WBC 8.61 02/17/2025    HGB 13.3 02/17/2025    HCT 39.5 02/17/2025    MCV 85 02/17/2025    RDW 12.6 02/17/2025     02/17/2025     BMP:  Lab Results   Component Value Date    SODIUM 134 (L) 04/11/2025    K 4.5 04/11/2025    CL 97 04/11/2025    CO2 30 04/11/2025    ANIONGAP 5 02/25/2015    BUN 18 04/11/2025    CREATININE 0.58 (L) 04/11/2025    GLUC 131 02/17/2025    GLUF 104 (H) 04/11/2025    CALCIUM 9.6 04/11/2025    CORRECTEDCA 9.5 05/25/2022    EGFR 87 04/11/2025    MG 1.9 05/24/2022     LFT:  Lab Results   Component Value Date    AST 19 04/11/2025    ALT 18 04/11/2025    ALKPHOS 68 04/11/2025    TP 7.1 04/11/2025    ALB 4.2 04/11/2025      No components found for: \"TSH3\"  Lab Results   Component Value Date    ANR2KSCLNIYY 1.682 10/17/2024     Lab Results   Component Value Date    HGBA1C 5.8 (H) 04/11/2025     Lipid Profile:   Lab Results   Component Value Date    CHOLESTEROL 124 04/11/2025    HDL 47 (L) 04/11/2025    LDLCALC 64 04/11/2025    TRIG 65 04/11/2025     Lab Results   Component " "Value Date    CHOLESTEROL 124 04/11/2025    CHOLESTEROL 136 10/17/2024     No results found for: \"CKTOTAL\", \"CKMB\", \"CKMBINDEX\", \"TROPONINI\"  No results found for: \"NTBNP\"   No results found for this or any previous visit (from the past 4 weeks).          Dr. Toni Robles MD, [facility]      \"This note has been constructed using a voice recognition system.Therefore there may be syntax, spelling, and/or grammatical errors. Please call if you have any questions. \"  "

## 2025-05-15 ENCOUNTER — OFFICE VISIT (OUTPATIENT)
Dept: CARDIOLOGY CLINIC | Facility: CLINIC | Age: 80
End: 2025-05-15
Payer: MEDICARE

## 2025-05-15 VITALS
DIASTOLIC BLOOD PRESSURE: 60 MMHG | SYSTOLIC BLOOD PRESSURE: 120 MMHG | BODY MASS INDEX: 27.67 KG/M2 | WEIGHT: 182 LBS | OXYGEN SATURATION: 97 % | HEART RATE: 69 BPM

## 2025-05-15 DIAGNOSIS — R06.09 DYSPNEA ON EXERTION: ICD-10-CM

## 2025-05-15 DIAGNOSIS — I25.118 CORONARY ARTERY DISEASE OF NATIVE ARTERY OF NATIVE HEART WITH STABLE ANGINA PECTORIS (HCC): Primary | ICD-10-CM

## 2025-05-15 DIAGNOSIS — I73.9 PERIPHERAL ARTERIAL DISEASE (HCC): ICD-10-CM

## 2025-05-15 DIAGNOSIS — E11.42 TYPE 2 DIABETES MELLITUS WITH DIABETIC POLYNEUROPATHY, WITHOUT LONG-TERM CURRENT USE OF INSULIN (HCC): ICD-10-CM

## 2025-05-15 DIAGNOSIS — I10 BENIGN ESSENTIAL HYPERTENSION: ICD-10-CM

## 2025-05-15 DIAGNOSIS — E78.2 HYPERLIPIDEMIA, MIXED: ICD-10-CM

## 2025-05-15 PROCEDURE — 93000 ELECTROCARDIOGRAM COMPLETE: CPT | Performed by: INTERNAL MEDICINE

## 2025-05-15 PROCEDURE — 99214 OFFICE O/P EST MOD 30 MIN: CPT | Performed by: INTERNAL MEDICINE

## 2025-05-29 LAB
LEFT EYE DIABETIC RETINOPATHY: POSITIVE
RIGHT EYE DIABETIC RETINOPATHY: POSITIVE

## 2025-06-02 ENCOUNTER — PATIENT MESSAGE (OUTPATIENT)
Dept: FAMILY MEDICINE CLINIC | Facility: CLINIC | Age: 80
End: 2025-06-02

## 2025-06-23 ENCOUNTER — TELEPHONE (OUTPATIENT)
Dept: ADMINISTRATIVE | Facility: OTHER | Age: 80
End: 2025-06-23

## 2025-06-23 NOTE — TELEPHONE ENCOUNTER
----- Message from Vivien CRUZ sent at 6/23/2025  8:28 AM EDT -----  Regarding: care gap request  06/23/25 8:28 AM    Hello, our patient attached above has had Diabetic Eye Exam completed/performed. Please assist in updating the patient chart by pulling the document from the Media Tab. The date of service is 05/29/25.     Thank you,  Vivien Chambers MA  [facility]  [facility]

## 2025-06-25 DIAGNOSIS — E11.42 TYPE 2 DIABETES MELLITUS WITH DIABETIC POLYNEUROPATHY, WITHOUT LONG-TERM CURRENT USE OF INSULIN (HCC): ICD-10-CM

## 2025-06-25 DIAGNOSIS — I25.118 CORONARY ARTERY DISEASE OF NATIVE ARTERY OF NATIVE HEART WITH STABLE ANGINA PECTORIS (HCC): ICD-10-CM

## 2025-06-25 DIAGNOSIS — I25.10 CORONARY ARTERY DISEASE INVOLVING NATIVE CORONARY ARTERY: ICD-10-CM

## 2025-06-25 RX ORDER — NITROGLYCERIN 0.4 MG/1
0.4 TABLET SUBLINGUAL
Qty: 50 TABLET | Refills: 0 | Status: SHIPPED | OUTPATIENT
Start: 2025-06-25

## 2025-06-25 RX ORDER — TIRZEPATIDE 12.5 MG/.5ML
12.5 INJECTION, SOLUTION SUBCUTANEOUS WEEKLY
Qty: 2 ML | Refills: 0 | Status: SHIPPED | OUTPATIENT
Start: 2025-06-25

## 2025-06-26 RX ORDER — METOPROLOL SUCCINATE 25 MG/1
25 TABLET, EXTENDED RELEASE ORAL DAILY
Qty: 30 TABLET | Refills: 5 | Status: SHIPPED | OUTPATIENT
Start: 2025-06-26

## 2025-07-09 ENCOUNTER — TELEPHONE (OUTPATIENT)
Dept: FAMILY MEDICINE CLINIC | Facility: CLINIC | Age: 80
End: 2025-07-09

## 2025-07-09 DIAGNOSIS — Z79.899 HIGH RISK MEDICATION USE: Primary | ICD-10-CM

## 2025-07-10 ENCOUNTER — APPOINTMENT (OUTPATIENT)
Dept: LAB | Facility: CLINIC | Age: 80
End: 2025-07-10
Attending: NURSE PRACTITIONER
Payer: MEDICARE

## 2025-07-10 DIAGNOSIS — E11.42 TYPE 2 DIABETES MELLITUS WITH DIABETIC POLYNEUROPATHY, WITHOUT LONG-TERM CURRENT USE OF INSULIN (HCC): ICD-10-CM

## 2025-07-10 DIAGNOSIS — E04.2 MULTIPLE THYROID NODULES: ICD-10-CM

## 2025-07-10 LAB
ANION GAP SERPL CALCULATED.3IONS-SCNC: 7 MMOL/L (ref 4–13)
BUN SERPL-MCNC: 21 MG/DL (ref 5–25)
CALCIUM SERPL-MCNC: 9.8 MG/DL (ref 8.4–10.2)
CHLORIDE SERPL-SCNC: 99 MMOL/L (ref 96–108)
CO2 SERPL-SCNC: 31 MMOL/L (ref 21–32)
CREAT SERPL-MCNC: 0.68 MG/DL (ref 0.6–1.3)
GFR SERPL CREATININE-BSD FRML MDRD: 83 ML/MIN/1.73SQ M
GLUCOSE SERPL-MCNC: 136 MG/DL (ref 65–140)
POTASSIUM SERPL-SCNC: 4.3 MMOL/L (ref 3.5–5.3)
SODIUM SERPL-SCNC: 137 MMOL/L (ref 135–147)

## 2025-07-10 PROCEDURE — 80048 BASIC METABOLIC PNL TOTAL CA: CPT | Performed by: FAMILY MEDICINE

## 2025-07-10 PROCEDURE — 36415 COLL VENOUS BLD VENIPUNCTURE: CPT | Performed by: FAMILY MEDICINE

## 2025-07-11 NOTE — TELEPHONE ENCOUNTER
Left message for patient to get non fasting labs done before CT Scan on Tuesday. Ask patient to call back to confirm she got this message.

## 2025-07-15 ENCOUNTER — HOSPITAL ENCOUNTER (OUTPATIENT)
Dept: RADIOLOGY | Facility: HOSPITAL | Age: 80
Discharge: HOME | End: 2025-07-15
Attending: FAMILY MEDICINE
Payer: MEDICARE

## 2025-07-15 DIAGNOSIS — E11.42 TYPE 2 DIABETES MELLITUS WITH DIABETIC POLYNEUROPATHY, WITHOUT LONG-TERM CURRENT USE OF INSULIN (HCC): ICD-10-CM

## 2025-07-15 DIAGNOSIS — I25.10 CORONARY ARTERY DISEASE INVOLVING NATIVE CORONARY ARTERY: ICD-10-CM

## 2025-07-15 DIAGNOSIS — R91.1 LUNG NODULE: ICD-10-CM

## 2025-07-15 PROCEDURE — 71260 CT THORAX DX C+: CPT

## 2025-07-15 RX ADMIN — IOHEXOL 85 ML: 350 INJECTION, SOLUTION INTRAVENOUS at 08:54

## 2025-07-16 RX ORDER — TIRZEPATIDE 12.5 MG/.5ML
12.5 INJECTION, SOLUTION SUBCUTANEOUS WEEKLY
Qty: 2 ML | Refills: 2 | Status: SHIPPED | OUTPATIENT
Start: 2025-07-16 | End: 2025-07-24 | Stop reason: SDUPTHER

## 2025-07-16 RX ORDER — ACYCLOVIR 400 MG/1
1 TABLET ORAL
Qty: 3 EACH | Refills: 5 | Status: SHIPPED | OUTPATIENT
Start: 2025-07-16 | End: 2025-07-24 | Stop reason: SDUPTHER

## 2025-07-16 RX ORDER — METOPROLOL SUCCINATE 25 MG/1
25 TABLET, EXTENDED RELEASE ORAL DAILY
Qty: 30 TABLET | Refills: 5 | Status: SHIPPED | OUTPATIENT
Start: 2025-07-16 | End: 2025-07-24 | Stop reason: SDUPTHER

## 2025-07-18 ENCOUNTER — PATIENT MESSAGE (OUTPATIENT)
Dept: FAMILY MEDICINE CLINIC | Facility: CLINIC | Age: 80
End: 2025-07-18

## 2025-07-18 DIAGNOSIS — E11.42 TYPE 2 DIABETES MELLITUS WITH DIABETIC POLYNEUROPATHY, WITHOUT LONG-TERM CURRENT USE OF INSULIN (HCC): Primary | ICD-10-CM

## 2025-07-20 DIAGNOSIS — E78.2 HYPERLIPIDEMIA, MIXED: ICD-10-CM

## 2025-07-20 DIAGNOSIS — E11.42 TYPE 2 DIABETES MELLITUS WITH DIABETIC POLYNEUROPATHY, WITHOUT LONG-TERM CURRENT USE OF INSULIN (HCC): ICD-10-CM

## 2025-07-21 RX ORDER — ROSUVASTATIN CALCIUM 40 MG/1
40 TABLET, COATED ORAL DAILY
Qty: 30 TABLET | Refills: 5 | Status: SHIPPED | OUTPATIENT
Start: 2025-07-21

## 2025-07-23 DIAGNOSIS — E11.42 TYPE 2 DIABETES MELLITUS WITH DIABETIC POLYNEUROPATHY, WITHOUT LONG-TERM CURRENT USE OF INSULIN (HCC): Primary | ICD-10-CM

## 2025-07-23 RX ORDER — BLOOD-GLUCOSE METER
EACH MISCELLANEOUS
Qty: 1 KIT | Refills: 0 | Status: SHIPPED | OUTPATIENT
Start: 2025-07-23 | End: 2025-07-24 | Stop reason: SDUPTHER

## 2025-07-24 DIAGNOSIS — E11.42 TYPE 2 DIABETES MELLITUS WITH DIABETIC POLYNEUROPATHY, WITHOUT LONG-TERM CURRENT USE OF INSULIN (HCC): ICD-10-CM

## 2025-07-24 DIAGNOSIS — I10 BENIGN ESSENTIAL HYPERTENSION: ICD-10-CM

## 2025-07-24 DIAGNOSIS — I25.10 CORONARY ARTERY DISEASE INVOLVING NATIVE CORONARY ARTERY: ICD-10-CM

## 2025-07-24 RX ORDER — ACYCLOVIR 400 MG/1
1 TABLET ORAL
Qty: 3 EACH | Refills: 5 | Status: SHIPPED | OUTPATIENT
Start: 2025-07-24

## 2025-07-24 RX ORDER — TIRZEPATIDE 12.5 MG/.5ML
12.5 INJECTION, SOLUTION SUBCUTANEOUS WEEKLY
Qty: 2 ML | Refills: 2 | Status: SHIPPED | OUTPATIENT
Start: 2025-07-24

## 2025-07-24 RX ORDER — BLOOD-GLUCOSE METER
EACH MISCELLANEOUS
Qty: 1 KIT | Refills: 0 | Status: SHIPPED | OUTPATIENT
Start: 2025-07-24

## 2025-07-25 RX ORDER — METOPROLOL SUCCINATE 25 MG/1
25 TABLET, EXTENDED RELEASE ORAL DAILY
Qty: 30 TABLET | Refills: 5 | Status: SHIPPED | OUTPATIENT
Start: 2025-07-25

## 2025-07-25 RX ORDER — LOSARTAN POTASSIUM AND HYDROCHLOROTHIAZIDE 12.5; 5 MG/1; MG/1
1 TABLET ORAL DAILY
Qty: 30 TABLET | Refills: 5 | Status: SHIPPED | OUTPATIENT
Start: 2025-07-25

## 2025-07-30 DIAGNOSIS — E11.42 TYPE 2 DIABETES MELLITUS WITH DIABETIC POLYNEUROPATHY, WITHOUT LONG-TERM CURRENT USE OF INSULIN (HCC): ICD-10-CM

## 2025-07-31 RX ORDER — TIRZEPATIDE 12.5 MG/.5ML
12.5 INJECTION, SOLUTION SUBCUTANEOUS WEEKLY
Qty: 2 ML | Refills: 2 | Status: SHIPPED | OUTPATIENT
Start: 2025-07-31

## 2025-08-17 DIAGNOSIS — E11.42 TYPE 2 DIABETES MELLITUS WITH DIABETIC POLYNEUROPATHY, WITHOUT LONG-TERM CURRENT USE OF INSULIN (HCC): ICD-10-CM

## 2025-08-17 DIAGNOSIS — I25.10 CORONARY ARTERY DISEASE INVOLVING NATIVE CORONARY ARTERY: ICD-10-CM

## 2025-08-19 ENCOUNTER — HOSPITAL ENCOUNTER (OUTPATIENT)
Dept: RADIOLOGY | Facility: HOSPITAL | Age: 80
Discharge: HOME | End: 2025-08-19
Attending: NURSE PRACTITIONER
Payer: MEDICARE

## 2025-08-19 DIAGNOSIS — E04.2 MULTIPLE THYROID NODULES: ICD-10-CM

## 2025-08-19 PROCEDURE — 76536 US EXAM OF HEAD AND NECK: CPT

## 2025-08-19 RX ORDER — ACYCLOVIR 400 MG/1
1 TABLET ORAL
Qty: 3 EACH | Refills: 5 | Status: SHIPPED | OUTPATIENT
Start: 2025-08-19

## 2025-08-19 RX ORDER — METOPROLOL SUCCINATE 25 MG/1
25 TABLET, EXTENDED RELEASE ORAL DAILY
Qty: 30 TABLET | Refills: 5 | Status: SHIPPED | OUTPATIENT
Start: 2025-08-19

## 2025-08-19 RX ORDER — TIRZEPATIDE 12.5 MG/.5ML
12.5 INJECTION, SOLUTION SUBCUTANEOUS WEEKLY
Qty: 2 ML | Refills: 2 | Status: SHIPPED | OUTPATIENT
Start: 2025-08-19

## (undated) DEVICE — GLIDESHEATH BASIC HYDROPHILIC COATED INTRODUCER SHEATH: Brand: GLIDESHEATH

## (undated) DEVICE — CATH DIAG 5FR .045 100CM FR4

## (undated) DEVICE — DGW .035 FC J3MM 260CM TEF: Brand: EMERALD

## (undated) DEVICE — RADIFOCUS OPTITORQUE ANGIOGRAPHIC CATHETER: Brand: OPTITORQUE

## (undated) DEVICE — TR BAND RADIAL ARTERY COMPRESSION DEVICE: Brand: TR BAND

## (undated) DEVICE — GUIDEWIRE WHOLEY HI TORQUE INTERM MOD J .035 145CM